# Patient Record
Sex: MALE | Race: WHITE | NOT HISPANIC OR LATINO | Employment: FULL TIME | URBAN - METROPOLITAN AREA
[De-identification: names, ages, dates, MRNs, and addresses within clinical notes are randomized per-mention and may not be internally consistent; named-entity substitution may affect disease eponyms.]

---

## 2023-08-26 ENCOUNTER — OFFICE VISIT (OUTPATIENT)
Dept: URGENT CARE | Age: 63
End: 2023-08-26
Payer: COMMERCIAL

## 2023-08-26 ENCOUNTER — HOSPITAL ENCOUNTER (EMERGENCY)
Facility: HOSPITAL | Age: 63
Discharge: HOME | End: 2023-08-26
Attending: EMERGENCY MEDICINE
Payer: COMMERCIAL

## 2023-08-26 VITALS
TEMPERATURE: 97.7 F | WEIGHT: 186.29 LBS | DIASTOLIC BLOOD PRESSURE: 90 MMHG | OXYGEN SATURATION: 96 % | RESPIRATION RATE: 18 BRPM | SYSTOLIC BLOOD PRESSURE: 200 MMHG | HEART RATE: 86 BPM

## 2023-08-26 VITALS
HEART RATE: 92 BPM | OXYGEN SATURATION: 96 % | TEMPERATURE: 97.6 F | DIASTOLIC BLOOD PRESSURE: 102 MMHG | RESPIRATION RATE: 18 BRPM | SYSTOLIC BLOOD PRESSURE: 190 MMHG

## 2023-08-26 DIAGNOSIS — I16.0 HYPERTENSIVE URGENCY: ICD-10-CM

## 2023-08-26 DIAGNOSIS — I10 HYPERTENSION, UNSPECIFIED TYPE: Primary | ICD-10-CM

## 2023-08-26 LAB
ANION GAP SERPL CALCULATED.3IONS-SCNC: 4 MMOL/L
BUN SERPL-MCNC: 8 MG/DL (ref 5–25)
CALCIUM SERPL-MCNC: 9.6 MG/DL (ref 8.4–10.2)
CHLORIDE SERPL-SCNC: 98 MMOL/L (ref 96–108)
CO2 SERPL-SCNC: 31 MMOL/L (ref 21–32)
CREAT SERPL-MCNC: 0.81 MG/DL (ref 0.6–1.3)
GFR SERPL CREATININE-BSD FRML MDRD: 94 ML/MIN/1.73SQ M
GLUCOSE SERPL-MCNC: 99 MG/DL (ref 65–140)
POTASSIUM SERPL-SCNC: 4.6 MMOL/L (ref 3.5–5.3)
SODIUM SERPL-SCNC: 133 MMOL/L (ref 135–147)

## 2023-08-26 PROCEDURE — G0382 LEV 3 HOSP TYPE B ED VISIT: HCPCS

## 2023-08-26 PROCEDURE — 80048 BASIC METABOLIC PNL TOTAL CA: CPT | Performed by: EMERGENCY MEDICINE

## 2023-08-26 PROCEDURE — 36415 COLL VENOUS BLD VENIPUNCTURE: CPT | Performed by: EMERGENCY MEDICINE

## 2023-08-26 PROCEDURE — 99285 EMERGENCY DEPT VISIT HI MDM: CPT | Performed by: EMERGENCY MEDICINE

## 2023-08-26 PROCEDURE — 99283 EMERGENCY DEPT VISIT LOW MDM: CPT

## 2023-08-26 RX ORDER — LOSARTAN POTASSIUM 50 MG/1
50 TABLET ORAL ONCE
Status: COMPLETED | OUTPATIENT
Start: 2023-08-26 | End: 2023-08-26

## 2023-08-26 RX ORDER — LOSARTAN POTASSIUM 50 MG/1
50 TABLET ORAL DAILY
Qty: 30 TABLET | Refills: 0 | Status: SHIPPED | OUTPATIENT
Start: 2023-08-26 | End: 2023-09-08 | Stop reason: SDUPTHER

## 2023-08-26 RX ADMIN — LOSARTAN POTASSIUM 50 MG: 50 TABLET, FILM COATED ORAL at 11:48

## 2023-08-26 NOTE — PROGRESS NOTES
[facility]        NAME: Melanie Austin is a 61 y.o. male  : 1960    MRN: [id number]  DATE: 2023  TIME: 10:14 AM    Assessment and Plan   Hypertension, unspecified type [I10]  1. Hypertension, unspecified type  Transfer to other facility      Report to [address] [place] [address] emergency department for further evaluation. Son is available and willing to drive. Secure follow up appointment with PCP as soon as possible for further management of HTN. Patient Instructions   Hypertension   WHAT YOU NEED TO KNOW:   Hypertension is high blood pressure. Your blood pressure is the force of your blood moving against the walls of your arteries. Hypertension causes your blood pressure to get so high that your heart has to work much harder than normal. This can damage your heart. Hypertension that does not respond to medicines and lifestyle changes is called resistant hypertension. Hypertension is considered chronic when it continues for 3 months or longer.   DISCHARGE INSTRUCTIONS:   Call your local emergency number (911 in the [address]) or have someone call if:   • You have chest pain.     • You have any of the following signs of a heart attack:       ? Squeezing, pressure, or pain in your chest     ? You may  also have any of the following:      - Discomfort or pain in your back, neck, jaw, stomach, or arm     - Shortness of breath     - Nausea or vomiting     - Lightheadedness or a sudden cold sweat     • You become confused or have trouble speaking.     • You suddenly feel lightheaded or have trouble breathing.     Return to the emergency department if:   • You have a severe headache or vision loss.     • You have weakness in an arm or leg.     Call your doctor or cardiologist if:   • You feel faint, dizzy, confused, or drowsy.     • You have been taking your blood pressure medicine but your pressure is higher than your provider says it should be.     • You have questions or concerns about your condition or care.     Medicines: You may  need any of the following:  • Antihypertensives  may be used to help lower your blood pressure. Several kinds of medicines are available. Your healthcare provider will choose medicines based on the kind of hypertension you have. You may need more than one type of medicine. Take the medicine exactly as directed.     • Diuretics  help decrease extra fluid that collects in your body. This will help lower your blood pressure. You may urinate more often while you take this medicine.     • Cholesterol medicine  helps lower your cholesterol level. A low cholesterol level helps prevent heart disease and makes it easier to control your blood pressure.     • Take your medicine as directed. Contact your healthcare provider if you think your medicine is not helping or if you have side effects. Tell your provider if you are allergic to any medicine. Keep a list of the medicines, vitamins, and herbs you take. Include the amounts, and when and why you take them. Bring the list or the pill bottles to follow-up visits. Carry your medicine list with you in case of an emergency.     Follow up with your doctor or cardiologist as directed: You will need to return to have your blood pressure checked and to have other lab tests done. Write down your questions so you remember to ask them during your visits. Stages of hypertension:  Your healthcare provider will give you a blood pressure goal based on your age, health, and risk for cardiovascular disease. The following are general guidelines on the stages of hypertension:  • Normal blood pressure is 119/79 or lower . Your healthcare provider may only check your blood pressure each year if it stays at a normal level.     • Elevated blood pressure is 120/79 to 129/79 . This is sometimes called prehypertension. Your healthcare provider may suggest lifestyle changes to help lower your blood pressure to a normal level.  He or she may then check it again in 3 to 6 months.     • Stage 1 hypertension is 130/80  to 139/89 . Your provider may recommend lifestyle changes, medication, and checks every 3 to 6 months until your blood pressure is controlled.     • Stage 2 hypertension is 140/90 or higher . Your provider will recommend lifestyle changes and have you take 2 kinds of hypertension medicines. You will also need to have your blood pressure checked monthly until it is controlled.        Manage hypertension:   • Check your blood pressure at home. Do not smoke, have caffeine, or exercise for at least 30 minutes before you check your blood pressure. Sit and rest for 5 minutes before you check your blood pressure. Extend your arm and support it on a flat surface. Your arm should be at the same level as your heart. Follow the directions that came with your blood pressure monitor. Check your blood pressure 2 times, 1 minute apart, before you take your medicine in the morning. Also check your blood pressure before your evening meal. Keep a record of your readings and bring it to your follow-up visits. Ask your healthcare provider what your blood pressure should be.          • Manage any other health conditions you have. Health conditions such as diabetes can increase your risk for hypertension. Follow your healthcare provider's instructions and take all your medicines as directed.     • Ask about all medicines. Certain medicines can increase your blood pressure. Examples include oral birth control pills, decongestants, herbal supplements, and NSAIDs, such as ibuprofen. Your healthcare provider can tell you which medicines are safe for you to take. This includes prescription and over-the-counter medicines.     Lifestyle changes you can make to manage hypertension:  Your healthcare provider may recommend you work with a team to manage hypertension.  The team may include medical experts such as a dietitian, an exercise or physical therapist, and a behavior therapist. Your family members may be included in helping you create lifestyle changes. • Limit sodium (salt) as directed. Too much sodium can affect your fluid balance. Check labels to find low-sodium or no-salt-added foods. Some low-sodium foods use potassium salts for flavor. Too much potassium can also cause health problems. Your healthcare provider will tell you how much sodium and potassium are safe for you to have in a day. He or she may recommend that you limit sodium to 2,300 mg a day.          • Follow the meal plan recommended by your healthcare provider. A dietitian or your provider can give you more information on low-sodium plans or the DASH (Dietary Approaches to Stop Hypertension) eating plan. The DASH plan is low in sodium, processed sugar, unhealthy fats, and total fat. It is high in potassium, calcium, and fiber. These can be found in vegetables, fruit, and whole-grain foods.          • Be physically active throughout the day. Physical activity, such as exercise, can help control your blood pressure and your weight. Be physically active for at least 30 minutes per day, on most days of the week. Include aerobic activity, such as walking or riding a bicycle. Also include strength training at least 2 times each week. Your healthcare providers can help you create a physical activity plan.              • Decrease stress. This may help lower your blood pressure. Learn ways to relax, such as deep breathing or listening to music.     • Limit alcohol as directed. Alcohol can increase your blood pressure. A drink of alcohol is 12 ounces of beer, 5 ounces of wine, or 1½ ounces of liquor.     • Do not smoke. Nicotine and other chemicals in cigarettes and cigars can increase your blood pressure and also cause lung damage. Ask your healthcare provider for information if you currently smoke and need help to quit. E-cigarettes or smokeless tobacco still contain nicotine.  Talk to your healthcare provider before you use these products.        © Copyright [facility] 2022 Information is for End User's use only and may not be sold, redistributed or otherwise used for commercial purposes. The above information is an  only. It is not intended as medical advice for individual conditions or treatments. Talk to your doctor, nurse or pharmacist before following any medical regimen to see if it is safe and effective for you. Follow up with PCP in 3-5 days. Proceed to  ER if symptoms worsen. Chief Complaint     Chief Complaint   Patient presents with   • Hypertension     Patient with history of high blood pressure who is not regulated on medication noted that his blood pressure has been high recently. He denies any symptoms relating to the blood pressure. History of Present Illness       61year old male with PMH significant for HTN presents with son for evaluation of high blood pressure. He reports that he has been diagnosed with HTN in the past and prescribed medications, but he has not taken any medications in 2 years because he "can't get an appointment" with his PCP. He is a current smoker. He records his BP at home and notes systolic readings in the [id number] over the last several days. He denies headache, vision changes, dizziness, chest pain, palpitations or any other symptoms. Hypertension  Pertinent negatives include no chest pain, headaches, neck pain, palpitations or shortness of breath. Review of Systems   Review of Systems   Constitutional: Negative for fatigue and fever. HENT: Negative for congestion, ear discharge, ear pain, postnasal drip, rhinorrhea, sinus pressure, sinus pain, sneezing and sore throat. Eyes: Negative. Negative for pain, discharge, redness and itching. Respiratory: Negative. Negative for apnea, cough, choking, chest tightness, shortness of breath, wheezing and stridor. Cardiovascular: Negative. Negative for chest pain and palpitations. High blood pressure   Gastrointestinal: Negative. Negative for diarrhea, nausea and vomiting. Endocrine: Negative. Negative for polydipsia, polyphagia and polyuria. Genitourinary: Negative. Negative for decreased urine volume and flank pain. Musculoskeletal: Negative. Negative for arthralgias, back pain, myalgias, neck pain and neck stiffness. Skin: Negative. Negative for color change and rash. Allergic/Immunologic: Negative. Negative for environmental allergies. Neurological: Negative. Negative for dizziness, facial asymmetry, light-headedness, numbness and headaches. Hematological: Negative. Negative for adenopathy. Psychiatric/Behavioral: Negative. Current Medications       Current Outpatient Medications:   •  HYDROcodone-acetaminophen (NORCO) 5-325 mg per tablet, Take 1 tablet by mouth every 6 (six) hours as needed for moderate pain (Not relieved by Anti-inflammatory). Max Daily Amount: 4 tablets (Patient not taking: Reported on 8/26/2023), Disp: 20 tablet, Rfl: 0    Current Allergies     Allergies as of 08/26/2023   • (No Known Allergies)            The following portions of the patient's history were reviewed and updated as appropriate: allergies, current medications, past family history, past medical history, past social history, past surgical history and problem list.     No past medical history on file. No past surgical history on file. No family history on file. Medications have been verified. Objective   BP (!) 190/102 (BP Location: Left arm, Patient Position: Sitting, Cuff Size: Standard)   Pulse 92   Temp 97.6 °F (36.4 °C)   Resp 18   SpO2 96%        Physical Exam     Physical Exam  Vitals reviewed. Constitutional:       General: He is not in acute distress. Appearance: Normal appearance. He is not ill-appearing, toxic-appearing or diaphoretic. Interventions: He is not intubated. HENT:      Head: Normocephalic and atraumatic.       Right Ear: External ear normal. There is no impacted cerumen. Left Ear: External ear normal. There is no impacted cerumen. Nose: Nose normal. No congestion or rhinorrhea. Mouth/Throat:      Mouth: Mucous membranes are moist.      Pharynx: Oropharynx is clear. Uvula midline. No pharyngeal swelling, oropharyngeal exudate, posterior oropharyngeal erythema or uvula swelling. Tonsils: No tonsillar exudate or tonsillar abscesses. 1+ on the right. 1+ on the left. Eyes:      Extraocular Movements: Extraocular movements intact. Conjunctiva/sclera: Conjunctivae normal.      Pupils: Pupils are equal, round, and reactive to light. Cardiovascular:      Rate and Rhythm: Normal rate and regular rhythm. Pulses: Normal pulses. Heart sounds: Normal heart sounds, S1 normal and S2 normal. Heart sounds not distant. No murmur heard. No friction rub. No gallop. Pulmonary:      Effort: Pulmonary effort is normal. No tachypnea, bradypnea, accessory muscle usage, prolonged expiration, respiratory distress or retractions. He is not intubated. Breath sounds: Normal breath sounds. No stridor, decreased air movement or transmitted upper airway sounds. No decreased breath sounds, wheezing, rhonchi or rales. Chest:      Chest wall: No tenderness. Musculoskeletal:         General: Normal range of motion. Cervical back: Normal range of motion and neck supple. No rigidity or tenderness. Lymphadenopathy:      Cervical: No cervical adenopathy. Skin:     General: Skin is warm and dry. Capillary Refill: Capillary refill takes less than 2 seconds. Findings: No erythema. Neurological:      General: No focal deficit present. Mental Status: He is alert and oriented to person, place, and time. Mental status is at baseline. GCS: GCS eye subscore is 4. GCS verbal subscore is 5. GCS motor subscore is 6.    Psychiatric:         Mood and Affect: Mood normal.

## 2023-08-29 NOTE — ED PROVIDER NOTES
History  Chief Complaint   Patient presents with   • High Blood Pressure     Pt was at urgent care where his BP was 190/102 and was sent here. Pt states he took medication for BP 2 years ago but currently does not take anything for this. Pt reports no symptoms. 71-year-old male presents with asymptomatic hypertension. ,  Patient went to a urgent care for a this and was sent here as his blood pressure was elevated. No headache no chest pain no dizziness no lightheadedness no shortness of breath. Patient is unsure the last time he checked his blood pressure. ,  Says he was on blood pressure medicine years ago but stopped taking. As he lost insurance and has not been to a PCP. Prior to Admission Medications   Prescriptions Last Dose Informant Patient Reported? Taking? HYDROcodone-acetaminophen (NORCO) 5-325 mg per tablet   No No   Sig: Take 1 tablet by mouth every 6 (six) hours as needed for moderate pain (Not relieved by Anti-inflammatory). Max Daily Amount: 4 tablets   Patient not taking: Reported on 8/26/2023      Facility-Administered Medications: None       History reviewed. No pertinent past medical history. Past Surgical History:   Procedure Laterality Date   • APPENDECTOMY         History reviewed. No pertinent family history. I have reviewed and agree with the history as documented. E-Cigarette/Vaping     E-Cigarette/Vaping Substances     Social History     Tobacco Use   • Smoking status: Every Day     Packs/day: 1.00     Types: Cigarettes   Substance Use Topics   • Alcohol use: Yes     Alcohol/week: 6.0 standard drinks of alcohol     Types: 6 Cans of beer per week     Comment: 6 pack daily   • Drug use: No       Review of Systems   Constitutional: Negative for activity change, chills, diaphoresis and fever. HENT: Negative for congestion, sinus pressure and sore throat. Eyes: Negative for pain and visual disturbance.    Respiratory: Negative for cough, chest tightness, shortness of breath, wheezing and stridor. Cardiovascular: Negative for chest pain and palpitations. Gastrointestinal: Negative for abdominal distention, abdominal pain, constipation, diarrhea, nausea and vomiting. Genitourinary: Negative for dysuria and frequency. Musculoskeletal: Negative for neck pain and neck stiffness. Skin: Negative for rash. Neurological: Negative for dizziness, speech difficulty, light-headedness, numbness and headaches. Physical Exam  Physical Exam  Vitals reviewed. Constitutional:       General: He is not in acute distress. Appearance: He is well-developed. He is not diaphoretic. HENT:      Head: Normocephalic and atraumatic. Right Ear: External ear normal.      Left Ear: External ear normal.      Nose: Nose normal.   Eyes:      General:         Right eye: No discharge. Left eye: No discharge. Pupils: Pupils are equal, round, and reactive to light. Neck:      Trachea: No tracheal deviation. Cardiovascular:      Rate and Rhythm: Normal rate and regular rhythm. Heart sounds: Normal heart sounds. No murmur heard. Pulmonary:      Effort: Pulmonary effort is normal. No respiratory distress. Breath sounds: Normal breath sounds. No stridor. Abdominal:      General: There is no distension. Palpations: Abdomen is soft. Tenderness: There is no abdominal tenderness. There is no guarding or rebound. Musculoskeletal:         General: Normal range of motion. Cervical back: Normal range of motion and neck supple. Skin:     General: Skin is warm and dry. Coloration: Skin is not pale. Findings: No erythema. Neurological:      General: No focal deficit present. Mental Status: He is alert and oriented to person, place, and time.          Vital Signs  ED Triage Vitals   Temperature Pulse Respirations Blood Pressure SpO2   08/26/23 1048 08/26/23 1048 08/26/23 1048 08/26/23 1049 08/26/23 1048   97.7 °F (36.5 °C) 86 18 (!) 216/107 96 %      Temp Source Heart Rate Source Patient Position - Orthostatic VS BP Location FiO2 (%)   08/26/23 1048 08/26/23 1048 -- 08/26/23 1048 --   Oral Monitor  Right arm       Pain Score       08/26/23 1048       No Pain           Vitals:    08/26/23 1048 08/26/23 1049 08/26/23 1055   BP:  (!) 216/107 (!) 200/90   Pulse: 86           Visual Acuity      ED Medications  Medications   losartan (COZAAR) tablet 50 mg (50 mg Oral Given 8/26/23 1148)       Diagnostic Studies  Results Reviewed     Procedure Component Value Units Date/Time    Basic metabolic panel [[id number]]  (Abnormal) Collected: 08/26/23 1112    Lab Status: Final result Specimen: Blood from Arm, Right Updated: 08/26/23 1142     Sodium 133 mmol/L      Potassium 4.6 mmol/L      Chloride 98 mmol/L      CO2 31 mmol/L      ANION GAP 4 mmol/L      BUN 8 mg/dL      Creatinine 0.81 mg/dL      Glucose 99 mg/dL      Calcium 9.6 mg/dL      eGFR 94 ml/min/1.73sq m     Narrative:      [place] guidelines for Chronic Kidney Disease (CKD):   •  Stage 1 with normal or high GFR (GFR > 90 mL/min/1.73 square meters)  •  Stage 2 Mild CKD (GFR = 60-89 mL/min/1.73 square meters)  •  Stage 3A Moderate CKD (GFR = 45-59 mL/min/1.73 square meters)  •  Stage 3B Moderate CKD (GFR = 30-44 mL/min/1.73 square meters)  •  Stage 4 Severe CKD (GFR = 15-29 mL/min/1.73 square meters)  •  Stage 5 End Stage CKD (GFR <15 mL/min/1.73 square meters)  Note: GFR calculation is accurate only with a steady state creatinine                 No orders to display              Procedures  Procedures         ED Course                                             Medical Decision Making        Initial ED assessment: 42-year-old male, asymptomatic hypertension, history of hypertension used to be on blood pressure meds does not recall the names    Initial DDx includes but is not limited to:   Asymptomatic hypertension, patient does not have any chest pain suggestive of myocardial ischemia does not have any headache suggestive of intracranial hemorrhage,    Initial ED plan: We will start on antihypertensives we will check CBC CMP to ensure no hepatic or renal dysfunction        Final ED summary/disposition:   After evaluation and workup in the emergency department, discharged on losartan given name of [facility] family medicine clinic for which she will follow. Amount and/or Complexity of Data Reviewed  Labs: ordered. Risk  Prescription drug management. Disposition  Final diagnoses:   Hypertension, unspecified type   Hypertensive urgency     Time reflects when diagnosis was documented in both MDM as applicable and the Disposition within this note     Time User Action Codes Description Comment    8/26/2023 11:42 AM Noralyn Dakins Add [I15.9] Secondary hypertension     8/26/2023 11:42 AM Gillie Kanner J Remove [I15.9] Secondary hypertension     8/26/2023 11:42 AM Delvin Abdalla Add [I10] Hypertension, unspecified type     8/26/2023 11:42 AM Noralyn Dakins Add [I16.0] Hypertensive urgency       ED Disposition     ED Disposition   Discharge    Condition   Stable    Date/Time   Sat Aug 26, 2023 11:42 AM    Comment   Marci Mason discharge to home/self care.                Follow-up Information     Follow up With Specialties Details Why Contact Info Additional Information    [facility] Call in 1 day To arrange an appointment with a family doctor [id number] [facility] [phone]  [address], [place], [place], [address] [facility] [address]          Discharge Medication List as of 8/26/2023 11:43 AM      START taking these medications    Details   losartan (COZAAR) 50 mg tablet Take 1 tablet (50 mg total) by mouth daily, Starting Sat 8/26/2023, Normal         CONTINUE these medications which have NOT CHANGED    Details   HYDROcodone-acetaminophen (NORCO) 5-325 mg per tablet Take 1 tablet by mouth every 6 (six) hours as needed for moderate pain (Not relieved by Anti-inflammatory). Max Daily Amount: 4 tablets, Starting 4/11/2016, Until Discontinued, Print             No discharge procedures on file.     PDMP Review     None          ED Provider  Electronically Signed by           Geeta Liu DO  08/29/23 [id number]

## 2023-09-08 ENCOUNTER — OFFICE VISIT (OUTPATIENT)
Dept: FAMILY MEDICINE CLINIC | Facility: CLINIC | Age: 63
End: 2023-09-08

## 2023-09-08 VITALS
WEIGHT: 189 LBS | RESPIRATION RATE: 16 BRPM | TEMPERATURE: 97.7 F | BODY MASS INDEX: 28.64 KG/M2 | HEIGHT: 68 IN | HEART RATE: 47 BPM | OXYGEN SATURATION: 92 % | SYSTOLIC BLOOD PRESSURE: 130 MMHG | DIASTOLIC BLOOD PRESSURE: 76 MMHG

## 2023-09-08 DIAGNOSIS — I10 HYPERTENSION, UNSPECIFIED TYPE: Primary | ICD-10-CM

## 2023-09-08 DIAGNOSIS — J44.9 CHRONIC OBSTRUCTIVE PULMONARY DISEASE, UNSPECIFIED COPD TYPE (HCC): ICD-10-CM

## 2023-09-08 DIAGNOSIS — R31.0 GROSS HEMATURIA: ICD-10-CM

## 2023-09-08 DIAGNOSIS — Z12.11 SCREEN FOR COLON CANCER: ICD-10-CM

## 2023-09-08 PROBLEM — F17.200 CURRENT EVERY DAY SMOKER: Status: ACTIVE | Noted: 2023-09-08

## 2023-09-08 PROCEDURE — 99213 OFFICE O/P EST LOW 20 MIN: CPT | Performed by: INTERNAL MEDICINE

## 2023-09-08 RX ORDER — LOSARTAN POTASSIUM 50 MG/1
50 TABLET ORAL DAILY
Qty: 30 TABLET | Refills: 0 | Status: SHIPPED | OUTPATIENT
Start: 2023-09-08

## 2023-09-08 RX ORDER — ALBUTEROL SULFATE 90 UG/1
2 AEROSOL, METERED RESPIRATORY (INHALATION) EVERY 6 HOURS PRN
Qty: 6.7 G | Refills: 5 | Status: SHIPPED | OUTPATIENT
Start: 2023-09-08

## 2023-09-08 NOTE — ASSESSMENT & PLAN NOTE
Patient has a chronic cough due to his smoking history. In the past he has used an inhaler with much success.

## 2023-09-08 NOTE — PROGRESS NOTES
Name: So Walsh      : 1960      MRN: [id number]  Encounter Provider: Kaitlin Schmitt MD  Encounter Date: 2023   Encounter department: [facility]     1. Hypertension, unspecified type  Assessment & Plan:  Patient was placed on losartan for his blood pressure and needs refills. Orders:  -     losartan (COZAAR) 50 mg tablet; Take 1 tablet (50 mg total) by mouth daily  -     Ambulatory Referral to Social Work Care Management Program; Future    2. Screen for colon cancer  -     Occult Blood, Fecal Immunochemical; Future    3. Chronic obstructive pulmonary disease, unspecified COPD type ([address] )  Assessment & Plan:  Patient has a chronic cough due to his smoking history. In the past he has used an inhaler with much success. Orders:  -     albuterol (Proventil HFA) 90 mcg/act inhaler; Inhale 2 puffs every 6 (six) hours as needed for wheezing  -     Ambulatory Referral to Social Work Care Management Program; Future    4. Gross hematuria  Assessment & Plan:  He casually mentions that he periodically has gross hematuria. Unfortunately he has no insurance             Subjective      Patient has multiple health issues however has had no insurance and needs to be followed more regularly by a regular doctor. He denies any chest pain but does have a chronic cough from his smoking. The only blood work he had was BMP done within the month we will try and get care management to help him out get insurance    Review of Systems   Constitutional: Negative for chills and fever. HENT: Negative for ear pain and sore throat. Eyes: Negative for pain and visual disturbance. Respiratory: Positive for cough and shortness of breath. Cardiovascular: Negative for chest pain and palpitations. Gastrointestinal: Negative for abdominal pain and vomiting. Genitourinary: Positive for hematuria. Negative for dysuria. Musculoskeletal: Negative for arthralgias and back pain. Skin: Negative for color change and rash. Neurological: Negative for seizures and syncope. All other systems reviewed and are negative. Current Outpatient Medications on File Prior to Visit   Medication Sig   • [DISCONTINUED] losartan (COZAAR) 50 mg tablet Take 1 tablet (50 mg total) by mouth daily   • [DISCONTINUED] HYDROcodone-acetaminophen (NORCO) 5-325 mg per tablet Take 1 tablet by mouth every 6 (six) hours as needed for moderate pain (Not relieved by Anti-inflammatory). Max Daily Amount: 4 tablets (Patient not taking: Reported on 8/26/2023)       Objective     /76 (BP Location: Left arm, Patient Position: Sitting, Cuff Size: Large)   Pulse (!) 47   Temp 97.7 °F (36.5 °C) (Temporal)   Resp 16   Ht 5' 8" (1.727 m)   Wt 85.7 kg (189 lb)   SpO2 92%   BMI 28.74 kg/m²     Physical Exam  Constitutional:       General: He is not in acute distress. Appearance: He is well-developed. HENT:      Right Ear: External ear normal.      Left Ear: External ear normal.      Nose: Nose normal.      Mouth/Throat:      Pharynx: No oropharyngeal exudate. Eyes:      Pupils: Pupils are equal, round, and reactive to light. Neck:      Thyroid: No thyromegaly. Vascular: No JVD. Cardiovascular:      Rate and Rhythm: Normal rate and regular rhythm. Heart sounds: Normal heart sounds. No murmur heard. No gallop. Pulmonary:      Effort: Pulmonary effort is normal. No respiratory distress. Breath sounds: Normal breath sounds. No wheezing or rales. Abdominal:      General: Bowel sounds are normal. There is no distension. Palpations: Abdomen is soft. There is no mass. Tenderness: There is no abdominal tenderness. Musculoskeletal:         General: No tenderness. Normal range of motion. Cervical back: Normal range of motion and neck supple. Right lower leg: No edema. Left lower leg: No edema. Lymphadenopathy:      Cervical: No cervical adenopathy.    Skin: Findings: No rash. Neurological:      General: No focal deficit present. Mental Status: He is alert and oriented to person, place, and time. Cranial Nerves: No cranial nerve deficit. Coordination: Coordination normal.   Psychiatric:         Mood and Affect: Mood normal.         Behavior: Behavior normal.         Thought Content:  Thought content normal.         Judgment: Judgment normal.       Karli Mar MD

## 2023-09-11 ENCOUNTER — PATIENT OUTREACH (OUTPATIENT)
Dept: FAMILY MEDICINE CLINIC | Facility: CLINIC | Age: 63
End: 2023-09-11

## 2023-09-11 NOTE — PROGRESS NOTES
DONTACM received referral from PCP office to assist with insurance. Chart review completed. Patient is currently without insurance.  order states to call son, Gutierrez Baires at [phone]; however, there is no communication consent signed to provide SW permission to speak with son. SW placed phone call to patient. Patient was not available and message box is full and not taking messages at this time. SW placed phone call to wife, Anuj Cheung, who is listed as emergency contact. Call was declined. SW will attempt to reach patient at another time.

## 2023-09-30 DIAGNOSIS — I10 HYPERTENSION, UNSPECIFIED TYPE: ICD-10-CM

## 2023-10-02 ENCOUNTER — PATIENT OUTREACH (OUTPATIENT)
Dept: FAMILY MEDICINE CLINIC | Facility: CLINIC | Age: 63
End: 2023-10-02

## 2023-10-02 RX ORDER — LOSARTAN POTASSIUM 50 MG/1
50 TABLET ORAL DAILY
Qty: 90 TABLET | Refills: 1 | Status: SHIPPED | OUTPATIENT
Start: 2023-10-02

## 2023-10-02 NOTE — LETTER
[address] [facility]  [phone]    Re: Insurance Assistance   10/2/2023       Dear Karlos Butcher,    I tried to reach you by telephone and unfortunately was unable to speak with you. I wanted to be certain that you had information regarding medical insurance. Please feel free to contact me at [phone] if you have questions or needs regarding health insurance. If I do not have an answer I can assist you in finding the appropriate agency or individual who can help.     Sincerely,         Dale Delarosa LCSW

## 2023-10-02 NOTE — PROGRESS NOTES
OPCM SW placed 2nd outreach phone call to patient. Note states to call son Clementina Hancock; however, patient does not have son listed as an emergency contact and no medical communication consent is provided. SW attempted to reach patient and unable to leave a message. Unable to reach letter sent by [address] [place] [address] and referral closed due to not being able to connect with patient. SW remains available.

## 2023-11-17 ENCOUNTER — OFFICE VISIT (OUTPATIENT)
Dept: FAMILY MEDICINE CLINIC | Facility: CLINIC | Age: 63
End: 2023-11-17
Payer: COMMERCIAL

## 2023-11-17 VITALS
OXYGEN SATURATION: 94 % | RESPIRATION RATE: 16 BRPM | HEART RATE: 92 BPM | HEIGHT: 68 IN | TEMPERATURE: 97.2 F | DIASTOLIC BLOOD PRESSURE: 98 MMHG | BODY MASS INDEX: 29.1 KG/M2 | WEIGHT: 192 LBS | SYSTOLIC BLOOD PRESSURE: 158 MMHG

## 2023-11-17 DIAGNOSIS — Z13.220 SCREENING, LIPID: ICD-10-CM

## 2023-11-17 DIAGNOSIS — Z12.11 SCREEN FOR COLON CANCER: ICD-10-CM

## 2023-11-17 DIAGNOSIS — I10 HYPERTENSION, UNSPECIFIED TYPE: ICD-10-CM

## 2023-11-17 DIAGNOSIS — J44.9 CHRONIC OBSTRUCTIVE PULMONARY DISEASE, UNSPECIFIED COPD TYPE (HCC): Primary | ICD-10-CM

## 2023-11-17 DIAGNOSIS — Z12.5 SCREENING FOR PROSTATE CANCER: ICD-10-CM

## 2023-11-17 PROCEDURE — 99214 OFFICE O/P EST MOD 30 MIN: CPT | Performed by: INTERNAL MEDICINE

## 2023-11-17 RX ORDER — LOSARTAN POTASSIUM 50 MG/1
100 TABLET ORAL DAILY
Qty: 90 TABLET | Refills: 1 | Status: SHIPPED | OUTPATIENT
Start: 2023-11-17

## 2023-11-17 NOTE — PROGRESS NOTES
Answers submitted by the patient for this visit:  High Blood Pressure Questionnaire (Submitted on 11/13/2023)  Chief Complaint: Hypertension  Chronicity: chronic  Onset: more than 1 month ago  Progression since onset: unchanged  Condition status: controlled  anxiety: No  blurred vision: Yes  chest pain: Yes  headaches: No  malaise/fatigue: No  neck pain: No  orthopnea: Yes  palpitations: Yes  peripheral edema: No  PND: No  shortness of breath: Yes  sweats: No  Agents associated with hypertension: NSAIDs  CAD risks: sedentary lifestyle, smoking/tobacco exposure  Compliance problems: no compliance problems

## 2023-11-17 NOTE — PROGRESS NOTES
Name: Vladislav Young      : 1960      MRN: [id number]  Encounter Provider: Gayatri Boswell MD  Encounter Date: 2023   Encounter department: [facility]     1. Chronic obstructive pulmonary disease, unspecified COPD type ([address])  -     fluticasone-umeclidinium-vilanterol 100-62.5-25 mcg/actuation inhaler; Inhale 1 puff daily Rinse mouth after use. -     CBC and differential; Future  -     XR chest pa & lateral; Future; Expected date: 2023    2. Hypertension, unspecified type  -     losartan (COZAAR) 50 mg tablet; Take 2 tablets (100 mg total) by mouth daily  -     Comprehensive metabolic panel; Future    3. Screening for prostate cancer  -     PSA, Total Screen; Future    4. Screening, lipid  -     Lipid panel; Future    5. Screen for colon cancer  -     Cologuard      BMI Counseling: Body mass index is 29.19 kg/m². The BMI is above normal. Nutrition recommendations include decreasing portion sizes, limiting drinks that contain sugar, moderation in carbohydrate intake and reducing intake of cholesterol. Exercise recommendations include exercising 3-5 times per week and strength training exercises. Rationale for BMI follow-up plan is due to patient being overweight or obese. Subjective      Pt did get insurance and is ready to go for testingg and adjust his meds    Hypertension  This is a chronic problem. The current episode started more than 1 month ago. The problem is unchanged. The problem is controlled. Associated symptoms include blurred vision, chest pain, orthopnea, palpitations and shortness of breath. Pertinent negatives include no anxiety, headaches, malaise/fatigue, neck pain, peripheral edema, PND or sweats. Agents associated with hypertension include NSAIDs. Risk factors for coronary artery disease include sedentary lifestyle, smoking/tobacco exposure and male gender. Past treatments include angiotensin blockers.  The current treatment provides mild improvement. There are no compliance problems. There is no history of angina or CVA. Review of Systems   Constitutional:  Negative for chills, fever and malaise/fatigue. HENT:  Negative for ear pain and sore throat. Eyes:  Positive for blurred vision. Negative for pain and visual disturbance. Respiratory:  Positive for shortness of breath. Negative for cough. Cardiovascular:  Positive for chest pain, palpitations and orthopnea. Negative for PND. Gastrointestinal:  Negative for abdominal pain and vomiting. Genitourinary:  Negative for dysuria and hematuria. Musculoskeletal:  Negative for arthralgias, back pain and neck pain. Skin:  Negative for color change and rash. Neurological:  Negative for seizures, syncope and headaches. All other systems reviewed and are negative. Current Outpatient Medications on File Prior to Visit   Medication Sig    albuterol (Proventil HFA) 90 mcg/act inhaler Inhale 2 puffs every 6 (six) hours as needed for wheezing    [DISCONTINUED] losartan (COZAAR) 50 mg tablet TAKE 1 TABLET BY MOUTH EVERY DAY       Objective     /98 (BP Location: Right arm, Patient Position: Sitting, Cuff Size: Large)   Pulse 92   Temp (!) 97.2 °F (36.2 °C) (Temporal)   Resp 16   Ht 5' 8" (1.727 m)   Wt 87.1 kg (192 lb)   SpO2 94%   BMI 29.19 kg/m²     Physical Exam  Constitutional:       General: He is not in acute distress. Appearance: He is well-developed. HENT:      Right Ear: External ear normal.      Left Ear: External ear normal.      Nose: Nose normal.      Mouth/Throat:      Pharynx: No oropharyngeal exudate. Eyes:      Pupils: Pupils are equal, round, and reactive to light. Neck:      Thyroid: No thyromegaly. Vascular: No JVD. Cardiovascular:      Rate and Rhythm: Normal rate and regular rhythm. Heart sounds: Normal heart sounds. No murmur heard. No gallop.    Pulmonary:      Effort: Pulmonary effort is normal. No respiratory distress. Breath sounds: Normal breath sounds. No wheezing, rhonchi or rales. Abdominal:      General: Bowel sounds are normal. There is no distension. Palpations: Abdomen is soft. There is no mass. Tenderness: There is no abdominal tenderness. Musculoskeletal:         General: No tenderness. Normal range of motion. Cervical back: Normal range of motion and neck supple. Right lower leg: No edema. Left lower leg: No edema. Lymphadenopathy:      Cervical: No cervical adenopathy. Skin:     Findings: No rash. Neurological:      General: No focal deficit present. Mental Status: He is alert and oriented to person, place, and time. Cranial Nerves: No cranial nerve deficit. Coordination: Coordination normal.   Psychiatric:         Mood and Affect: Mood normal.         Behavior: Behavior normal.         Thought Content:  Thought content normal.         Judgment: Judgment normal.       Ciara Thompson MD

## 2023-11-25 ENCOUNTER — LAB (OUTPATIENT)
Dept: LAB | Facility: MEDICAL CENTER | Age: 63
End: 2023-11-25
Payer: COMMERCIAL

## 2023-11-25 ENCOUNTER — APPOINTMENT (OUTPATIENT)
Dept: RADIOLOGY | Facility: MEDICAL CENTER | Age: 63
End: 2023-11-25
Payer: COMMERCIAL

## 2023-11-25 DIAGNOSIS — J44.9 CHRONIC OBSTRUCTIVE PULMONARY DISEASE, UNSPECIFIED COPD TYPE (HCC): ICD-10-CM

## 2023-11-25 DIAGNOSIS — I10 HYPERTENSION, UNSPECIFIED TYPE: ICD-10-CM

## 2023-11-25 DIAGNOSIS — Z12.5 SCREENING FOR PROSTATE CANCER: ICD-10-CM

## 2023-11-25 DIAGNOSIS — Z13.220 SCREENING, LIPID: ICD-10-CM

## 2023-11-25 LAB
ALBUMIN SERPL BCP-MCNC: 3.9 G/DL (ref 3.5–5)
ALP SERPL-CCNC: 79 U/L (ref 34–104)
ALT SERPL W P-5'-P-CCNC: 12 U/L (ref 7–52)
ANION GAP SERPL CALCULATED.3IONS-SCNC: 3 MMOL/L
AST SERPL W P-5'-P-CCNC: 16 U/L (ref 13–39)
BILIRUB SERPL-MCNC: 0.54 MG/DL (ref 0.2–1)
BUN SERPL-MCNC: 8 MG/DL (ref 5–25)
CALCIUM SERPL-MCNC: 9.6 MG/DL (ref 8.4–10.2)
CHLORIDE SERPL-SCNC: 99 MMOL/L (ref 96–108)
CHOLEST SERPL-MCNC: 199 MG/DL
CO2 SERPL-SCNC: 32 MMOL/L (ref 21–32)
CREAT SERPL-MCNC: 1.02 MG/DL (ref 0.6–1.3)
GFR SERPL CREATININE-BSD FRML MDRD: 77 ML/MIN/1.73SQ M
GLUCOSE P FAST SERPL-MCNC: 118 MG/DL (ref 65–99)
HDLC SERPL-MCNC: 48 MG/DL
LDLC SERPL CALC-MCNC: 123 MG/DL (ref 0–100)
NONHDLC SERPL-MCNC: 151 MG/DL
POTASSIUM SERPL-SCNC: 5.9 MMOL/L (ref 3.5–5.3)
PROT SERPL-MCNC: 7.3 G/DL (ref 6.4–8.4)
PSA SERPL-MCNC: 2.41 NG/ML (ref 0–4)
SODIUM SERPL-SCNC: 134 MMOL/L (ref 135–147)
TRIGL SERPL-MCNC: 138 MG/DL

## 2023-11-25 PROCEDURE — G0103 PSA SCREENING: HCPCS

## 2023-11-25 PROCEDURE — 85025 COMPLETE CBC W/AUTO DIFF WBC: CPT

## 2023-11-25 PROCEDURE — 71046 X-RAY EXAM CHEST 2 VIEWS: CPT

## 2023-11-25 PROCEDURE — 36415 COLL VENOUS BLD VENIPUNCTURE: CPT

## 2023-11-25 PROCEDURE — 80053 COMPREHEN METABOLIC PANEL: CPT

## 2023-11-25 PROCEDURE — 80061 LIPID PANEL: CPT

## 2023-11-25 NOTE — LETTER
[address] [place] [address]  [address] [address]      November 28, 2023    MRN: [id number]     Phone: [phone]     Dear Mr. Rosario Patel recently had a(n) Diagnostic Imaging performed on 11/25/2023 at  [address] [place] [address] that was requested by Lia Lundy MD. The study was reviewed by a radiologist, which is a physician who specializes in medical imaging. The radiologist issued a report describing his or her findings. In that report there was a finding that the radiologist felt warranted further discussion with your health care provider and that discussion would be beneficial to you. The results were sent to Lia Lundy MD on 11/25/2023  8:16 AM. We recommend that you contact Lia Lundy MD at [phone] or set up an appointment to discuss the results of the imaging test. If you have already heard from Lia Lundy MD regarding the results of your study, you can disregard this letter. This letter is not meant to alarm you, but intended to encourage you to follow-up on your results with the provider that sent you for the imaging study. In addition, we have enclosed answers to frequently asked questions by other patients who have also received a letter to review results with their health care provider (see page two). Thank you for choosing [address] [place] [address] for your medical imaging needs. FREQUENTLY ASKED QUESTIONS    Why am I receiving this letter? [facility] requires us to notify patients who have findings on imaging exams that may require more testing or follow-up with a health professional within the next 3 months.         How serious is the finding on the imaging test?  This letter is sent to all patients who may need follow-up or more testing within the next 3 months. Receiving this letter does not necessarily mean you have a life-threatening imaging finding or disease. Recommendations in the radiologist’s imaging report are general in nature and it is up to your healthcare provider to say whether those recommendations make sense for your situation. You are strongly encouraged to talk to your health care provider about the results and ask whether additional steps need to be taken. Where can I get a copy of the final report for my recent radiology exam?  To get a full copy of the report you can access your records online at [email or web address] or please contact [facility] Medical Records Department at [phone] Monday through Friday between 8 am and 6 pm.         What do I need to do now? Please contact your health care provider who requested the imaging study to discuss what further actions (if any) are needed. You may have already heard from (your ordering provider) in regard to this test in which case you can disregard this letter. NOTICE IN ACCORDANCE WITH THE [place] STATE “PATIENT TEST RESULT INFORMATION ACT OF 2018”    You are receiving this notice as a result of a determination by your diagnostic imaging service that further discussions of your test results are warranted and would be beneficial to you. The complete results of your test or tests have been or will be sent to the health care practitioner that ordered the test or tests. It is recommended that you contact your health care practitioner to discuss your results as soon as possible.

## 2023-11-27 DIAGNOSIS — R91.8 ABNORMALITY OF LUNG ON CXR: Primary | ICD-10-CM

## 2023-12-01 ENCOUNTER — TELEPHONE (OUTPATIENT)
Dept: FAMILY MEDICINE CLINIC | Facility: CLINIC | Age: 63
End: 2023-12-01

## 2023-12-01 NOTE — TELEPHONE ENCOUNTER
Tried to call patient with lab results, and chest xray results mail box full.  Unable to leave a voicemail

## 2023-12-01 NOTE — TELEPHONE ENCOUNTER
----- Message from Noah Gonzalez MD sent at 11/27/2023  9:09 AM EST -----  Sugar little high PSA and the rest of his blood work okay

## 2023-12-01 NOTE — TELEPHONE ENCOUNTER
----- Message from Jaime Freeman MD sent at 11/27/2023  9:03 AM EST -----  Chest x-ray was not normal he needs a CT scan of his chest referral to pulmonary

## 2023-12-09 LAB — COLOGUARD RESULT REPORTABLE: POSITIVE

## 2023-12-11 DIAGNOSIS — R19.5 POSITIVE COLORECTAL CANCER SCREENING USING COLOGUARD TEST: Primary | ICD-10-CM

## 2023-12-20 ENCOUNTER — OFFICE VISIT (OUTPATIENT)
Dept: GASTROENTEROLOGY | Facility: AMBULARY SURGERY CENTER | Age: 63
End: 2023-12-20
Payer: COMMERCIAL

## 2023-12-20 ENCOUNTER — TELEPHONE (OUTPATIENT)
Dept: GASTROENTEROLOGY | Facility: AMBULARY SURGERY CENTER | Age: 63
End: 2023-12-20

## 2023-12-20 VITALS
HEIGHT: 68 IN | WEIGHT: 195.2 LBS | DIASTOLIC BLOOD PRESSURE: 88 MMHG | SYSTOLIC BLOOD PRESSURE: 150 MMHG | OXYGEN SATURATION: 98 % | BODY MASS INDEX: 29.58 KG/M2 | HEART RATE: 62 BPM

## 2023-12-20 DIAGNOSIS — R19.5 POSITIVE COLORECTAL CANCER SCREENING USING COLOGUARD TEST: Primary | ICD-10-CM

## 2023-12-20 DIAGNOSIS — F17.200 CURRENT EVERY DAY SMOKER: ICD-10-CM

## 2023-12-20 DIAGNOSIS — J44.9 CHRONIC OBSTRUCTIVE PULMONARY DISEASE, UNSPECIFIED COPD TYPE (HCC): ICD-10-CM

## 2023-12-20 DIAGNOSIS — Z78.9 ALCOHOL USE: ICD-10-CM

## 2023-12-20 PROCEDURE — 99204 OFFICE O/P NEW MOD 45 MIN: CPT | Performed by: INTERNAL MEDICINE

## 2023-12-20 NOTE — PATIENT INSTRUCTIONS
Scheduled date of colonoscopy (as of today): 03/25/24  Physician performing colonoscopy: Dr. Dutton  Location of colonoscopy: [facility]   Bowel prep reviewed with patient: clenpiq  Instructions reviewed with patient by: nw  Clearances: n/a

## 2023-12-20 NOTE — PROGRESS NOTES
[facility] - Outpatient Consultation  Nahid Luis 63 y.o. male MRN: [id number]  Encounter: [id number]      PCP: Kaitlin García MD  Referring: Kaitlin García MD  [address] [facility]  [address] [place] [id number]      ASSESSMENT AND PLAN:      1. Positive colorectal cancer screening using Cologuard test  Procedural risk given his tobacco and alcohol use,  Increased risk for colon cancer given his positive Cologuard test  Recommend optimization of his pulmonary status with pulmonary referral placed today  Last colonoscopy > 10 years ago  - Ambulatory Referral to Gastroenterology  - Colonoscopy; Future  - sodium picosulfate, magnesium oxide, citric acid (Clenpiq) oral solution; Take 175 mL (1 bottle) the evening before the colonoscopy, between 5 PM and 9 PM, followed by a second 175 mL bottle 5 hours before the colonoscopy.  Dispense: 350 mL; Refill: 0    2. Alcohol use  Counseled on alcohol cessation  Fortunately recent liver enzymes are normal  - Chronic Hepatitis Panel; Future  - Comprehensive metabolic panel; Future  - US right upper quadrant; Future- screen for     3. Chronic obstructive pulmonary disease, unspecified COPD type (HCC)  4. Current every day smoker  - recommend pulmonary optimization given wheezing noted on today's exam  Counseled on tobacco cessation  - Ambulatory Referral to Pulmonology; Future  ______________________________________________________________________    CC:  Chief Complaint   Patient presents with    Advice Only       HPI:      Patient is a 63-year-old male who is referred for positive Cologuard.  He has COPD, HTN, alcohol and tobacco use.  Colonoscopy was performed more than 10 years ago.  He denies any GI complaints at today's office visit.  No family history of colon cancer.  He has bowel movements 3 times a day without rectal bleeding, without unintentional weight loss.  He is status post appendectomy.  He has rare heartburn symptoms.  He  has been smoking 1 pack a day for the last 50 years, and drinks 3-4 beers per day.  He denies any marijuana use.    Abdominal Pain  Associated symptoms include arthralgias, frequency, hematuria and myalgias. Pertinent negatives include no anorexia, constipation, diarrhea, dysuria, fever, flatus, headaches, hematochezia, melena, nausea, vomiting or weight loss. Nothing aggravates the pain.           REVIEW OF SYSTEMS:    CONSTITUTIONAL: Denies any fever, chills, rigors, and weight loss.  HEENT: No earache or tinnitus. Denies hearing loss or visual disturbances.  CARDIOVASCULAR: No chest pain or palpitations.   RESPIRATORY: Denies any cough, hemoptysis, shortness of breath or dyspnea on exertion.  GASTROINTESTINAL: As noted in the History of Present Illness.   GENITOURINARY: No problems with urination. Denies any hematuria or dysuria.  NEUROLOGIC: No dizziness or vertigo, denies headaches.   MUSCULOSKELETAL: Denies any muscle or joint pain.   SKIN: Denies skin rashes or itching.   ENDOCRINE: Denies excessive thirst. Denies intolerance to heat or cold.  PSYCHOSOCIAL: Denies depression or anxiety. Denies any recent memory loss.       Historical Information   History reviewed. No pertinent past medical history.  Past Surgical History:   Procedure Laterality Date    APPENDECTOMY       Social History   Social History     Substance and Sexual Activity   Alcohol Use Yes    Alcohol/week: 6.0 standard drinks of alcohol    Types: 6 Cans of beer per week    Comment: 6 pack daily     Social History     Substance and Sexual Activity   Drug Use No     Social History     Tobacco Use   Smoking Status Every Day    Current packs/day: 1.00    Average packs/day: 1 pack/day for 49.0 years (49.0 ttl pk-yrs)    Types: Cigarettes    Start date: 1975   Smokeless Tobacco Not on file     Family History   Problem Relation Age of Onset    Alcohol abuse Father        Meds/Allergies       Current Outpatient Medications:     albuterol (Proventil  "HFA) 90 mcg/act inhaler    fluticasone-umeclidinium-vilanterol 100-62.5-25 mcg/actuation inhaler    losartan (COZAAR) 50 mg tablet    sodium picosulfate, magnesium oxide, citric acid (Clenpiq) oral solution    No Known Allergies        Objective     Blood pressure 150/88, pulse 62, height 5' 8\" (1.727 m), weight 88.5 kg (195 lb 3.2 oz), SpO2 98%. Body mass index is 29.68 kg/m².     PHYSICAL EXAM:      General Appearance:   Alert, cooperative, no distress   HEENT:   Normocephalic, atraumatic, anicteric.     Neck:  Supple, symmetrical, trachea midline   Lungs:   +JOHN and LLL expiratory wheezing   Heart::   Regular rate and rhythm; no murmur, rub, or gallop.   Abdomen:   Soft, non-tender, non-distended; normal bowel sounds; no masses, no organomegaly    Genitalia:   Deferred    Rectal:   Deferred    Extremities:  No cyanosis, clubbing or edema    Pulses:  2+ and symmetric    Skin:  No jaundice, rashes, or lesions    Lymph nodes:  No palpable cervical lymphadenopathy        Lab Results:     Lab Results   Component Value Date    WBC  11/25/2023      Comment:      This is a corrected result. Previous result was 14.23 Thousand/uL on 11/25/2023 at 1706 EST    HGB  11/25/2023      Comment:      This is a corrected result. Previous result was 18.6 g/dL on 11/25/2023 at 1706 EST    HCT  11/25/2023      Comment:      This is a corrected result. Previous result was 57.8 % on 11/25/2023 at 1706 EST    MCV  11/25/2023      Comment:      This is a corrected result. Previous result was 100 fL on 11/25/2023 at 1706 EST    PLT  11/25/2023      Comment:      This is a corrected result. Previous result was 236 Thousands/uL on 11/25/2023 at 1706 EST       Lab Results   Component Value Date    K 5.9 (H) 11/25/2023    CL 99 11/25/2023    CO2 32 11/25/2023    BUN 8 11/25/2023    CREATININE 1.02 11/25/2023    GLUF 118 (H) 11/25/2023    CALCIUM 9.6 11/25/2023    AST 16 11/25/2023    ALT 12 11/25/2023    ALKPHOS 79 11/25/2023    EGFR 77 " "11/25/2023       No results found for: \"INR\", \"PROTIME\"      Radiology Results:         Portions of the record may have been created with voice recognition software. Occasional wrong word or \"sound a like\" substitutions may have occurred due to the inherent limitations of voice recognition software. Read the chart carefully and recognize, using context, where substitutions have occurred.        "

## 2023-12-27 ENCOUNTER — APPOINTMENT (OUTPATIENT)
Dept: LAB | Facility: MEDICAL CENTER | Age: 63
End: 2023-12-27
Payer: COMMERCIAL

## 2023-12-27 DIAGNOSIS — Z78.9 ALCOHOL USE: ICD-10-CM

## 2023-12-27 LAB
ALBUMIN SERPL BCP-MCNC: 4.2 G/DL (ref 3.5–5)
ALP SERPL-CCNC: 86 U/L (ref 34–104)
ALT SERPL W P-5'-P-CCNC: 12 U/L (ref 7–52)
ANION GAP SERPL CALCULATED.3IONS-SCNC: 5 MMOL/L
AST SERPL W P-5'-P-CCNC: 15 U/L (ref 13–39)
BILIRUB SERPL-MCNC: 0.49 MG/DL (ref 0.2–1)
BUN SERPL-MCNC: 10 MG/DL (ref 5–25)
CALCIUM SERPL-MCNC: 9.8 MG/DL (ref 8.4–10.2)
CHLORIDE SERPL-SCNC: 99 MMOL/L (ref 96–108)
CO2 SERPL-SCNC: 31 MMOL/L (ref 21–32)
CREAT SERPL-MCNC: 1.01 MG/DL (ref 0.6–1.3)
GFR SERPL CREATININE-BSD FRML MDRD: 78 ML/MIN/1.73SQ M
GLUCOSE P FAST SERPL-MCNC: 112 MG/DL (ref 65–99)
HBV CORE AB SER QL: NORMAL
HBV CORE IGM SER QL: NORMAL
HBV SURFACE AG SER QL: NORMAL
HCV AB SER QL: NORMAL
POTASSIUM SERPL-SCNC: 5.4 MMOL/L (ref 3.5–5.3)
PROT SERPL-MCNC: 7.5 G/DL (ref 6.4–8.4)
SODIUM SERPL-SCNC: 135 MMOL/L (ref 135–147)

## 2023-12-27 PROCEDURE — 86705 HEP B CORE ANTIBODY IGM: CPT

## 2023-12-27 PROCEDURE — 80053 COMPREHEN METABOLIC PANEL: CPT

## 2023-12-27 PROCEDURE — 36415 COLL VENOUS BLD VENIPUNCTURE: CPT

## 2023-12-27 PROCEDURE — 86704 HEP B CORE ANTIBODY TOTAL: CPT

## 2023-12-27 PROCEDURE — 86803 HEPATITIS C AB TEST: CPT

## 2023-12-27 PROCEDURE — 87340 HEPATITIS B SURFACE AG IA: CPT

## 2024-01-04 ENCOUNTER — TELEPHONE (OUTPATIENT)
Dept: PULMONOLOGY | Facility: CLINIC | Age: 64
End: 2024-01-04

## 2024-01-16 ENCOUNTER — HOSPITAL ENCOUNTER (OUTPATIENT)
Dept: RADIOLOGY | Facility: MEDICAL CENTER | Age: 64
Discharge: HOME | End: 2024-01-16
Payer: COMMERCIAL

## 2024-01-16 DIAGNOSIS — Z78.9 ALCOHOL USE: ICD-10-CM

## 2024-01-16 PROCEDURE — 76705 ECHO EXAM OF ABDOMEN: CPT

## 2024-03-18 ENCOUNTER — TELEPHONE (OUTPATIENT)
Dept: GASTROENTEROLOGY | Facility: AMBULARY SURGERY CENTER | Age: 64
End: 2024-03-18

## 2024-03-21 ENCOUNTER — TELEPHONE (OUTPATIENT)
Dept: GASTROENTEROLOGY | Facility: AMBULARY SURGERY CENTER | Age: 64
End: 2024-03-21

## 2024-03-25 ENCOUNTER — ANESTHESIA (OUTPATIENT)
Dept: GASTROENTEROLOGY | Facility: HOSPITAL | Age: 64
End: 2024-03-25

## 2024-03-25 ENCOUNTER — HOSPITAL ENCOUNTER (OUTPATIENT)
Dept: GASTROENTEROLOGY | Facility: HOSPITAL | Age: 64
Setting detail: OUTPATIENT SURGERY
Discharge: HOME | End: 2024-03-25
Attending: INTERNAL MEDICINE
Payer: COMMERCIAL

## 2024-03-25 ENCOUNTER — ANESTHESIA EVENT (OUTPATIENT)
Dept: GASTROENTEROLOGY | Facility: HOSPITAL | Age: 64
End: 2024-03-25

## 2024-03-25 VITALS
SYSTOLIC BLOOD PRESSURE: 159 MMHG | BODY MASS INDEX: 26.22 KG/M2 | RESPIRATION RATE: 20 BRPM | WEIGHT: 173 LBS | OXYGEN SATURATION: 95 % | TEMPERATURE: 97.9 F | HEART RATE: 90 BPM | DIASTOLIC BLOOD PRESSURE: 85 MMHG | HEIGHT: 68 IN

## 2024-03-25 DIAGNOSIS — R19.5 POSITIVE COLORECTAL CANCER SCREENING USING COLOGUARD TEST: ICD-10-CM

## 2024-03-25 PROCEDURE — 88305 TISSUE EXAM BY PATHOLOGIST: CPT | Performed by: PATHOLOGY

## 2024-03-25 RX ORDER — PROPOFOL 10 MG/ML
INJECTION, EMULSION INTRAVENOUS AS NEEDED
Status: DISCONTINUED | OUTPATIENT
Start: 2024-03-25 | End: 2024-03-25

## 2024-03-25 RX ORDER — PROPOFOL 10 MG/ML
INJECTION, EMULSION INTRAVENOUS CONTINUOUS PRN
Status: DISCONTINUED | OUTPATIENT
Start: 2024-03-25 | End: 2024-03-25

## 2024-03-25 RX ORDER — SODIUM CHLORIDE, SODIUM LACTATE, POTASSIUM CHLORIDE, CALCIUM CHLORIDE 600; 310; 30; 20 MG/100ML; MG/100ML; MG/100ML; MG/100ML
INJECTION, SOLUTION INTRAVENOUS CONTINUOUS PRN
Status: DISCONTINUED | OUTPATIENT
Start: 2024-03-25 | End: 2024-03-25

## 2024-03-25 RX ADMIN — PROPOFOL 90 MG: 10 INJECTION, EMULSION INTRAVENOUS at 11:44

## 2024-03-25 RX ADMIN — SODIUM CHLORIDE, SODIUM LACTATE, POTASSIUM CHLORIDE, AND CALCIUM CHLORIDE: .6; .31; .03; .02 INJECTION, SOLUTION INTRAVENOUS at 11:42

## 2024-03-25 RX ADMIN — SODIUM CHLORIDE, SODIUM LACTATE, POTASSIUM CHLORIDE, AND CALCIUM CHLORIDE: .6; .31; .03; .02 INJECTION, SOLUTION INTRAVENOUS at 10:57

## 2024-03-25 RX ADMIN — PROPOFOL 140 MCG/KG/MIN: 10 INJECTION, EMULSION INTRAVENOUS at 11:45

## 2024-03-25 NOTE — ANESTHESIA PREPROCEDURE EVALUATION
"Procedure:  COLONOSCOPY     - denies any chest pain, palpitations, shortness of breath, syncope, lightheadedness, seizures   - denies any recent infectious symptoms such as fevers, chills, cough   - denies taking any anticoagulation medications or any issues with bleeding, bruising, clotting    Relevant Problems   ANESTHESIA (within normal limits)      CARDIO   (+) Hypertension      ENDO (within normal limits)      GI/HEPATIC (within normal limits)      /RENAL (within normal limits)      GYN (within normal limits)      HEMATOLOGY (within normal limits)      MUSCULOSKELETAL (within normal limits)      NEURO/PSYCH (within normal limits)      PULMONARY   (+) Chronic obstructive pulmonary disease (HCC)      Behavioral Health   (+) Current every day smoker      Lab Results   Component Value Date    WBC  11/25/2023      Comment:      This is a corrected result. Previous result was 14.23 Thousand/uL on 11/25/2023 at 1706 EST    HGB  11/25/2023      Comment:      This is a corrected result. Previous result was 18.6 g/dL on 11/25/2023 at 1706 EST    HCT  11/25/2023      Comment:      This is a corrected result. Previous result was 57.8 % on 11/25/2023 at 1706 EST    MCV  11/25/2023      Comment:      This is a corrected result. Previous result was 100 fL on 11/25/2023 at 1706 EST    PLT  11/25/2023      Comment:      This is a corrected result. Previous result was 236 Thousands/uL on 11/25/2023 at 1706 EST     Lab Results   Component Value Date    SODIUM 135 12/27/2023    K 5.4 (H) 12/27/2023    CL 99 12/27/2023    CO2 31 12/27/2023    AGAP 5 12/27/2023    BUN 10 12/27/2023    CREATININE 1.01 12/27/2023    GLUC 99 08/26/2023    GLUF 112 (H) 12/27/2023    CALCIUM 9.8 12/27/2023    AST 15 12/27/2023    ALT 12 12/27/2023    ALKPHOS 86 12/27/2023    TP 7.5 12/27/2023    TBILI 0.49 12/27/2023    EGFR 78 12/27/2023     No results found for: \"PTT\"  No results found for: \"INR\", \"PROTIME\"    Physical Exam    Airway    Mallampati " score: II  TM Distance: >3 FB  Neck ROM: full     Dental    lower dentures and upper dentures    Cardiovascular  Rhythm: regular, Rate: normal    Pulmonary   Breath sounds clear to auscultation    Other Findings        Anesthesia Plan  ASA Score- 2     Anesthesia Type- IV sedation with anesthesia with ASA Monitors.         Additional Monitors:     Airway Plan:            Plan Factors-Exercise tolerance (METS): >4 METS.    Chart reviewed. EKG reviewed.  Existing labs reviewed. Patient summary reviewed.    Patient is not a current smoker.  Patient did not smoke on day of surgery.    Obstructive sleep apnea risk education given perioperatively.        Induction- intravenous.    Postoperative Plan-     Informed Consent- Anesthetic plan and risks discussed with patient.  I personally reviewed this patient with the CRNA. Discussed and agreed on the Anesthesia Plan with the CRNA..

## 2024-03-25 NOTE — ANESTHESIA POSTPROCEDURE EVALUATION
Post-Op Assessment Note    CV Status:  Stable    Pain management: adequate       Mental Status:  Awake and sleepy   Hydration Status:  Euvolemic   PONV Controlled:  Controlled   Airway Patency:  Patent     Post Op Vitals Reviewed: Yes    No anethesia notable event occurred.    Staff: CRNA               BP   140/83   Temp   97.4   Pulse 96   Resp 15   SpO2 96

## 2024-03-25 NOTE — H&P
"History and Physical -  [facility]  Nahid Luis 64 y.o. male MRN: [id number]                  HPI: Nahid Luis is a 64 y.o. year old male who presents for +cologuard.      REVIEW OF SYSTEMS: Per the HPI, and otherwise unremarkable.    Historical Information   Past Medical History:   Diagnosis Date    Colon polyp     COPD (chronic obstructive pulmonary disease) (HCC)     Hypertension      Past Surgical History:   Procedure Laterality Date    APPENDECTOMY      COLONOSCOPY       Social History   Social History     Substance and Sexual Activity   Alcohol Use Yes    Alcohol/week: 6.0 standard drinks of alcohol    Types: 6 Cans of beer per week    Comment: daily     Social History     Substance and Sexual Activity   Drug Use No     Social History     Tobacco Use   Smoking Status Every Day    Current packs/day: 1.00    Average packs/day: 1 pack/day for 49.2 years (49.2 ttl pk-yrs)    Types: Cigarettes    Start date: 1975   Smokeless Tobacco Not on file     Family History   Problem Relation Age of Onset    Alcohol abuse Father        Meds/Allergies       Current Outpatient Medications:     albuterol (Proventil HFA) 90 mcg/act inhaler    fluticasone-umeclidinium-vilanterol 100-62.5-25 mcg/actuation inhaler    losartan (COZAAR) 50 mg tablet    No Known Allergies    Objective     /76   Pulse 93   Temp (!) 96.4 °F (35.8 °C) (Temporal)   Resp 20   Ht 5' 8\" (1.727 m)   Wt 78.5 kg (173 lb)   SpO2 92%   BMI 26.30 kg/m²       PHYSICAL EXAM    Gen: NAD  Head: NCAT  CV: RRR  CHEST: Clear  ABD: soft, NT/ND  EXT: no edema      ASSESSMENT/PLAN:  This is a 64 y.o. year old male here for colonoscopy, and he is stable and optimized for his procedure.        "

## 2024-03-27 PROCEDURE — 88305 TISSUE EXAM BY PATHOLOGIST: CPT | Performed by: PATHOLOGY

## 2024-06-21 ENCOUNTER — OFFICE VISIT (OUTPATIENT)
Dept: FAMILY MEDICINE CLINIC | Facility: CLINIC | Age: 64
End: 2024-06-21
Payer: COMMERCIAL

## 2024-06-21 VITALS
HEART RATE: 97 BPM | WEIGHT: 172 LBS | DIASTOLIC BLOOD PRESSURE: 82 MMHG | HEIGHT: 68 IN | TEMPERATURE: 97.9 F | BODY MASS INDEX: 26.07 KG/M2 | SYSTOLIC BLOOD PRESSURE: 170 MMHG | OXYGEN SATURATION: 95 % | RESPIRATION RATE: 16 BRPM

## 2024-06-21 DIAGNOSIS — I10 HYPERTENSION, UNSPECIFIED TYPE: Primary | ICD-10-CM

## 2024-06-21 DIAGNOSIS — F17.200 CURRENT EVERY DAY SMOKER: ICD-10-CM

## 2024-06-21 DIAGNOSIS — J44.9 CHRONIC OBSTRUCTIVE PULMONARY DISEASE, UNSPECIFIED COPD TYPE (HCC): ICD-10-CM

## 2024-06-21 PROCEDURE — 99214 OFFICE O/P EST MOD 30 MIN: CPT | Performed by: FAMILY MEDICINE

## 2024-06-21 RX ORDER — LOSARTAN POTASSIUM 50 MG/1
100 TABLET ORAL DAILY
Qty: 180 TABLET | Refills: 1 | Status: SHIPPED | OUTPATIENT
Start: 2024-06-21

## 2024-06-21 RX ORDER — FLUTICASONE PROPIONATE AND SALMETEROL 100; 50 UG/1; UG/1
1 POWDER RESPIRATORY (INHALATION) 2 TIMES DAILY
Qty: 60 BLISTER | Refills: 2 | Status: SHIPPED | OUTPATIENT
Start: 2024-06-21 | End: 2024-06-21

## 2024-06-21 RX ORDER — FLUTICASONE PROPIONATE AND SALMETEROL 100; 50 UG/1; UG/1
1 POWDER RESPIRATORY (INHALATION) 2 TIMES DAILY
Qty: 60 BLISTER | Refills: 2 | Status: SHIPPED | OUTPATIENT
Start: 2024-06-21 | End: 2024-06-24 | Stop reason: SDUPTHER

## 2024-06-21 NOTE — PROGRESS NOTES
Ambulatory Visit  Name: Nahid Luis      : 1960      MRN: [id number]  Encounter Provider: Marky Orozco MD  Encounter Date: 2024   Encounter department: [facility]    Assessment & Plan   1. Hypertension, unspecified type  -     losartan (COZAAR) 50 mg tablet; Take 2 tablets (100 mg total) by mouth daily  2. Current every day smoker  3. Chronic obstructive pulmonary disease, unspecified COPD type ([facility])  -     Fluticasone-Salmeterol (Advair) 100-50 mcg/dose inhaler; Inhale 1 puff 2 (two) times a day Rinse mouth after use.    Restart medication losartan 50 mg, take 1 tablet a day for the first 2 weeks and then increase to 2 tablets a day afterwards  Will trial Advair rather than Trelegy as patient seems to have difficulty with affordability of Trelegy  We will discuss tobacco cessation in the near future  Follow up in 2 weeks       History of Present Illness     Hypertension  This is a chronic problem. The current episode started more than 1 month ago. The problem is unchanged. The problem is controlled. Associated symptoms include orthopnea and shortness of breath. Pertinent negatives include no anxiety, blurred vision, chest pain, headaches, malaise/fatigue, neck pain, palpitations, peripheral edema, PND or sweats. There are no associated agents to hypertension. Risk factors for coronary artery disease include smoking/tobacco exposure. There are no compliance problems.        64-year-old male patient presents for follow-up regarding his current medical conditions.  Patient was last seen in the 2023 which was seen for his COPD, hypertension and preventative care tasks.  Since then patient has ran out of his blood pressure medication and has not been taking it.  Patient also was unable to afford Trelegy, reports this will cost him about $600 for 30-day supply.  Patient did have some lightheadedness.  Blood pressure today is 170/82.    Review of Systems   Constitutional:   Negative for chills, fever and malaise/fatigue.   HENT:  Negative for congestion and rhinorrhea.    Eyes:  Negative for blurred vision.   Respiratory:  Positive for cough and shortness of breath. Negative for chest tightness.    Cardiovascular:  Positive for orthopnea. Negative for chest pain, palpitations and PND.   Musculoskeletal:  Negative for neck pain.   Allergic/Immunologic: Negative for environmental allergies.   Neurological:  Positive for light-headedness. Negative for dizziness, weakness, numbness and headaches.   Psychiatric/Behavioral:  Negative for sleep disturbance.      Past Medical History:   Diagnosis Date    Colon polyp     COPD (chronic obstructive pulmonary disease) (HCC)     Hypertension      Past Surgical History:   Procedure Laterality Date    APPENDECTOMY      COLONOSCOPY       Family History   Problem Relation Age of Onset    Alcohol abuse Father      Social History     Tobacco Use    Smoking status: Every Day     Current packs/day: 1.00     Average packs/day: 1 pack/day for 49.5 years (49.5 ttl pk-yrs)     Types: Cigarettes     Start date: 1975    Smokeless tobacco: Not on file   Vaping Use    Vaping status: Never Used   Substance and Sexual Activity    Alcohol use: Yes     Alcohol/week: 6.0 standard drinks of alcohol     Types: 6 Cans of beer per week     Comment: daily 4-5 beers daily    Drug use: No    Sexual activity: Not on file     Current Outpatient Medications on File Prior to Visit   Medication Sig    albuterol (Proventil HFA) 90 mcg/act inhaler Inhale 2 puffs every 6 (six) hours as needed for wheezing    [DISCONTINUED] fluticasone-umeclidinium-vilanterol 100-62.5-25 mcg/actuation inhaler Inhale 1 puff daily Rinse mouth after use. (Patient not taking: Reported on 6/21/2024)    [DISCONTINUED] losartan (COZAAR) 50 mg tablet Take 2 tablets (100 mg total) by mouth daily (Patient not taking: Reported on 6/21/2024)     No Known Allergies    There is no immunization history on file for  "this patient.  Objective     /82 (BP Location: Left arm, Patient Position: Sitting, Cuff Size: Large)   Pulse 97   Temp 97.9 °F (36.6 °C) (Temporal)   Resp 16   Ht 5' 8\" (1.727 m)   Wt 78 kg (172 lb)   SpO2 95%   BMI 26.15 kg/m²     Physical Exam  Vitals reviewed.   Constitutional:       General: He is not in acute distress.     Appearance: Normal appearance. He is not ill-appearing, toxic-appearing or diaphoretic.   Cardiovascular:      Rate and Rhythm: Normal rate.      Pulses: Normal pulses.   Pulmonary:      Effort: Pulmonary effort is normal.   Abdominal:      General: Abdomen is flat.   Musculoskeletal:         General: No swelling or deformity.   Skin:     General: Skin is warm and dry.      Capillary Refill: Capillary refill takes less than 2 seconds.      Coloration: Skin is not jaundiced.   Neurological:      General: No focal deficit present.      Mental Status: He is alert.   Psychiatric:         Mood and Affect: Mood normal.           "

## 2024-06-24 DIAGNOSIS — J44.9 CHRONIC OBSTRUCTIVE PULMONARY DISEASE, UNSPECIFIED COPD TYPE (HCC): ICD-10-CM

## 2024-06-24 RX ORDER — FLUTICASONE PROPIONATE AND SALMETEROL 100; 50 UG/1; UG/1
1 POWDER RESPIRATORY (INHALATION) 2 TIMES DAILY
Qty: 60 BLISTER | Refills: 2 | Status: SHIPPED | OUTPATIENT
Start: 2024-06-24 | End: 2024-09-22

## 2024-06-24 NOTE — TELEPHONE ENCOUNTER
Patient called the RX Refill Line. Message is being forwarded to the office.     Patient is requesting a new inhaler, it looks like fluticasone-sameterol 100-50 was to be called in but failed transmission. Patient was not sure if this was the correct inhaler that was to be called into the pharmacy.     Please contact patient at [phone]

## 2024-08-17 ENCOUNTER — HOSPITAL ENCOUNTER (EMERGENCY)
Dept: VASCULAR ULTRASOUND | Facility: HOSPITAL | Age: 64
Discharge: HOME | End: 2024-08-17
Payer: COMMERCIAL

## 2024-08-17 ENCOUNTER — APPOINTMENT (EMERGENCY)
Dept: VASCULAR ULTRASOUND | Facility: HOSPITAL | Age: 64
End: 2024-08-17
Payer: COMMERCIAL

## 2024-08-17 ENCOUNTER — HOSPITAL ENCOUNTER (EMERGENCY)
Facility: HOSPITAL | Age: 64
Discharge: HOME | End: 2024-08-17
Attending: EMERGENCY MEDICINE | Admitting: EMERGENCY MEDICINE
Payer: COMMERCIAL

## 2024-08-17 VITALS
HEART RATE: 82 BPM | SYSTOLIC BLOOD PRESSURE: 170 MMHG | TEMPERATURE: 97.7 F | OXYGEN SATURATION: 97 % | DIASTOLIC BLOOD PRESSURE: 80 MMHG | RESPIRATION RATE: 18 BRPM

## 2024-08-17 DIAGNOSIS — I73.9 PVD (PERIPHERAL VASCULAR DISEASE) (HCC): Primary | ICD-10-CM

## 2024-08-17 LAB
2HR DELTA HS TROPONIN: -1 NG/L
ALBUMIN SERPL BCG-MCNC: 3.8 G/DL (ref 3.5–5)
ALP SERPL-CCNC: 72 U/L (ref 34–104)
ALT SERPL W P-5'-P-CCNC: 10 U/L (ref 7–52)
ANION GAP SERPL CALCULATED.3IONS-SCNC: 5 MMOL/L (ref 4–13)
AST SERPL W P-5'-P-CCNC: 13 U/L (ref 13–39)
ATRIAL RATE: 76 BPM
BASOPHILS # BLD AUTO: 0.03 THOUSANDS/ÂΜL (ref 0–0.1)
BASOPHILS NFR BLD AUTO: 0 % (ref 0–1)
BILIRUB SERPL-MCNC: 0.49 MG/DL (ref 0.2–1)
BNP SERPL-MCNC: 45 PG/ML (ref 0–100)
BUN SERPL-MCNC: 9 MG/DL (ref 5–25)
CALCIUM SERPL-MCNC: 9.4 MG/DL (ref 8.4–10.2)
CARDIAC TROPONIN I PNL SERPL HS: 7 NG/L
CARDIAC TROPONIN I PNL SERPL HS: 8 NG/L
CHLORIDE SERPL-SCNC: 97 MMOL/L (ref 96–108)
CO2 SERPL-SCNC: 28 MMOL/L (ref 21–32)
CREAT SERPL-MCNC: 0.98 MG/DL (ref 0.6–1.3)
EOSINOPHIL # BLD AUTO: 0.1 THOUSAND/ÂΜL (ref 0–0.61)
EOSINOPHIL NFR BLD AUTO: 1 % (ref 0–6)
ERYTHROCYTE [DISTWIDTH] IN BLOOD BY AUTOMATED COUNT: 14.7 % (ref 11.6–15.1)
GFR SERPL CREATININE-BSD FRML MDRD: 81 ML/MIN/1.73SQ M
GLUCOSE SERPL-MCNC: 179 MG/DL (ref 65–140)
HCT VFR BLD AUTO: 49 % (ref 36.5–49.3)
HGB BLD-MCNC: 16.1 G/DL (ref 12–17)
IMM GRANULOCYTES # BLD AUTO: 0.03 THOUSAND/UL (ref 0–0.2)
IMM GRANULOCYTES NFR BLD AUTO: 0 % (ref 0–2)
LYMPHOCYTES # BLD AUTO: 8.75 THOUSANDS/ÂΜL (ref 0.6–4.47)
LYMPHOCYTES NFR BLD AUTO: 60 % (ref 14–44)
MAGNESIUM SERPL-MCNC: 2.3 MG/DL (ref 1.9–2.7)
MCH RBC QN AUTO: 30.6 PG (ref 26.8–34.3)
MCHC RBC AUTO-ENTMCNC: 32.9 G/DL (ref 31.4–37.4)
MCV RBC AUTO: 93 FL (ref 82–98)
MONOCYTES # BLD AUTO: 0.76 THOUSAND/ÂΜL (ref 0.17–1.22)
MONOCYTES NFR BLD AUTO: 5 % (ref 4–12)
NEUTROPHILS # BLD AUTO: 4.95 THOUSANDS/ÂΜL (ref 1.85–7.62)
NEUTS SEG NFR BLD AUTO: 34 % (ref 43–75)
NRBC BLD AUTO-RTO: 0 /100 WBCS
P AXIS: 84 DEGREES
PLATELET # BLD AUTO: 252 THOUSANDS/UL (ref 149–390)
PMV BLD AUTO: 8 FL (ref 8.9–12.7)
POTASSIUM SERPL-SCNC: 5 MMOL/L (ref 3.5–5.3)
PR INTERVAL: 160 MS
PROT SERPL-MCNC: 7.1 G/DL (ref 6.4–8.4)
QRS AXIS: 71 DEGREES
QRSD INTERVAL: 80 MS
QT INTERVAL: 388 MS
QTC INTERVAL: 436 MS
RBC # BLD AUTO: 5.27 MILLION/UL (ref 3.88–5.62)
SODIUM SERPL-SCNC: 130 MMOL/L (ref 135–147)
T WAVE AXIS: 95 DEGREES
VENTRICULAR RATE: 76 BPM
WBC # BLD AUTO: 14.62 THOUSAND/UL (ref 4.31–10.16)

## 2024-08-17 PROCEDURE — 85025 COMPLETE CBC W/AUTO DIFF WBC: CPT | Performed by: EMERGENCY MEDICINE

## 2024-08-17 PROCEDURE — 80053 COMPREHEN METABOLIC PANEL: CPT | Performed by: EMERGENCY MEDICINE

## 2024-08-17 PROCEDURE — 93970 EXTREMITY STUDY: CPT

## 2024-08-17 PROCEDURE — 84484 ASSAY OF TROPONIN QUANT: CPT | Performed by: EMERGENCY MEDICINE

## 2024-08-17 PROCEDURE — 99285 EMERGENCY DEPT VISIT HI MDM: CPT

## 2024-08-17 PROCEDURE — 93923 UPR/LXTR ART STDY 3+ LVLS: CPT

## 2024-08-17 PROCEDURE — NC001 PR NO CHARGE: Performed by: SURGERY

## 2024-08-17 PROCEDURE — 93925 LOWER EXTREMITY STUDY: CPT

## 2024-08-17 PROCEDURE — 99285 EMERGENCY DEPT VISIT HI MDM: CPT | Performed by: EMERGENCY MEDICINE

## 2024-08-17 PROCEDURE — 83880 ASSAY OF NATRIURETIC PEPTIDE: CPT | Performed by: EMERGENCY MEDICINE

## 2024-08-17 PROCEDURE — 83735 ASSAY OF MAGNESIUM: CPT | Performed by: EMERGENCY MEDICINE

## 2024-08-17 PROCEDURE — 93970 EXTREMITY STUDY: CPT | Performed by: SURGERY

## 2024-08-17 PROCEDURE — 93010 ELECTROCARDIOGRAM REPORT: CPT | Performed by: INTERNAL MEDICINE

## 2024-08-17 PROCEDURE — 36415 COLL VENOUS BLD VENIPUNCTURE: CPT | Performed by: EMERGENCY MEDICINE

## 2024-08-17 PROCEDURE — 93005 ELECTROCARDIOGRAM TRACING: CPT

## 2024-08-17 RX ORDER — ASPIRIN 81 MG/1
81 TABLET ORAL DAILY
Qty: 30 TABLET | Refills: 0 | Status: SHIPPED | OUTPATIENT
Start: 2024-08-17 | End: 2024-09-16

## 2024-08-17 RX ADMIN — ASPIRIN 81 MG: 81 TABLET, COATED ORAL at 13:12

## 2024-08-17 NOTE — ED PROVIDER NOTES
History  Chief Complaint   Patient presents with    Leg Swelling     Pt to ED with c/o left leg swelling and some right calf pain on and off. Pt states here to rule out blood clots. Pt denies CP, some SOB with exertion.     64-year-old gentleman comes in for the evaluation of leg pain and swelling.  Patient states he is concerned for blood clots.  Patient does not have a personal history or family history of blood clots but because he has increased pain and swelling in his leg he was told by several people that he could have blood clots.  Patient does have a history of COPD and says he is sometimes short of breath and has chest tightness but is actually not feeling any different than usual.  No known injury.      History provided by:  Patient   used: No    Leg Pain  Location:  Leg  Time since incident:  3 days  Injury: no    Leg location:  R lower leg  Pain details:     Quality:  Pressure and shooting    Radiates to:  Does not radiate    Severity:  Moderate    Onset quality:  Gradual    Duration:  3 days    Timing:  Intermittent    Progression:  Waxing and waning  Chronicity:  New  Dislocation: no    Foreign body present:  No foreign bodies  Tetanus status:  Unknown  Prior injury to area:  No  Ineffective treatments:  None tried  Associated symptoms: swelling    Associated symptoms: no back pain and no fever    Risk factors: no concern for non-accidental trauma and no recent illness        Prior to Admission Medications   Prescriptions Last Dose Informant Patient Reported? Taking?   Fluticasone-Salmeterol (Advair) 100-50 mcg/dose inhaler   No No   Sig: Inhale 1 puff 2 (two) times a day Rinse mouth after use.   albuterol (Proventil HFA) 90 mcg/act inhaler  Self No No   Sig: Inhale 2 puffs every 6 (six) hours as needed for wheezing   losartan (COZAAR) 50 mg tablet   No No   Sig: Take 2 tablets (100 mg total) by mouth daily      Facility-Administered Medications: None       Past Medical History:    Diagnosis Date    Colon polyp     COPD (chronic obstructive pulmonary disease) (HCC)     Hypertension        Past Surgical History:   Procedure Laterality Date    APPENDECTOMY      COLONOSCOPY         Family History   Problem Relation Age of Onset    Alcohol abuse Father      I have reviewed and agree with the history as documented.    E-Cigarette/Vaping    E-Cigarette Use Never User      E-Cigarette/Vaping Substances     Social History     Tobacco Use    Smoking status: Every Day     Current packs/day: 1.00     Average packs/day: 1 pack/day for 49.6 years (49.6 ttl pk-yrs)     Types: Cigarettes     Start date: 1975   Vaping Use    Vaping status: Never Used   Substance Use Topics    Alcohol use: Yes     Alcohol/week: 6.0 standard drinks of alcohol     Types: 6 Cans of beer per week     Comment: daily 4-5 beers daily    Drug use: No       Review of Systems   Constitutional:  Negative for chills and fever.   HENT:  Negative for ear pain and sore throat.    Eyes:  Negative for pain and visual disturbance.   Respiratory:  Negative for cough and shortness of breath.    Cardiovascular:  Negative for chest pain and palpitations.   Gastrointestinal:  Negative for abdominal pain and vomiting.   Genitourinary:  Negative for dysuria and hematuria.   Musculoskeletal:  Negative for arthralgias and back pain.   Skin:  Negative for color change and rash.   Neurological:  Negative for seizures and syncope.   All other systems reviewed and are negative.      Physical Exam  Physical Exam  Vitals and nursing note reviewed.   Constitutional:       General: He is not in acute distress.     Appearance: He is well-developed.   HENT:      Head: Normocephalic and atraumatic.   Eyes:      Conjunctiva/sclera: Conjunctivae normal.   Cardiovascular:      Rate and Rhythm: Normal rate and regular rhythm.      Heart sounds: No murmur heard.  Pulmonary:      Effort: Pulmonary effort is normal. No respiratory distress.      Breath sounds: Normal  breath sounds.   Abdominal:      Palpations: Abdomen is soft.      Tenderness: There is no abdominal tenderness.   Musculoskeletal:         General: Swelling present.      Cervical back: Neck supple.      Right lower leg: Tenderness present. 1+ Edema present.      Left lower leg: Tenderness present.   Skin:     General: Skin is warm and dry.      Capillary Refill: Capillary refill takes less than 2 seconds.   Neurological:      Mental Status: He is alert.   Psychiatric:         Mood and Affect: Mood normal.         Vital Signs  ED Triage Vitals   Temperature Pulse Respirations Blood Pressure SpO2   08/17/24 0852 08/17/24 0852 08/17/24 0852 08/17/24 0853 08/17/24 0852   97.7 °F (36.5 °C) 80 20 (!) 209/86 97 %      Temp Source Heart Rate Source Patient Position - Orthostatic VS BP Location FiO2 (%)   08/17/24 0852 08/17/24 0852 08/17/24 1230 08/17/24 0852 --   Oral Monitor Sitting Right arm       Pain Score       08/17/24 0852       6           Vitals:    08/17/24 1100 08/17/24 1200 08/17/24 1230 08/17/24 1245   BP: (!) 195/87 (!) 172/85 (!) 186/88 170/80   Pulse: 76 67 77 82   Patient Position - Orthostatic VS:   Sitting          Visual Acuity      ED Medications  Medications   aspirin (ECOTRIN LOW STRENGTH) EC tablet 81 mg (81 mg Oral Given 8/17/24 1312)       Diagnostic Studies  Results Reviewed       Procedure Component Value Units Date/Time    HS Troponin I 2hr [[id number]]  (Normal) Collected: 08/17/24 1106    Lab Status: Final result Specimen: Blood from Arm, Left Updated: 08/17/24 1138     hs TnI 2hr 7 ng/L      Delta 2hr hsTnI -1 ng/L     HS Troponin I 4hr [[id number]]     Lab Status: No result Specimen: Blood     B-Type Natriuretic Peptide(BNP) [[id number]]  (Normal) Collected: 08/17/24 0908    Lab Status: Final result Specimen: Blood from Arm, Left Updated: 08/17/24 1001     BNP 45 pg/mL     HS Troponin 0hr (reflex protocol) [[id number]]  (Normal) Collected: 08/17/24 0908    Lab Status: Final result Specimen:  Blood from Arm, Left Updated: 08/17/24 0943     hs TnI 0hr 8 ng/L     Comprehensive metabolic panel [[id number]]  (Abnormal) Collected: 08/17/24 0908    Lab Status: Final result Specimen: Blood from Arm, Left Updated: 08/17/24 0935     Sodium 130 mmol/L      Potassium 5.0 mmol/L      Chloride 97 mmol/L      CO2 28 mmol/L      ANION GAP 5 mmol/L      BUN 9 mg/dL      Creatinine 0.98 mg/dL      Glucose 179 mg/dL      Calcium 9.4 mg/dL      AST 13 U/L      ALT 10 U/L      Alkaline Phosphatase 72 U/L      Total Protein 7.1 g/dL      Albumin 3.8 g/dL      Total Bilirubin 0.49 mg/dL      eGFR 81 ml/min/1.73sq m     Narrative:      National Kid[employer] Foundation guidelines for Chronic Kidney Disease (CKD):     Stage 1 with normal or high GFR (GFR > 90 mL/min/1.73 square meters)    Stage 2 Mild CKD (GFR = 60-89 mL/min/1.73 square meters)    Stage 3A Moderate CKD (GFR = 45-59 mL/min/1.73 square meters)    Stage 3B Moderate CKD (GFR = 30-44 mL/min/1.73 square meters)    Stage 4 Severe CKD (GFR = 15-29 mL/min/1.73 square meters)    Stage 5 End Stage CKD (GFR <15 mL/min/1.73 square meters)  Note: GFR calculation is accurate only with a steady state creatinine    Magnesium [[id number]]  (Normal) Collected: 08/17/24 0908    Lab Status: Final result Specimen: Blood from Arm, Left Updated: 08/17/24 0935     Magnesium 2.3 mg/dL     CBC and differential [[id number]]  (Abnormal) Collected: 08/17/24 0908    Lab Status: Final result Specimen: Blood from Arm, Left Updated: 08/17/24 0916     WBC 14.62 Thousand/uL      RBC 5.27 Million/uL      Hemoglobin 16.1 g/dL      Hematocrit 49.0 %      MCV 93 fL      MCH 30.6 pg      MCHC 32.9 g/dL      RDW 14.7 %      MPV 8.0 fL      Platelets 252 Thousands/uL      nRBC 0 /100 WBCs      Segmented % 34 %      Immature Grans % 0 %      Lymphocytes % 60 %      Monocytes % 5 %      Eosinophils Relative 1 %      Basophils Relative 0 %      Absolute Neutrophils 4.95 Thousands/µL      Absolute Immature  Grans 0.03 Thousand/uL      Absolute Lymphocytes 8.75 Thousands/µL      Absolute Monocytes 0.76 Thousand/µL      Eosinophils Absolute 0.10 Thousand/µL      Basophils Absolute 0.03 Thousands/µL                     VAS VENOUS DUPLEX - LOWER LIMB BILATERAL    (Results Pending)   VAS ARTERIAL DUPLEX- LOWER LIMB BILATERAL    (Results Pending)              Procedures  ECG 12 Lead Documentation Only    Date/Time: 8/17/2024 9:08 AM    Performed by: Robyn Negrete DO  Authorized by: Robyn Negrete DO    Rate:     ECG rate:  76  Rhythm:     Rhythm: sinus rhythm    Ectopy:     Ectopy: none    QRS:     QRS axis:  Normal    QRS intervals:  Normal  ST segments:     ST segments:  Normal  T waves:     T waves: normal             ED Course                                               Medical Decision Making  Differential diagnosis includes but is not limited to superficial thrombophlebitis, DVT, peripheral vascular disease, musculoskeletal pain, peripheral edema volume overload CHF    Problems Addressed:  PVD (peripheral vascular disease) (HCC): chronic illness or injury with exacerbation, progression, or side effects of treatment    Amount and/or Complexity of Data Reviewed  Labs: ordered. Decision-making details documented in ED Course.  Radiology: ordered. Decision-making details documented in ED Course.  Discussion of management or test interpretation with external provider(s): Discussed arterial duplex results with vascular surgery they feel that this is a chronic arterial occlusion suggestion is to start him on 81 mg aspirin daily and he will be seen in the office this Wednesday.  Discussed this with patient and son.  Will give phone number for office    Risk  OTC drugs.  Prescription drug management.                 Disposition  Final diagnoses:   PVD (peripheral vascular disease) (HCC)     Time reflects when diagnosis was documented in both MDM as applicable and the Disposition within this note       Time User  Action Codes Description Comment    8/17/2024  1:02 PM Robyn Negrete Add [I73.9] PVD (peripheral vascular disease) ([facility])           ED Disposition       ED Disposition   Discharge    Condition   Stable    Date/Time   Sat Aug 17, 2024  1:02 PM    Comment   Nahid Luis discharge to home/self care.                   Follow-up Information       Follow up With Specialties Details Why Contact Info    Carlos Bray DO Vascular Surgery Call  Dr Bray has office hours wednesday they will call with appt time [address] [facility]  [address] [place] [id number] [phone]              Patient's Medications   Discharge Prescriptions    ASPIRIN (ECOTRIN LOW STRENGTH) 81 MG EC TABLET    Take 1 tablet (81 mg total) by mouth daily       Start Date: 8/17/2024 End Date: 9/16/2024       Order Dose: 81 mg       Quantity: 30 tablet    Refills: 0       No discharge procedures on file.    PDMP Review       None            ED Provider  Electronically Signed by             Robyn Negrete DO  08/17/24 3428

## 2024-08-17 NOTE — CONSULTS
Vascular Surgery    Presented to ER with left leg swelling and right leg pain with ambulation x 2 weeks.  LEAD done shows right INOCENCIA 0.28 and left 0.5 with right SFA pop occlusion and left SFA occlusion.  Motor and sensory intact per ER with warm BLE and brisk cap refill.  Appears to be chronic disease and claudication and possible early rest pain on the right.  Start 81mg asa and ok to DC from ER.  Will arrange office visit with me later this week.

## 2024-08-18 PROCEDURE — 93922 UPR/L XTREMITY ART 2 LEVELS: CPT | Performed by: SURGERY

## 2024-08-18 PROCEDURE — 93925 LOWER EXTREMITY STUDY: CPT | Performed by: SURGERY

## 2024-08-19 ENCOUNTER — VBI (OUTPATIENT)
Dept: FAMILY MEDICINE CLINIC | Facility: CLINIC | Age: 64
End: 2024-08-19

## 2024-08-19 ENCOUNTER — TELEPHONE (OUTPATIENT)
Age: 64
End: 2024-08-19

## 2024-08-19 NOTE — TELEPHONE ENCOUNTER
08/19/24 11:30 AM    Patient contacted post ED visit, outreach attempt made but message could not be left. Additional outreach attempt will be made.     Thank you.  Prasanna Rico MA  [facility]

## 2024-08-19 NOTE — TELEPHONE ENCOUNTER
Caller:  Nahid    Doctor: Katarina    Reason for call:  Patient called stated he was suppose to be scheduled for this week with Dr. Bray. Per notes Dr. Bray,  wrote a note that patient should be seen this week.     Call back#:  [phone]

## 2024-08-21 ENCOUNTER — PATIENT OUTREACH (OUTPATIENT)
Dept: OTHER | Facility: CLINIC | Age: 64
End: 2024-08-21

## 2024-08-21 ENCOUNTER — OFFICE VISIT (OUTPATIENT)
Dept: VASCULAR SURGERY | Facility: CLINIC | Age: 64
End: 2024-08-21
Payer: COMMERCIAL

## 2024-08-21 ENCOUNTER — TELEPHONE (OUTPATIENT)
Dept: VASCULAR SURGERY | Facility: CLINIC | Age: 64
End: 2024-08-21

## 2024-08-21 VITALS
DIASTOLIC BLOOD PRESSURE: 88 MMHG | HEIGHT: 68 IN | WEIGHT: 174 LBS | BODY MASS INDEX: 26.37 KG/M2 | HEART RATE: 74 BPM | SYSTOLIC BLOOD PRESSURE: 144 MMHG

## 2024-08-21 DIAGNOSIS — I73.9 PAD (PERIPHERAL ARTERY DISEASE) (HCC): Primary | ICD-10-CM

## 2024-08-21 PROCEDURE — 99215 OFFICE O/P EST HI 40 MIN: CPT | Performed by: SURGERY

## 2024-08-21 RX ORDER — ATORVASTATIN CALCIUM 20 MG/1
20 TABLET, FILM COATED ORAL DAILY
Qty: 30 TABLET | Refills: 0 | Status: SHIPPED | OUTPATIENT
Start: 2024-08-21

## 2024-08-21 NOTE — ASSESSMENT & PLAN NOTE
Due to his 64-year-old male presents for PAD.  He was in the emergency room on Saturday at [place] with initial complaints of left leg swelling and then also endorse chronic bilateral lower extremity pain with ambulation as well as numbness in his right foot he had an arterial duplex done in the emergency room which demonstrated chronic SFA occlusion bilaterally his INOCENCIA is in the 0.5 range on the left and 0.3 on the right.  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  He was set up for outpatient follow-up with myself today he is still smoking but working on quitting he is cut down quite a bit he is still active he works as a  he has had no prior arterial procedures and endorses longstanding bilateral claudication worse on the right he also has numbness in his right forefoot which is classic for rest pain and in fact he has been sleeping for some time at night with right leg dangling off the bed.  He denies rest pain in the left lower extremity does not have any wounds on either extremity.  He is also never had vein harvested for any prior procedures.  I discussed the results of his testing with him and told him I would recommend starting with an angiogram of his right lower extremity.  I think that likely due to the extent of the disease on the right side and his age he will likely need a bypass.  I will set him up for a left groin access aortogram with right lower extremity runoff and possible intervention within the next 3 to 4 weeks.  In anticipation of likely needing a bypass I will order vein mapping today have also referred him to cardiology as he has not established with 1.  I did advise smoking cessation and I will give him a referral to the smoking cessation program.  He is already taking an aspirin and I will start him on Lipitor 20 mg a day and he can follow-up with his PCP for refills and LFTs.    Common femoral aneurysms I did  also order an aortoiliac duplex to rule out aortoiliac aneurysms as well.

## 2024-08-21 NOTE — TELEPHONE ENCOUNTER
Operative Scheduling Information:     Hospital:  Any IR/endo suite     Physician:  Katarina     Surgery: Left groin access, Aortogram with RLE angiogram, possible intervention     Urgency:  Standard     Level:  Level 3: Outpatients to be scheduled for elective surgery than can be delayed up to 4 weeks without reasonable expectation of detriment to patient     Case Length:  Normal     Post-op Bed:  Outpatient     OR Table:  IR     Equipment Needs:  None     Medication Instructions:  Aspirin:   Continue (do not hold)     Hydration:  No     Contrast Allergy:  no

## 2024-08-21 NOTE — PROGRESS NOTES
Assessment/Plan:      Diagnoses and all orders for this visit:    PAD (peripheral artery disease) ([facility])  -     IR lower extremity angiogram; Future  -     Ambulatory Referral to Cardiology; Future  -     VAS VEIN MAPPING- LOWER EXTREMITY; Future  -     atorvastatin (LIPITOR) 20 mg tablet; Take 1 tablet (20 mg total) by mouth daily  -     Ambulatory Referral to Smoking Cessation Program; Future  -     VAS abdominal aorta/iliac duplex; Future    Other orders  -     Nursing communication Apply gown prior to procedure; Standing  -     Have Patient Void On Call to Procedure Room; Standing  -     Insert and Maintain IV; Standing        PAD (peripheral artery disease) ([facility])  Due to his 64-year-old male presents for PAD.  He was in the emergency room on Saturday at [place] with initial complaints of left leg swelling and then also endorse chronic bilateral lower extremity pain with ambulation as well as numbness in his right foot he had an arterial duplex done in the emergency room which demonstrated chronic SFA occlusion bilaterally his INOCENCIA is in the 0.5 range on the left and 0.3 on the right.  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  He was set up for outpatient follow-up with myself today he is still smoking but working on quitting he is cut down quite a bit he is still active he works as a  he has had no prior arterial procedures and endorses longstanding bilateral claudication worse on the right he also has numbness in his right forefoot which is classic for rest pain and in fact he has been sleeping for some time at night with right leg dangling off the bed.  He denies rest pain in the left lower extremity does not have any wounds on either extremity.  He is also never had vein harvested for any prior procedures.  I discussed the results of his testing with him and told him I would recommend starting with an angiogram of his right lower  extremity.  I think that likely due to the extent of the disease on the right side and his age he will likely need a bypass.  I will set him up for a left groin access aortogram with right lower extremity runoff and possible intervention within the next 3 to 4 weeks.  In anticipation of likely needing a bypass I will order vein mapping today have also referred him to cardiology as he has not established with 1.  I did advise smoking cessation and I will give him a referral to the smoking cessation program.  He is already taking an aspirin and I will start him on Lipitor 20 mg a day and he can follow-up with his PCP for refills and LFTs.    Common femoral aneurysms I did also order an aortoiliac duplex to rule out aortoiliac aneurysms as well.      Subjective:     Patient ID: Nahid Luis is a 64 y.o. male.    Patient presents today for hospital f/u visit for further evaluation of PAD and claudication. PRESTON 8/17/24. Reports RLE fatigue with walking. Also reports R foot numbness with resting and red discoloration. No wounds.     HPI    Review of Systems   Constitutional: Negative.    HENT: Negative.     Eyes: Negative.    Respiratory:  Positive for cough and shortness of breath.    Cardiovascular: Negative.    Gastrointestinal: Negative.    Endocrine: Negative.    Genitourinary: Negative.    Musculoskeletal: Negative.    Skin: Negative.    Allergic/Immunologic: Negative.    Neurological: Negative.    Hematological: Negative.    Psychiatric/Behavioral: Negative.         I have reviewed the ROS above and made changes as needed.      Objective:     Physical Exam    General  Exam: alert, awake, oriented, no distress, consistent with stated age    Integumentary  Exam: warm, dry, no gross lesions, no bruises and normal color    Head and Neck  Exam: supple, no bruits, trachea midline, no JVD, no mass or palpable nodes    Chest and Lung  Exam: chest normal without deformity, bilaterally expansive, clear to  auscultation    Cardiovascular  Exam: regular rate, regular rhythm, no murmurs, no rubs or gallops    Adbomen  Exam: soft, non-tender, non-distended, no pulsatile abdominal masses, no abdominal bruit    Peripheral Vascular  Exam: no clubbing of the digits of the upper extremity, no cyanosis, no edema, both feet are warm, radial pulses 2+ bilaterally, skin well perfused, without and no varicosities.    No widened popliteal pulse noted bilaterally    Upper Extremity:  Palpation: Radial pulse- Bilateral 2+    Lower Extremity:  Palpation: Femoral pulse- Bilateral 2+          Absent distal pulses bilaterally   No BLE wounds    Neurologic  Exam:alert, non-focal, oriented x 3, cranial nerves II-XII grossly intact      Operative Scheduling Information:    Hospital:  Any IR/endo suite    Physician:  Katarina    Surgery: Left groin access, Aortogram with RLE angiogram, possible intervention    Urgency:  Standard    Level:  Level 3: Outpatients to be scheduled for elective surgery than can be delayed up to 4 weeks without reasonable expectation of detriment to patient    Case Length:  Normal    Post-op Bed:  Outpatient    OR Table:  IR    Equipment Needs:  None    Medication Instructions:  Aspirin:   Continue (do not hold)    Hydration:  No    Contrast Allergy:  no      Tobacco use is a significant patient-modifiable risk factor for this patient’s vascular disease with multiple vascular comorbidities, and a significant risk factor for failure of and complications from any endovascular or surgical interventions.    I explained to the patient the effects of smoking including peripheral artery disease, coronary artery disease, cerebrovascular disease as well as cancer and chronic obstructive pulmonary disease. I asked the patient to stop smoking immediately. It is never too late to quit, and many studies show significant health benefits as well as economical savings after smoking cessation. I offered to the patient nicotine  replacement therapy as well as referral to the smoking cessation program and access to the quit line 7-737-YIZUNNC or ambulatory referral to our network smoking cessation program.    Based on our conversation, this patient does not appear ready to quit    And declined my offer of nicotine replacement or tobacco cessation medications    The patient did not set a quit date. I will continue to  follow up on this issue at our next scheduled visit.     I spent approximately 10 minutes on tobacco cessation counseling with this patient.

## 2024-08-21 NOTE — TELEPHONE ENCOUNTER
REMINDER: Under Reason For Call, comments MUST be formatted as:   (Surgeon's Initials) / (Procedure)      Special Instructions / FYI: level 3, no clearance, and any IR/endo suite. Per patient please call daughter Nany Huff (emergency contact) [phone] to schedule procedure.    Consent: I certify that patient has signed, printed, timed, and dated their surgery consent.  I certify that the patient's LEGAL NAME and DATE OF BIRTH are written in the upper left corner on BOTH sides of the consent.  I certify that BOTH sides of the completed surgery consent have been scanned into the patient's Epic chart by myself on 8/21/2024.  Yes, I have LABELED the consent in Epic as Consent for Vascular Procedure.     For Surgical Clearances     Levels   1-3   ROUTE this encounter to [facility] Clearance Pool (AND)   [facility] Surgery Coordinator Pool     Level   4   ROUTE this encounter to [facility] Surgery Coordinator Pool       HYDRATION CLEARANCES   ONLY ROUTE TO  [facility] Clearance Pool       Yes, I have ROUTED this encounter to [facility] Surgery Coordinator and/or [facility] Clearance Pool.

## 2024-08-23 NOTE — TELEPHONE ENCOUNTER
08/23/24 3:15 PM    Patient contacted post ED visit, [facility] phone outreaches documented. Patient called practice and scheduled a follow-up ED visit.  Seen vascular 8-21-24    Thank you.  Prasanna Rico MA  [facility]

## 2024-08-23 NOTE — PROGRESS NOTES
"Cardiology and Heart Failure Clinic Note    Nahid Luis 64 y.o. male   MRN: [id number]  Encounter: [id number]        Assessment / Plan:    # HTN  Losartan 100 mg QD  BP above goal.   Add norvasc 5 mg daily    # HLD   last year  Has PAD so recommend aggressive LDL control  On lipitor 20mg daily, recently started so would repeat lipids in 2 months    # PAD  On ASA 81  On statin   Follows with vascular, upcoming angiogram    # Abnormal EKG  Sinus rhythm with possible septal infarct and T wave abnormalities  Check echo    # TORRES  May be due to COPD  Check echo    # COPD  Recommend smoking cessation    # active smoking  Still smoking, recommend smoking cessation    # heavy ETOH use  Recommend cutting back    # Abnormal chest xray  Chest x-ray  - \"Abnormal opacity in the left upper lobe with the suggestion of cavitary components. Recommend CT scan of the chest to exclude a cavitary lesion, either inflammatory or neoplastic. \"  Looks like CT was ordered but never done.     Please follow up with primary care about this.       Today's Plan Summary:  See above assessment/plan for full details of today's plan.  Briefly,     Start norvasc  Check echo                Reason For Visit / Chief Complaint:  Referred by Dr. Bray (vascular) for a new patient visit for HTN, HLD, PAD.    HPI:   Nahid Luis is a 64 y.o.  male with history as noted in the problem list and further detailed in the above assessment and plan.    Referred by Dr. Bray (vascular) for a new patient visit for HTN, HLD, PAD.    The patient recently presented to the ER with leg pain and was diagnosed with PAD.  He saw vascular in follow-up as an outpatient and was scheduled for an angiogram.  Because of his cardiac risk factors he was referred to cardiology.    Today, the patient reports -   no chest pain.   No SOB at rest.  Does have TORRES.  No orthopnea or PND.  No leg edema.   No palpitations.  No syncope.    Works with heavy machine " .    .   3 children.    Smoking cigarettes.  Cutting back.   4 beers / day (used to drink a lot more).  No drugs.          Cardiac Imaging personally reviewed:  EKG 8-17-24  Sinus rhythm  Septal infarct  Nonspecific T wave changes       Holter or event monitor    Echo    RUTH    Cardiac MRI    Stress testing    [facility]    CP[facility]              Patient Active Problem List    Diagnosis Date Noted   • Mixed hyperlipidemia 08/26/2024   • PAD (peripheral artery disease) ([facility]) 08/21/2024   • Hypertension 09/08/2023   • Chronic obstructive pulmonary disease ([facility]) 09/08/2023   • Current every day smoker 09/08/2023   • Gross hematuria 09/08/2023       Past Medical History:   Diagnosis Date   • Colon polyp    • COPD (chronic obstructive pulmonary disease) ([facility])    • Hypertension        Review of Systems   Constitutional:  Negative for chills and fever.   HENT:  Negative for nosebleeds.    Gastrointestinal:  Negative for abdominal distention and blood in stool.   Neurological:  Negative for dizziness and syncope.   Psychiatric/Behavioral:  Negative for confusion.    14-point ROS completed and negative except as stated above and/or in the HPI.    No Known Allergies    Current Outpatient Medications   Medication Instructions   • albuterol (Proventil HFA) 90 mcg/act inhaler 2 puffs, Inhalation, Every 6 hours PRN   • amLODIPine (NORVASC) 5 mg, Oral, Daily   • aspirin (ECOTRIN LOW STRENGTH) 81 mg, Oral, Daily   • atorvastatin (LIPITOR) 20 mg, Oral, Daily   • Fluticasone-Salmeterol (Advair) 100-50 mcg/dose inhaler 1 puff, Inhalation, 2 times daily, Rinse mouth after use.   • losartan (COZAAR) 100 mg, Oral, Daily       Social History     Socioeconomic History   • Marital status: Single     Spouse name: Not on file   • Number of children: Not on file   • Years of education: Not on file   • Highest education level: Not on file   Occupational History   • Not on file   Tobacco Use   • Smoking status: Every Day  "    Current packs/day: 1.00     Average packs/day: 1 pack/day for 49.7 years (49.7 ttl pk-yrs)     Types: Cigarettes     Start date: 1975   • Smokeless tobacco: Not on file   Vaping Use   • Vaping status: Never Used   Substance and Sexual Activity   • Alcohol use: Yes     Alcohol/week: 6.0 standard drinks of alcohol     Types: 6 Cans of beer per week     Comment: daily 4-5 beers daily   • Drug use: No   • Sexual activity: Not on file   Other Topics Concern   • Not on file   Social History Narrative   • Not on file     Social Determinants of Health     Financial Resource Strain: Not on file   Food Insecurity: Not on file   Transportation Needs: Not on file   Physical Activity: Not on file   Stress: Not on file   Social Connections: Not on file   Intimate Partner Violence: Not on file   Housing Stability: Not on file       Family History   Problem Relation Age of Onset   • Alcohol abuse Father    • Heart attack Father        Physical Exam:  Blood pressure (!) 176/74, pulse 99, height 5' 8\" (1.727 m), weight 79 kg (174 lb 3.2 oz), SpO2 93%.  Body mass index is 26.49 kg/m².  Wt Readings from Last 3 Encounters:   08/26/24 79 kg (174 lb 3.2 oz)   08/21/24 78.9 kg (174 lb)   06/21/24 78 kg (172 lb)     Physical Exam  Vitals reviewed.   Constitutional:       General: He is not in acute distress.     Appearance: He is not toxic-appearing.   HENT:      Head: Normocephalic and atraumatic.   Eyes:      General: No scleral icterus.     Conjunctiva/sclera: Conjunctivae normal.   Neck:      Vascular: No carotid bruit.      Comments: No JVD  Cardiovascular:      Rate and Rhythm: Normal rate and regular rhythm.      Heart sounds: No murmur heard.     No friction rub. No gallop.      Comments: Distant heart sounds  Pulmonary:      Breath sounds: No wheezing, rhonchi or rales.      Comments: Prolonged expiratory phase  Abdominal:      General: There is no distension.      Palpations: Abdomen is soft.      Tenderness: There is no " abdominal tenderness. There is no guarding.   Musculoskeletal:      Right lower leg: No edema.      Left lower leg: No edema.   Skin:     Coloration: Skin is not jaundiced or pale.      Findings: No erythema.   Neurological:      Mental Status: He is alert. Mental status is at baseline.   Psychiatric:         Mood and Affect: Mood normal.         Behavior: Behavior normal.         Labs & Results:  Lab Results   Component Value Date    SODIUM 130 (L) 08/17/2024    K 5.0 08/17/2024    CL 97 08/17/2024    CO2 28 08/17/2024    BUN 9 08/17/2024    CREATININE 0.98 08/17/2024    GLUC 179 (H) 08/17/2024    CALCIUM 9.4 08/17/2024         Thank you for the opportunity to participate in the care of this patient.    Toni Martinez MD, [facility]  Advanced Heart Failure and Transplant Cardiologist  Director of Cardio-Oncology  [facility]   [Negative] : Respiratory

## 2024-08-26 ENCOUNTER — OFFICE VISIT (OUTPATIENT)
Dept: CARDIOLOGY CLINIC | Facility: CLINIC | Age: 64
End: 2024-08-26
Payer: COMMERCIAL

## 2024-08-26 VITALS
SYSTOLIC BLOOD PRESSURE: 176 MMHG | OXYGEN SATURATION: 93 % | HEIGHT: 68 IN | BODY MASS INDEX: 26.4 KG/M2 | HEART RATE: 99 BPM | DIASTOLIC BLOOD PRESSURE: 74 MMHG | WEIGHT: 174.2 LBS

## 2024-08-26 DIAGNOSIS — R93.89 ABNORMAL CHEST X-RAY: ICD-10-CM

## 2024-08-26 DIAGNOSIS — I73.9 PAD (PERIPHERAL ARTERY DISEASE) (HCC): ICD-10-CM

## 2024-08-26 DIAGNOSIS — R06.09 DOE (DYSPNEA ON EXERTION): ICD-10-CM

## 2024-08-26 DIAGNOSIS — E78.2 MIXED HYPERLIPIDEMIA: ICD-10-CM

## 2024-08-26 DIAGNOSIS — I10 PRIMARY HYPERTENSION: Primary | ICD-10-CM

## 2024-08-26 DIAGNOSIS — Z71.6 ENCOUNTER FOR SMOKING CESSATION COUNSELING: ICD-10-CM

## 2024-08-26 DIAGNOSIS — R94.31 ABNORMAL EKG: ICD-10-CM

## 2024-08-26 PROCEDURE — 99204 OFFICE O/P NEW MOD 45 MIN: CPT | Performed by: INTERNAL MEDICINE

## 2024-08-26 RX ORDER — AMLODIPINE BESYLATE 5 MG/1
5 TABLET ORAL DAILY
Qty: 30 TABLET | Refills: 5 | Status: SHIPPED | OUTPATIENT
Start: 2024-08-26

## 2024-08-26 NOTE — PATIENT INSTRUCTIONS
Your blood pressure is elevated so I recommend starting a new medication called amlodipine, 1 pill daily    I recommend checking an echocardiogram, this is an ultrasound of the heart

## 2024-08-26 NOTE — LETTER
"August 26, 2024     Carlos Bray DO  [address]  [address]  [facility] [address]    Patient: Nahid Luis   YOB: 1960   Date of Visit: 8/26/2024       Dear Dr. Bray:    Thank you for referring Nahid Luis to me for evaluation. Below are my notes for this consultation.    If you have questions, please do not hesitate to call me. I look forward to following your patient along with you.         Sincerely,        Toni Martinez MD        CC: MD Toni Gallegos MD  8/26/2024  2:23 PM  Sign when Signing Visit  Cardiology and Heart Failure Clinic Note    Nahid Luis 64 y.o. male   MRN: [id number]  Encounter: [id number]        Assessment / Plan:    # HTN  Losartan 100 mg QD  BP above goal.   Add norvasc 5 mg daily    # HLD   last year  Has PAD so recommend aggressive LDL control  On lipitor 20mg daily, recently started so would repeat lipids in 2 months    # PAD  On ASA 81  On statin   Follows with vascular, upcoming angiogram    # Abnormal EKG  Sinus rhythm with possible septal infarct and T wave abnormalities  Check echo    # TORRES  May be due to COPD  Check echo    # COPD  Recommend smoking cessation    # active smoking  Still smoking, recommend smoking cessation    # heavy ETOH use  Recommend cutting back    # Abnormal chest xray  Chest x-ray  - \"Abnormal opacity in the left upper lobe with the suggestion of cavitary components. Recommend CT scan of the chest to exclude a cavitary lesion, either inflammatory or neoplastic. \"  Looks like CT was ordered but never done.     Please follow up with primary care about this.       Today's Plan Summary:  See above assessment/plan for full details of today's plan.  Briefly,     Start norvasc  Check echo                Reason For Visit / Chief Complaint:  Referred by Dr. Bray (vascular) for a new patient visit for HTN, HLD, PAD.    HPI:   Nahid Luis is a 64 y.o.  male with history as " noted in the problem list and further detailed in the above assessment and plan.    Referred by Dr. Bray (vascular) for a new patient visit for HTN, HLD, PAD.    The patient recently presented to the ER with leg pain and was diagnosed with PAD.  He saw vascular in follow-up as an outpatient and was scheduled for an angiogram.  Because of his cardiac risk factors he was referred to cardiology.    Today, the patient reports -   no chest pain.   No SOB at rest.  Does have TORRES.  No orthopnea or PND.  No leg edema.   No palpitations.  No syncope.    Works with heavy .    .   3 children.    Smoking cigarettes.  Cutting back.   4 beers / day (used to drink a lot more).  No drugs.          Cardiac Imaging personally reviewed:  EKG 8-17-24  Sinus rhythm  Septal infarct  Nonspecific T wave changes       Holter or event monitor    Echo    RUTH    Cardiac MRI    Stress testing    Coronary CTA or [facility]    CPET              Patient Active Problem List    Diagnosis Date Noted   • PAD (peripheral artery disease) ([facility]) 08/21/2024   • Hypertension 09/08/2023   • Chronic obstructive pulmonary disease ([facility]) 09/08/2023   • Current every day smoker 09/08/2023   • Gross hematuria 09/08/2023       Past Medical History:   Diagnosis Date   • Colon polyp    • COPD (chronic obstructive pulmonary disease) ([facility])    • Hypertension        Review of Systems   Constitutional:  Negative for chills and fever.   HENT:  Negative for nosebleeds.    Gastrointestinal:  Negative for abdominal distention and blood in stool.   Neurological:  Negative for dizziness and syncope.   Psychiatric/Behavioral:  Negative for confusion.    14-point ROS completed and negative except as stated above and/or in the HPI.    No Known Allergies    Current Outpatient Medications   Medication Instructions   • albuterol (Proventil HFA) 90 mcg/act inhaler 2 puffs, Inhalation, Every 6 hours PRN   • amLODIPine (NORVASC) 5 mg, Oral, Daily   • aspirin  "(ECOTRIN LOW STRENGTH) 81 mg, Oral, Daily   • atorvastatin (LIPITOR) 20 mg, Oral, Daily   • Fluticasone-Salmeterol (Advair) 100-50 mcg/dose inhaler 1 puff, Inhalation, 2 times daily, Rinse mouth after use.   • losartan (COZAAR) 100 mg, Oral, Daily       Social History     Socioeconomic History   • Marital status: Single     Spouse name: Not on file   • Number of children: Not on file   • Years of education: Not on file   • Highest education level: Not on file   Occupational History   • Not on file   Tobacco Use   • Smoking status: Every Day     Current packs/day: 1.00     Average packs/day: 1 pack/day for 49.7 years (49.7 ttl pk-yrs)     Types: Cigarettes     Start date: 1975   • Smokeless tobacco: Not on file   Vaping Use   • Vaping status: Never Used   Substance and Sexual Activity   • Alcohol use: Yes     Alcohol/week: 6.0 standard drinks of alcohol     Types: 6 Cans of beer per week     Comment: daily 4-5 beers daily   • Drug use: No   • Sexual activity: Not on file   Other Topics Concern   • Not on file   Social History Narrative   • Not on file     Social Determinants of Health     Financial Resource Strain: Not on file   Food Insecurity: Not on file   Transportation Needs: Not on file   Physical Activity: Not on file   Stress: Not on file   Social Connections: Not on file   Intimate Partner Violence: Not on file   Housing Stability: Not on file       Family History   Problem Relation Age of Onset   • Alcohol abuse Father    • Heart attack Father        Physical Exam:  Blood pressure (!) 176/74, pulse 99, height 5' 8\" (1.727 m), weight 79 kg (174 lb 3.2 oz), SpO2 93%.  Body mass index is 26.49 kg/m².  Wt Readings from Last 3 Encounters:   08/26/24 79 kg (174 lb 3.2 oz)   08/21/24 78.9 kg (174 lb)   06/21/24 78 kg (172 lb)     Physical Exam  Vitals reviewed.   Constitutional:       General: He is not in acute distress.     Appearance: He is not toxic-appearing.   HENT:      Head: Normocephalic and atraumatic. "   Eyes:      General: No scleral icterus.     Conjunctiva/sclera: Conjunctivae normal.   Neck:      Vascular: No carotid bruit.      Comments: No JVD  Cardiovascular:      Rate and Rhythm: Normal rate and regular rhythm.      Heart sounds: No murmur heard.     No friction rub. No gallop.      Comments: Distant heart sounds  Pulmonary:      Breath sounds: No wheezing, rhonchi or rales.      Comments: Prolonged expiratory phase  Abdominal:      General: There is no distension.      Palpations: Abdomen is soft.      Tenderness: There is no abdominal tenderness. There is no guarding.   Musculoskeletal:      Right lower leg: No edema.      Left lower leg: No edema.   Skin:     Coloration: Skin is not jaundiced or pale.      Findings: No erythema.   Neurological:      Mental Status: He is alert. Mental status is at baseline.   Psychiatric:         Mood and Affect: Mood normal.         Behavior: Behavior normal.         Labs & Results:  Lab Results   Component Value Date    SODIUM 130 (L) 08/17/2024    K 5.0 08/17/2024    CL 97 08/17/2024    CO2 28 08/17/2024    BUN 9 08/17/2024    CREATININE 0.98 08/17/2024    GLUC 179 (H) 08/17/2024    CALCIUM 9.4 08/17/2024         Thank you for the opportunity to participate in the care of this patient.    Toni Martinez MD, [facility]  Advanced Heart Failure and Transplant Cardiologist  Director of Cardio-Oncology  [facility]

## 2024-08-26 NOTE — TELEPHONE ENCOUNTER
Verified patient's insurance   CONFIRMED - Patient's insurance is [employer]  Is patient requesting a call when authorization has been obtained? Patient did not request a call.    Surgery Date: 9/24/24  Primary Surgeon: SEBASTIEN // Carlos Bray (NPI: [id number])  Assisting Surgeon: Not Applicable (N/A)  Facility: [place] (Tax: [id number] / NPI: [id number])  Inpatient / Outpatient: Outpatient  Level: 3    Clearance Received: No clearance ordered.  Consent Received: Yes, scanned into Epic on 8/21/24.  Medication Hold / Last Dose:  No hold ASA  IR Notified:  Emailed 8/26/24  Rep. Notified: Not Applicable (N/A)  Equipment Needs: Not Applicable (N/A)  Vas Lab Requested: Not Applicable (N/A)  Patient Contacted: 8/26/24 - spoke to Surendra Ayon    Diagnosis: I73.9  Procedure/ CPT Code(s): Angiogram // CPT: 76060, 89474, and 10372 // Procedure to take place in IR [Referral Based]    For varicose vein related procedures:   Last LEVDR: Not Applicable (N/A)  CEAP Classification: Not Applicable (N/A)  VCSS: Not Applicable (N/A)    Post Operative Date/ Time: 10/16/24 , 230p [place] ([place]) with Carlos Bray (NPI: [id number])     *Please review medication hold(s), PATs, and check H&P with patient.*  PATIENT WAS MAILED SURGERY/SHOWERING/DISCHARGE/COVID INSTRUCTIONS AFTER REVIEWING WITH THEM VIA PHONE CALL.

## 2024-08-29 ENCOUNTER — HOSPITAL ENCOUNTER (OUTPATIENT)
Dept: NON INVASIVE DIAGNOSTICS | Facility: MEDICAL CENTER | Age: 64
Discharge: HOME | End: 2024-08-29
Payer: COMMERCIAL

## 2024-08-29 VITALS
WEIGHT: 174 LBS | SYSTOLIC BLOOD PRESSURE: 176 MMHG | BODY MASS INDEX: 26.37 KG/M2 | HEART RATE: 99 BPM | DIASTOLIC BLOOD PRESSURE: 74 MMHG | HEIGHT: 68 IN

## 2024-08-29 DIAGNOSIS — I10 PRIMARY HYPERTENSION: ICD-10-CM

## 2024-08-29 DIAGNOSIS — R94.31 ABNORMAL EKG: ICD-10-CM

## 2024-08-29 DIAGNOSIS — R06.09 DOE (DYSPNEA ON EXERTION): ICD-10-CM

## 2024-08-29 DIAGNOSIS — I73.9 PAD (PERIPHERAL ARTERY DISEASE) (HCC): ICD-10-CM

## 2024-08-29 LAB
AORTIC ROOT: 3.2 CM
APICAL FOUR CHAMBER EJECTION FRACTION: 55 %
ASCENDING AORTA: 2.9 CM
BSA FOR ECHO PROCEDURE: 1.93 M2
E WAVE DECELERATION TIME: 162 MS
E/A RATIO: 0.76
FRACTIONAL SHORTENING: 34 (ref 28–44)
INTERVENTRICULAR SEPTUM IN DIASTOLE (PARASTERNAL SHORT AXIS VIEW): 0.9 CM
INTERVENTRICULAR SEPTUM: 0.9 CM (ref 0.6–1.1)
LAAS-AP2: 11.2 CM2
LAAS-AP4: 12.5 CM2
LEFT ATRIUM SIZE: 3.3 CM
LEFT ATRIUM VOLUME (MOD BIPLANE): 26 ML
LEFT ATRIUM VOLUME INDEX (MOD BIPLANE): 13.5 ML/M2
LEFT INTERNAL DIMENSION IN SYSTOLE: 2.9 CM (ref 2.1–4)
LEFT VENTRICULAR INTERNAL DIMENSION IN DIASTOLE: 4.4 CM (ref 3.5–6)
LEFT VENTRICULAR POSTERIOR WALL IN END DIASTOLE: 0.9 CM
LEFT VENTRICULAR STROKE VOLUME: 53 ML
LVSV (TEICH): 53 ML
MV E'TISSUE VEL-SEP: 8 CM/S
MV PEAK A VEL: 0.84 M/S
MV PEAK E VEL: 64 CM/S
MV STENOSIS PRESSURE HALF TIME: 47 MS
MV VALVE AREA P 1/2 METHOD: 4.68
RA PRESSURE ESTIMATED: 5 MMHG
RIGHT ATRIUM AREA SYSTOLE A4C: 10.8 CM2
RIGHT VENTRICLE ID DIMENSION: 3.9 CM
SL CV LEFT ATRIUM LENGTH A2C: 4.2 CM
SL CV LV EF: 65
SL CV PED ECHO LEFT VENTRICLE DIASTOLIC VOLUME (MOD BIPLANE) 2D: 86 ML
SL CV PED ECHO LEFT VENTRICLE SYSTOLIC VOLUME (MOD BIPLANE) 2D: 33 ML
TRICUSPID ANNULAR PLANE SYSTOLIC EXCURSION: 2.2 CM

## 2024-08-29 PROCEDURE — 93306 TTE W/DOPPLER COMPLETE: CPT | Performed by: INTERNAL MEDICINE

## 2024-08-29 PROCEDURE — 93306 TTE W/DOPPLER COMPLETE: CPT

## 2024-09-18 RX ORDER — SODIUM CHLORIDE 9 MG/ML
75 INJECTION, SOLUTION INTRAVENOUS CONTINUOUS
Status: CANCELLED | OUTPATIENT
Start: 2024-09-18

## 2024-09-23 ENCOUNTER — TELEPHONE (OUTPATIENT)
Dept: RADIOLOGY | Facility: HOSPITAL | Age: 64
End: 2024-09-23

## 2024-09-23 NOTE — PRE-PROCEDURE INSTRUCTIONS
Pre-procedure Instructions for Interventional Radiology  [address] [address]  INTERVENTIONAL RADIOLOGY [phone]    You are scheduled for a/an arteriogram.    On Tuesday 9-24-24.    Your tentative arrival time is 9:15am.  [facility] [[email or web address] will notify you the day before your procedure with the exact arrival time and the location to arrive.    To prepare for your procedure:  Please arrange for someone to drive you home after the procedure and stay with you until the next morning if you are instructed to do so.  This is typically for patients receiving some type of sedative or anesthetic for the procedure.  DO NOT EAT OR DRINK ANYTHING after midnight on the evening before your procedure including candy & gum.  ONLY SIPS OF WATER with your medications are allowed on the morning of your procedure.  TAKE ALL OF YOUR REGULAR MEDICATIONS THE MORNING OF YOUR PROCEDURE with sips of water!  We may call you to stop some of your blood sugar, blood pressure and blood thinning medications depending on the procedure.  Please take all of these medications unless we instruct you to stop them.  If you have an allergy to x-ray dye, please contact Interventional Radiology for an x-ray dye preparation which usually consists of an oral steroid and Benadryl.    The day of your procedure:  Bring a list of the medications you take at home.  Bring medications you take for breathing problems (such as inhalers), medications for chest pain, or both.  Bring a case for your glasses or contacts.  Bring your insurance card and a form of photo ID.  Please leave all valuables such as credit cards and jewelry at home.  Report to the admitting office to the left of the registration desk in the main lobby at the [facility], Entrance B.  You will then be directed to the Short Stay Center.  While your procedure is being performed, your family may wait in the Radiology Waiting Room on the 1st floor in  Radiology.  if they need to leave, they may provide a number to be called following the procedure.   Be prepared to stay overnight just in case. Sometimes procedures will indicate the need for further observation or treatment.   If you are scheduled for a follow-up visit with the Interventional Radiologist after your procedure, you will be called with a date and time.    Special Instructions (Medications to stop taking before your procedure etc.):

## 2024-09-24 ENCOUNTER — HOSPITAL ENCOUNTER (OUTPATIENT)
Dept: RADIOLOGY | Facility: HOSPITAL | Age: 64
Discharge: HOME | End: 2024-09-24
Attending: SURGERY
Payer: COMMERCIAL

## 2024-09-24 VITALS
HEIGHT: 64 IN | HEART RATE: 62 BPM | TEMPERATURE: 97.1 F | RESPIRATION RATE: 18 BRPM | BODY MASS INDEX: 29.71 KG/M2 | DIASTOLIC BLOOD PRESSURE: 74 MMHG | OXYGEN SATURATION: 95 % | WEIGHT: 174 LBS | SYSTOLIC BLOOD PRESSURE: 141 MMHG

## 2024-09-24 DIAGNOSIS — I73.9 PAD (PERIPHERAL ARTERY DISEASE) (HCC): ICD-10-CM

## 2024-09-24 LAB
INR PPP: 0.92 (ref 0.85–1.19)
PROTHROMBIN TIME: 12.7 SECONDS (ref 12.3–15)

## 2024-09-24 PROCEDURE — 36246 INS CATH ABD/L-EXT ART 2ND: CPT | Performed by: SURGERY

## 2024-09-24 PROCEDURE — 85610 PROTHROMBIN TIME: CPT | Performed by: SURGERY

## 2024-09-24 PROCEDURE — 75716 ARTERY X-RAYS ARMS/LEGS: CPT | Performed by: SURGERY

## 2024-09-24 PROCEDURE — 99153 MOD SED SAME PHYS/QHP EA: CPT

## 2024-09-24 PROCEDURE — C1769 GUIDE WIRE: HCPCS

## 2024-09-24 PROCEDURE — C1894 INTRO/SHEATH, NON-LASER: HCPCS

## 2024-09-24 PROCEDURE — C1760 CLOSURE DEV, VASC: HCPCS

## 2024-09-24 PROCEDURE — 75822 VEIN X-RAY ARMS/LEGS: CPT

## 2024-09-24 PROCEDURE — 75625 CONTRAST EXAM ABDOMINL AORTA: CPT | Performed by: SURGERY

## 2024-09-24 PROCEDURE — 76937 US GUIDE VASCULAR ACCESS: CPT | Performed by: SURGERY

## 2024-09-24 PROCEDURE — C1887 CATHETER, GUIDING: HCPCS

## 2024-09-24 PROCEDURE — 99152 MOD SED SAME PHYS/QHP 5/>YRS: CPT

## 2024-09-24 PROCEDURE — 99152 MOD SED SAME PHYS/QHP 5/>YRS: CPT | Performed by: SURGERY

## 2024-09-24 PROCEDURE — 76937 US GUIDE VASCULAR ACCESS: CPT

## 2024-09-24 RX ORDER — SODIUM CHLORIDE 9 MG/ML
75 INJECTION, SOLUTION INTRAVENOUS CONTINUOUS
Status: DISCONTINUED | OUTPATIENT
Start: 2024-09-24 | End: 2024-09-25 | Stop reason: HOSPADM

## 2024-09-24 RX ORDER — IODIXANOL 320 MG/ML
200 INJECTION, SOLUTION INTRAVASCULAR
Status: COMPLETED | OUTPATIENT
Start: 2024-09-24 | End: 2024-09-24

## 2024-09-24 RX ORDER — FENTANYL CITRATE 50 UG/ML
INJECTION, SOLUTION INTRAMUSCULAR; INTRAVENOUS AS NEEDED
Status: COMPLETED | OUTPATIENT
Start: 2024-09-24 | End: 2024-09-24

## 2024-09-24 RX ORDER — MIDAZOLAM HYDROCHLORIDE 2 MG/2ML
INJECTION, SOLUTION INTRAMUSCULAR; INTRAVENOUS AS NEEDED
Status: COMPLETED | OUTPATIENT
Start: 2024-09-24 | End: 2024-09-24

## 2024-09-24 RX ORDER — LIDOCAINE WITH 8.4% SOD BICARB 0.9%(10ML)
SYRINGE (ML) INJECTION AS NEEDED
Status: COMPLETED | OUTPATIENT
Start: 2024-09-24 | End: 2024-09-24

## 2024-09-24 RX ORDER — ATORVASTATIN CALCIUM 20 MG/1
20 TABLET, FILM COATED ORAL DAILY
Qty: 30 TABLET | Refills: 5 | Status: SHIPPED | OUTPATIENT
Start: 2024-09-24

## 2024-09-24 RX ADMIN — FENTANYL CITRATE 25 MCG: 50 INJECTION INTRAMUSCULAR; INTRAVENOUS at 10:20

## 2024-09-24 RX ADMIN — FENTANYL CITRATE 25 MCG: 50 INJECTION INTRAMUSCULAR; INTRAVENOUS at 10:52

## 2024-09-24 RX ADMIN — MIDAZOLAM 0.5 MG: 1 INJECTION INTRAMUSCULAR; INTRAVENOUS at 10:31

## 2024-09-24 RX ADMIN — Medication 10 ML: at 10:31

## 2024-09-24 RX ADMIN — MIDAZOLAM 0.5 MG: 1 INJECTION INTRAMUSCULAR; INTRAVENOUS at 10:51

## 2024-09-24 RX ADMIN — FENTANYL CITRATE 25 MCG: 50 INJECTION INTRAMUSCULAR; INTRAVENOUS at 10:32

## 2024-09-24 RX ADMIN — SODIUM CHLORIDE 75 ML/HR: 0.9 INJECTION, SOLUTION INTRAVENOUS at 09:41

## 2024-09-24 RX ADMIN — FENTANYL CITRATE 25 MCG: 50 INJECTION INTRAMUSCULAR; INTRAVENOUS at 10:25

## 2024-09-24 RX ADMIN — MIDAZOLAM 0.5 MG: 1 INJECTION INTRAMUSCULAR; INTRAVENOUS at 10:25

## 2024-09-24 RX ADMIN — IODIXANOL 84 ML: 320 INJECTION, SOLUTION INTRAVASCULAR at 11:44

## 2024-09-24 RX ADMIN — MIDAZOLAM 0.5 MG: 1 INJECTION INTRAMUSCULAR; INTRAVENOUS at 10:20

## 2024-09-24 NOTE — DISCHARGE INSTR - AVS FIRST PAGE
DISCHARGE INSTRUCTIONS  ARTERIOGRAM/ANGIOPLASTY/STENT    ACTIVITY: On the evening following the procedure, you should be mostly resting.  Someone should remain with you during the evening and overnight following the procedure.     On the day after your procedure, limit your activity to walking.  Avoid heavy lifting (no more than 15 lbs) for the first three days. Walking up steps and normal activities may be resumed as you feel ready.   You should not drive a car for at least two days following discharge from the hospital. You may ride in a car.   If you have any questions regarding a particular activity, please discuss with your doctor or nurse before you are discharged.    DIET:  Resume your normal diet.  Drink more water than usual for the next 24 hours.    PROCEDURE SITE: You may have a procedure site in your groin, arm, or foot.  You may have surgical glue at your procedure site.  The glue is used to cover the procedure site, assist in closure, and prevent contamination. This adhesive will darken and peel away on its own within one to two weeks. Do not pick at it.    You should shower daily.  Wash incision daily with soap and water, but do not rub or scrub the incision; rinse thoroughly and pat dry.  Do not bathe in a tub or swim for the first 2 week following your procedure or if you have any open wounds.  It is normal to have some bruising, swelling or discoloration around the procedure site.  IF increasing redness, pain, or a bulge develops, call our office immediately.    If present, you may remove the band-aid or “steri-strips” over your procedure site after two days.   If you notice any active bleeding at the site, apply pressure to the site and call our office ([phone]) or [phone].    FOLLOW UP STUDIES:  Your doctor will discuss whether further treatments or follow-up studies are necessary at your first post procedure visit.    FOLLOW UP APPOINTMENTS:  Making and keeping follow up appointments and  ultrasound tests are important to your recovery.  If you have difficulty making it to or keeping your follow up appointments, call the office.    If you have increased pain, fever >101.5, increased drainage, redness or a bad smell at your surgery site, new coldness/numbness of your arm or leg, please call us immediately and GO directly to the ER.    PLEASE CALL THE OFFICE IF YOU HAVE ANY QUESTIONS  [phone]  [phone] [address], [address], [place] [address]  [address] [place] [address]  [address] [facility], [address] [address]  [address] [facility], 1st Floor, [place] [address]  [address] [facility], [address], [place] [address]  [address] [facility], [address], [place] [address]  [address] [place] [address]  [address] [facility], 1st Floor, [address], [place] [address]  [address] [facility] [place] [address]  [address] [facility], [address], [place] [address]

## 2024-09-24 NOTE — SEDATION DOCUMENTATION
MIMA oates completed by Katarina Toribio. Patient tolerated well. Left groin puncture site closed with a angioseal, site dressed with guaze and tegaderm. 2 hour bedrest start time 1100. Report given to SSC.

## 2024-09-24 NOTE — OP NOTE
OPERATIVE REPORT  PATIENT NAME: Nahid Luis    :  1960  MRN: [id number]  Pt Location:     [facility] intervention[facility] radiology suite    Surgeon  Carlos Bray DO    Assistants none    SURGERY DATE: 2024      Preop Diagnosis:  #1 right lower extremity ischemic rest pain  #2 short distance bilateral claudication    Postop diagnosis:  Same    Procedures  #1 ultrasound-guided percutaneous access to the left common femoral artery  #2 aortogram  #3 iliac angiogram  #4 catheter placement in the right common femoral artery  #5 right lower extremity runoff angiogram  #6 left lower extremity runoff angiogram  #7 supervision interpretation of all radiologic imaging  #8 conscious sedation administered by myself 25 minutes time      Anesthesia Type:   Conscious sedation plus local anesthetic    Operative Indications:    64-year-old male with longstanding right lower extremity ischemic rest pain and bilateral claudication.  I saw him in the office he had lower extremity arterial duplex demonstrated small common femoral artery aneurysms bilaterally in the 1-1/2 to 2 cm range as well as SFA occlusion bilaterally and popliteal occlusion on the right.  His right leg is more bothersome to him and his ABIs 0.3 on the right and 0.5 on the left.  I discussed options with him in the office including the likely need for a bypass I also discussed the need for an angiogram to evaluate his outflow prior.  He has vein mapping and cardiology workup already in progress.        Operative Findings:  Patent infrarenal aorta and renal arteries bilaterally the common iliacs are ectatic bilaterally there is no significant occlusive disease in the iliac system on the right or the left side.  On the left side there is a small common femoral aneurysm approximately 2 cm the profundus patent the SFA is occluded proximally there is reconstitution of the distal SFA the P1 popliteal segment on the left is widely patent there is a focal severe  stenosis in the P2 popliteal segment on the left side the P3 segment is widely patent and there is three-vessel runoff although the image quality below the ankle is somewhat poor.  On the right side there is also a small right common femoral aneurysm approximately 1.5 cm.  The profunda was widely patent the SFA was occluded proximally the SFA was occluded throughout its entire course and P1 P2 and P3 popliteal segments are occluded there was reconstitution of the TP trunk the AT is widely patent throughout its entire course of the peroneal is patent the proximal posterior tibial was occluded and reconstitutes in the mid posterior tibial segment with patent vessel throughout the remainder of its course there are patent medial and lateral plantar vessels as well as her dorsalis pedis it is not entirely clear if the pedal arch is completed appears incomplete to me.        Complications:   None    Procedure and Technique:  Informed consent was obtained and the appropriate part of the patient was correct identified in the holding area brought to the operating room placed in supine position appropriate lines and monitors were placed after satisfactory induction of conscious sedation the groins prepped draped in the normal sterile fashion Jako timeout was performed.  I began by identifying the left common femoral artery with an ultrasound I then anesthetized this area 1% lidocaine and then accessed this using a micropuncture needle over an 014 wire this exchanged for micropuncture sheath and then a Bentson wire was advanced into the infrarenal aorta the existing sheath was changed for a 5 Cook Islander short sheath and then a retrograde left common femoral sheath angiogram was performed with the above-stated findings I then advanced an RBI catheter to the level of the renal arteries and performed aortogram with the above-stated findings I then withdrew the catheter to the level aortic bifurcation and performed an iliac angiogram  with right and left oblique views with the above-stated findings.  I then advanced the catheter to the level of the right common femoral artery after selecting the right common iliac artery with a stiff Glidewire and RBI I performed right lower extremity runoff angiography from this position with the above-stated findings I then withdrew the RBI catheter over a Bentson wire and performed left lower extremity runoff angiogram from the left groin sheath site with the above-stated findings.  No intervention was required.  The access site was closed with a Angio-Seal.             I was present for the entire procedure.    Patient Disposition:  PACU              SIGNATURE: Carlos Bray,   DATE: September 24, 2024  TIME: 11:10 AM    Follow-up: Due to the iliac ectasia seen on angiography in the common femoral small aneurysms I will order a CTA abdomen pelvis to fully evaluate his aneurysmal disease.  He will require first treatment for his right leg which will consist of a right common femoral to posterior tibial artery bypass he has vein mapping and cardiology clearance pending he will also require repair of his right common femoral aneurysm at that time.  I will set this up in the near future and discuss bypass surgery with him in the office in a couple of weeks.      Flouro time 11.3min  DAP 4592  84cc visi

## 2024-09-24 NOTE — TELEPHONE ENCOUNTER
Reason for call:   [x] Refill   [] Prior Auth  [] Other:     Office:   [] PCP/Provider -   [x] Specialty/Provider - Vascular    Medication: atorvastatin (LIPITOR) 20 mg tablet     Dose/Frequency: Take 1 tablet (20 mg total) by mouth daily     Quantity: 30 tablet    Pharmacy: [facility]/pharmacy #[id number] - [place] - [address] [facility] [phone]    Does the patient have enough for 3 days?   [] Yes   [x] No - Send as HP to POD

## 2024-09-24 NOTE — DISCHARGE INSTRUCTIONS
ARTERIOGRAM    WHAT YOU SHOULD KNOW:   An angiogram is a procedure to look at arteries in your body. Arteries are the blood vessels that carry blood from your heart to your body.     AFTER YOU LEAVE:     Self-care:   Limit activity: Rest for the remainder of the day of your procedure.Have some one with you until the next morning. Keep your arm or leg straight as much as possible.Rest as much as possible, sitting lying or reclining. Walk only to go to the bathroom, to bed or to eat. If the angiogram catheter was put in your leg, use the stairs as little as possible. No driving for 24-48 hours. No heavy lifting, >10 lbs. Or strenuous activity for 48 hours.      Keep your wound clean and dry. Remove band aid/ dressing tomorrow. You may shower 24 hours after your procedure. Shower and wash groin area or wrist area gently with soap and water: beginning tomorrow. Rinse and pat Dry. Apply new water seal band aid. Repeat this process for 5 days.  If there is any drainage from the puncture site, you should put on a clean bandage. No Powders, creams, lotions or antibiotic ointments for 5 days.  No tub baths, hot tubs or swimming for 5 days.    Watch for bleeding and bruising: It is normal to have a bruise and soreness where the angiogram catheter went in.  Medication: If your angiogram was performed to treat blockages in your leg arteries, it is strongly recommended that you take both an antiplatelet medication (like aspirin or Plavix) to prevent clotting AND a statin drug (like Lipitor or Crestor), even if you have normal cholesterol. If these drugs are not ordered for you please contact either your Vascular Surgery office or the Interventional Radiology Dept during normal daytime working hours. See Interventional Radiology telephone numbers below.  You Should Have Follow up with the vascular surgeon   call [phone] with questions  Diet:   You may resume your regular diet, Sips of flat soda will help with mild  nausea.  Drink more liquids than usual for the next 24 hours      IMMEDIATELY Contact Interventional Radiology at [phone]  if any of the following occur:  If your bruise gets larger or if you notice any active bleeding. APPLY DIRECT PRESSURE TO THE BLEEDING SITE.   If you notice increased swelling or have increased pain at the puncture site   If you have any numbness or pain in the extremity of the puncture site   If that extremity seems cold or pale.    You have fever greater than 101  Persistent nausea or vomitting    Follow up with your primary healthcare provider  as directed: Write down your questions so you remember to ask them during your visits.

## 2024-09-25 ENCOUNTER — TELEPHONE (OUTPATIENT)
Dept: VASCULAR SURGERY | Facility: CLINIC | Age: 64
End: 2024-09-25

## 2024-09-25 DIAGNOSIS — I73.9 PAD (PERIPHERAL ARTERY DISEASE) (HCC): Primary | ICD-10-CM

## 2024-09-25 NOTE — TELEPHONE ENCOUNTER
----- Message from Carlos Bray DO sent at 9/24/2024 11:08 AM EDT -----  I did an angio on this patient today.  Start looking for OR time for a right CFA aneurysm repair & right CFA to tibial bypass.  Will need 6 hours.  He will need cardiology clearance prior, vein mapping, and I ordered a CT scan ab/pelvis prior.  Schedule OV with me in ~ 2 weeks to discuss bypass.

## 2024-09-30 ENCOUNTER — HOSPITAL ENCOUNTER (OUTPATIENT)
Dept: VASCULAR ULTRASOUND | Facility: HOSPITAL | Age: 64
Discharge: HOME | End: 2024-09-30
Attending: SURGERY
Payer: COMMERCIAL

## 2024-09-30 DIAGNOSIS — I73.9 PAD (PERIPHERAL ARTERY DISEASE) (HCC): ICD-10-CM

## 2024-09-30 PROCEDURE — 93978 VASCULAR STUDY: CPT | Performed by: SURGERY

## 2024-09-30 PROCEDURE — 93978 VASCULAR STUDY: CPT

## 2024-10-05 NOTE — PROGRESS NOTES
"Cardiology and Heart Failure Clinic Note    Nahid Luis 64 y.o. male   MRN: [id number]  Encounter: [id number]        Assessment / Plan:    # Preoperative risk assessment  Plan for vascular surgery - right common femoral to posterior tibial artery bypass  Recent echo reviewed demonstrating normal ejection fraction  He does have significant TORRES.  While this may be pulmonary in nature, we also need to consider cardiac causes such as anginal equivalent.  Given he has upcoming vascular surgery, and given that he is unable to do 4 METS activity, recommend pharmacologic nuclear stress test.  This test needs to be pharmacologic due to inability to exercise due to severe claudication symptoms in the leg.  If there are high risk findings on the stress test he will need to delay vascular surgery and have a LHC.  If there are no high risk findings, he would be at an acceptable level of risk to proceed with the planned surgery.    # HTN    Losartan 100 mg daily    norvasc 5 mg daily  (added last visit)  BP now at goal    # HLD   last year  Has PAD so recommend aggressive LDL control  On lipitor 20mg daily, recently started so would repeat lipids in 2 months    # PAD  On ASA 81  On statin   Follows with vascular, upcoming plan for surgery    # Abnormal EKG  Sinus rhythm with possible septal infarct and T wave abnormalities  Checked echo - normal EF, no WMA    # TORRES  May be due to COPD  Echo without concerns  May be anginal equivalent - check stress test as above    # COPD  Recommend smoking cessation    # active smoking  Still smoking, recommend smoking cessation    # heavy ETOH use  Recommend cutting back    # Abnormal chest xray  Chest x-ray  - \"Abnormal opacity in the left upper lobe with the suggestion of cavitary components. Recommend CT scan of the chest to exclude a cavitary lesion, either inflammatory or neoplastic. \"  Looks like CT was ordered but never done.     Please follow up with primary care " about this.       Today's Plan Summary:  See above assessment/plan for full details of today's plan.  Briefly,     Pharmacologic nuclear stress test                Reason For Visit / Chief Complaint:  F/u - HTN, HLD, PAD.    HPI:   Nahid Luis is a 64 y.o.  male with history as noted in the problem list and further detailed in the above assessment and plan.    Initial:   August 2024  Referred by Dr. Bray (vascular) for a new patient visit for HTN, HLD, PAD.  The patient recently presented to the ER with leg pain and was diagnosed with PAD.  He saw vascular in follow-up and was scheduled for an angiogram.  Because of his cardiac risk factors he was referred to cardiology.  Today, the patient reports -   no chest pain.   Does have TORRES.  Works with heavy .    .   3 children.  Smoking cigarettes.  Cutting back.   4 beers / day (used to drink a lot more).  No drugs.    Interval:    Plan at initial visit -->   Start norvasc  Check echo    Echo - 08/29/24   EF 65%.  No WMA.    Today  - still smoking but down to 5 cig per day.   Cut back on beer.   Reports will be having vascular surgery.  Here for preoperative risk assessment.   No chest pain.   Does have significant TORRES.   Cannot walk very far due to leg pain.              Cardiac Imaging personally reviewed:  EKG 8-17-24  Sinus rhythm  Septal infarct  Nonspecific T wave changes       Holter or event monitor    Echo Echo - 08/29/24   EF 65%.  No WMA.   RUTH    Cardiac MRI    Stress testing    Coronary CTA or [facility]    CPET              Patient Active Problem List    Diagnosis Date Noted    Mixed hyperlipidemia 08/26/2024    PAD (peripheral artery disease) (HCC) 08/21/2024    Hypertension 09/08/2023    Chronic obstructive pulmonary disease (HCC) 09/08/2023    Current every day smoker 09/08/2023    Gross hematuria 09/08/2023       Past Medical History:   Diagnosis Date    Colon polyp     COPD (chronic obstructive pulmonary disease) ([facility])      "Hypertension        No Known Allergies    Current Outpatient Medications   Medication Instructions    albuterol (Proventil HFA) 90 mcg/act inhaler 2 puffs, Inhalation, Every 6 hours PRN    amLODIPine (NORVASC) 5 mg, Oral, Daily    aspirin (ECOTRIN LOW STRENGTH) 81 mg, Oral, Daily    atorvastatin (LIPITOR) 20 mg, Oral, Daily    Fluticasone-Salmeterol (Advair) 100-50 mcg/dose inhaler 1 puff, Inhalation, 2 times daily, Rinse mouth after use.    losartan (COZAAR) 100 mg, Oral, Daily       Social History     Socioeconomic History    Marital status: Single     Spouse name: Not on file    Number of children: Not on file    Years of education: Not on file    Highest education level: Not on file   Occupational History    Not on file   Tobacco Use    Smoking status: Every Day     Current packs/day: 1.00     Average packs/day: 1 pack/day for 49.8 years (49.8 ttl pk-yrs)     Types: Cigarettes     Start date: 1975    Smokeless tobacco: Not on file   Vaping Use    Vaping status: Never Used   Substance and Sexual Activity    Alcohol use: Yes     Alcohol/week: 6.0 standard drinks of alcohol     Types: 6 Cans of beer per week     Comment: daily 4-5 beers daily    Drug use: No    Sexual activity: Not on file   Other Topics Concern    Not on file   Social History Narrative    Not on file     Social Determinants of Health     Financial Resource Strain: Not on file   Food Insecurity: Not on file   Transportation Needs: Not on file   Physical Activity: Not on file   Stress: Not on file   Social Connections: Not on file   Intimate Partner Violence: Not on file   Housing Stability: Not on file       Family History   Problem Relation Age of Onset    Alcohol abuse Father     Heart attack Father        Physical Exam:  Blood pressure 130/70, pulse 98, height 5' 8\" (1.727 m), weight 80.4 kg (177 lb 3.2 oz).  Body mass index is 26.94 kg/m².  Wt Readings from Last 3 Encounters:   10/07/24 80.4 kg (177 lb 3.2 oz)   09/24/24 78.9 kg (174 lb) "   08/29/24 78.9 kg (174 lb)     Physical Exam  Vitals reviewed.   Constitutional:       General: He is not in acute distress.     Appearance: He is not toxic-appearing.   Neck:      Comments: No JVP elevation  Cardiovascular:      Rate and Rhythm: Normal rate and regular rhythm.      Heart sounds: No murmur heard.     No friction rub. No gallop.      Comments: Distant heart sounds  Pulmonary:      Breath sounds: No wheezing, rhonchi or rales.      Comments: Prolonged expiratory phase  Musculoskeletal:      Right lower leg: No edema.      Left lower leg: No edema.   Neurological:      Mental Status: He is alert.         Labs & Results:  Lab Results   Component Value Date    SODIUM 130 (L) 08/17/2024    K 5.0 08/17/2024    CL 97 08/17/2024    CO2 28 08/17/2024    BUN 9 08/17/2024    CREATININE 0.98 08/17/2024    GLUC 179 (H) 08/17/2024    CALCIUM 9.4 08/17/2024         Thank you for the opportunity to participate in the care of this patient.    Toni Martinez MD, [facility]  Advanced Heart Failure and Transplant Cardiologist  Director of Cardio-Oncology  [facility]

## 2024-10-07 ENCOUNTER — OFFICE VISIT (OUTPATIENT)
Dept: CARDIOLOGY CLINIC | Facility: CLINIC | Age: 64
End: 2024-10-07
Payer: COMMERCIAL

## 2024-10-07 VITALS
SYSTOLIC BLOOD PRESSURE: 130 MMHG | WEIGHT: 177.2 LBS | HEART RATE: 98 BPM | HEIGHT: 68 IN | BODY MASS INDEX: 26.86 KG/M2 | DIASTOLIC BLOOD PRESSURE: 70 MMHG

## 2024-10-07 DIAGNOSIS — R06.09 DOE (DYSPNEA ON EXERTION): ICD-10-CM

## 2024-10-07 DIAGNOSIS — R94.31 ABNORMAL EKG: ICD-10-CM

## 2024-10-07 DIAGNOSIS — R06.02 SOB (SHORTNESS OF BREATH): ICD-10-CM

## 2024-10-07 DIAGNOSIS — E78.2 MIXED HYPERLIPIDEMIA: ICD-10-CM

## 2024-10-07 DIAGNOSIS — Z01.818 PREOPERATIVE CLEARANCE: Primary | ICD-10-CM

## 2024-10-07 DIAGNOSIS — I73.9 PAD (PERIPHERAL ARTERY DISEASE) (HCC): ICD-10-CM

## 2024-10-07 DIAGNOSIS — I10 PRIMARY HYPERTENSION: ICD-10-CM

## 2024-10-07 DIAGNOSIS — Z71.6 ENCOUNTER FOR SMOKING CESSATION COUNSELING: ICD-10-CM

## 2024-10-07 PROCEDURE — 99214 OFFICE O/P EST MOD 30 MIN: CPT | Performed by: INTERNAL MEDICINE

## 2024-10-09 ENCOUNTER — HOSPITAL ENCOUNTER (OUTPATIENT)
Dept: VASCULAR ULTRASOUND | Facility: HOSPITAL | Age: 64
Discharge: HOME | End: 2024-10-09
Attending: SURGERY
Payer: COMMERCIAL

## 2024-10-09 ENCOUNTER — HOSPITAL ENCOUNTER (OUTPATIENT)
Dept: CT IMAGING | Facility: HOSPITAL | Age: 64
Discharge: HOME | End: 2024-10-09
Attending: SURGERY
Payer: COMMERCIAL

## 2024-10-09 DIAGNOSIS — I73.9 PAD (PERIPHERAL ARTERY DISEASE) (HCC): ICD-10-CM

## 2024-10-09 PROCEDURE — 93971 EXTREMITY STUDY: CPT

## 2024-10-09 PROCEDURE — 93971 EXTREMITY STUDY: CPT | Performed by: SURGERY

## 2024-10-09 PROCEDURE — 74174 CTA ABD&PLVS W/CONTRAST: CPT

## 2024-10-09 RX ADMIN — IOHEXOL 50 ML: 350 INJECTION, SOLUTION INTRAVENOUS at 17:49

## 2024-10-14 ENCOUNTER — NURSE TRIAGE (OUTPATIENT)
Age: 64
End: 2024-10-14

## 2024-10-14 NOTE — TELEPHONE ENCOUNTER
"Answer Assessment - Initial Assessment Questions  1. REASON FOR CALL or QUESTION: \"What is your reason for calling today?\" or \"How can I best help you?\" or \"What question do you have that I can help answer?\"      Returned call to patient. Informed him he has an apt with 10/16 to review results of vein mapping and CTA done on 10/9. Pt voiced understanding and no further questions.    Protocols used: Information Only Call - No Triage-ADULT-[place]    "

## 2024-10-14 NOTE — TELEPHONE ENCOUNTER
----- Message from Maranda LEGER sent at 10/14/2024  3:23 PM EDT -----  Patient's daughter calling in [facility] pt saw some results online via my chart and will like a call back to him for questions he may have.

## 2024-10-15 NOTE — PROGRESS NOTES
Assessment/Plan:      Diagnoses and all orders for this visit:    Gross hematuria  -     Ambulatory Referral to Urology; Future    PAD (peripheral artery disease) ([facility])  -     Case request operating room: right CFA to tibial bypass, possible completion angiogram; Standing  -     Type and screen; Future  -     Case request operating room: right CFA to tibial bypass, possible completion angiogram    Other orders  -     Diet NPO; Sips with meds; Standing  -     Void on call to OR; Standing  -     Insert peripheral IV; Standing  -     Nursing Communication CHG bath, have staff wash entire body (neck down) per pre op bathing protocol. Routine, evening prior to, and day of surgery.; Standing  -     Nursing Communication Swab both nares with Povidone-Iodine solution, EXCLUDE if patient has shellfish/Iodine allergy, and replace with nasal alcohol swabstick. Routine, day of surgery, on call to OR.; Standing  -     chlorhexidine (PERIDEX) 0.12 % oral rinse 15 mL  -     Place sequential compression device; Standing  -     ceFAZolin (ANCEF) IVPB (premix in dextrose) 2,000 mg 50 mL        PAD (peripheral artery disease) ([facility])  64-year-old male presents for follow-up for PAD.  I saw him back in August for chronic PAD.  He has bilateral short distance claudication and right lower extremity rest pain he does not have any tissue loss nor does he have rest pain on the left side.  I schedule him for aortoiliac duplex and subsequent CAT scan to evaluate his aortoiliac aneurysmal disease.  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  Also demonstrated an incidental bladder mass and I placed a referral to urology.  I strongly advised him on smoking cessation in his previous visits and he is cut back significantly and is aware of the importance of completely quitting.  He has been compliant with his aspirin and statin.  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  His right leg will require a right common femoral to tibial bypass he has three-vessel runoff and I believe his anterior tibial is the best outflow vessel for bypass.  His vein mapping seems adequate and I recommended a right common femoral to anterior tibial bypass with ipsilateral great saphenous vein.  His left leg symptoms are manageable and we will plan for observation for the left side occlusive disease as well as left common femoral aneurysm at this time.  He was referred for cardiology and has a stress test scheduled next week pending cardiology clearance we will proceed with right lower extremity bypass and risks benefits and alternative therapies were discussed with him in detail today and he consented to proceed.      Subjective:     Patient ID: Nahid Luis is a 64 y.o. male.    Patient underwent RLE angiogram on 9/24/24 and presents today for f/u visit. BLE short distance claudication and RLE rest pain. Also review LE vein mapping and CTA abdomen/pelvis done 10/9/24. Discuss possible bypass.     HPI    Review of Systems   Constitutional: Negative.    HENT: Negative.     Eyes: Negative.    Respiratory:  Positive for cough and shortness of breath.    Cardiovascular: Negative.    Gastrointestinal: Negative.    Endocrine: Negative.    Genitourinary: Negative.    Musculoskeletal:         Calf cramping with walking   Skin: Negative.    Allergic/Immunologic: Negative.    Neurological:  Positive for numbness.   Hematological: Negative.    Psychiatric/Behavioral: Negative.         I have reviewed the ROS above and made changes as needed.      Objective:     Physical Exam      General  Exam: alert, awake, oriented, no distress, consistent with stated age    Integumentary  Exam: warm, dry, no gross lesions, no bruises and normal color    Head and Neck  Exam: supple, no bruits, trachea midline, no JVD, no mass or  palpable nodes    Chest and Lung  Exam: chest normal without deformity, bilaterally expansive, clear to auscultation    Cardiovascular  Exam: regular rate, regular rhythm, no murmurs, no rubs or gallops    Adbomen  Exam: soft, non-tender, non-distended, no pulsatile abdominal masses, no abdominal bruit    Peripheral Vascular  Exam: no clubbing of the digits of the upper extremity, no cyanosis, no edema, both feet are warm, radial pulses 2+ bilaterally, skin well perfused, without and no varicosities.      Upper Extremity:  Palpation: Radial pulse- Bilateral 2+    Lower Extremity:  Palpation: Femoral pulse- Bilateral 2+         Absent distal pulses   No BLE wounds  Neurologic  Exam:alert, non-focal, oriented x 3, cranial nerves II-XII grossly intact        Operative Scheduling Information:    Hospital:  [facility] or [facility] or [facility]    Physician:  Katarina    Surgery: Right CFA to AT bypass with GSV, possible completion angiogram    Urgency:  Standard    Level:  Level 3: Outpatients to be scheduled for elective surgery than can be delayed up to 4 weeks without reasonable expectation of detriment to patient    Case Length:  Normal    Post-op Bed:  ICU    OR Table:  Standard    Equipment Needs:  None    Medication Instructions:  Aspirin:   Continue (do not hold)    Hydration:  No    Contrast Allergy:  no

## 2024-10-15 NOTE — H&P (VIEW-ONLY)
Assessment/Plan:      Diagnoses and all orders for this visit:    Gross hematuria  -     Ambulatory Referral to Urology; Future    PAD (peripheral artery disease) ([facility])  -     Case request operating room: right CFA to tibial bypass, possible completion angiogram; Standing  -     Type and screen; Future  -     Case request operating room: right CFA to tibial bypass, possible completion angiogram    Other orders  -     Diet NPO; Sips with meds; Standing  -     Void on call to OR; Standing  -     Insert peripheral IV; Standing  -     Nursing Communication CHG bath, have staff wash entire body (neck down) per pre op bathing protocol. Routine, evening prior to, and day of surgery.; Standing  -     Nursing Communication Swab both nares with Povidone-Iodine solution, EXCLUDE if patient has shellfish/Iodine allergy, and replace with nasal alcohol swabstick. Routine, day of surgery, on call to OR.; Standing  -     chlorhexidine (PERIDEX) 0.12 % oral rinse 15 mL  -     Place sequential compression device; Standing  -     ceFAZolin (ANCEF) IVPB (premix in dextrose) 2,000 mg 50 mL        PAD (peripheral artery disease) ([facility])  64-year-old male presents for follow-up for PAD.  I saw him back in August for chronic PAD.  He has bilateral short distance claudication and right lower extremity rest pain he does not have any tissue loss nor does he have rest pain on the left side.  I schedule him for aortoiliac duplex and subsequent CAT scan to evaluate his aortoiliac aneurysmal disease.  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  Also demonstrated an incidental bladder mass and I placed a referral to urology.  I strongly advised him on smoking cessation in his previous visits and he is cut back significantly and is aware of the importance of completely quitting.  He has been compliant with his aspirin and statin.  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  His right leg will require a right common femoral to tibial bypass he has three-vessel runoff and I believe his anterior tibial is the best outflow vessel for bypass.  His vein mapping seems adequate and I recommended a right common femoral to anterior tibial bypass with ipsilateral great saphenous vein.  His left leg symptoms are manageable and we will plan for observation for the left side occlusive disease as well as left common femoral aneurysm at this time.  He was referred for cardiology and has a stress test scheduled next week pending cardiology clearance we will proceed with right lower extremity bypass and risks benefits and alternative therapies were discussed with him in detail today and he consented to proceed.      Subjective:     Patient ID: Nahid Luis is a 64 y.o. male.    Patient underwent RLE angiogram on 9/24/24 and presents today for f/u visit. BLE short distance claudication and RLE rest pain. Also review LE vein mapping and CTA abdomen/pelvis done 10/9/24. Discuss possible bypass.     HPI    Review of Systems   Constitutional: Negative.    HENT: Negative.     Eyes: Negative.    Respiratory:  Positive for cough and shortness of breath.    Cardiovascular: Negative.    Gastrointestinal: Negative.    Endocrine: Negative.    Genitourinary: Negative.    Musculoskeletal:         Calf cramping with walking   Skin: Negative.    Allergic/Immunologic: Negative.    Neurological:  Positive for numbness.   Hematological: Negative.    Psychiatric/Behavioral: Negative.         I have reviewed the ROS above and made changes as needed.      Objective:     Physical Exam      General  Exam: alert, awake, oriented, no distress, consistent with stated age    Integumentary  Exam: warm, dry, no gross lesions, no bruises and normal color    Head and Neck  Exam: supple, no bruits, trachea midline, no JVD, no mass or  palpable nodes    Chest and Lung  Exam: chest normal without deformity, bilaterally expansive, clear to auscultation    Cardiovascular  Exam: regular rate, regular rhythm, no murmurs, no rubs or gallops    Adbomen  Exam: soft, non-tender, non-distended, no pulsatile abdominal masses, no abdominal bruit    Peripheral Vascular  Exam: no clubbing of the digits of the upper extremity, no cyanosis, no edema, both feet are warm, radial pulses 2+ bilaterally, skin well perfused, without and no varicosities.      Upper Extremity:  Palpation: Radial pulse- Bilateral 2+    Lower Extremity:  Palpation: Femoral pulse- Bilateral 2+         Absent distal pulses   No BLE wounds  Neurologic  Exam:alert, non-focal, oriented x 3, cranial nerves II-XII grossly intact        Operative Scheduling Information:    Hospital:  [facility] or [facility] or [facility]    Physician:  Katarina    Surgery: Right CFA to AT bypass with GSV, possible completion angiogram    Urgency:  Standard    Level:  Level 3: Outpatients to be scheduled for elective surgery than can be delayed up to 4 weeks without reasonable expectation of detriment to patient    Case Length:  Normal    Post-op Bed:  ICU    OR Table:  Standard    Equipment Needs:  None    Medication Instructions:  Aspirin:   Continue (do not hold)    Hydration:  No    Contrast Allergy:  no

## 2024-10-16 ENCOUNTER — OFFICE VISIT (OUTPATIENT)
Dept: VASCULAR SURGERY | Facility: CLINIC | Age: 64
End: 2024-10-16
Payer: COMMERCIAL

## 2024-10-16 ENCOUNTER — TELEPHONE (OUTPATIENT)
Dept: VASCULAR SURGERY | Facility: CLINIC | Age: 64
End: 2024-10-16

## 2024-10-16 VITALS
BODY MASS INDEX: 27.28 KG/M2 | HEART RATE: 82 BPM | DIASTOLIC BLOOD PRESSURE: 82 MMHG | WEIGHT: 180 LBS | HEIGHT: 68 IN | SYSTOLIC BLOOD PRESSURE: 148 MMHG

## 2024-10-16 DIAGNOSIS — R31.0 GROSS HEMATURIA: Primary | ICD-10-CM

## 2024-10-16 DIAGNOSIS — I73.9 PAD (PERIPHERAL ARTERY DISEASE) (HCC): ICD-10-CM

## 2024-10-16 PROCEDURE — 99215 OFFICE O/P EST HI 40 MIN: CPT | Performed by: SURGERY

## 2024-10-16 RX ORDER — CEFAZOLIN SODIUM 2 G/50ML
2000 SOLUTION INTRAVENOUS ONCE
OUTPATIENT
Start: 2024-10-16 | End: 2024-10-16

## 2024-10-16 RX ORDER — CHLORHEXIDINE GLUCONATE ORAL RINSE 1.2 MG/ML
15 SOLUTION DENTAL ONCE
OUTPATIENT
Start: 2024-10-16 | End: 2024-10-16

## 2024-10-16 NOTE — ASSESSMENT & PLAN NOTE
64-year-old male presents for follow-up for PAD.  I saw him back in August for chronic PAD.  He has bilateral short distance claudication and right lower extremity rest pain he does not have any tissue loss nor does he have rest pain on the left side.  I schedule him for aortoiliac duplex and subsequent CAT scan to evaluate his aortoiliac aneurysmal disease.  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  Also demonstrated an incidental bladder mass and I placed a referral to urology.  I strongly advised him on smoking cessation in his previous visits and he is cut back significantly and is aware of the importance of completely quitting.  He has been compliant with his aspirin and statin.  I did a bilateral lower extremity angiogram on him a couple weeks ago which demonstrated a small left common femoral aneurysm left SFA occlusion and right SFA popliteal occlusion.  His right leg will require a right common femoral to tibial bypass he has three-vessel runoff and I believe his anterior tibial is the best outflow vessel for bypass.  His vein mapping seems adequate and I recommended a right common femoral to anterior tibial bypass with ipsilateral great saphenous vein.  His left leg symptoms are manageable and we will plan for observation for the left side occlusive disease as well as left common femoral aneurysm at this time.  He was referred for cardiology and has a stress test scheduled next week pending cardiology clearance we will proceed with right lower extremity bypass and risks benefits and alternative therapies were discussed with him in detail today and he consented to proceed.

## 2024-10-17 ENCOUNTER — TELEPHONE (OUTPATIENT)
Age: 64
End: 2024-10-17

## 2024-10-17 NOTE — TELEPHONE ENCOUNTER
Complaint/Diagnosis:Gross Hematuria     Insurance:[employer] P[employer] Blue     History of cancer:no     Previous urologist:no     Outside testing/where:[facility]     If yes what kind:[facility]     Records requested:epic     Preferred location:?

## 2024-10-17 NOTE — TELEPHONE ENCOUNTER
Daughter states father received a text and wasn't sure what it was about. I confirmed next weeks testing and notice that Urology was trying to reach him. I was able to warm transfer Nany to [facility] in urology to assist her.

## 2024-10-17 NOTE — TELEPHONE ENCOUNTER
ASAP Npt Referral: Please review records/results if time sensitive/scheduling protocol : 6 cm mass approximating the posterior bladder, highly concerning for a primary bladder or prostate cancer. No evidence of metastatic disease in the abdomen or pelvis. Consultation with urology is recommended.

## 2024-10-17 NOTE — TELEPHONE ENCOUNTER
Attempted to call patient to triage and get scheduled. No answer and voicemail box is full so unable to leave a message. Will attempt again later.    [facility] says schedule within in 1 week, no appointment win this timeframe in [place] or [place], these are the locations closest to patients home

## 2024-10-17 NOTE — TELEPHONE ENCOUNTER
Spoke with daughter at length about having her father be seen asap. Daughter and pt are okay with establishing care at [place] office with Dr. Wellington. Advised Dr might do a cystoscopy as well at tomorrows visit and family and pt are agreeable.

## 2024-10-17 NOTE — TELEPHONE ENCOUNTER
Daughter called back to see if we had an appointment for patient. She would like a call back.    CB: [phone]

## 2024-10-18 ENCOUNTER — PROCEDURE VISIT (OUTPATIENT)
Dept: UROLOGY | Facility: MEDICAL CENTER | Age: 64
End: 2024-10-18
Payer: COMMERCIAL

## 2024-10-18 VITALS
OXYGEN SATURATION: 95 % | DIASTOLIC BLOOD PRESSURE: 76 MMHG | SYSTOLIC BLOOD PRESSURE: 126 MMHG | HEART RATE: 89 BPM | WEIGHT: 180 LBS | HEIGHT: 68 IN | BODY MASS INDEX: 27.28 KG/M2

## 2024-10-18 DIAGNOSIS — N32.89 BLADDER MASS: Primary | ICD-10-CM

## 2024-10-18 DIAGNOSIS — R31.0 GROSS HEMATURIA: ICD-10-CM

## 2024-10-18 LAB
SL AMB  POCT GLUCOSE, UA: ABNORMAL
SL AMB LEUKOCYTE ESTERASE,UA: ABNORMAL
SL AMB POCT BILIRUBIN,UA: ABNORMAL
SL AMB POCT BLOOD,UA: ABNORMAL
SL AMB POCT CLARITY,UA: ABNORMAL
SL AMB POCT COLOR,UA: ABNORMAL
SL AMB POCT KETONES,UA: ABNORMAL
SL AMB POCT NITRITE,UA: ABNORMAL
SL AMB POCT PH,UA: 6.5
SL AMB POCT SPECIFIC GRAVITY,UA: 1.02
SL AMB POCT URINE PROTEIN: 100
SL AMB POCT UROBILINOGEN: 4

## 2024-10-18 PROCEDURE — 88112 CYTOPATH CELL ENHANCE TECH: CPT | Performed by: PATHOLOGY

## 2024-10-18 PROCEDURE — 99204 OFFICE O/P NEW MOD 45 MIN: CPT | Performed by: UROLOGY

## 2024-10-18 PROCEDURE — 52000 CYSTOURETHROSCOPY: CPT | Performed by: UROLOGY

## 2024-10-18 PROCEDURE — 81003 URINALYSIS AUTO W/O SCOPE: CPT | Performed by: UROLOGY

## 2024-10-18 NOTE — ASSESSMENT & PLAN NOTE
Incidental finding of 6.2 cm bladder mass on CT scan evaluating for claudication  Cystoscopy:  large bladder tumor consistent with bladder cancer in appearance along right trigone/bladder neck  - UA  - Ucx  - urine cytology  - schedule for CT urogram to evaluate upper tracts  - schedule for TURBT, possible right stent placement if ureteral orifice is resected and identified  - discussed may not be able to visualize right ureteral orifice (not visible on cystoscopy today) and may need PCN if unable to identify ureteral orifice and develops right hydronephrosis  - discussed need for Malone postoperatively  - will need cardiology clearance, he is at increased risk for procedure given peripheral vascular disease with claudication.  - will have to hold blood thinners for procedure

## 2024-10-18 NOTE — PROGRESS NOTES
"   Cystoscopy     Date/Time  10/18/2024 2:30 PM     Performed by  Toni Real MD   Authorized by  Toni Real MD     Universal Protocol:  procedure performed by consultantConsent: Verbal consent obtained. Written consent obtained.  Risks and benefits: risks, benefits and alternatives were discussed  Consent given by: patient  Time out: Immediately prior to procedure a \"time out\" was called to verify the correct patient, procedure, equipment, support staff and site/side marked as required.  Timeout called at: 10/18/2024 3:40 PM.  Patient understanding: patient states understanding of the procedure being performed  Patient consent: the patient's understanding of the procedure matches consent given  Procedure consent: procedure consent matches procedure scheduled  Relevant documents: relevant documents present and verified  Test results: test results available and properly labeled  Site marked: the operative site was not marked  Radiology Images displayed and confirmed. If images not available, report reviewed: imaging studies available  Required items: required blood products, implants, devices, and special equipment available  Patient identity confirmed: verbally with patient      Procedure Details:  Procedure type: cystoscopy    Patient tolerance: Patient tolerated the procedure well with no immediate complications    Additional Procedure Details: Office Cystoscopy Procedure Note    Indication:    Bladder mass    Informed consent   The risks, benefits, complications, treatment options, and expected outcomes were discussed with the patient. The patient concurred with the proposed plan and provided informed consent.    Anesthesia  Lidocaine jelly 2%    Antibiotic prophylaxis   None    Procedure  The patient was placed in the supineposition, was prepped and draped in the usual manner using sterile technique, and 2% lidocaine jelly instilled into the urethra.  A 17 F flexible cystoscope was then inserted " into the urethra and the urethra and bladder carefully examined.  The following findings were noted:    Findings:  Urethra:  Normal  Prostate:  mild lateral lobe hypertrophy  Bladder:  Large fungating papillary bladder tumor over 6 cm in size along bladder neck/right trigone/right lateral wall of bladder  Ureteral orifices:  Unable to visualize right UO  Other findings:  None, retroflexed view confirms    Specimens: None                 Complications:    None; patient tolerated the procedure well           Disposition: To home            Condition: Stable    Plan:   Schedule for TURBT

## 2024-10-18 NOTE — PROGRESS NOTES
10/18/2024    Nahid Luis  1960  [id number]    1. Bladder mass  Assessment & Plan:  Incidental finding of 6.2 cm bladder mass on CT scan evaluating for claudication  Cystoscopy:  large bladder tumor consistent with bladder cancer in appearance along right trigone/bladder neck  - UA  - Ucx  - urine cytology  - schedule for CT urogram to evaluate upper tracts  - schedule for TURBT, possible right stent placement  - discussed may not be able to visualize right ureteral orifice, may need PCN if unable to identify ureteral orifice and develops right hydronephrosis  - discussed need for Malone postoperatively  - will need cardiology clearance, he is at increased risk for procedure given peripheral vascular disease with claudication.  - will have to hold blood thinners for procedure  Orders:  -     POCT urine dip auto non-scope  -     Cytology, urine  -     CT renal protocol; Future; Expected date: 10/18/2024  -     Urine culture  -     Case request operating room: TRANSURETHRAL RESECTION OF BLADDER TUMOR (TURBT); Standing  -     Case request operating room: TRANSURETHRAL RESECTION OF BLADDER TUMOR (TURBT)  2. Gross hematuria  -     Ambulatory Referral to Urology  -     Cytology, urine  -     CT renal protocol; Future; Expected date: 10/18/2024       History of Present Illness  64 y.o. male with a history of kidney stones and PAD with claudication, HLD, COPD, HTN, and current smoker with 50+ pack-year smoking history.      No recent hematuria or dysuria   Only history of hematuria was with passing of a kidney stone  Had CTA done to evaluate PAD with claudication and was incidentally found to have large 6 cm bladder mass as seen below on CT scan image, no hydronephrosis    CT scan:  6 cm bladder mass with arterial enhancement        Review of Systems   All other systems reviewed and are negative.      Past Medical History  Past Medical History:   Diagnosis Date    Colon polyp     COPD (chronic obstructive  pulmonary disease) (HCC)     Hypertension        Past Social History  Past Surgical History:   Procedure Laterality Date    APPENDECTOMY      COLONOSCOPY      IR LOWER EXTREMITY ANGIOGRAM  9/24/2024       Past Family History  Family History   Problem Relation Age of Onset    Alcohol abuse Father     Heart attack Father        Past Social history  Social History     Socioeconomic History    Marital status: Single     Spouse name: Not on file    Number of children: Not on file    Years of education: Not on file    Highest education level: Not on file   Occupational History    Not on file   Tobacco Use    Smoking status: Every Day     Current packs/day: 0.25     Types: Cigarettes    Smokeless tobacco: Not on file   Vaping Use    Vaping status: Never Used   Substance and Sexual Activity    Alcohol use: Yes     Alcohol/week: 6.0 standard drinks of alcohol     Types: 6 Cans of beer per week     Comment: daily 4-5 beers daily    Drug use: No    Sexual activity: Not on file   Other Topics Concern    Not on file   Social History Narrative    Not on file     Social Determinants of Health     Financial Resource Strain: Not on file   Food Insecurity: Not on file   Transportation Needs: Not on file   Physical Activity: Not on file   Stress: Not on file   Social Connections: Not on file   Intimate Partner Violence: Not on file   Housing Stability: Not on file       Current Medications  Current Outpatient Medications   Medication Sig Dispense Refill    albuterol (Proventil HFA) 90 mcg/act inhaler Inhale 2 puffs every 6 (six) hours as needed for wheezing 6.7 g 5    amLODIPine (NORVASC) 5 mg tablet Take 1 tablet (5 mg total) by mouth daily 30 tablet 5    aspirin (ECOTRIN LOW STRENGTH) 81 mg EC tablet Take 1 tablet (81 mg total) by mouth daily 30 tablet 0    atorvastatin (LIPITOR) 20 mg tablet Take 1 tablet (20 mg total) by mouth daily 30 tablet 5    Fluticasone-Salmeterol (Advair) 100-50 mcg/dose inhaler Inhale 1 puff 2 (two) times  "a day Rinse mouth after use. 60 blister 2    losartan (COZAAR) 50 mg tablet Take 2 tablets (100 mg total) by mouth daily 180 tablet 1     No current facility-administered medications for this visit.       Allergies  No Known Allergies    Past Medical History, Social History, Family History, medications and allergies were reviewed.    Vitals  Vitals:    10/18/24 1439   BP: 126/76   BP Location: Left arm   Patient Position: Sitting   Cuff Size: Adult   Pulse: 89   SpO2: 95%   Weight: 81.6 kg (180 lb)   Height: 5' 8\" (1.727 m)       Physical Exam  Constitutional:       Appearance: Normal appearance. He is normal weight.   HENT:      Head: Normocephalic.      Nose: Nose normal. No congestion.   Eyes:      Conjunctiva/sclera: Conjunctivae normal.   Cardiovascular:      Rate and Rhythm: Normal rate.   Pulmonary:      Effort: Pulmonary effort is normal. No respiratory distress.   Abdominal:      General: Abdomen is flat. There is no distension.      Palpations: Abdomen is soft.      Tenderness: There is no right CVA tenderness or left CVA tenderness.   Musculoskeletal:      Comments: Normal gait   Skin:     General: Skin is warm and dry.   Neurological:      General: No focal deficit present.      Mental Status: He is alert and oriented to person, place, and time.   Psychiatric:         Mood and Affect: Mood normal.         Results  Lab Results   Component Value Date    PSA 2.41 11/25/2023     Lab Results   Component Value Date    CALCIUM 9.4 08/17/2024    K 5.0 08/17/2024    CO2 28 08/17/2024    CL 97 08/17/2024    BUN 9 08/17/2024    CREATININE 0.98 08/17/2024     Lab Results   Component Value Date    WBC 14.62 (H) 08/17/2024    HGB 16.1 08/17/2024    HCT 49.0 08/17/2024    MCV 93 08/17/2024     08/17/2024       Office Urine Dip  Recent Results (from the past 1 hour(s))   POCT urine dip auto non-scope    Collection Time: 10/18/24  2:49 PM   Result Value Ref Range     COLOR,UA orange     CLARITY,UA cloudy     " SPECIFIC GRAVITY,UA 1.025      PH,UA 6.5     LEUKOCYTE ESTERASE,UA trace     NITRITE,UA neg     GLUCOSE, UA neg     KETONES,UA trace     BILIRUBIN,UA small     BLOOD,UA large     POCT URINE PROTEIN 100     SL AMB POCT UROBILINOGEN 4.0    ]

## 2024-10-19 ENCOUNTER — APPOINTMENT (OUTPATIENT)
Dept: LAB | Facility: MEDICAL CENTER | Age: 64
End: 2024-10-19
Payer: COMMERCIAL

## 2024-10-19 DIAGNOSIS — I73.9 PAD (PERIPHERAL ARTERY DISEASE) (HCC): ICD-10-CM

## 2024-10-19 LAB
ANION GAP SERPL CALCULATED.3IONS-SCNC: 5 MMOL/L (ref 4–13)
BUN SERPL-MCNC: 9 MG/DL (ref 5–25)
CALCIUM SERPL-MCNC: 9.3 MG/DL (ref 8.4–10.2)
CHLORIDE SERPL-SCNC: 94 MMOL/L (ref 96–108)
CO2 SERPL-SCNC: 32 MMOL/L (ref 21–32)
CREAT SERPL-MCNC: 0.92 MG/DL (ref 0.6–1.3)
GFR SERPL CREATININE-BSD FRML MDRD: 87 ML/MIN/1.73SQ M
GLUCOSE SERPL-MCNC: 98 MG/DL (ref 65–140)
POTASSIUM SERPL-SCNC: 4.7 MMOL/L (ref 3.5–5.3)
SODIUM SERPL-SCNC: 131 MMOL/L (ref 135–147)

## 2024-10-19 PROCEDURE — 80048 BASIC METABOLIC PNL TOTAL CA: CPT

## 2024-10-19 PROCEDURE — 36415 COLL VENOUS BLD VENIPUNCTURE: CPT

## 2024-10-19 PROCEDURE — 87086 URINE CULTURE/COLONY COUNT: CPT | Performed by: UROLOGY

## 2024-10-20 LAB — BACTERIA UR CULT: NORMAL

## 2024-10-21 LAB — BACTERIA UR CULT: NORMAL

## 2024-10-22 ENCOUNTER — TELEPHONE (OUTPATIENT)
Dept: UROLOGY | Facility: MEDICAL CENTER | Age: 64
End: 2024-10-22

## 2024-10-22 NOTE — TELEPHONE ENCOUNTER
Case placed by Dr. Real for a TURBT (1.5 hours per MM) and needs cardiac clearance and vascular clearance. Is on Aspirin.     Patient has Vascular surgery scheduled for 10/31.   -stress test 10/23    Cardiologist: Toni Martinez MD last seen 10/7/2024, has 10/31 follow-up.    Called out and patient answered, however it sounded like he was driving and the call dropped. Tried to call back but it was busy. Will try again later.  -HOLDING 12/11 @ [facility] with Dr. Real.

## 2024-10-23 ENCOUNTER — HOSPITAL ENCOUNTER (OUTPATIENT)
Dept: NON INVASIVE DIAGNOSTICS | Facility: CLINIC | Age: 64
Discharge: HOME | End: 2024-10-23
Payer: COMMERCIAL

## 2024-10-23 ENCOUNTER — TELEPHONE (OUTPATIENT)
Dept: VASCULAR SURGERY | Facility: CLINIC | Age: 64
End: 2024-10-23

## 2024-10-23 ENCOUNTER — PREP FOR PROCEDURE (OUTPATIENT)
Dept: UROLOGY | Facility: MEDICAL CENTER | Age: 64
End: 2024-10-23

## 2024-10-23 DIAGNOSIS — Z01.818 OTHER SPECIFIED PRE-OPERATIVE EXAMINATION: ICD-10-CM

## 2024-10-23 DIAGNOSIS — R06.09 DOE (DYSPNEA ON EXERTION): ICD-10-CM

## 2024-10-23 DIAGNOSIS — N32.89 BLADDER MASS: Primary | ICD-10-CM

## 2024-10-23 DIAGNOSIS — Z01.812 PRE-OPERATIVE LABORATORY EXAMINATION: ICD-10-CM

## 2024-10-23 DIAGNOSIS — R39.89 SUSPECTED UTI: ICD-10-CM

## 2024-10-23 DIAGNOSIS — Z01.810 PRE-OPERATIVE CARDIOVASCULAR EXAMINATION: ICD-10-CM

## 2024-10-23 DIAGNOSIS — R06.02 SOB (SHORTNESS OF BREATH): ICD-10-CM

## 2024-10-23 LAB
CHEST PAIN STATEMENT: NORMAL
MAX DIASTOLIC BP: 86 MMHG
MAX HR PERCENT: 63 %
MAX HR: 99 BPM
MAX PREDICTED HEART RATE: 156 BPM
NUC STRESS EJECTION FRACTION: 56 %
PROTOCOL NAME: NORMAL
RATE PRESSURE PRODUCT: NORMAL
REASON FOR TERMINATION: NORMAL
SL CV REST NUCLEAR ISOTOPE DOSE: 8.5 MCI
SL CV STRESS NUCLEAR ISOTOPE DOSE: 26.1 MCI
SL CV STRESS RECOVERY BP: NORMAL MMHG
SL CV STRESS RECOVERY HR: 82 BPM
SL CV STRESS RECOVERY O2 SAT: 95 %
STRESS ANGINA INDEX: 0
STRESS BASELINE BP: NORMAL MMHG
STRESS BASELINE HR: 69 BPM
STRESS O2 SAT REST: 94 %
STRESS PEAK HR: 95 BPM
STRESS POST ESTIMATED WORKLOAD: 1 METS
STRESS POST EXERCISE DUR MIN: 3 MIN
STRESS POST EXERCISE DUR SEC: 0 SEC
STRESS POST O2 SAT PEAK: 97 %
STRESS POST PEAK BP: 180 MMHG
STRESS POST PEAK HR: 99 BPM
STRESS POST PEAK SYSTOLIC BP: 180 MMHG
STRESS/REST PERFUSION RATIO: 0.89
TARGET HR FORMULA: NORMAL
TEST INDICATION: NORMAL

## 2024-10-23 PROCEDURE — 93016 CV STRESS TEST SUPVJ ONLY: CPT | Performed by: INTERNAL MEDICINE

## 2024-10-23 PROCEDURE — 88112 CYTOPATH CELL ENHANCE TECH: CPT | Performed by: PATHOLOGY

## 2024-10-23 PROCEDURE — 93018 CV STRESS TEST I&R ONLY: CPT | Performed by: INTERNAL MEDICINE

## 2024-10-23 PROCEDURE — 78452 HT MUSCLE IMAGE SPECT MULT: CPT

## 2024-10-23 PROCEDURE — A9502 TC99M TETROFOSMIN: HCPCS

## 2024-10-23 PROCEDURE — 93017 CV STRESS TEST TRACING ONLY: CPT

## 2024-10-23 PROCEDURE — 78452 HT MUSCLE IMAGE SPECT MULT: CPT | Performed by: INTERNAL MEDICINE

## 2024-10-23 RX ORDER — REGADENOSON 0.08 MG/ML
0.4 INJECTION, SOLUTION INTRAVENOUS ONCE
Status: COMPLETED | OUTPATIENT
Start: 2024-10-23 | End: 2024-10-23

## 2024-10-23 RX ADMIN — REGADENOSON 0.4 MG: 0.08 INJECTION, SOLUTION INTRAVENOUS at 13:37

## 2024-10-23 NOTE — PRE-PROCEDURE INSTRUCTIONS
Pre-Surgery Instructions:   Medication Instructions    albuterol (Proventil HFA) 90 mcg/act inhaler Uses PRN- OK to take day of surgery    amLODIPine (NORVASC) 5 mg tablet Take day of surgery.    aspirin (ECOTRIN LOW STRENGTH) 81 mg EC tablet Take day of surgery.    atorvastatin (LIPITOR) 20 mg tablet Take day of surgery.    Fluticasone-Salmeterol (Advair) 100-50 mcg/dose inhaler Take day of surgery.    losartan (COZAAR) 50 mg tablet Hold day of surgery.      Medication instructions for day surgery reviewed. Please use only a sip of water to take your instructed medications. Avoid all over the counter vitamins, supplements and NSAIDS for one week prior to surgery per anesthesia guidelines. Tylenol is ok to take as needed.     You will receive a call one business day prior to surgery with an arrival time and hospital directions. If your surgery is scheduled on a Monday, the hospital will be calling you on the Friday prior to your surgery. If you have not heard from anyone by 8pm, please call the hospital supervisor through the hospital  at [phone]. ([place] [phone] or [place] [phone]).    Do not eat or drink anything after midnight the night before your surgery, including candy, mints, lifesavers, or chewing gum. Do not drink alcohol 24hrs before your surgery. Try not to smoke at least 24hrs before your surgery.       Follow the pre surgery showering instructions as listed in the “My Surgical Experience Booklet” or otherwise provided by your surgeon's office. Do not use a blade to shave the surgical area 1 week before surgery. It is okay to use a clean electric clippers up to 24 hours before surgery. Do not apply any lotions, creams, including makeup, cologne, deodorant, or perfumes after showering on the day of your surgery. Do not use dry shampoo, hair spray, hair gel, or any type of hair products.     No contact lenses, eye make-up, or artificial eyelashes. Remove nail polish, including  gel polish, and any artificial, gel, or acrylic nails if possible. Remove all jewelry including rings and body piercing jewelry.     Wear causal clothing that is easy to take on and off. Consider your type of surgery.    Keep any valuables, jewelry, piercings at home. Please bring any specially ordered equipment (sling, braces) if indicated.    Arrange for a responsible person to drive you to and from the hospital on the day of your surgery. Please confirm the visitor policy for the day of your procedure when you receive your phone call with an arrival time.     Call the surgeon's office with any new illnesses, exposures, or additional questions prior to surgery.    Please reference your “My Surgical Experience Booklet” for additional information to prepare for your upcoming surgery.

## 2024-10-23 NOTE — TELEPHONE ENCOUNTER
Spoke with patient's daughter and confirmed surgery date of 12/11  Type of surgery: TURBT   Operating physician: Dr. Real  Location of surgery: [place] OR    Verbally went over prep with patient on 10/23  NPO  Bowel prep? No  Hospital calls afternoon prior with arrival time (calls Friday afternoon for Monday surgery)  Patient needs ride to and from surgery   outpatient  Pre-op testing to be done 2 weeks prior to surgery. All testing can be done as a walk-in. EKG can only be done as a walk-in at any [facility].  Labs needed: UC, CBC, BMP  Blood thinners:   Aspirin  Clearances needed: Cardiac    Mailedto patient on 10/23  Copy of packet scanned into Media  Labs in packet and in electronic record   Soap prep in packet  post-op in packet     Consent: on admit     Cardiac Clearance:  Dr. Toni Martinez   Date clearance form faxed: 10/23  Best fax number:[phone]    Vascular Clearance:  Dr. Carlos Bray  Date clearance form faxed: 10/23  Best fax number:[phone]    Medication Suspension of: Aspirin  Ordering and Authorizing Provider:  Robyn Negrete DO  [address] [facility] [address]  Phone: [phone]   Fax: [phone]

## 2024-10-23 NOTE — TELEPHONE ENCOUNTER
Clearance will need to be deferred until after patients planned bypass surgery on 10/31/24 as we cannot predict how long he will require uninterrupted DAPT until after his procedure

## 2024-10-23 NOTE — TELEPHONE ENCOUNTER
Recjudi'd pre surgical clearence for urology  for this pt last saw Dr Bray 10/16/24, form  in media

## 2024-10-23 NOTE — RESULT ENCOUNTER NOTE
Nuclear stress test - 10/23/24   No ischemia    Plan: This test was part of preoperative risk evaluation.  No concerning findings on stress test.  The patient is at an acceptable level of risk to proceed with the planned surgery without the need for further cardiac testing.

## 2024-10-23 NOTE — TELEPHONE ENCOUNTER
Daughter called and said that 12/11 surgery date will work for them.I told them you would f/u with them.

## 2024-10-24 ENCOUNTER — PREP FOR PROCEDURE (OUTPATIENT)
Dept: VASCULAR SURGERY | Facility: CLINIC | Age: 64
End: 2024-10-24

## 2024-10-25 NOTE — TELEPHONE ENCOUNTER
Verified patient's insurance   CONFIRMED - Patient's insurance is [employer]  Is patient requesting a call when authorization has been obtained? Patient did not request a call.    Surgery Date: 10/31/24  Primary Surgeon: SEBASTIEN // Carlos Bray (NPI: [id number])  Assisting Surgeon: Not Applicable (N/A)  Facility: [facility] (Tax: [id number] / NPI: [id number])  Inpatient / Outpatient: Inpatient  Level: 2    Clearance Received: Yes, Cardiology . 10/24 Dr. Morataya  Consent Received: Yes, scanned into Epic on 10/16/24.  Medication Hold / Last Dose:  No hold on ASA  IR Notified:  Emailed  10/17/24  Rep. Notified: Not Applicable (N/A)  Equipment Needs: Not Applicable (N/A)  Vas Lab Requested: Not Applicable (N/A)  Patient Contacted: 10/25/24 spoke to patients daughter    Diagnosis: I73.9  Procedure/ CPT Code(s): Angiogram // CPT: 28661, 56343, and 67242 // Procedure to take place in OR [Auth/ Cert Based] and BYPASS - Femoral-Tibial // CPT: 64990    For varicose vein related procedures:   Last LEVDR: Not Applicable (N/A)  CEAP Classification: Not Applicable (N/A)  VCSS: Not Applicable (N/A)    Post Operative Date/ Time: 11/12/24 , [place] with Dede Regalado (NPI: [id number])     *Please review medication hold(s), PATs, and check H&P with patient.*  PATIENT WAS MAILED SURGERY/SHOWERING/DISCHARGE/COVID INSTRUCTIONS AFTER REVIEWING WITH THEM VIA PHONE CALL.

## 2024-10-26 ENCOUNTER — APPOINTMENT (OUTPATIENT)
Dept: LAB | Facility: MEDICAL CENTER | Age: 64
End: 2024-10-26
Payer: COMMERCIAL

## 2024-10-26 DIAGNOSIS — I73.9 PAD (PERIPHERAL ARTERY DISEASE) (HCC): ICD-10-CM

## 2024-10-26 PROCEDURE — 86900 BLOOD TYPING SEROLOGIC ABO: CPT | Performed by: SURGERY

## 2024-10-26 PROCEDURE — 86901 BLOOD TYPING SEROLOGIC RH(D): CPT | Performed by: SURGERY

## 2024-10-26 PROCEDURE — 86850 RBC ANTIBODY SCREEN: CPT | Performed by: SURGERY

## 2024-10-26 PROCEDURE — 36415 COLL VENOUS BLD VENIPUNCTURE: CPT

## 2024-10-27 ENCOUNTER — LAB REQUISITION (OUTPATIENT)
Dept: LAB | Facility: HOSPITAL | Age: 64
End: 2024-10-27
Payer: COMMERCIAL

## 2024-10-27 DIAGNOSIS — I73.9 PERIPHERAL VASCULAR DISEASE, UNSPECIFIED (HCC): ICD-10-CM

## 2024-10-27 LAB
ABO GROUP BLD: NORMAL
BLD GP AB SCN SERPL QL: NEGATIVE
RH BLD: POSITIVE
SPECIMEN EXPIRATION DATE: NORMAL

## 2024-10-30 ENCOUNTER — ANESTHESIA EVENT (OUTPATIENT)
Dept: PERIOP | Facility: HOSPITAL | Age: 64
DRG: 253 | End: 2024-10-30
Payer: COMMERCIAL

## 2024-10-31 ENCOUNTER — TELEPHONE (OUTPATIENT)
Dept: CARDIOLOGY CLINIC | Facility: CLINIC | Age: 64
End: 2024-10-31

## 2024-10-31 ENCOUNTER — ANESTHESIA (OUTPATIENT)
Dept: PERIOP | Facility: HOSPITAL | Age: 64
DRG: 253 | End: 2024-10-31
Payer: COMMERCIAL

## 2024-10-31 ENCOUNTER — HOSPITAL ENCOUNTER (INPATIENT)
Facility: HOSPITAL | Age: 64
LOS: 6 days | Discharge: HOME | DRG: 253 | End: 2024-11-06
Attending: SURGERY | Admitting: SURGERY
Payer: COMMERCIAL

## 2024-10-31 DIAGNOSIS — E78.2 MIXED HYPERLIPIDEMIA: ICD-10-CM

## 2024-10-31 DIAGNOSIS — J44.9 CHRONIC OBSTRUCTIVE PULMONARY DISEASE, UNSPECIFIED COPD TYPE (HCC): ICD-10-CM

## 2024-10-31 DIAGNOSIS — N32.89 BLADDER MASS: Primary | ICD-10-CM

## 2024-10-31 DIAGNOSIS — I73.9 PAD (PERIPHERAL ARTERY DISEASE) (HCC): ICD-10-CM

## 2024-10-31 DIAGNOSIS — I10 HYPERTENSION, UNSPECIFIED TYPE: ICD-10-CM

## 2024-10-31 LAB
ABO GROUP BLD: NORMAL
PLATELET # BLD AUTO: 253 THOUSANDS/UL (ref 149–390)
PMV BLD AUTO: 7.9 FL (ref 8.9–12.7)
RH BLD: POSITIVE

## 2024-10-31 PROCEDURE — 35566 ART BYP FEM-ANT-POST TIB/PRL: CPT | Performed by: SURGERY

## 2024-10-31 PROCEDURE — 99223 1ST HOSP IP/OBS HIGH 75: CPT | Performed by: INTERNAL MEDICINE

## 2024-10-31 PROCEDURE — 06BP0ZZ EXCISION OF RIGHT SAPHENOUS VEIN, OPEN APPROACH: ICD-10-PCS | Performed by: SURGERY

## 2024-10-31 PROCEDURE — 041K09Q BYPASS RIGHT FEMORAL ARTERY TO LOWER EXTREMITY ARTERY WITH AUTOLOGOUS VENOUS TISSUE, OPEN APPROACH: ICD-10-PCS | Performed by: SURGERY

## 2024-10-31 PROCEDURE — 4A133J1 MONITORING OF ARTERIAL PULSE, PERIPHERAL, PERCUTANEOUS APPROACH: ICD-10-PCS | Performed by: REGISTERED NURSE

## 2024-10-31 PROCEDURE — 86923 COMPATIBILITY TEST ELECTRIC: CPT

## 2024-10-31 PROCEDURE — NC001 PR NO CHARGE: Performed by: UROLOGY

## 2024-10-31 PROCEDURE — C1894 INTRO/SHEATH, NON-LASER: HCPCS | Performed by: SURGERY

## 2024-10-31 PROCEDURE — 4A133B1 MONITORING OF ARTERIAL PRESSURE, PERIPHERAL, PERCUTANEOUS APPROACH: ICD-10-PCS | Performed by: REGISTERED NURSE

## 2024-10-31 PROCEDURE — 03HY32Z INSERTION OF MONITORING DEVICE INTO UPPER ARTERY, PERCUTANEOUS APPROACH: ICD-10-PCS | Performed by: REGISTERED NURSE

## 2024-10-31 PROCEDURE — 85049 AUTOMATED PLATELET COUNT: CPT | Performed by: STUDENT IN AN ORGANIZED HEALTH CARE EDUCATION/TRAINING PROGRAM

## 2024-10-31 PROCEDURE — 99222 1ST HOSP IP/OBS MODERATE 55: CPT | Performed by: UROLOGY

## 2024-10-31 RX ORDER — ONDANSETRON 2 MG/ML
4 INJECTION INTRAMUSCULAR; INTRAVENOUS EVERY 6 HOURS PRN
Status: DISCONTINUED | OUTPATIENT
Start: 2024-10-31 | End: 2024-11-05 | Stop reason: SDUPTHER

## 2024-10-31 RX ORDER — FLUTICASONE FUROATE AND VILANTEROL 100; 25 UG/1; UG/1
1 POWDER RESPIRATORY (INHALATION) 2 TIMES DAILY
Status: DISCONTINUED | OUTPATIENT
Start: 2024-10-31 | End: 2024-11-06 | Stop reason: HOSPADM

## 2024-10-31 RX ORDER — DEXAMETHASONE SODIUM PHOSPHATE 10 MG/ML
INJECTION, SOLUTION INTRAMUSCULAR; INTRAVENOUS AS NEEDED
Status: DISCONTINUED | OUTPATIENT
Start: 2024-10-31 | End: 2024-10-31

## 2024-10-31 RX ORDER — SODIUM CHLORIDE, SODIUM LACTATE, POTASSIUM CHLORIDE, CALCIUM CHLORIDE 600; 310; 30; 20 MG/100ML; MG/100ML; MG/100ML; MG/100ML
75 INJECTION, SOLUTION INTRAVENOUS CONTINUOUS
Status: DISPENSED | OUTPATIENT
Start: 2024-10-31 | End: 2024-10-31

## 2024-10-31 RX ORDER — SENNOSIDES 8.6 MG
1 TABLET ORAL DAILY
Status: DISCONTINUED | OUTPATIENT
Start: 2024-10-31 | End: 2024-11-05

## 2024-10-31 RX ORDER — PROTAMINE SULFATE 10 MG/ML
INJECTION, SOLUTION INTRAVENOUS AS NEEDED
Status: DISCONTINUED | OUTPATIENT
Start: 2024-10-31 | End: 2024-10-31

## 2024-10-31 RX ORDER — CHLORHEXIDINE GLUCONATE ORAL RINSE 1.2 MG/ML
15 SOLUTION DENTAL ONCE
Status: COMPLETED | OUTPATIENT
Start: 2024-10-31 | End: 2024-10-31

## 2024-10-31 RX ORDER — PHENYLEPHRINE HCL IN 0.9% NACL 1 MG/10 ML
SYRINGE (ML) INTRAVENOUS AS NEEDED
Status: DISCONTINUED | OUTPATIENT
Start: 2024-10-31 | End: 2024-10-31

## 2024-10-31 RX ORDER — CEFAZOLIN SODIUM 2 G/50ML
2000 SOLUTION INTRAVENOUS ONCE
Status: COMPLETED | OUTPATIENT
Start: 2024-10-31 | End: 2024-10-31

## 2024-10-31 RX ORDER — HEPARIN SODIUM 5000 [USP'U]/ML
5000 INJECTION, SOLUTION INTRAVENOUS; SUBCUTANEOUS EVERY 8 HOURS SCHEDULED
Status: DISCONTINUED | OUTPATIENT
Start: 2024-10-31 | End: 2024-11-03

## 2024-10-31 RX ORDER — OXYCODONE HYDROCHLORIDE 10 MG/1
10 TABLET ORAL EVERY 4 HOURS PRN
Status: DISCONTINUED | OUTPATIENT
Start: 2024-10-31 | End: 2024-11-06 | Stop reason: HOSPADM

## 2024-10-31 RX ORDER — ONDANSETRON 2 MG/ML
4 INJECTION INTRAMUSCULAR; INTRAVENOUS ONCE AS NEEDED
Status: DISCONTINUED | OUTPATIENT
Start: 2024-10-31 | End: 2024-10-31 | Stop reason: HOSPADM

## 2024-10-31 RX ORDER — LABETALOL HYDROCHLORIDE 5 MG/ML
10 INJECTION, SOLUTION INTRAVENOUS EVERY 6 HOURS PRN
Status: DISCONTINUED | OUTPATIENT
Start: 2024-10-31 | End: 2024-11-06 | Stop reason: HOSPADM

## 2024-10-31 RX ORDER — HYDROMORPHONE HCL/PF 1 MG/ML
0.5 SYRINGE (ML) INJECTION EVERY 2 HOUR PRN
Status: DISCONTINUED | OUTPATIENT
Start: 2024-10-31 | End: 2024-11-03

## 2024-10-31 RX ORDER — PROPOFOL 10 MG/ML
INJECTION, EMULSION INTRAVENOUS AS NEEDED
Status: DISCONTINUED | OUTPATIENT
Start: 2024-10-31 | End: 2024-10-31

## 2024-10-31 RX ORDER — HYDROMORPHONE HCL/PF 1 MG/ML
0.5 SYRINGE (ML) INJECTION EVERY OTHER DAY
Status: DISCONTINUED | OUTPATIENT
Start: 2024-10-31 | End: 2024-10-31

## 2024-10-31 RX ORDER — HEPARIN SODIUM 1000 [USP'U]/ML
INJECTION, SOLUTION INTRAVENOUS; SUBCUTANEOUS AS NEEDED
Status: DISCONTINUED | OUTPATIENT
Start: 2024-10-31 | End: 2024-10-31

## 2024-10-31 RX ORDER — ALBUTEROL SULFATE 90 UG/1
2 INHALANT RESPIRATORY (INHALATION) EVERY 6 HOURS PRN
Status: DISCONTINUED | OUTPATIENT
Start: 2024-10-31 | End: 2024-11-06 | Stop reason: HOSPADM

## 2024-10-31 RX ORDER — HYDROMORPHONE HCL/PF 1 MG/ML
0.5 SYRINGE (ML) INJECTION
Status: CANCELLED | OUTPATIENT
Start: 2024-10-31

## 2024-10-31 RX ORDER — ROCURONIUM BROMIDE 10 MG/ML
INJECTION, SOLUTION INTRAVENOUS AS NEEDED
Status: DISCONTINUED | OUTPATIENT
Start: 2024-10-31 | End: 2024-10-31

## 2024-10-31 RX ORDER — MIDAZOLAM HYDROCHLORIDE 2 MG/2ML
INJECTION, SOLUTION INTRAMUSCULAR; INTRAVENOUS AS NEEDED
Status: DISCONTINUED | OUTPATIENT
Start: 2024-10-31 | End: 2024-10-31

## 2024-10-31 RX ORDER — LABETALOL HYDROCHLORIDE 5 MG/ML
10 INJECTION, SOLUTION INTRAVENOUS ONCE
Status: COMPLETED | OUTPATIENT
Start: 2024-10-31 | End: 2024-10-31

## 2024-10-31 RX ORDER — AMLODIPINE BESYLATE 5 MG/1
5 TABLET ORAL DAILY
Status: DISCONTINUED | OUTPATIENT
Start: 2024-11-01 | End: 2024-11-01

## 2024-10-31 RX ORDER — ACETAMINOPHEN 325 MG/1
975 TABLET ORAL EVERY 8 HOURS SCHEDULED
Status: DISCONTINUED | OUTPATIENT
Start: 2024-10-31 | End: 2024-11-05 | Stop reason: SDUPTHER

## 2024-10-31 RX ORDER — ONDANSETRON 2 MG/ML
INJECTION INTRAMUSCULAR; INTRAVENOUS AS NEEDED
Status: DISCONTINUED | OUTPATIENT
Start: 2024-10-31 | End: 2024-10-31

## 2024-10-31 RX ORDER — FENTANYL CITRATE/PF 50 MCG/ML
25 SYRINGE (ML) INJECTION
Status: DISCONTINUED | OUTPATIENT
Start: 2024-10-31 | End: 2024-10-31 | Stop reason: HOSPADM

## 2024-10-31 RX ORDER — ASPIRIN 81 MG/1
81 TABLET ORAL DAILY
Status: DISCONTINUED | OUTPATIENT
Start: 2024-10-31 | End: 2024-11-06 | Stop reason: HOSPADM

## 2024-10-31 RX ORDER — LIDOCAINE HYDROCHLORIDE 20 MG/ML
INJECTION, SOLUTION EPIDURAL; INFILTRATION; INTRACAUDAL; PERINEURAL AS NEEDED
Status: DISCONTINUED | OUTPATIENT
Start: 2024-10-31 | End: 2024-10-31

## 2024-10-31 RX ORDER — HEPARIN SODIUM 200 [USP'U]/100ML
INJECTION, SOLUTION INTRAVENOUS
Status: COMPLETED | OUTPATIENT
Start: 2024-10-31 | End: 2024-10-31

## 2024-10-31 RX ORDER — SODIUM CHLORIDE 9 MG/ML
125 INJECTION, SOLUTION INTRAVENOUS CONTINUOUS
Status: DISCONTINUED | OUTPATIENT
Start: 2024-10-31 | End: 2024-11-01

## 2024-10-31 RX ORDER — HYDROMORPHONE HCL/PF 1 MG/ML
SYRINGE (ML) INJECTION AS NEEDED
Status: DISCONTINUED | OUTPATIENT
Start: 2024-10-31 | End: 2024-10-31

## 2024-10-31 RX ORDER — MAGNESIUM HYDROXIDE 1200 MG/15ML
LIQUID ORAL AS NEEDED
Status: DISCONTINUED | OUTPATIENT
Start: 2024-10-31 | End: 2024-10-31 | Stop reason: HOSPADM

## 2024-10-31 RX ORDER — ATORVASTATIN CALCIUM 20 MG/1
20 TABLET, FILM COATED ORAL
Status: DISCONTINUED | OUTPATIENT
Start: 2024-11-01 | End: 2024-11-06 | Stop reason: HOSPADM

## 2024-10-31 RX ORDER — FENTANYL CITRATE 50 UG/ML
INJECTION, SOLUTION INTRAMUSCULAR; INTRAVENOUS AS NEEDED
Status: DISCONTINUED | OUTPATIENT
Start: 2024-10-31 | End: 2024-10-31

## 2024-10-31 RX ORDER — OXYCODONE HYDROCHLORIDE 5 MG/1
5 TABLET ORAL EVERY 4 HOURS PRN
Status: DISCONTINUED | OUTPATIENT
Start: 2024-10-31 | End: 2024-11-06 | Stop reason: HOSPADM

## 2024-10-31 RX ADMIN — SODIUM CHLORIDE: 0.9 INJECTION, SOLUTION INTRAVENOUS at 11:29

## 2024-10-31 RX ADMIN — CEFAZOLIN SODIUM 2000 MG: 2 SOLUTION INTRAVENOUS at 07:54

## 2024-10-31 RX ADMIN — Medication 50 MCG: at 08:20

## 2024-10-31 RX ADMIN — Medication 100 MCG: at 13:48

## 2024-10-31 RX ADMIN — ROCURONIUM BROMIDE 15 MG: 10 INJECTION, SOLUTION INTRAVENOUS at 10:32

## 2024-10-31 RX ADMIN — SUGAMMADEX 200 MG: 100 INJECTION, SOLUTION INTRAVENOUS at 13:01

## 2024-10-31 RX ADMIN — Medication 100 MCG: at 08:01

## 2024-10-31 RX ADMIN — HYDROMORPHONE HYDROCHLORIDE 0.5 MG: 1 INJECTION, SOLUTION INTRAMUSCULAR; INTRAVENOUS; SUBCUTANEOUS at 11:40

## 2024-10-31 RX ADMIN — SODIUM CHLORIDE 125 ML/HR: 0.9 INJECTION, SOLUTION INTRAVENOUS at 06:17

## 2024-10-31 RX ADMIN — LABETALOL HYDROCHLORIDE 10 MG: 5 INJECTION, SOLUTION INTRAVENOUS at 18:04

## 2024-10-31 RX ADMIN — ROCURONIUM BROMIDE 20 MG: 10 INJECTION, SOLUTION INTRAVENOUS at 09:13

## 2024-10-31 RX ADMIN — CEFAZOLIN SODIUM 2000 MG: 2 SOLUTION INTRAVENOUS at 11:51

## 2024-10-31 RX ADMIN — Medication 100 MCG: at 11:44

## 2024-10-31 RX ADMIN — ROCURONIUM BROMIDE 15 MG: 10 INJECTION, SOLUTION INTRAVENOUS at 11:26

## 2024-10-31 RX ADMIN — OXYCODONE HYDROCHLORIDE 10 MG: 10 TABLET ORAL at 21:03

## 2024-10-31 RX ADMIN — HEPARIN SODIUM 5000 UNITS: 5000 INJECTION INTRAVENOUS; SUBCUTANEOUS at 21:03

## 2024-10-31 RX ADMIN — ROCURONIUM BROMIDE 50 MG: 10 INJECTION, SOLUTION INTRAVENOUS at 07:42

## 2024-10-31 RX ADMIN — SENNOSIDES 8.6 MG: 8.6 TABLET, FILM COATED ORAL at 18:04

## 2024-10-31 RX ADMIN — LIDOCAINE HYDROCHLORIDE 100 MG: 20 INJECTION, SOLUTION EPIDURAL; INFILTRATION; INTRACAUDAL at 13:38

## 2024-10-31 RX ADMIN — Medication 100 MCG: at 08:25

## 2024-10-31 RX ADMIN — FENTANYL CITRATE 50 MCG: 50 INJECTION INTRAMUSCULAR; INTRAVENOUS at 08:58

## 2024-10-31 RX ADMIN — Medication 100 MCG: at 13:44

## 2024-10-31 RX ADMIN — HEPARIN SODIUM 6000 UNITS: 1000 INJECTION INTRAVENOUS; SUBCUTANEOUS at 11:38

## 2024-10-31 RX ADMIN — FENTANYL CITRATE 50 MCG: 50 INJECTION INTRAMUSCULAR; INTRAVENOUS at 07:41

## 2024-10-31 RX ADMIN — ONDANSETRON 4 MG: 2 INJECTION INTRAMUSCULAR; INTRAVENOUS at 12:51

## 2024-10-31 RX ADMIN — SODIUM CHLORIDE 125 ML/HR: 0.9 INJECTION, SOLUTION INTRAVENOUS at 20:37

## 2024-10-31 RX ADMIN — DEXAMETHASONE SODIUM PHOSPHATE 10 MG: 10 INJECTION INTRAMUSCULAR; INTRAVENOUS at 07:42

## 2024-10-31 RX ADMIN — Medication 50 MCG: at 12:19

## 2024-10-31 RX ADMIN — LIDOCAINE HYDROCHLORIDE 100 MG: 20 INJECTION, SOLUTION EPIDURAL; INFILTRATION; INTRACAUDAL at 07:41

## 2024-10-31 RX ADMIN — SODIUM CHLORIDE: 0.9 INJECTION, SOLUTION INTRAVENOUS at 08:15

## 2024-10-31 RX ADMIN — SODIUM CHLORIDE 200 MCG: 0.9 INJECTION, SOLUTION INTRAVENOUS at 09:50

## 2024-10-31 RX ADMIN — HYDROMORPHONE HYDROCHLORIDE 0.5 MG: 1 INJECTION, SOLUTION INTRAMUSCULAR; INTRAVENOUS; SUBCUTANEOUS at 09:49

## 2024-10-31 RX ADMIN — PROTAMINE SULFATE 15 MG: 10 INJECTION, SOLUTION INTRAVENOUS at 12:47

## 2024-10-31 RX ADMIN — SODIUM CHLORIDE 100 MCG: 0.9 INJECTION, SOLUTION INTRAVENOUS at 09:48

## 2024-10-31 RX ADMIN — ASPIRIN 81 MG: 81 TABLET, COATED ORAL at 18:04

## 2024-10-31 RX ADMIN — ROCURONIUM BROMIDE 20 MG: 10 INJECTION, SOLUTION INTRAVENOUS at 11:51

## 2024-10-31 RX ADMIN — PHENYLEPHRINE HYDROCHLORIDE 20 MCG/MIN: 50 INJECTION INTRAVENOUS at 08:01

## 2024-10-31 RX ADMIN — ACETAMINOPHEN 975 MG: 325 TABLET, FILM COATED ORAL at 18:04

## 2024-10-31 RX ADMIN — ROCURONIUM BROMIDE 30 MG: 10 INJECTION, SOLUTION INTRAVENOUS at 08:30

## 2024-10-31 RX ADMIN — HYDROMORPHONE HYDROCHLORIDE 0.5 MG: 1 INJECTION, SOLUTION INTRAMUSCULAR; INTRAVENOUS; SUBCUTANEOUS at 18:49

## 2024-10-31 RX ADMIN — MIDAZOLAM HYDROCHLORIDE 2 MG: 1 INJECTION, SOLUTION INTRAMUSCULAR; INTRAVENOUS at 07:38

## 2024-10-31 RX ADMIN — FENTANYL CITRATE 50 MCG: 50 INJECTION INTRAMUSCULAR; INTRAVENOUS at 08:15

## 2024-10-31 RX ADMIN — CHLORHEXIDINE GLUCONATE 15 ML: 1.2 RINSE ORAL at 06:17

## 2024-10-31 RX ADMIN — ROCURONIUM BROMIDE 20 MG: 10 INJECTION, SOLUTION INTRAVENOUS at 09:52

## 2024-10-31 RX ADMIN — ROCURONIUM BROMIDE 15 MG: 10 INJECTION, SOLUTION INTRAVENOUS at 12:36

## 2024-10-31 RX ADMIN — SODIUM CHLORIDE 100 MCG: 0.9 INJECTION, SOLUTION INTRAVENOUS at 11:36

## 2024-10-31 RX ADMIN — SODIUM CHLORIDE 100 MCG: 0.9 INJECTION, SOLUTION INTRAVENOUS at 09:18

## 2024-10-31 RX ADMIN — Medication 50 MCG: at 08:18

## 2024-10-31 RX ADMIN — SODIUM CHLORIDE 100 MCG: 0.9 INJECTION, SOLUTION INTRAVENOUS at 11:29

## 2024-10-31 RX ADMIN — PROPOFOL 150 MG: 10 INJECTION, EMULSION INTRAVENOUS at 07:41

## 2024-10-31 NOTE — ANESTHESIA PREPROCEDURE EVALUATION
Procedure:  right CFA to tibial bypass (Right: Leg Upper)  psb completion angiogram (Right: Abdomen)    Relevant Problems   CARDIO   (+) Hypertension   (+) Mixed hyperlipidemia   (+) PAD (peripheral artery disease) (HCC)      PULMONARY   (+) Chronic obstructive pulmonary disease (HCC)   (+) Current every day smoker      Urinary   (+) Bladder mass        Physical Exam    Airway    Mallampati score: II  TM Distance: >3 FB  Neck ROM: full     Dental   Comment: edentulous     Cardiovascular  Rhythm: regular, Rate: normal, Cardiovascular exam normal    Pulmonary  Pulmonary exam normal Breath sounds clear to auscultation    Other Findings        Anesthesia Plan  ASA Score- 3     Anesthesia Type- general with ASA Monitors.         Additional Monitors: arterial line.    Airway Plan: ETT.    Comment: Left Ventricle Left ventricular cavity size is normal. Wall thickness is normal. The left ventricular ejection fraction is 65%. Systolic function is normal. Wall motion is normal. Diastolic function is normal for age.  Right Ventricle Right ventricular cavity size is normal. Systolic function is normal. Wall thickness is normal.  Left Atrium The atrium is normal in size.  Right Atrium The atrium is normal in size.  Aortic Valve The aortic valve is trileaflet. The leaflets are not thickened. The leaflets are not calcified. The leaflets exhibit normal mobility. There is no evidence of regurgitation. The aortic valve has no significant stenosis.  Mitral Valve Mitral valve structure is normal.  There is trace regurgitation. There is no evidence of stenosis.  Tricuspid Valve Tricuspid valve structure is normal. There is no evidence of regurgitation. There is no evidence of stenosis.  Pulmonic Valve Pulmonic valve structure is normal. There is no evidence of regurgitation. There is no evidence of stenosis.  Ascending Aorta The aortic root is normal in size.  IVC/SVC The right atrial pressure is estimated at 5.0 mmHg. The inferior vena  cava is normal in size. Respirophasic changes were normal.  Pericardium There is no pericardial effusion. The pericardium is normal in appearance.    Left Ventricle Measurements    .       Plan Factors-            Patient is a current smoker.  Patient instructed to abstain from smoking on day of procedure. Patient smoked on day of surgery.    Obstructive sleep apnea risk education given perioperatively.        Induction- intravenous.    Postoperative Plan- Plan for postoperative opioid use. Planned trial extubation        Informed Consent- Anesthetic plan and risks discussed with patient and daughter.

## 2024-10-31 NOTE — CONSULTS
UROLOGY CONSULTATION NOTE     Patient Identifiers: Nahid Luis (MRN [id number])  Service Requesting Consultation: Vascular surgery  Service Providing Consultation:  Urology, Mushtaq Garduno MD    Date of Service: 10/31/2024    Reason for Consultation: Intraoperative consult for hematuria      ASSESSMENT:     Gross hematuria  Recently diagnosed bladder tumor pending resection    Gross hematuria encountered following Malone catheter placement consistent with chronic bleeding.  Hematuria may worsen once anticoagulation is initiated following his vascular procedure.    A three-way Malone catheter was replaced to facilitate continuous bladder irrigation if needed    PLAN:     Three-way Malone catheter placed and irrigated prior to incision for the vascular surgery procedure.    Urology will follow along and monitor the patient's hematuria.    Continue surgical planning for upcoming TURBT    Thank you for allowing me to participate in this patients’ care.  Please do not hesitate to call with any additional questions.  Mushtaq Garduno MD    History of Present Illness:     Nahid Luis is a 64 y.o. old with a history of 6 cm bladder tumor identified, recently evaluated by my partner Dr. Real.  Patient is brought to the operating room today for a planned femoral-popliteal bypass for peripheral vascular disease with claudication.  I was consulted into the operating room by the vascular surgery team regarding hematuria encountered following preoperative Malone catheter placement.    Past Medical, Past Surgical History:     Past Medical History:   Diagnosis Date    Bladder cancer (HCC)     Colon polyp     COPD (chronic obstructive pulmonary disease) (HCC)     Hyperlipidemia     Hypertension    :    Past Surgical History:   Procedure Laterality Date    APPENDECTOMY      COLONOSCOPY      CYSTOSCOPY      IR LOWER EXTREMITY ANGIOGRAM  09/24/2024   :    Medications, Allergies:     Current Facility-Administered  Medications:     sodium chloride 0.9 % infusion, 125 mL/hr, Intravenous, Continuous, Hebert Barrera DO, Last Rate: 125 mL/hr at 10/31/24 0738, New Bag at 10/31/24 0815    Facility-Administered Medications Ordered in Other Encounters:     dexamethasone (PF) (DECADRON) injection, , Intravenous, PRN, Carlton Campoverde CRNA, 10 mg at 10/31/24 0742    fentaNYL injection, , Intravenous, PRN, Carlton Campoverde CRNA, 50 mcg at 10/31/24 0858    lidocaine (PF) (XYLOCAINE-MPF) 2 % injection, , Intravenous, PRN, Carlton Campoverde CRNA, 100 mg at 10/31/24 0741    midazolam (VERSED) injection, , Intravenous, PRN, Carlton Campoverde CRNA, 2 mg at 10/31/24 0738    phenylephrine (MELBA-SYNEPHRINE) 50 mg (STANDARD CONCENTRATION) in sodium chloride 0.9% 250 mL, , Intravenous, Continuous PRN, Carlton Campoverde CRNA, Last Rate: 15 mL/hr at 10/31/24 0911, 50 mcg/min at 10/31/24 0911    phenylephrine 1,000 mcg/10 mL prefilled syringe, , Intravenous, PRN, Carlton Campoverde CRNA, 100 mcg at 10/31/24 0825    propofol (DIPRIVAN) 200 MG/20ML bolus injection, , Intravenous, PRN, Carlton Campoverde CRNA, 150 mg at 10/31/24 0741    ROCuronium (ZEMURON) injection, , Intravenous, PRN, Carlton Campoverde CRNA, 20 mg at 10/31/24 0913    Allergies:  No Known Allergies:    Social and Family History:   Social History:   Social History     Tobacco Use    Smoking status: Every Day     Current packs/day: 0.25     Types: Cigarettes   Vaping Use    Vaping status: Never Used   Substance Use Topics    Alcohol use: Yes     Alcohol/week: 6.0 standard drinks of alcohol     Types: 6 Cans of beer per week     Comment: daily 4-5 beers daily, last drank 10/30    Drug use: No   .    Social History     Tobacco Use   Smoking Status Every Day    Current packs/day: 0.25    Types: Cigarettes   Smokeless Tobacco Not on file       Family History:  Family History   Problem Relation Age of Onset    Alcohol abuse Father     Heart attack Father    :     Review of Systems:     Unable to obtain as  the patient is intubated.  Extensive chart review as above.  Physical Exam:   General: Patient is intubated and encountered stable in the operating room.  He is positioned supine.  A Malone catheter was placed by the nursing team which revealed hematuria    I irrigated the Malone catheter with  300 cc of sterile water.  [place] urine is encountered but no clots are present.  Since the patient will be receiving a heparin load following his vascular procedure I recommended placing a three-way Malone catheter prior to leaving the operating room to facilitate continuous bladder irrigation if needed.    Labs:     Lab Results   Component Value Date    HGB 16.1 08/17/2024    HCT 49.0 08/17/2024    WBC 14.62 (H) 08/17/2024     08/17/2024   ]    Lab Results   Component Value Date    K 4.7 10/19/2024    CL 94 (L) 10/19/2024    CO2 32 10/19/2024    BUN 9 10/19/2024    CREATININE 0.92 10/19/2024    CALCIUM 9.3 10/19/2024   ]    Imaging:   I personally reviewed the images and report of the following studies, and reviewed them with the patient:

## 2024-10-31 NOTE — ASSESSMENT & PLAN NOTE
S/p right CFA to AT bypass, POD #0  Frequent neurovascular checks  Avoid hypotension to preserve Bypass patency  Further management per the vascular teams recommendations

## 2024-10-31 NOTE — QUICK NOTE
Urology Afternoon Update:    Patient evaluated in PACU. Resting comfortable, O2 in place. Malone catheter remains inserted draining clear cherry red urine.  CBI running at a moderate-high pace. Heparin has been initiated postoperatively.  Patient denies pain. Continue CBI overnight.  Orders placed for manual irrigations every 4 hours.  Continue surgical planning for upcoming TURBT in the outpatient setting. Urology will continue to follow.     Glo Shipley PA-C

## 2024-10-31 NOTE — ASSESSMENT & PLAN NOTE
Followed by urology  Plan for TURBT in the future  Currently with hematuria  Plan as previously described

## 2024-10-31 NOTE — ASSESSMENT & PLAN NOTE
We will continue his outpatient amlodipine for now  Losartan on hold for now given recent surgery  Goal will be to maintain normotension

## 2024-10-31 NOTE — ANESTHESIA POSTPROCEDURE EVALUATION
Post-Op Assessment Note    CV Status:  Stable    Pain management: adequate       Mental Status:  Alert     Post Op Vitals Reviewed: Yes    No anethesia notable event occurred.    Staff: CRNA           Last Filed PACU Vitals:  Vitals Value Taken Time   Temp     Pulse     BP     Resp     SpO2         Modified Aracelis:  No data recorded

## 2024-10-31 NOTE — ANESTHESIA POSTPROCEDURE EVALUATION
Post-Op Assessment Note    CV Status:  Stable    Pain management: adequate       Mental Status:  Alert and awake   Hydration Status:  Euvolemic   PONV Controlled:  Controlled   Airway Patency:  Patent  Two or more mitigation strategies used for obstructive sleep apnea   Post Op Vitals Reviewed: Yes    No anethesia notable event occurred.    Staff: Anesthesiologist           Last Filed PACU Vitals:  Vitals Value Taken Time   Temp 97.5 °F (36.4 °C) 10/31/24 1412   Pulse 92 10/31/24 1601   /58 10/31/24 1552   Resp 16 10/31/24 1601   SpO2 92 % 10/31/24 1601   Vitals shown include unfiled device data.    Modified Aracelis:  Activity: 2 (10/31/2024  3:42 PM)  Respiration: 2 (10/31/2024  3:42 PM)  Circulation: 2 (10/31/2024  3:42 PM)  Consciousness: 2 (10/31/2024  3:42 PM)  Oxygen Saturation: 1 (10/31/2024  3:42 PM)  Modified Aracelis Score: 9 (10/31/2024  3:42 PM)

## 2024-10-31 NOTE — CONSULTS
Consultation - Critical Care/ICU   Name: Nahid Luis 64 y.o. male I MRN: [id number]  Unit/Bed#: ICU 14 I Date of Admission: 10/31/2024   Date of Service: 10/31/2024 I Hospital Day: 0   Inpatient consult to Medical Critical Care  Consult performed by: LISA Johnson  Consult ordered by: Saravanan Barbosa DO        Physician Requesting Evaluation: Carlos Bray DO   Reason for Evaluation / Principal Problem: Post operative medical management s/p right CFA to AT bypass    I have discussed the above management plan in detail with the primary service.     Assessment & Plan  PAD (peripheral artery disease) (HCC)  S/p right CFA to AT bypass, POD #0  Frequent neurovascular checks  Avoid hypotension to preserve Bypass patency  Further management per the vascular teams recommendations    Gross hematuria  The patient has a history of a bladder mass. He is on heparin for his bypass and developed hematuria. He is now on CBI per urology recommendations  We will continue the CBI for now  Maintain 3 way catheter- removal per urology's recommendations  Monitor hgb  Chronic obstructive pulmonary disease (HCC)  Will continue his outpatient Advair  Encourage cough, deep breathing and IS use  May require prn bronchodilators if he develops SOB  Hypertension  We will continue his outpatient amlodipine for now  Losartan on hold for now given recent surgery  Goal will be to maintain normotension  Mixed hyperlipidemia  Will continue his outpatient statin  Bladder mass  Followed by urology  Plan for TURBT in the future  Currently with hematuria  Plan as previously described  Disposition: Critical care    History of Present Illness   Nahid Luis is a 64 y.o. who presents for elective right CFA to AT bypass. The procedure was complicated by the development of hematuria in a patient with a known bladder mass in the setting of anticoagulation with heparin. He denies SOB, CP or leg pain at present    History obtained from chart  review and the patient.  Review of Systems: Review of Systems   All other systems reviewed and are negative.      Historical Information   Past Medical History:  No date: Bladder cancer (HCC)  No date: Colon polyp  No date: COPD (chronic obstructive pulmonary disease) (HCC)  No date: Hyperlipidemia  No date: Hypertension Past Surgical History:  No date: APPENDECTOMY  No date: COLONOSCOPY  No date: CYSTOSCOPY  09/24/2024: IR LOWER EXTREMITY ANGIOGRAM   Current Outpatient Medications   Medication Instructions    albuterol (Proventil HFA) 90 mcg/act inhaler 2 puffs, Inhalation, Every 6 hours PRN    amLODIPine (NORVASC) 5 mg, Oral, Daily    aspirin (ECOTRIN LOW STRENGTH) 81 mg, Oral, Daily    atorvastatin (LIPITOR) 20 mg, Oral, Daily    Fluticasone-Salmeterol (Advair) 100-50 mcg/dose inhaler 1 puff, Inhalation, 2 times daily, Rinse mouth after use.    losartan (COZAAR) 100 mg, Oral, Daily    No Known Allergies   Social History     Tobacco Use    Smoking status: Every Day     Current packs/day: 0.25     Types: Cigarettes   Vaping Use    Vaping status: Never Used   Substance Use Topics    Alcohol use: Yes     Alcohol/week: 6.0 standard drinks of alcohol     Types: 6 Cans of beer per week     Comment: daily 4-5 beers daily, last drank 10/30    Drug use: No    Family History   Problem Relation Age of Onset    Alcohol abuse Father     Heart attack Father           Objective :                   Vitals I/O      Most Recent Min/Max in 24hrs   Temp 97.5 °F (36.4 °C) Temp  Min: 97.1 °F (36.2 °C)  Max: 97.5 °F (36.4 °C)   Pulse 86 Pulse  Min: 75  Max: 94   Resp (!) 11 Resp  Min: 11  Max: 18   /58 BP  Min: 108/58  Max: 163/71   O2 Sat 91 % SpO2  Min: 91 %  Max: 98 %      Intake/Output Summary (Last 24 hours) at 10/31/2024 1634  Last data filed at 10/31/2024 1304  Gross per 24 hour   Intake 2675 ml   Output 420 ml   Net 2255 ml       Diet Regular; Regular House    Invasive Monitoring           Physical Exam   Physical  Exam  Eyes:      Pupils: Pupils are equal, round, and reactive to light.   Skin:     General: Skin is warm and dry.   HENT:      Head: Normocephalic.      Mouth/Throat:      Mouth: Mucous membranes are dry.   Cardiovascular:      Rate and Rhythm: Normal rate.   Musculoskeletal:      Comments: Right groin with bovena in place. Foot well perfused, palpable DP pulses   Abdominal:      Palpations: Abdomen is soft.      Tenderness: There is no abdominal tenderness.   Constitutional:       Appearance: He is well-nourished.   Pulmonary:      Effort: Pulmonary effort is normal.      Breath sounds: Normal breath sounds.   Neurological:      General: No focal deficit present.      Motor: gross motor function is at baseline for patient.          Diagnostic Studies      Lab Results: I have reviewed the following results:     Medications:  Scheduled PRN   acetaminophen, 975 mg, Q8H MICHAEL  [START ON 11/1/2024] amLODIPine, 5 mg, Daily  aspirin, 81 mg, Daily  [START ON 11/1/2024] atorvastatin, 20 mg, Daily With Dinner  Fluticasone Furoate-Vilanterol, 1 puff, BID  heparin (porcine), 5,000 Units, Q8H MICHAEL  senna, 1 tablet, Daily      albuterol, 2 puff, Q6H PRN  lactated ringers, 500 mL, Once PRN   And  lactated ringers, 500 mL, Once PRN  ondansetron, 4 mg, Q6H PRN  oxyCODONE, 10 mg, Q4H PRN  oxyCODONE, 5 mg, Q4H PRN  sodium chloride, 500 mL, Once PRN   And  sodium chloride, 500 mL, Once PRN       Continuous    lactated ringers, 75 mL/hr  sodium chloride, 125 mL/hr, Last Rate: 125 mL/hr (10/31/24 0738)         Labs:   CBC    Recent Labs     10/31/24  1458        BMP    No recent results    Coags    No recent results     Additional Electrolytes  No recent results       Blood Gas    No recent results  No recent results LFTs  No recent results    Infectious  No recent results  Glucose  No recent results

## 2024-10-31 NOTE — TELEPHONE ENCOUNTER
Spoke to pts rickie Ayon and r/s appt as pt is currently undergoing lower extremity bypass surgery. Pt will be coming to see Dr HOOK on Mon 12/2.

## 2024-10-31 NOTE — ASSESSMENT & PLAN NOTE
Will continue his outpatient Advair  Encourage cough, deep breathing and IS use  May require prn bronchodilators if he develops SOB

## 2024-10-31 NOTE — OR NURSING
"Approx. 5-10 minutes after 16 fr 5cc balloon brambila insertion at 0750, sweetie red blood and clot noted in drainage bag tubing. Call made to operating urologist - Dr. Garduno to come into OR. Dr. Garduno arrived and was informed of pts very recent diagnosis of bladder cancer on 10-18-24- of bladder cancer 6.2 cm lesion in trigone area of bladder. Brambila irrigated with approx 200cc sterile water by Dr. Garduno until urine cleared from dark red to \"Haw[place]ian punch\" and no further clot noted. Dr. Garduno recommended replacing 2 way brambila with three way brambila and having a post op urology consult for possible CBI irrigation need.16 fr Brambila replaced with 22 fr 3 way hematuria brambila prior to surgery starting.  "

## 2024-10-31 NOTE — CONSULTS
Please refer to official urologic consult note written by Dr. Garduno 10/31 for urologic assessment and plan. Out team remains following.     Glo Shipley PA-C

## 2024-10-31 NOTE — INTERVAL H&P NOTE
H&P reviewed. After examining the patient I find no changes in the patients condition since the H&P had been written.    Vitals:    10/31/24 0610   BP: 162/76   Pulse: 75   Resp: 18   Temp: (!) 97.1 °F (36.2 °C)   SpO2: 93%

## 2024-10-31 NOTE — OP NOTE
OPERATIVE REPORT  PATIENT NAME: Nahid Luis    :  1960  MRN: [id number]  Pt Location: [place] [facility] 09    SURGERY DATE: 10/31/2024    Surgeons and Role:     * Carlos Bray, DO - Primary     * Saravanan Barbosa, DO - Fellow     * Mushtaq Garduno MD     * Delano Bertrand MD     Preop Diagnosis:  Chronic limb threatening ischemia with rest pain     Post-Op Diagnosis Codes:  Chronic limb threatening ischemia with rest pain     Procedure(s):  Right common femoral and anterior tibial artery exposure  Right greater saphenous vein harvest via skip incisions  Right common femoral artery to anterior tibial artery bypass with ipsilateral reversed greater saphenous vein     Specimen(s):  None    Estimated Blood Loss:   100    Drains:  None    Anesthesia Type:   General    Operative Indications:  This is a 64-year-old male was initially evaluated for bilateral short distance claudication with progression of right lower extremity peripheral arterial disease to chronic limb threatening ischemia and ischemic rest pain.  Based off of symptoms patient underwent biopsy and a arteriogram which showed long segment SFA and popliteal artery occlusive disease with reconstitution of anterior tibial and posterior tibial arteries.  Based off these findings recommendation was made for right lower extremity common femoral to anterior tibial artery bypass with ipsilateral greater saphenous vein based off adequate vein from preoperative vein mapping.  Patient received appropriate preoperative cardiology evaluation and restratification.  Of note there was also a bladder mass that was identified on patient's preop risk CT scan and urology was contacted to evaluate patient at time of procedure.  Risk, benefits, alternatives, and description of procedure were discussed with patient in detail and patient was agreeable to proceeding.    Operative Findings:  Prior to initiation of procedure Malone catheter was placed and there was  evidence of hematuria along with clots within the Malone catheter.  Based off of this finding intraoperative urology evaluation was performed with recommendation for placement of a three-way Malone irrigation catheter.  Suspect bleeding secondary to patient's bladder mass.  Malone was exchanged for an irrigation catheter with plan for continued urology care post procedure.    Right common femoral artery appeared slightly ectatic however patent and amenable to clamping.  Right GSV was harvested from saphenofemoral junction to mid tibia and appeared to be approximately 3 mm or greater throughout its course.  Right anterior tibial artery was approximately 3 to 4 mm in diameter with mild atherosclerotic disease and otherwise soft throughout.  GSV was harvested via skip incisions in the autologous right common femoral to anterior tibial artery bypass was created via a subcutaneous tunnel.  There was a small hematoma within the subcutaneous tunnel after completion of bypass requiring a small cutdown superior lateral to the knee with placement of a 6-0 Prolene hemostatic suture within vein bypass graft.    There was a palpable 2+ DP artery pulse on completion of procedure.      Complications:   None    Procedure and Technique:  Consent was obtained preoperatively.  The patient was brought to the operating room and placed on the table in supine position, all bony prominences were appropriately padded.  Preoperative antibiotics were administered.  The patient received General endotracheal anesthesia.   The patient was prepped and draped in usual sterile surgical fashion.  A timeout was performed identifying patient by name, date of birth, and procedure with all in agreement.  Prior to initiation of procedure ultrasound was used to map out and alfreda right greater saphenous vein.  Greater saphenous vein appears to be appropriate caliber of at least 3 mm throughout its length from saphenofemoral junction to mid tibia.  Vein was  appropriately marked for anticipated skip incisions.    A longitudinal incision was made with a #15 blade over the right groin overlying the common femoral arter.  Dissection was carried down through the skin and subcutaneous tissue. The inguinal ligament was identified and subcutaneous tissue was dissected from the inferior edge medially and laterally.  The common femoral artery was identified after further dissection and the anterior aspect was cleared of subcutaneous tissue moving from proximal to distal.  Common femoral artery did appear to be slightly ectatic however patent.  Circumferential dissection using both blunt and sharp technique was then performed.  The right common femoral artery was then encircled with Silastic Vesseloops.  A longitudinal incision was then made over the lateral aspect of the operative extremity posterior to the tibia within the anterior compartment.  Dissection was carried through skin and subcutaneous tissue and the avascular plane between the tibialis anterior and extensor digitorum longus muscles was entered.  The anterior tibial artery was identified.  Bridging veins overlying the artery were divided between surgical clips.  The anterior tibial artery appeared to be an adequate recipient for bypass graft, there is mild atherosclerotic disease however artery was soft otherwise throughout.  The artery was encircled with silastic vessel loops proximally and distally.  Then an incision was made on the right lower extremity over the area where the saphenous vein was previously identified under ultrasound guidance.  The saphenous vein was dissected and skeletonized via multiple skip incisions with adequate skin bridging between saphenectomy site incisions.  All saphenous vein branching vessels were divided between surgical clips.  After complete harvest of the saphenous vein from mid tibial region it was transected at the saphenofemoral junction after proximal clamping.  Residual vein  cuff was then oversewn with 5-0 Prolene suture in a multilayered fashion.  The greater saphenous vein was flushed multiple times with heparinized saline and any points of leak were closed with surgical clips or interrupted 6-0 Prolene sutures.  The vein was then again pressurized and distended and the anterior surface marked with a marking pen as to confirm appropriate orientation.   The patient was then systemically heparinized with initial bolus of 6000 units of IV heparin and intermittently bolused to maintain therapeutic levels.  Atraumatic vascular clamps were then applied to the right common femoral artery to obtain proximal and distal control.  A #11 blade was then used to make  a longitudinal arteriotomy.  This was extended proximally distally with the use of Rucker scissors.  Next the vein was brought onto the field, reversed with confirmation of appropriate orientation, cut, spatulated, and beveled appropriately.  An end to side anastomosis was then created on the common femoral artery with the use of 5-0 Prolene suture tied at the heel and run circumferentially.  Inflow was then restored and the vein was pressurized.  This appeared to be appropriately hemostatic with an excellent pulse within the vein.  Vein was then flushed with heparinized saline.  Next a Elif tunneler was used to create a subcutaneous tunnel from the anterior tibial artery proximally towards the common femoral artery.  The graft was then brought through the previously created tunnel ensuring that no kinks or twists were introduced during the process. Appropriate 1:1 movement of the graft was appreciated after the tunneler was removed.  Again the vein was pressurized and noted to be without any kinks or twists.  A soft noncalcified region of the right proximal anterior tibial artery was identified. Proximal and distal Vesseloops were pulled up and a #11 blade was used to create a longitudinal arteriotomy. This was extended proximally  distally with Rucker scissors. The distal aspect of the graft was was cut to size, spatulated, and beveled. The anastomosis was then created with running 6-0 Prolene suture tied both at the heel and at the toe and run circumferentially.  Prior to completion of anastomosis, flushing maneuvers were performed and the vein was appropriately de-aired.  After completion of the anastomoses that there was an excellent pulse distal to the anastomotic site along with a palpable pulse within the right dorsalis pedis.  Given successful completion of bypass the patient was given protamine. Good hemostasis was achieved in the wound beds.  Just prior to completion of procedure there was evidence of small hematoma noted just superior lateral to the knee at the site of the subcutaneous tunnel.  There was concern that this was secondary to a defect in the vein and as such a small cutdown was performed over this area.  Bypass graft was then evaluated and there was a small area that appeared to be bleeding.  This was closed with the use of 6-0 Prolene suture in an interrupted figure-of-eight fashion and appropriately hemostatic.  No other bleeding points were identified.    All wound beds were then appropriately irrigated with sterile saline solution.  The wounds were closed in multiple layers of Vicryl suture, and the skin was closed with 4-0 Monocryl to both the right groin, right superolateral tunnel exploration incision, and right anterior tibial artery incision sites.  All saphenectomy incision sites were closed with 2-0 Vicryl suture and surgical staples to the skin.    At the conclusion of the procedure all sponge and instrument counts were correct.  Patient had a palpable right 2+ dorsalis pedis pulse on completion.  Patient tolerated procedure well.  Patient was transferred to PACU in stable condition.    Dr. Bray was present for the entire procedure.    Patient Disposition:  PACU        Vascular Quality Initiative -  Infra-Inguinal Bypass      Urgency: Elective     Side: right  Skin Prep:Chlorhexidene    Chlorhexidine Shower: VQI Chlorhexidine Shower: Yes    Functional Status: VQI Functional Status: Fully active; able to carry on all predisease activities without restriction.   Ambulation: Amb = independently ambulatory    Exercise Program: NoNo=Patient did not follow a supervised or unsupervised exercise program.    Glass Classification: VQI GLASS Classification: 1= Femoropopliteal: Any lesion spanning from the superficial femoral through (and including) the popliteal artery D. Fem-Pop Grade 33= Total length SFA disease > 2/3 (>20 cm ) length; may include any flush occlusion < 20 cm or non-flush  10-20 cm long; short popliteal stenosis 2-5 cm, not involving   trifurcation    Pathology: Occlusive disease = any % of stenosis or total occlusion    Leg to be Bypassed:Right Side     Indication:    Right Lower Extremity  Indication Occlusive: Ischemic Rest Pain       Left Lower Extremity  Indication Occlusive: Asymptomatic:  documented peripheral arterial disease without symptoms of claudication or ischemic pain      Treatment of Native Artery to Maintain Bypass Patency?:  No     Anesthesia: General    Skin Prep: VQI SKIN PREP: Chlorhexidine    Graft Origin: Common Femoral    Graft Recipient: AT    Graft Vein Type: Reversed GSV    Number of Vein Segments: 1    Prosthetic: None      Groin Incision: Incision Orientation is: Vertical.   Groin Closure Type: Absorbable Subcuticular. Closure Dressing is: Cyanoacrylate Adhesive..     Total Procedure Time:   Event Time In   Procedure Start 0857   Procedure Closing 1300   Procedure Finish        EBL: Minimal    If Graft Vein: Vein [place] Incision: Skip    Vein Graft Location: Sub-cutaneous    Adjuncts:  Vein Cuff:  no Sequential Graft:no    Concomitant Proximal Ipsilateral:   PVI: No      Endarterectomy:No      Supra-inguinal Bypass: {no    Completion Study:   Doppler: yes   Duplex:  no    Arteriogram: no    Intervention Performed: VQI Intervention Performed: No     SIGNATURE: Saravanan Barbosa DO  DATE: October 31, 2024  TIME: 1:43 PM

## 2024-10-31 NOTE — ASSESSMENT & PLAN NOTE
The patient has a history of a bladder mass. He is on heparin for his bypass and developed hematuria. He is now on CBI per urology recommendations  We will continue the CBI for now  Maintain 3 way catheter- removal per urology's recommendations  Monitor hgb

## 2024-11-01 ENCOUNTER — DOCUMENTATION (OUTPATIENT)
Dept: VASCULAR SURGERY | Facility: CLINIC | Age: 64
End: 2024-11-01

## 2024-11-01 PROBLEM — D62 ABLA (ACUTE BLOOD LOSS ANEMIA): Status: ACTIVE | Noted: 2024-11-01

## 2024-11-01 LAB
ANION GAP SERPL CALCULATED.3IONS-SCNC: 4 MMOL/L (ref 4–13)
ANION GAP SERPL CALCULATED.3IONS-SCNC: 5 MMOL/L (ref 4–13)
BUN SERPL-MCNC: 13 MG/DL (ref 5–25)
BUN SERPL-MCNC: 15 MG/DL (ref 5–25)
CA-I BLD-SCNC: 1.1 MMOL/L (ref 1.12–1.32)
CA-I BLD-SCNC: 1.13 MMOL/L (ref 1.12–1.32)
CALCIUM SERPL-MCNC: 8.3 MG/DL (ref 8.4–10.2)
CALCIUM SERPL-MCNC: 8.4 MG/DL (ref 8.4–10.2)
CHLORIDE SERPL-SCNC: 103 MMOL/L (ref 96–108)
CHLORIDE SERPL-SCNC: 103 MMOL/L (ref 96–108)
CO2 SERPL-SCNC: 24 MMOL/L (ref 21–32)
CO2 SERPL-SCNC: 25 MMOL/L (ref 21–32)
CREAT SERPL-MCNC: 0.66 MG/DL (ref 0.6–1.3)
CREAT SERPL-MCNC: 0.82 MG/DL (ref 0.6–1.3)
DME PARACHUTE DELIVERY DATE ACTUAL: NORMAL
DME PARACHUTE DELIVERY DATE REQUESTED: NORMAL
DME PARACHUTE ITEM DESCRIPTION: NORMAL
DME PARACHUTE ORDER STATUS: NORMAL
DME PARACHUTE SUPPLIER NAME: NORMAL
DME PARACHUTE SUPPLIER PHONE: NORMAL
ERYTHROCYTE [DISTWIDTH] IN BLOOD BY AUTOMATED COUNT: 15.5 % (ref 11.6–15.1)
GFR SERPL CREATININE-BSD FRML MDRD: 102 ML/MIN/1.73SQ M
GFR SERPL CREATININE-BSD FRML MDRD: 93 ML/MIN/1.73SQ M
GLUCOSE SERPL-MCNC: 141 MG/DL (ref 65–140)
GLUCOSE SERPL-MCNC: 165 MG/DL (ref 65–140)
HCT VFR BLD AUTO: 31.7 % (ref 36.5–49.3)
HGB BLD-MCNC: 10.4 G/DL (ref 12–17)
MAGNESIUM SERPL-MCNC: 1.9 MG/DL (ref 1.9–2.7)
MAGNESIUM SERPL-MCNC: 2.4 MG/DL (ref 1.9–2.7)
MCH RBC QN AUTO: 30.5 PG (ref 26.8–34.3)
MCHC RBC AUTO-ENTMCNC: 32.8 G/DL (ref 31.4–37.4)
MCV RBC AUTO: 93 FL (ref 82–98)
PHOSPHATE SERPL-MCNC: 3.4 MG/DL (ref 2.3–4.1)
PLATELET # BLD AUTO: 223 THOUSANDS/UL (ref 149–390)
PMV BLD AUTO: 8.1 FL (ref 8.9–12.7)
POTASSIUM SERPL-SCNC: 4.2 MMOL/L (ref 3.5–5.3)
POTASSIUM SERPL-SCNC: 4.3 MMOL/L (ref 3.5–5.3)
RBC # BLD AUTO: 3.41 MILLION/UL (ref 3.88–5.62)
SODIUM SERPL-SCNC: 132 MMOL/L (ref 135–147)
SODIUM SERPL-SCNC: 132 MMOL/L (ref 135–147)
WBC # BLD AUTO: 17.87 THOUSAND/UL (ref 4.31–10.16)

## 2024-11-01 PROCEDURE — 80048 BASIC METABOLIC PNL TOTAL CA: CPT

## 2024-11-01 PROCEDURE — NC001 PR NO CHARGE: Performed by: SURGERY

## 2024-11-01 PROCEDURE — 82330 ASSAY OF CALCIUM: CPT

## 2024-11-01 PROCEDURE — 83735 ASSAY OF MAGNESIUM: CPT

## 2024-11-01 PROCEDURE — 99232 SBSQ HOSP IP/OBS MODERATE 35: CPT | Performed by: INTERNAL MEDICINE

## 2024-11-01 PROCEDURE — 85027 COMPLETE CBC AUTOMATED: CPT | Performed by: STUDENT IN AN ORGANIZED HEALTH CARE EDUCATION/TRAINING PROGRAM

## 2024-11-01 PROCEDURE — 97167 OT EVAL HIGH COMPLEX 60 MIN: CPT

## 2024-11-01 PROCEDURE — 97162 PT EVAL MOD COMPLEX 30 MIN: CPT

## 2024-11-01 PROCEDURE — 99232 SBSQ HOSP IP/OBS MODERATE 35: CPT

## 2024-11-01 PROCEDURE — 84100 ASSAY OF PHOSPHORUS: CPT

## 2024-11-01 PROCEDURE — 80048 BASIC METABOLIC PNL TOTAL CA: CPT | Performed by: STUDENT IN AN ORGANIZED HEALTH CARE EDUCATION/TRAINING PROGRAM

## 2024-11-01 RX ORDER — PHENAZOPYRIDINE HYDROCHLORIDE 100 MG/1
100 TABLET, FILM COATED ORAL 3 TIMES DAILY
Status: DISCONTINUED | OUTPATIENT
Start: 2024-11-01 | End: 2024-11-03

## 2024-11-01 RX ORDER — OXYBUTYNIN CHLORIDE 5 MG/1
5 TABLET ORAL 3 TIMES DAILY
Status: DISCONTINUED | OUTPATIENT
Start: 2024-11-01 | End: 2024-11-06 | Stop reason: HOSPADM

## 2024-11-01 RX ORDER — PHENAZOPYRIDINE HYDROCHLORIDE 100 MG/1
100 TABLET, FILM COATED ORAL
Status: DISCONTINUED | OUTPATIENT
Start: 2024-11-01 | End: 2024-11-01

## 2024-11-01 RX ORDER — OXYBUTYNIN CHLORIDE 5 MG/1
5 TABLET ORAL 3 TIMES DAILY
Status: DISCONTINUED | OUTPATIENT
Start: 2024-11-01 | End: 2024-11-01

## 2024-11-01 RX ORDER — CALCIUM GLUCONATE 20 MG/ML
2 INJECTION, SOLUTION INTRAVENOUS ONCE
Status: COMPLETED | OUTPATIENT
Start: 2024-11-01 | End: 2024-11-01

## 2024-11-01 RX ORDER — MAGNESIUM SULFATE HEPTAHYDRATE 40 MG/ML
2 INJECTION, SOLUTION INTRAVENOUS ONCE
Status: COMPLETED | OUTPATIENT
Start: 2024-11-01 | End: 2024-11-01

## 2024-11-01 RX ADMIN — HEPARIN SODIUM 5000 UNITS: 5000 INJECTION INTRAVENOUS; SUBCUTANEOUS at 21:57

## 2024-11-01 RX ADMIN — PHENAZOPYRIDINE 100 MG: 100 TABLET ORAL at 21:57

## 2024-11-01 RX ADMIN — OXYBUTYNIN CHLORIDE 5 MG: 5 TABLET ORAL at 15:57

## 2024-11-01 RX ADMIN — ACETAMINOPHEN 975 MG: 325 TABLET, FILM COATED ORAL at 01:13

## 2024-11-01 RX ADMIN — PHENAZOPYRIDINE 100 MG: 100 TABLET ORAL at 08:23

## 2024-11-01 RX ADMIN — OXYBUTYNIN CHLORIDE 5 MG: 5 TABLET ORAL at 21:57

## 2024-11-01 RX ADMIN — FLUTICASONE FUROATE AND VILANTEROL TRIFENATATE 1 PUFF: 100; 25 POWDER RESPIRATORY (INHALATION) at 08:23

## 2024-11-01 RX ADMIN — OXYCODONE HYDROCHLORIDE 5 MG: 5 TABLET ORAL at 17:40

## 2024-11-01 RX ADMIN — PHENAZOPYRIDINE 100 MG: 100 TABLET ORAL at 04:22

## 2024-11-01 RX ADMIN — SENNOSIDES 8.6 MG: 8.6 TABLET, FILM COATED ORAL at 08:23

## 2024-11-01 RX ADMIN — ACETAMINOPHEN 975 MG: 325 TABLET, FILM COATED ORAL at 17:40

## 2024-11-01 RX ADMIN — OXYCODONE HYDROCHLORIDE 5 MG: 5 TABLET ORAL at 22:05

## 2024-11-01 RX ADMIN — OXYBUTYNIN CHLORIDE 5 MG: 5 TABLET ORAL at 04:22

## 2024-11-01 RX ADMIN — HYDROMORPHONE HYDROCHLORIDE 0.5 MG: 1 INJECTION, SOLUTION INTRAMUSCULAR; INTRAVENOUS; SUBCUTANEOUS at 03:26

## 2024-11-01 RX ADMIN — ASPIRIN 81 MG: 81 TABLET, COATED ORAL at 08:23

## 2024-11-01 RX ADMIN — OXYCODONE HYDROCHLORIDE 5 MG: 5 TABLET ORAL at 12:37

## 2024-11-01 RX ADMIN — SODIUM CHLORIDE, SODIUM LACTATE, POTASSIUM CHLORIDE, AND CALCIUM CHLORIDE 500 ML: .6; .31; .03; .02 INJECTION, SOLUTION INTRAVENOUS at 02:48

## 2024-11-01 RX ADMIN — ATORVASTATIN CALCIUM 20 MG: 20 TABLET, FILM COATED ORAL at 15:57

## 2024-11-01 RX ADMIN — PHENAZOPYRIDINE 100 MG: 100 TABLET ORAL at 15:57

## 2024-11-01 RX ADMIN — ACETAMINOPHEN 975 MG: 325 TABLET, FILM COATED ORAL at 10:22

## 2024-11-01 RX ADMIN — CALCIUM GLUCONATE 2 G: 20 INJECTION, SOLUTION INTRAVENOUS at 01:26

## 2024-11-01 RX ADMIN — MAGNESIUM SULFATE HEPTAHYDRATE 2 G: 40 INJECTION, SOLUTION INTRAVENOUS at 01:21

## 2024-11-01 RX ADMIN — HEPARIN SODIUM 5000 UNITS: 5000 INJECTION INTRAVENOUS; SUBCUTANEOUS at 05:36

## 2024-11-01 RX ADMIN — OXYBUTYNIN CHLORIDE 5 MG: 5 TABLET ORAL at 08:27

## 2024-11-01 RX ADMIN — HEPARIN SODIUM 5000 UNITS: 5000 INJECTION INTRAVENOUS; SUBCUTANEOUS at 13:39

## 2024-11-01 NOTE — QUICK NOTE
Notified by nursing about bleeding from right lateral leg incision earlier this evening. Dressed with gauze and kerlix. Dressing removed and old blood noted on the gauze. No active bleeding identified. No palpable hematoma. Bypass and AT remain palpable. Expected post-operative pain which is well controlled. Afebrile and normotensive during exam.    Ivan James MD  General Surgery   10/31/24

## 2024-11-01 NOTE — PLAN OF CARE
Problem: Prexisting or High Potential for Compromised Skin Integrity  Goal: Skin integrity is maintained or improved  Description: INTERVENTIONS:  - Identify patients at risk for skin breakdown  - Assess and monitor skin integrity  - Assess and monitor nutrition and hydration status  - Monitor labs   - Assess for incontinence   - Turn and reposition patient  - Assist with mobility/ambulation  - Relieve pressure over bony prominences  - Avoid friction and shearing  - Provide appropriate hygiene as needed including keeping skin clean and dry  - Evaluate need for skin moisturizer/barrier cream  - Collaborate with interdisciplinary team   - Patient/family teaching  - Consider wound care consult   Outcome: Progressing     Problem: DISCHARGE PLANNING  Goal: Discharge to home or other facility with appropriate resources  Description: INTERVENTIONS:  - Identify barriers to discharge w/patient and caregiver  - Arrange for needed discharge resources and transportation as appropriate  - Identify discharge learning needs (meds, wound care, etc.)  - Arrange for interpretive services to assist at discharge as needed  - Refer to Case Management Department for coordinating discharge planning if the patient needs post-hospital services based on physician/advanced practitioner order or complex needs related to functional status, cognitive ability, or social support system  Outcome: Progressing     Problem: Knowledge Deficit  Goal: Patient/family/caregiver demonstrates understanding of disease process, treatment plan, medications, and discharge instructions  Description: Complete learning assessment and assess knowledge base.  Interventions:  - Provide teaching at level of understanding  - Provide teaching via preferred learning methods  Outcome: Progressing     Problem: RESPIRATORY - ADULT  Goal: Achieves optimal ventilation and oxygenation  Description: INTERVENTIONS:  - Assess for changes in respiratory status  - Assess for changes  in mentation and behavior  - Position to facilitate oxygenation and minimize respiratory effort  - Oxygen administered by appropriate delivery if ordered  - Initiate smoking cessation education as indicated  - Encourage broncho-pulmonary hygiene including cough, deep breathe, Incentive Spirometry  - Assess the need for suctioning and aspirate as needed  - Assess and instruct to report SOB or any respiratory difficulty  - Respiratory Therapy support as indicated  Outcome: Progressing     Problem: MUSCULOSKELETAL - ADULT  Goal: Maintain or return mobility to safest level of function  Description: INTERVENTIONS:  - Assess patient's ability to carry out ADLs; assess patient's baseline for ADL function and identify physical deficits which impact ability to perform ADLs (bathing, care of mouth/teeth, toileting, grooming, dressing, etc.)  - Assess/evaluate cause of self-care deficits   - Assess range of motion  - Assess patient's mobility  - Assess patient's need for assistive devices and provide as appropriate  - Encourage maximum independence but intervene and supervise when necessary  - Involve family in performance of ADLs  - Assess for home care needs following discharge   - Consider OT consult to assist with ADL evaluation and planning for discharge  - Provide patient education as appropriate  Outcome: Progressing  Goal: Maintain proper alignment of affected body part  Description: INTERVENTIONS:  - Support, maintain and protect limb and body alignment  - Provide patient/ family with appropriate education  Outcome: Progressing

## 2024-11-01 NOTE — ASSESSMENT & PLAN NOTE
-S/p Right CFA to AT bypass lateral SQ tunnel with ipsilateral vGSV on 10/31.  - Regular diet as tolerated  - Continue ASA/Statin  - Goal BP normotensive  - PT/OT  - Continue prevena dressing  - DVT ppx with SQH

## 2024-11-01 NOTE — PROGRESS NOTES
Vascular Nurse Navigator Post Op Education    Met with patient at bedside to introduce myself as Vascular Nurse Navigator and explained my role.  Patient is appropriate and accepting to education. Patient was educated with Review of written materials provided, Teachback, Explanation, Demonstration, and Question & Answer on expectations of post op care and recovery on Right CFA to AT bypass lateral SQ tunnel with ipsilateral vGSV with Prevena vac placed to right groin. Patient is a smoker  (1/4 ppd x 49 yrs), as such Smoking effects on the lungs, tobacco triggers, and Smoking cessation was reviewed. Education provided to patient on infection prevention, activity limitations, when to call the office, importance of follow up, and incisional care.  Discharge instruction handout provided to patient to review.  Reviewed Prevena vac care and removal with patient and provided him with information pamphlet.  Provided patient with a pack of disposable washcloths, a pack of guaze, and a pack of chlorhexidine wipes for incision care upon discharge.        Tobacco use is a significant patient-modifiable risk factor for this patient’s vascular disease with multiple vascular comorbidities, and a significant risk factor for failure of and complications from any endovascular or surgical interventions.    I explained to the patient the effects of smoking including peripheral artery disease, coronary artery disease, cerebrovascular disease as well as cancer and chronic obstructive pulmonary disease. I asked the patient to stop smoking immediately. It is never too late to quit, and many studies show significant health benefits as well as economical savings after smoking cessation. I offered to the patient nicotine replacement therapy as well as referral to the smoking cessation program and access to the quit line 2-233-YTCYSJZ or ambulatory referral to our network smoking cessation program.    Based on our conversation, this patient  appears motivated to quit  And declined my offer of nicotine replacement or tobacco cessation medications    The patient set a quit date of 10/30/24 .     I spent approximately 5 minutes on tobacco cessation counseling with this patient.

## 2024-11-01 NOTE — CASE MANAGEMENT
Case Management Assessment & Discharge Planning Note    Patient name Nahid Luis  Location ICU 14/ICU 14 MRN [id number]  : 1960 Date 2024       Current Admission Date: 10/31/2024  Current Admission Diagnosis:PAD (peripheral artery disease) (HCC)   Patient Active Problem List    Diagnosis Date Noted Date Diagnosed    ABLA (acute blood loss anemia) 2024     Bladder mass 10/18/2024     Mixed hyperlipidemia 2024     PAD (peripheral artery disease) (HCC) 2024     Hypertension 2023     Chronic obstructive pulmonary disease (HCC) 2023     Current every day smoker 2023     Gross hematuria 2023       LOS (days): 1  Geometric Mean LOS (GMLOS) (days): 3.8  Days to GMLOS:2.8     OBJECTIVE:    Risk of Unplanned Readmission Score: 11.54         Current admission status: Inpatient       Preferred Pharmacy:   [facility]/pharmacy #[id number] - [address] [address]  Phone: [phone] Fax: [phone]    Primary Care Provider: Kaitlin García MD    Primary Insurance: [place]  Secondary Insurance:     ASSESSMENT:  Active Health Care Proxies    There are no active Health Care Proxies on file.                      Patient Information  Admitted from:: Home  Mental Status: Alert  During Assessment patient was accompanied by: Not accompanied during assessment  Assessment information provided by:: Patient  Primary Caregiver: Self  Support Systems: Family members  County of Residence: [place]  What city do you live in?: [place]  Home entry access options. Select all that apply.: Stairs  Number of steps to enter home.: 4  Do the steps have railings?: Yes  Type of Current Residence: [facility]  Living Arrangements: Lives w/ Son, Lives w/ Daughter, Other (Comment) (grandchild)    Activities of Daily Living Prior to Admission  Functional Status: Independent  Completes ADLs independently?: Yes  Ambulates independently?: Yes  Does patient  use assisted devices?: No  Does patient currently own DME?: No         Patient Information Continued  Income Source: Employed  Does patient have a history of substance abuse?: No  Does patient have a history of Mental Health Diagnosis?: No                    DISCHARGE DETAILS:    Discharge planning discussed with:: Patient        CM contacted family/caregiver?: No- see comments (pt called daughter at bedside)                  Requested Home Health Care         Is the patient interested in HHC at discharge?: No    DME Referral Provided  Referral made for DME?: Yes  DME referral completed for the following items:: Walker  DME Supplier Name:: [employer]    Other Referral/Resources/Interventions Provided:  Interventions: DME  Referral Comments: order for walker placed         Treatment Team Recommendation: Home  Discharge Destination Plan:: Home  Transport at Discharge : Family                                      Additional Comments: CM met with pt at bedside. Pt was IPTA. No PT/OT needs but therapy did recommend walker. Pt called daughter and confirmed they no longer have walker at the home. Pt interested in CM ordering walker through insurance. CM placed order, awaiting signature.

## 2024-11-01 NOTE — DISCHARGE INSTR - AVS FIRST PAGE
DISCHARGE INSTRUCTIONS  LEG/BYPASS SURGERY    ACTIVITY:   Limit your physical activity to walking for the first week and then increase your activity as tolerated.  If you become short of breath or tired, stop and rest.  You may require help with walking or feel more secure with something to lean on.  Walking up steps and normal activities may be resumed as you feel ready.  Most people tire easily for the first few weeks following leg surgery.  This improves as conditioning returns.  Avoid strenuous activity such as vigorous exercise.  Avoid heavy lifting (do not lift more than 15 pounds) for the first four weeks after surgery.  You should not drive a car for at least two weeks following discharge from the hospital and you are off all narcotic pain medication.  You may ride in a car.    DIET:  Resume your normal diet.  Good nutrition is important for healing of your incision.  If you are discharged on narcotics for pain control, continue taking your stool softeners until you are having regular bowel movements.    INCISION:  Once the Prevena VAC is removed, you should shower daily.  Wash incision daily with soap and water, but do not rub or scrub the incision; rinse thoroughly and pat dry.  You have staples to close your incision and it is okay for these to get wet.  Do not bathe in a tub or swim for the first 4 week following surgery or if you have any open wounds.  It is normal to have swelling or discoloration around the incision.   If increasing redness or pain develops, call our office immediately.  Numbness in the region of the incision may occur following the surgery.  This normally improves over six to twelve months.    You have surgical glue over your incisions. There are stitches present under the skin which will absorb on their own.   The glue is used to cover the access, assist in closure, and prevent contamination. This adhesive will darken and peel away on its own within one to two weeks. Do not pick at  it.        You have a groin wound/incision. Once Prevena VAC is removed, place a clean dry piece of gauze to cover your groin incision to keep incision clean and dry and prevent your skin from sticking together.  Change gauze daily.  You have staples at your incisions.  These will be removed at your follow-up appointment or when they are ready to come out.  If you have foot or leg wounds, please follow your podiatrist/wound care doctor's instructions for care.  If any of your incisions are open and require dressing changes, you will be given instructions for your daily incision care. If you are not able to change the dressings, a visiting nurse will be arranged.    DO NOT put any powders, creams, ointments, or lotions on your incision.    LEG SWELLING: Most patients have noticeable leg swelling after leg surgery. This usually improves within a few weeks. If swelling is present, elevate the leg whenever possible. Avoid sitting with the leg hanging down for prolonged periods of time.  Walking is beneficial.  An ACE bandage or support stocking may be helpful, but this should be discussed with your physician prior to use if you have a bypass.    FOLLOW UP STUDIES:  Doppler ultrasound studies are very important for long-term management.  Your surgeon will arrange this at your first postoperative visit.  Repeat studies are then scheduled every three months for the first year and periodically after this.    FOLLOW UP APPOINTMENTS:  Making and keeping follow up appointments and ultrasound tests are important to your recovery.  If you have difficulty making it to or keeping your follow up appointments, call the office.    If you have increased pain, fever >101.5, increased drainage, redness or a bad smell at your surgery site, new coldness/numbness of your arm or leg, please call us immediately and GO directly to the ER.    PLEASE CALL THE OFFICE IF YOU HAVE ANY QUESTIONS  [phone]  [phone] [address],  [address], [place] [address]  [address] [place] [address]  [address] [facility] [address] [facility], [place] [address]  [address] [facility] [place] [address]  [address] [facility], [place] [address]  [address] [facility] [address], [address], [place] [address]  [address], [address]nd Floor, [place] [address]  [address] [facility], 1st Floor, [address], [place] [address]  [address], [address], [place] [address]  [address] [facility], [address], [place] [address]         Dear Nahid Luis,     It was our pleasure to care for you here at [facility].  It is our hope that we were always able to exceed the expected standards for your care during your stay.  You were hospitalized due to peripheral vascular disease and hematuria .  You were cared for on the second floor under the service of Dorian Leija DO with the [facility] who covers for your primary care physician (PCP), Kaitlin García MD, while you were hospitalized.  If you have any questions or concerns related to this hospitalization, you may contact us at .  For follow up as well as medication refills, we recommend that you follow up with your primary care physician.  A registered nurse will reach out to you by phone within a few days after your discharge to answer any additional questions that you may have after going home.  However, at this time we provide for you here, the most important instructions / recommendations at discharge:     Notable Medication Adjustments -   Oxybutynin as needed bladder spasms  Testing Required after Discharge -   Ask your PCP about getting repeat blood work - CBC, BMP  ** Please contact your PCP to request testing orders for any of the testing recommended here **  Important Follow Up Information -   Please follow-up with urology next Monday  Please follow-up with vascular surgery on the  12th  Please review this entire after visit summary as additional general instructions including medication list, appointments, activity, diet, any pertinent wound care, and other additional recommendations from your care team that may be provided for you.      Sincerely,     Dorian Leija, DO

## 2024-11-01 NOTE — PROGRESS NOTES
Progress Note - Vascular Surgery   Name: Nahid Luis 64 y.o. male I MRN: [id number]  Unit/Bed#: ICU 14 I Date of Admission: 10/31/2024   Date of Service: 11/1/2024 I Hospital Day: 1    Assessment & Plan  PAD (peripheral artery disease) (HCC)  -S/p Right CFA to AT bypass lateral SQ tunnel with ipsilateral vGSV on 10/31.  - Regular diet as tolerated  - Continue ASA/Statin  - Goal BP normotensive  - PT/OT  - Continue prevena dressing  - DVT ppx with SQH  Gross hematuria  - Gross hematuria after brambila placement in OR. Currently with continuous bladder irrigation with drainage clearing up.  - Urology consulted, appreciate recs.  - Continue brambila  Mixed hyperlipidemia  - Continue statin  ABLA (acute blood loss anemia)  - In setting of recent surgery and bleeding from bladder.  - Trend hemoglobin. Transfuse for Hgb<7 or if symptomatic.    Please contact the SecureChat role for the Vascular Surgery service with any questions/concerns.    Subjective   No acute events overnight. Afebrile, hemodynamically stable. Tolerating diet. No nausea, or vomiting, fevers or chills. Pain well controlled.       Objective :  Temp:  [97.5 °F (36.4 °C)-98.2 °F (36.8 °C)] 97.7 °F (36.5 °C)  HR:  [] 70  BP: ()/(40-71) 90/53  Resp:  [8-36] 22  SpO2:  [91 %-98 %] 95 %  O2 Device: Nasal cannula  Nasal Cannula O2 Flow Rate (L/min):  [1 L/min-4 L/min] 1 L/min    I/O         10/30 0701  10/31 0700 10/31 0701 11/01 0700 11/01 0701 11/02 0700    I.V. (mL/kg)  2625 (30.7)     IV Piggyback  50     Total Intake(mL/kg)  2675 (31.3)     Urine (mL/kg/hr)  4370 (2.1) 200 (2.2)    Total Output  4370 200    Net  -1695 -200                 Lines/Drains/Airways       Active Status       Name Placement date Placement time Site Days    Arterial Line 10/31/24 Radial 10/31/24  0754  Radial  1    Closed/Suction Drain --  --  --  --    Urethral Catheter Three way 22 Fr. 10/31/24  0820  Three way  less than 1    Continuous Bladder Irrigation  Three-way 10/31/24  --  Three-way  1                    Physical Exam:  General: No acute distress, alert and oriented  CV: Well perfused, regular rate  Lungs: Normal work of breathing, no increased respiratory effort  Abdomen: Soft, non-tender, non-distended.  Extremities: RLE incisions c/d/I. No palpable hematoma. Bypass palpable pulse. Palpable DP. Motor intact in RLE.  Skin: Warm, dry      Lab Results: I have reviewed the following results:  Recent Labs     11/01/24  0000 11/01/24  0520   WBC  --  17.87*   HGB  --  10.4*   HCT  --  31.7*   PLT  --  223   SODIUM 132* 132*   K 4.3 4.2    103   CO2 24 25   BUN 15 13   CREATININE 0.82 0.66   GLUC 165* 141*   CAIONIZED 1.10* 1.13   MG 1.9 2.4   PHOS 3.4  --        Imaging Results Review: No pertinent imaging studies reviewed.  Other Study Results Review: No additional pertinent studies reviewed.    VTE Pharmacologic Prophylaxis: VTE covered by:  heparin (porcine), Subcutaneous, 5,000 Units at 11/01/24 0536     VTE Mechanical Prophylaxis: sequential compression device

## 2024-11-01 NOTE — PHYSICAL THERAPY NOTE
PHYSICAL THERAPY EVALUATION          Patient Name: Nahid Lius  Today's Date: 11/1/2024 11/01/24 0956   PT Last Visit   PT Visit Date 11/01/24   Note Type   Note type Evaluation   Pain Assessment   Pain Assessment Tool 0-10   Pain Score No Pain   Restrictions/Precautions   Other Precautions Multiple lines;Pain;Telemetry  (CBI, catheter, telemetry, wound vac)   Home Living   Type of Home House   Home Layout One level;Stairs to enter with rails   Bathroom Shower/Tub Walk-in shower   Bathroom Toilet Standard   Bathroom Equipment Grab bars in shower;Built-in shower seat   Home Equipment   (unsure if he has cane or RW at home)   Additional Comments 4 TRAY   Prior Function   Level of [place] Independent with ADLs;Independent with functional mobility;Independent with IADLS   Lives With Family  (son, dtr and grandkid)   Receives Help From Family   IADLs Independent with driving;Independent with medication management;Family/Friend/Other provides meals   Falls in the last 6 months 0   Vocational Full time employment   Comments indep without DME   General   Additional Pertinent History pt admitted 10/31/24 for PAD s/p CFA to AT bypass. ambulate patient orders. PMHx significant for COPD, PAD, +smoker, CA   Cognition   Overall Cognitive Status WFL   Arousal/Participation Cooperative   Memory Within functional limits   Following Commands Follows all commands and directions without difficulty   RLE Assessment   RLE Assessment WFL  (pain limitations)   LLE Assessment   LLE Assessment WFL   Coordination   Sensation X  (reports LE acutely numb)   Bed Mobility   Supine to Sit 6  Modified independent   Additional items Increased time required   Transfers   Sit to Stand 5  Supervision   Additional items Increased time required;Other  (AD)   Stand to Sit 5  Supervision   Additional items Increased time required;Other  (AD)   Ambulation/Elevation   Gait pattern Improper Weight shift;Antalgic;Excessively  "slow   Gait Assistance 5  Supervision   Additional items Assist x 1;Verbal cues  (cues for safe/proper use of AD)   Assistive Device Rolling walker   Distance 30'   Balance   Static Standing Fair +   Dynamic Standing Fair   Ambulatory Fair   Endurance Deficit   Endurance Deficit No  (Vitals 132/65 supine and 142/71 seated pre amb)   Activity Tolerance   Activity Tolerance Patient tolerated treatment well   Medical Staff Made Aware Ofelia OT   Nurse Made Aware Estrella RN   Assessment   Prognosis Good   Problem List Decreased mobility;Pain;Decreased skin integrity   Assessment Nahid Luis is a 64 y.o. male admitted to [facility] on 10/[facility]/2024 for PAD (peripheral artery disease) (HCC). POD#1 DFA to AT bypass. PT was consulted and pt was seen on 11/1/2024 for mobility assessment and d/c planning. Pt presents w high fall risk, multiple lines. At baseline pt is indep without an AD. Pt is currently functioning at a mod I bed mobility, S for transfers and ambulation. Pt demonstrated ability to ambulate household distances. Decline further ambulation dt not wanting to \"overdo it\". Did use RW dt discomfort in RLE. He seemed to have little reliance on DME and required frequent cuing to reinforce safe technique. He is unsure if he still has DME at home so may benefit from RW if he does not given decreased WB tolerance on RLE. No significant acute deficits impacting function and safe dc home. Will maintain on restorative services.   Barriers to Discharge None   Plan   PT Frequency   (d/c PT: maintain on restorative)   Discharge Recommendation   Rehab Resource Intensity Level, PT No post-acute rehabilitation needs   Equipment Recommended (S)  Walker  (pt unsure if he still has RW at home. may benefit from DME if he does not already own)   End of Consult   Patient Position at End of Consult Bedside chair;All needs within reach     History: co - morbidities including age, current experience including fall risk, multiple " lines  Exam: impairments in systems including multiple body structures involved; musculoskeletal (strength), neuromuscular (balance, gait, transfers, sensation), cardiopulmonary (vitals), cognition; activity limitations (difficulties executing an action); participation restrictions (problems associated w involvement in life situations), am-pac  Clinical: stable/unpredictable  Complexity: moderate    Julia Cabral, PT

## 2024-11-01 NOTE — PROGRESS NOTES
Progress Note - Urology   Name: Nahid Luis 64 y.o. male I MRN: [id number]  Unit/Bed#: ICU 14 I Date of Admission: 10/31/2024   Date of Service: 11/1/2024 I Hospital Day: 1     Assessment & Plan  PAD (peripheral artery disease) (HCC)  S/p right CFA to AT bypass, POD #1  Frequent neurovascular checks  Alert and oriented in consultation   Continue monitoring per primary team   Gross hematuria  Known bladder mass + on Heparin for recent bypass  Developed hematuria intraoperatively   3-way brambila catheter and irrigation performed by Dr. Garduno   CBI remained ongoing overnight + manual irrigation q4h  Brambila remains inserted draining clear light pink/yellow urine, no clots  Patient reports some clot passage this morning but no obstruction   Hgb stable at 10.4     Plan:  Continue CBI - wean as able   Continue manual irrigation q4h to remove clots and avoid obstruction   Monitor Hgb closely, transfuse as needed per primary team   Continue pyridium and oxybutynin for bladder spasms   Bladder mass  Known 6 cm bladder mass - follows with our team in the outpatient setting   Mild hematuria ongoing     Plan:  Continue with CBI and manual irrigations   TURBT scheduled 12/11/24 with Dr. Real     Subjective:   Patient seen and examined, he is sitting in his bedside chair resting comfortably.  CBI remains at a moderate pace, Brambila catheter draining clear light pink/yellow urine.  Irrigation performed at bedside--urine clears, no clots appreciated.  Patient continues to deny pain within the bladder or bilateral flanks.  He reported bladder spasms overnight but oxybutynin and Pyridium have been assisting.  He does have some irritation at the glans penis.  He overall feels well.  Hgb stable at 10.4 today.    Review of Systems   Constitutional:  Negative for activity change, chills, fatigue and fever.   Respiratory:  Negative for apnea, cough and shortness of breath.    Cardiovascular:  Negative for chest pain and leg  "swelling.   Gastrointestinal:  Negative for abdominal distention, abdominal pain, constipation, diarrhea, nausea and vomiting.   Genitourinary:  Positive for hematuria (light pink, no clots) and penile pain (glans). Negative for decreased urine volume, difficulty urinating, dysuria, flank pain, frequency, testicular pain and urgency.   Neurological:  Negative for dizziness and headaches.   Psychiatric/Behavioral: Negative.         Objective:  Vitals: Blood pressure 136/68, pulse 66, temperature 97.6 °F (36.4 °C), temperature source Oral, resp. rate 20, height 5' 8\" (1.727 m), weight 85.6 kg (188 lb 11.4 oz), SpO2 95%.,Body mass index is 28.69 kg/m².    Intake/Output Summary (Last 24 hours) at 11/1/2024 1259  Last data filed at 11/1/2024 1230  Gross per 24 hour   Intake 890 ml   Output 4500 ml   Net -3610 ml     Invasive Devices       Peripheral Intravenous Line  Duration             Peripheral IV 10/31/24 Left Forearm 1 day    Peripheral IV 10/31/24 Right Forearm 1 day              Drain  Duration             Closed/Suction Drain -- days    Continuous Bladder Irrigation Three-way 1 day    Urethral Catheter Three way 22 Fr. 1 day                    Physical Exam  Vitals and nursing note reviewed.   Constitutional:       General: He is not in acute distress.     Appearance: Normal appearance. He is not ill-appearing.   HENT:      Head: Normocephalic and atraumatic.      Mouth/Throat:      Pharynx: Oropharynx is clear.   Eyes:      Extraocular Movements: Extraocular movements intact.      Conjunctiva/sclera: Conjunctivae normal.   Cardiovascular:      Rate and Rhythm: Normal rate.   Pulmonary:      Effort: Pulmonary effort is normal.   Abdominal:      General: Abdomen is flat. There is no distension.      Palpations: Abdomen is soft.      Tenderness: There is no abdominal tenderness.   Genitourinary:     Penis: Normal.       Comments: CBI at a moderate pace.  Malone catheter remains inserted draining clear light " pink/yellow urine.  No clots appreciated in Malone tubing/bag.  Musculoskeletal:         General: Normal range of motion.      Cervical back: Normal range of motion.   Skin:     General: Skin is warm and dry.   Neurological:      General: No focal deficit present.      Mental Status: He is alert and oriented to person, place, and time.   Psychiatric:         Mood and Affect: Mood normal.         Behavior: Behavior normal.         Labs:  Recent Labs     11/01/24  0520   WBC 17.87*     Recent Labs     11/01/24  0520   HGB 10.4*       Recent Labs     11/01/24  0000 11/01/24 0520   CREATININE 0.82 0.66       History:    Past Medical History:   Diagnosis Date    Bladder cancer (HCC)     Colon polyp     COPD (chronic obstructive pulmonary disease) (HCC)     Hyperlipidemia     Hypertension      Past Surgical History:   Procedure Laterality Date    APPENDECTOMY      COLONOSCOPY      CYSTOSCOPY      IR LOWER EXTREMITY ANGIOGRAM  09/24/2024    [facility] BYP FEM-ANT TIBL PST TIBL PRONEAL ART/OTH DSTL Right 10/31/2024    Procedure: right Common femoral artery to anterior tibial bypass with autologous vein;  Surgeon: Carlos Bray DO;  Location: [facility];  Service: Vascular     Family History   Problem Relation Age of Onset    Alcohol abuse Father     Heart attack Father      Social History     Socioeconomic History    Marital status: Single     Spouse name: None    Number of children: None    Years of education: None    Highest education level: None   Occupational History    None   Tobacco Use    Smoking status: Every Day     Current packs/day: 0.25     Types: Cigarettes     Passive exposure: Current    Smokeless tobacco: None   Vaping Use    Vaping status: Never Used   Substance and Sexual Activity    Alcohol use: Yes     Alcohol/week: 3.0 standard drinks of alcohol     Types: 3 Cans of beer per week     Comment: daily 3-5 beers daily, last drank 10/30    Drug use: No    Sexual activity: None   Other Topics Concern    None    Social History Narrative    None     Social Determinants of Health     Financial Resource Strain: Not on file   Food Insecurity: Not on file   Transportation Needs: Not on file   Physical Activity: Not on file   Stress: Not on file   Social Connections: Not on file   Intimate Partner Violence: Not on file   Housing Stability: Not on file         Glo Shipley PA-C  Date: 11/1/2024 Time: 12:59 PM

## 2024-11-01 NOTE — ASSESSMENT & PLAN NOTE
The patient has a history of a bladder mass. He is on heparin for his bypass and developed hematuria. He is now on CBI per urology recommendations  We will continue the CBI for now  Maintain 3 way catheter- removal per urology's recommendations  Monitor hgb  Started pyridium and oxybutynin for bladder spasms- discuss further with urology

## 2024-11-01 NOTE — ASSESSMENT & PLAN NOTE
Known bladder mass + on Heparin for recent bypass  Developed hematuria intraoperatively   3-way brambila catheter and irrigation performed by Dr. Garduno   CBI remained ongoing overnight + manual irrigation q4h  Brambila remains inserted draining clear light pink/yellow urine, no clots  Patient reports some clot passage this morning but no obstruction   Hgb stable at 10.4     Plan:  Continue CBI - wean as able   Continue manual irrigation q4h to remove clots and avoid obstruction   Monitor Hgb closely, transfuse as needed per primary team   Continue pyridium and oxybutynin for bladder spasms

## 2024-11-01 NOTE — ASSESSMENT & PLAN NOTE
Hold amlodipine given variable BP  Losartan on hold for now given recent surgery  Goal will be to maintain normotension

## 2024-11-01 NOTE — ASSESSMENT & PLAN NOTE
S/p right CFA to AT bypass, POD #1  Frequent neurovascular checks  Alert and oriented in consultation   Continue monitoring per primary team

## 2024-11-01 NOTE — UTILIZATION REVIEW
Initial Clinical Review    Elective inpatient surgical procedure  Age/Sex: 64 y.o. male  Surgery Date: 10/31/2024  Procedure:   Right common femoral and anterior tibial artery exposure  Right greater saphenous vein harvest via skip incisions  Right common femoral artery to anterior tibial artery bypass with ipsilateral reversed greater saphenous vein   Anesthesia: general   Operative Findings:   Prior to initiation of procedure Malone catheter was placed and there was evidence of hematuria along with clots within the Malone catheter.  Based off of this finding intraoperative urology evaluation was performed with recommendation for placement of a three-way Malone irrigation catheter.  Suspect bleeding secondary to patient's bladder mass.  Malone was exchanged for an irrigation catheter with plan for continued urology care post procedure.     Right common femoral artery appeared slightly ectatic however patent and amenable to clamping.  Right GSV was harvested from saphenofemoral junction to mid tibia and appeared to be approximately 3 mm or greater throughout its course.  Right anterior tibial artery was approximately 3 to 4 mm in diameter with mild atherosclerotic disease and otherwise soft throughout.  GSV was harvested via skip incisions in the autologous right common femoral to anterior tibial artery bypass was created via a subcutaneous tunnel.  There was a small hematoma within the subcutaneous tunnel after completion of bypass requiring a small cutdown superior lateral to the knee with placement of a 6-0 Prolene hemostatic suture within vein bypass graft.     There was a palpable 2+ DP artery pulse on completion of procedure.     Urology consult Intraoperative consult for Gross hematuria in setting Recently diagnosed bladder tumor pending resection  Gross hematuria encountered following Malone catheter placement consistent with chronic bleeding.  Hematuria may worsen once anticoagulation is initiated following his  vascular procedure.  A three-way Malone catheter was replaced to facilitate continuous bladder irrigation if needed    POD#1 Progress Note:   Vascular surgery  No acute events overnight. Afebrile, hemodynamically stable. Tolerating diet. No nausea, or vomiting, fevers or chills. Pain well controlled.   PAD  S/p Right CFA to AT bypass lateral SQ tunnel with ipsilateral vGSV on 10/31.  - Regular diet as tolerated  - Continue ASA/Statin  - Goal BP normotensive  - PT/OT  - Continue prevena dressing  - DVT ppx with SQH  ABLA  Trend hemoglobin. Transfuse for Hgb<7 or if symptomatic.   Gross hematuria  Recs per Urology cont Malone    Critical care  Variable BP: received 500cc of LR for hypotension   Now feeling improved, OOB> chair; Increase activities as tolerated.  Bronchodilators.  [facility] to [facility]    Urology Bladder mass w Gross hematuria  CBI remained ongoing overnight + manual irrigation q4h  Malone remains inserted draining clear light pink/yellow urine, no clots  Patient reports some clot passage this morning but no obstruction   Hgb stable at 10.4   Plan:  Continue CBI - wean as able   Continue manual irrigation q4h to remove clots and avoid obstruction   Monitor Hgb closely, transfuse as needed per primary team   ontinue pyridium and oxybutynin for bladder spasms     Admission Orders: Date/Time/Statement:   Admission Orders (From admission, onward)       Ordered        10/31/24 1343  Inpatient Admission  Once                          Orders Placed This Encounter   Procedures    Inpatient Admission     Standing Status:   Standing     Number of Occurrences:   1     Order Specific Question:   Level of Care     Answer:   Critical Care [15]     Order Specific Question:   Estimated length of stay     Answer:   Inpatient Only Surgery       Continuous cardiopulmonary & pulse oximetry  Freq neuro checks  IR LE angiogram  Arterial line monitoring for BP  Maintain urinary catheter  Diet: regular  Mobility: OOB  DVT  Prophylaxis: heparin (porcine), 5,000 Units, Subcutaneous, Q8H MICHAEL, SCD    Medications/Pain Control:   Scheduled Medications:  acetaminophen, 975 mg, Oral, Q8H MICHAEL  aspirin, 81 mg, Oral, Daily  atorvastatin, 20 mg, Oral, Daily With Dinner  Fluticasone Furoate-Vilanterol, 1 puff, Inhalation, BID  heparin (porcine), 5,000 Units, Subcutaneous, Q8H MICHAEL  oxybutynin, 5 mg, Oral, TID  phenazopyridine, 100 mg, Oral, TID  senna, 1 tablet, Oral, Daily      Continuous IV Infusions:   Sodium chloride 0.9 % infusion  Rate: 125 mL/hr Dose: 125 mL/hr  Freq: Continuous Route: IV  Indications of Use: IV Hydration  Last Dose: Stopped (11/01/24 0153)  Start: 10/31/24 0600 End: 11/01/24 0056    PRN Meds:  albuterol, 2 puff, Inhalation, Q6H PRN  HYDROmorphone, 0.5 mg, Intravenous, Q2H PRN 10/31 & 11/1 x1  labetalol, 10 mg, Intravenous, Q6H PRN  ondansetron, 4 mg, Intravenous, Q6H PRN  oxyCODONE, 10 mg, Oral, Q4H PRN  oxyCODONE, 5 mg, Oral, Q4H PRN    IVF 11/1 x1 500 ML=  lactated ringers bolus 500 mL  Dose: 500 mL  Freq: Once as needed Route: IV  PRN Comment: For SBP less than 100mmHg and K level less than or equal to 4.8 as per Vascular Surgery Hypotension Protocol  Start: 10/31/24 1338 End: 11/01/24 1337    Vital Signs (last 3 days)       Date/Time Temp Pulse Resp BP MAP (mmHg) Arterial Line BP MAP SpO2 Calculated FIO2 (%) - Nasal Cannula O2 Flow Rate (L/min) Nasal Cannula O2 Flow Rate (L/min) O2 Device Patient Position - Orthostatic VS [place] Coma Scale Score Pain    11/01/24 1237 97.6 °F (36.4 °C) -- -- -- -- -- -- -- -- -- -- -- -- -- 5    11/01/24 1200 -- 66 20 136/68 92 -- -- -- -- -- -- -- -- 15 No Pain    11/01/24 1100 -- 90 20 117/55 83 -- -- -- -- -- -- -- -- 15 --    11/01/24 1030 -- 84 15 136/60 79 -- -- -- -- -- -- -- -- -- --    11/01/24 1022 -- -- -- -- -- -- -- -- -- -- -- -- -- -- 2    11/01/24 1000 -- 86 28 142/71 111 -- -- -- -- -- -- -- -- 15 --    11/01/24 0956 -- -- -- -- -- -- -- -- -- -- -- -- -- -- No Pain     11/01/24 0943 -- -- -- -- -- -- -- -- -- -- -- -- -- -- No Pain    11/01/24 0930 -- 68 19 132/65 101 130/40 66 mmHg -- -- -- -- -- -- -- --    11/01/24 0900 -- 84 26 -- -- 152/48 78 mmHg -- -- -- -- -- -- 15 --    11/01/24 0815 -- 68 16 -- -- 136/40 68 mmHg -- -- -- -- -- -- -- --    11/01/24 0800 -- 100 20 -- -- -- -- -- -- -- -- -- -- 15 No Pain    11/01/24 0742 97.7 °F (36.5 °C) 70 22 -- -- 160/48 80 mmHg 95 % -- -- -- -- -- -- --    11/01/24 0741 -- 74 36 -- -- 168/50 84 mmHg 96 % -- -- -- -- -- -- --    11/01/24 0740 -- 68 24 -- -- 176/54 90 mmHg 95 % -- -- -- -- -- -- --    11/01/24 0739 -- 70 24 -- -- 176/54 88 mmHg 95 % -- -- -- -- -- -- --    11/01/24 0738 -- 76 31 -- -- 182/52 90 mmHg 96 % -- -- -- -- -- -- --    11/01/24 0736 -- 70 21 -- -- 188/54 94 mmHg 94 % -- -- -- -- -- -- --    11/01/24 0700 -- 62 13 -- -- 140/42 70 mmHg 94 % -- -- -- -- -- 15 --    11/01/24 0600 -- 62 11 -- -- 128/38 62 mmHg 91 % -- -- -- -- -- 15 --    11/01/24 0500 -- 60 12 -- -- 126/36 66 mmHg 94 % -- -- -- -- -- 15 --    11/01/24 0444 -- 60 9 -- -- 136/42 68 mmHg 94 % -- -- -- -- -- -- --    11/01/24 0440 -- 60 11 -- -- 134/40 68 mmHg 93 % -- -- -- -- -- -- --    11/01/24 0430 -- 60 12 -- -- 150/46 76 mmHg 94 % -- -- -- -- -- -- --    11/01/24 0420 -- 82 21 -- -- 168/68 92 mmHg 95 % -- -- -- -- -- -- --    11/01/24 0405 -- 64 13 -- -- 130/42 66 mmHg 95 % -- -- -- -- -- -- --    11/01/24 0404 -- 66 13 -- -- 128/42 68 mmHg 93 % -- -- -- -- -- -- --    11/01/24 0403 -- 64 12 -- -- 126/42 66 mmHg 94 % -- -- -- -- -- -- --    11/01/24 0402 -- 64 14 -- -- 130/42 68 mmHg 95 % -- -- -- -- -- -- --    11/01/24 0401 -- 66 14 -- -- 134/42 70 mmHg 96 % -- -- -- -- -- -- --    11/01/24 0400 98.1 °F (36.7 °C) 64 13 -- -- 130/40 66 mmHg 95 % 24 -- 1 L/min Nasal cannula -- 15 --    11/01/24 0359 -- 58 9 -- -- 124/38 64 mmHg 95 % -- -- -- -- -- -- --    11/01/24 0326 -- -- -- -- -- -- -- -- -- -- -- -- -- -- 9    11/01/24 0300 -- -- -- -- -- --  -- -- -- -- -- -- -- 15 --    11/01/24 0255 -- 62 13 -- -- 122/38 64 mmHg 93 % -- -- -- -- -- -- --    11/01/24 0254 -- 62 18 -- -- 126/38 66 mmHg 94 % -- -- -- -- -- -- --    11/01/24 0253 -- 58 11 -- -- 122/38 64 mmHg 95 % -- -- -- -- -- -- --    11/01/24 0252 -- 62 11 -- -- 130/40 70 mmHg 95 % -- -- -- -- -- -- --    11/01/24 0251 -- 68 14 -- -- 134/44 72 mmHg 96 % -- -- -- -- -- -- --    11/01/24 0250 -- 74 18 -- -- 140/46 76 mmHg 97 % -- -- -- -- -- -- --    11/01/24 0249 -- 72 16 -- -- 134/44 72 mmHg 95 % -- -- -- -- -- -- --    11/01/24 0221 -- 62 10 -- -- 108/36 56 mmHg 94 % -- -- -- -- -- -- --    11/01/24 0220 -- 62 9 -- -- 106/34 56 mmHg 94 % -- -- -- -- -- -- --    11/01/24 0219 -- 60 10 -- -- 106/34 56 mmHg 92 % -- -- -- -- -- -- --    11/01/24 0218 -- 62 10 -- -- 106/34 56 mmHg 94 % -- -- -- -- -- -- --    11/01/24 0217 -- 60 10 -- -- 108/34 56 mmHg 94 % -- -- -- -- -- -- --    11/01/24 0216 -- 60 10 -- -- 106/34 56 mmHg 94 % -- -- -- -- -- -- --    11/01/24 0215 -- 62 11 -- -- 108/34 56 mmHg 94 % -- -- -- -- -- -- --    11/01/24 0200 -- 66 10 -- -- 128/40 68 mmHg 94 % -- -- -- -- -- 15 --    11/01/24 0145 -- 64 16 -- -- 130/40 66 mmHg 96 % -- -- -- -- -- -- --    11/01/24 0144 -- 60 11 -- -- 126/40 64 mmHg 96 % -- -- -- -- -- -- --    11/01/24 0143 -- 62 13 -- -- 130/40 66 mmHg 96 % -- -- -- -- -- -- --    11/01/24 0142 -- 66 15 -- -- 124/40 64 mmHg 95 % -- -- -- -- -- -- --    11/01/24 0141 -- 66 16 -- -- 132/40 68 mmHg 96 % -- -- -- -- -- -- --    11/01/24 0140 -- 70 19 -- -- 144/44 72 mmHg 96 % -- -- -- -- -- -- --    11/01/24 0139 -- 66 16 -- -- 140/44 70 mmHg 97 % -- -- -- -- -- -- --    11/01/24 0132 -- 70 17 -- -- 184/54 96 mmHg 97 % -- -- -- -- -- -- --    11/01/24 0131 -- 80 22 -- -- 204/60 106 mmHg 97 % -- -- -- -- -- -- --    11/01/24 0130 -- 100 26 -- -- 216/78 120 mmHg 98 % -- -- -- -- -- -- --    11/01/24 0129 -- 68 16 -- -- 142/44 72 mmHg 97 % -- -- -- -- -- -- --    11/01/24 0128 -- 72  20 -- -- 146/44 72 mmHg 96 % -- -- -- -- -- -- --    11/01/24 0127 -- 72 18 -- -- 150/50 80 mmHg 95 % -- -- -- -- -- -- --    11/01/24 0126 -- 70 20 -- -- 142/46 78 mmHg 96 % -- -- -- -- -- -- --    11/01/24 0113 -- -- -- -- -- -- -- -- -- -- -- -- -- -- 7 11/01/24 0100 -- -- -- -- -- -- -- -- -- -- -- -- -- 15 --    11/01/24 0005 98.2 °F (36.8 °C) -- -- -- -- -- -- -- -- -- -- -- -- 15 3    10/31/24 2352 -- 66 12 -- -- 118/40 62 mmHg 94 % 28 -- 2 L/min Nasal cannula -- -- --    10/31/24 2351 -- 68 12 -- -- 120/42 62 mmHg 94 % -- -- -- -- -- -- --    10/31/24 2350 -- 68 15 -- -- 124/42 64 mmHg 95 % -- -- -- -- -- -- --    10/31/24 2349 -- 76 20 -- -- 128/46 68 mmHg 95 % -- -- -- -- -- -- --    10/31/24 2348 -- 70 14 -- -- 122/42 62 mmHg 94 % -- -- -- -- -- -- --    10/31/24 2347 -- 70 15 -- -- 128/42 64 mmHg 94 % -- -- -- -- -- -- --    10/31/24 2346 -- 70 17 -- -- 134/44 68 mmHg 95 % -- -- -- -- -- -- --    10/31/24 2300 -- 66 8 90/53 68 104/40 56 mmHg 94 % -- -- -- -- -- 15 --    10/31/24 2206 -- 78 19 -- -- 212/58 102 mmHg -- -- -- -- -- -- -- --    10/31/24 2205 -- 78 22 -- -- 196/60 98 mmHg -- -- -- -- -- -- -- --    10/31/24 2203 -- 76 20 -- -- 118/44 66 mmHg 95 % -- -- -- -- -- -- --    10/31/24 2200 -- 66 15 -- -- 96/36 54 mmHg 93 % -- -- -- -- -- 15 --    10/31/24 2103 -- -- -- -- -- -- -- -- -- -- -- -- -- -- 8    10/31/24 2100 -- -- -- -- -- -- -- -- -- -- -- -- -- 15 --    10/31/24 2005 -- 70 12 -- -- 100/34 52 mmHg 95 % -- -- -- -- -- -- --    10/31/24 2004 -- 72 12 -- -- 104/34 54 mmHg 95 % -- -- -- -- -- -- --    10/31/24 2003 -- 72 12 -- -- 106/36 54 mmHg 95 % -- -- -- -- -- -- --    10/31/24 2002 -- 74 17 -- -- 104/34 54 mmHg 95 % -- -- -- -- -- -- --    10/31/24 2001 -- 72 15 -- -- 104/36 52 mmHg 95 % -- -- -- -- -- -- --    10/31/24 2000 -- 72 11 102/56 76 102/34 52 mmHg 95 % -- -- -- -- -- 15 --    10/31/24 1945 -- 86 25 132/61 87 134/42 66 mmHg 98 % -- -- -- -- -- -- --    10/31/24 1935 -- 84  24 -- -- 186/66 102 mmHg 97 % -- -- -- -- -- -- --    10/31/24 1934 -- 80 23 -- -- 172/56 92 mmHg 96 % -- -- -- -- -- -- --    10/31/24 1933 -- 84 21 -- -- 188/66 106 mmHg 97 % -- -- -- -- -- -- --    10/31/24 1932 -- 80 23 -- -- 108/40 58 mmHg 94 % -- -- -- -- -- -- --    10/31/24 1931 -- 70 12 -- -- 96/36 52 mmHg 93 % -- -- -- -- -- -- --    10/31/24 1930 -- 72 11 81/44 59 94/34 52 mmHg 93 % -- -- -- -- -- -- --    10/31/24 1915 -- 74 11 82/46 59 100/34 54 mmHg 94 % -- -- -- -- -- -- --    10/31/24 1900 98.1 °F (36.7 °C) 74 15 76/51 62 94/36 52 mmHg 95 % -- -- -- -- -- 15 --    10/31/24 1849 -- -- -- -- -- -- -- -- -- -- -- -- -- -- 7    10/31/24 1845 -- 86 31 -- -- 108/34 54 mmHg 97 % -- -- -- -- -- -- --    10/31/24 1830 -- 88 29 149/63 91 214/64 112 mmHg 98 % -- -- -- -- -- -- --    10/31/24 1815 -- 78 28 -- -- 174/60 94 mmHg 95 % -- -- -- -- -- -- --    10/31/24 1804 -- -- -- -- -- -- -- -- -- -- -- -- -- -- 8    10/31/24 1800 -- 102 29 119/62 83 176/54 84 mmHg 96 % -- -- -- -- -- 15 --    10/31/24 1745 -- 88 17 -- -- 158/50 78 mmHg 95 % -- -- -- -- -- -- --    10/31/24 1730 -- 96 29 113/63 92 146/52 76 mmHg 97 % -- -- -- -- -- -- --    10/31/24 1715 -- 100 22 -- -- 170/58 87 mmHg 97 % -- -- -- -- -- -- --    10/31/24 1700 -- 88 18 94/40 57 134/48 72 mmHg 97 % -- -- -- -- -- 15 --    10/31/24 1645 -- 92 24 93/40 53 164/60 88 mmHg 96 % -- -- -- -- Lying -- 5    10/31/24 1636 98.2 °F (36.8 °C) 90 -- -- -- 146/56 80 mmHg 97 % 32 -- 3 L/min Nasal cannula -- -- --    10/31/24 1635 -- -- -- -- -- -- -- -- -- -- -- -- -- 15 No Pain    10/31/24 1600 -- 86 12 -- -- 126/54 74 mmHg 91 % -- -- -- -- -- -- --    10/31/24 1557 -- 86 11 108/58 76 122/52 72 mmHg 91 % 36 -- 4 L/min Nasal cannula -- 14 No Pain    10/31/24 1542 -- 94 17 125/59 88 172/68 96 mmHg 94 % 36 -- 4 L/min Nasal cannula -- 14 No Pain    10/31/24 1527 -- 84 13 132/62 88 142/60 82 mmHg 92 % 36 -- 4 L/min Nasal cannula -- 14 No Pain    10/31/24 1512 -- 84 12  163/71 102 140/60 84 mmHg 94 % 32 -- 3 L/min Nasal cannula -- 14 No Pain    10/31/24 1457 -- 82 11 126/62 86 152/60 86 mmHg 98 % -- 5 L/min -- Simple mask -- 14 No Pain    10/31/24 1442 -- 86 14 132/61 86 134/58 82 mmHg 92 % -- 5 L/min -- Simple mask -- 14 No Pain    10/31/24 1427 -- 86 13 163/71 102 130/60 82 mmHg 93 % 36 -- 4 L/min Nasal cannula -- 14 No Pain    10/31/24 1412 97.5 °F (36.4 °C) 86 16 133/60 86 138/62 86 mmHg 97 % -- 6 L/min -- Simple mask -- 14 --    10/31/24 0707 -- -- -- -- -- -- -- -- -- -- -- -- -- -- No Pain    10/31/24 0610 97.1 °F (36.2 °C) 75 18 162/76 -- -- -- 93 % -- -- -- None (Room air) -- -- No Pain          Weight (last 2 days)       Date/Time Weight    10/31/24 1636 85.6 (188.71)    10/31/24 0610 80.9 (178.35)            Pertinent Labs/Diagnostic Test Results:   Radiology:  IR lower extremity angiogram    (Results Pending)     Cardiology:  No orders to display     GI:  No orders to display           Results from last 7 days   Lab Units 11/01/24  0520 10/31/24  1458   WBC [place]/uL 17.87*  --    HEMOGLOBIN g/dL 10.4*  --    HEMATOCRIT % 31.7*  --    PLATELETS Thousands/uL 223 253         Results from last 7 days   Lab Units 11/01/24  0520 11/01/24  0000   SODIUM mmol/L 132* 132*   POTASSIUM mmol/L 4.2 4.3   CHLORIDE mmol/L 103 103   CO2 mmol/L 25 24   ANION GAP mmol/L 4 5   BUN mg/dL 13 15   CREATININE mg/dL 0.66 0.82   EGFR ml/min/1.73sq m 102 93   CALCIUM mg/dL 8.4 8.3*   CALCIUM, IONIZED mmol/L 1.13 1.10*   MAGNESIUM mg/dL 2.4 1.9   PHOSPHORUS mg/dL  --  3.4             Results from last 7 days   Lab Units 11/01/24  0520 11/01/24  0000   GLUCOSE RANDOM mg/dL 141* 165*         Network Utilization Review Department  ATTENTION: Please call with any questions or concerns to [phone] and carefully listen to the prompts so that you are directed to the right person. All voicemails are confidential.   For Discharge needs, contact Care Management DC Support Team at [phone] opt.  2  Send all requests for admission clinical reviews, approved or denied determinations and any other requests to dedicated fax number below belonging to the campus where the patient is receiving treatment. List of dedicated fax numbers for the Facilities:  FACILITY NAME UR FAX NUMBER   ADMISSION DENIALS (Administrative/Medical Necessity) [phone]   DISCHARGE SUPPORT TEAM (NETWORK) [phone]   PARENT CHILD HEALTH (Maternity/NICU/Pediatrics) [phone]   [facility] [phone]   [facility] [phone]   [facility] [phone]   [facility] [phone]   [facility] [phone]   [facility] [phone]   [facility] [phone]   [facility] [phone]   [facility] [phone]   [facility] [phone]   [facility] [phone]   [facility] [phone]

## 2024-11-01 NOTE — CASE MANAGEMENT
Case Management Discharge Planning Note    Patient name Nahid Luis  Location ICU 14/ICU 14 MRN [id number]  : 1960 Date 2024       Current Admission Date: 10/31/2024  Current Admission Diagnosis:PAD (peripheral artery disease) ([facility])   Patient Active Problem List    Diagnosis Date Noted Date Diagnosed    ABLA (acute blood loss anemia) 2024     Bladder mass 10/18/2024     Mixed hyperlipidemia 2024     PAD (peripheral artery disease) ([facility]) 2024     Hypertension 2023     Chronic obstructive pulmonary disease (HCC) 2023     Current every day smoker 2023     Gross hematuria 2023       LOS (days): 1  Geometric Mean LOS (GMLOS) (days): 3.8  Days to GMLOS:2.8     OBJECTIVE:  Risk of Unplanned Readmission Score: 11.54         Current admission status: Inpatient   Preferred Pharmacy:   [facility]/pharmacy #[id number] - [place] [address] [address]  Phone: [phone] Fax: [phone]    Primary Care Provider: Kaitlin García MD    Primary Insurance: [facility] BLUE [facility]  Secondary Insurance:     DISCHARGE DETAILS:                                          Other Referral/Resources/Interventions Provided:  Interventions: DME  Referral Comments: [employer] approved by [email or web address] - delivered to pt at bedside - delivery ticket signed and uploaded to [place]

## 2024-11-01 NOTE — ASSESSMENT & PLAN NOTE
Known 6 cm bladder mass - follows with our team in the outpatient setting   Mild hematuria ongoing     Plan:  Continue with CBI and manual irrigations   TURBT scheduled 12/11/24 with Dr. Real

## 2024-11-01 NOTE — PLAN OF CARE
Problem: DISCHARGE PLANNING  Goal: Discharge to home or other facility with appropriate resources  Description: INTERVENTIONS:  - Identify barriers to discharge w/patient and caregiver  - Arrange for needed discharge resources and transportation as appropriate  - Identify discharge learning needs (meds, wound care, etc.)  - Arrange for interpretive services to assist at discharge as needed  - Refer to Case Management Department for coordinating discharge planning if the patient needs post-hospital services based on physician/advanced practitioner order or complex needs related to functional status, cognitive ability, or social support system  Reactivated     Problem: Knowledge Deficit  Goal: Patient/family/caregiver demonstrates understanding of disease process, treatment plan, medications, and discharge instructions  Description: Complete learning assessment and assess knowledge base.  Interventions:  - Provide teaching at level of understanding  - Provide teaching via preferred learning methods  Reactivated     Problem: RESPIRATORY - ADULT  Goal: Achieves optimal ventilation and oxygenation  Description: INTERVENTIONS:  - Assess for changes in respiratory status  - Assess for changes in mentation and behavior  - Position to facilitate oxygenation and minimize respiratory effort  - Oxygen administered by appropriate delivery if ordered  - Initiate smoking cessation education as indicated  - Encourage broncho-pulmonary hygiene including cough, deep breathe, Incentive Spirometry  - Assess the need for suctioning and aspirate as needed  - Assess and instruct to report SOB or any respiratory difficulty  - Respiratory Therapy support as indicated  Reactivated     Problem: MUSCULOSKELETAL - ADULT  Goal: Maintain or return mobility to safest level of function  Description: INTERVENTIONS:  - Assess patient's ability to carry out ADLs; assess patient's baseline for ADL function and identify physical deficits which impact  ability to perform ADLs (bathing, care of mouth/teeth, toileting, grooming, dressing, etc.)  - Assess/evaluate cause of self-care deficits   - Assess range of motion  - Assess patient's mobility  - Assess patient's need for assistive devices and provide as appropriate  - Encourage maximum independence but intervene and supervise when necessary  - Involve family in performance of ADLs  - Assess for home care needs following discharge   - Consider OT consult to assist with ADL evaluation and planning for discharge  - Provide patient education as appropriate  Reactivated

## 2024-11-01 NOTE — OCCUPATIONAL THERAPY NOTE
Occupational Therapy Evaluation     Patient Name: Nahid Luis  Today's Date: 11/1/2024  Problem List  Principal Problem:    PAD (peripheral artery disease) (HCC)  Active Problems:    Hypertension    Chronic obstructive pulmonary disease (HCC)    Gross hematuria    Mixed hyperlipidemia    Bladder mass    ABLA (acute blood loss anemia)    Past Medical History  Past Medical History:   Diagnosis Date    Bladder cancer (HCC)     Colon polyp     COPD (chronic obstructive pulmonary disease) (HCC)     Hyperlipidemia     Hypertension      Past Surgical History  Past Surgical History:   Procedure Laterality Date    APPENDECTOMY      COLONOSCOPY      CYSTOSCOPY      IR LOWER EXTREMITY ANGIOGRAM  09/24/2024    [place] [facility] FEM-ANT TIBL PST TIBL PRONEAL ART/OTH DSTL Right 10/31/2024    Procedure: right Common femoral artery to anterior tibial bypass with autologous vein;  Surgeon: Carlos Bray DO;  Location: [facility];  Service: Vascular         11/01/24 0943   OT Last Visit   OT Visit Date 11/01/24   Note Type   Note type Evaluation   Pain Assessment   Pain Assessment Tool 0-10   Pain Score No Pain  (at rest)   Restrictions/Precautions   Other Precautions Multiple lines;Pain;Telemetry   Home Living   Type of Home House   Home Layout One level;Stairs to enter with rails  (4 TRAY)   Bathroom Shower/Tub Walk-in shower   Bathroom Toilet Standard   Bathroom Equipment Grab bars in shower;Built-in shower seat   Home Equipment   (unsure if he has cane or RW at home)   Prior Function   Level of [place] Independent with ADLs;Independent with functional mobility;Independent with IADLS   Lives With Family  (son, dtr and grandkid)   Receives Help From Family   IADLs Independent with driving;Independent with medication management;Family/Friend/Other provides meals   Falls in the last 6 months 0   Vocational Full time employment   Lifestyle   [employer] PTA, was (I) with ADLs and was (I) with IADLs. Patient lives in a [facility] w/ 4  "TRAY. Lives w/ multiple family members. Walk in shower w/ GB, standard toilet. Unclear on DME at home. Denies falls. Works FT. (+) .   Reciprocal Relationships Multiple family members   Service to Others Works FT   General   Additional Pertinent History Comorbidities affecting pt’s functional performance include a significant PMH of: HTN, COPD, CA.  Patient with active OT orders and activity orders for Ambulate.   Family/Caregiver Present No   Subjective   Subjective \"I'm doing ok\" Agreable to OT/PT co-eval.   ADL   Where Assessed Edge of bed   Eating Assistance 7  Independent   Grooming Assistance 5  Supervision/Setup   UB Bathing Assistance 5  Supervision/Setup   LB Bathing Assistance 4  Minimal Assistance   UB Dressing Assistance 5  Supervision/Setup   LB Dressing Assistance 4  Minimal Assistance   Toileting Assistance  5  Supervision/Setup   Bed Mobility   Supine to Sit 6  Modified independent   Additional items Increased time required   Transfers   Sit to Stand 5  Supervision   Additional items Increased time required;Verbal cues;Other  (RW)   Stand to Sit 5  Supervision   Additional items Increased time required;Other  (RW)   Functional Mobility   Functional Mobility 5  Supervision   Additional Comments Ax1; limited distance 2* pain.   Additional items Rolling walker   Balance   Static Sitting Good   Dynamic Sitting Good   Static Standing Fair +   Dynamic Standing Fair   Ambulatory Fair   Activity Tolerance   Activity Tolerance Patient tolerated treatment well   Medical Staff Made Aware Julia PT   Nurse Made Aware Yes   RUE Assessment   RUE Assessment WFL   LUE Assessment   LUE Assessment WFL   Hand Function   Gross Motor Coordination Functional   Fine Motor Coordination Functional   Sensation   Light Touch Partial deficits in the RLE   Proprioception   Proprioception No apparent deficits   Vision - Complex Assessment   Ocular Range of Motion Intact   Perception   Inattention/Neglect Appears intact "   Cognition   Overall Cognitive Status WFL   Arousal/Participation Alert;Responsive;Cooperative   Attention Within functional limits   Orientation Level Oriented X4   Memory Within functional limits   Following Commands Follows all commands and directions without difficulty   Assessment   Assessment Patient is a 64 y.o. year old male seen for OT eval s/p admit to [facility] on 10/31/2024 with PAD s/p CFA to AT bypass.  OT consulted to assess ADLs/IADLs/functional mobility and assist w/ D/C planning. Pt A&Ox4, follows all commands. Currently pt functioning at/close to baseline. Educ pt on compensatory strategies to complete LB ADLs w pt verbalizing understanding. Given pt's CLOF, will D/C OT orders and maintain on restorative. No further acute OT needs identified at this time. Recommend continued mobilization with hospital staff while in the hospital to increase pt’s endurance and strength upon D/C. From OT standpoint, recommend D/C to home with family support when medically cleared. D/C pt from OT caseload at this time.   Plan   OT Frequency Eval only   Discharge Recommendation   Rehab Resource Intensity Level, OT No post-acute rehabilitation needs   Equipment Recommended Other (comment)  (RW if pt does not own already)   Additional Comments  Co treatment with PT secondary to complex medical condition of pt, possible A of 2 required to achieve and maintain transitional movements, requiring the need of skilled therapeutic intervention of 2 therapists to achieve delivery of services.   AM-PAC Daily Activity Inpatient   Lower Body Dressing 3   Bathing 3   Toileting 3   Upper Body Dressing 3   Grooming 4   Eating 4   Daily Activity Raw Score 20   Daily Activity Standardized Score (Calc for Raw Score >=11) 42.03   AM-PAC Applied Cognition Inpatient   Following a Speech/Presentation 4   Understanding Ordinary Conversation 4   Taking Medications 4   Remembering Where Things Are Placed or Put Away 4   Remembering List of 4-5  Errands 4   Taking Care of Complicated Tasks 4   Applied Cognition Raw Score 24   Applied Cognition Standardized Score 62.21     Vitals seated EOB: 142/71, 81 HR.    Ofelia Carrasquillo

## 2024-11-01 NOTE — ASSESSMENT & PLAN NOTE
- Gross hematuria after brambila placement in OR. Currently with continuous bladder irrigation with drainage clearing up.  - Urology consulted, appreciate recs.  - Continue brambila

## 2024-11-01 NOTE — PROGRESS NOTES
Progress Note - Critical Care/ICU   Name: Nahid Luis 64 y.o. male I MRN: [id number]  Unit/Bed#: ICU 14 I Date of Admission: 10/31/2024   Date of Service: 11/1/2024 I Hospital Day: 1      Assessment & Plan  PAD (peripheral artery disease) (HCC)  S/p right CFA to AT bypass, POD #1  Frequent neurovascular checks  Site redressed overnight given oozing- WNL, no active bleeding  Avoid hypotension to preserve Bypass patency- hypotension protocol in place with fluid bolus  Further management per the vascular teams recommendations    Gross hematuria  The patient has a history of a bladder mass. He is on heparin for his bypass and developed hematuria. He is now on CBI per urology recommendations  We will continue the CBI for now  Maintain 3 way catheter- removal per urology's recommendations  Monitor hgb  Started pyridium and oxybutynin for bladder spasms- discuss further with urology  Chronic obstructive pulmonary disease (HCC)  Will continue his outpatient Advair  Encourage cough, deep breathing and IS use  May require prn bronchodilators if he develops SOB  Hypertension  Hold amlodipine given variable BP  Losartan on hold for now given recent surgery  Goal will be to maintain normotension  Mixed hyperlipidemia  Will continue his outpatient statin  Bladder mass  Followed by urology  Plan for TURBT in the future  Currently with hematuria  Plan as previously described  Disposition: Stepdown Level 1    ICU Core Measures     A: Assess, Prevent, and Manage Pain Has pain been assessed? Yes  Need for changes to pain regimen? No   B: Both SAT/SAT  N/A   C: Choice of Sedation RASS Goal: 0 Alert and Calm  Need for changes to sedation or analgesia regimen? No   D: Delirium CAM-ICU: Negative   E: Early Mobility  Plan for early mobility? Yes   F: Family Engagement Plan for family engagement today? Yes       Review of Invasive Devices:    Malone Plan:  continue for hematuria, CBI    Nazia Plan: Keep arterial line for hemodynamic  monitoring    Prophylaxis:  VTE VTE covered by:  heparin (porcine), Subcutaneous, 5,000 Units at 10/31/24 2103       Stress Ulcer  not ordered         24 Hour Events :   - Continued on CBI  - Variable BP: received 500cc of LR for hypotension   Subjective   Review of Systems: Review of Systems   Constitutional:  Negative for chills and fever.   HENT:  Negative for ear pain and sore throat.    Eyes:  Negative for pain and visual disturbance.   Respiratory:  Negative for cough and shortness of breath.    Cardiovascular:  Negative for chest pain and palpitations.   Gastrointestinal:  Negative for abdominal pain and vomiting.   Genitourinary:  Negative for dysuria and hematuria.        + bladder spasms   Musculoskeletal:  Negative for arthralgias and back pain.   Skin:  Negative for color change and rash.   Neurological:  Negative for seizures and syncope.   All other systems reviewed and are negative.      Objective :                   Vitals I/O      Most Recent Min/Max in 24hrs   Temp 98.1 °F (36.7 °C) Temp  Min: 97.1 °F (36.2 °C)  Max: 98.2 °F (36.8 °C)   Pulse 60 Pulse  Min: 58  Max: 102   Resp (!) 9 Resp  Min: 8  Max: 31   BP 90/53 BP  Min: 76/51  Max: 163/71   O2 Sat 94 % SpO2  Min: 91 %  Max: 98 %      Intake/Output Summary (Last 24 hours) at 11/1/2024 0534  Last data filed at 11/1/2024 0405  Gross per 24 hour   Intake 2675 ml   Output 3670 ml   Net -995 ml       Diet Regular; Regular House    Invasive Monitoring           Physical Exam   Physical Exam  Eyes:      General: No dysconjugate gaze and no foreign body in eyeNo visual field deficit or scleral icterus.        Right eye: No discharge.         Left eye: No discharge.      Conjunctiva/sclera: Conjunctivae normal.      Pupils: Pupils are equal, round, and reactive to light.   Skin:     General: Skin is warm, dry and not mottled extremities.      Coloration: Skin is pale. Skin is not jaundiced.   HENT:      Head: Normocephalic and atraumatic.    Cardiovascular:      Rate and Rhythm: Normal rate and regular rhythm.      Pulses: Normal pulses.   Musculoskeletal:      Right lower leg: No edema.      Left lower leg: No edema.   Abdominal:      Palpations: Abdomen is soft.      Tenderness: There is no abdominal tenderness.   Pulmonary:      Effort: Pulmonary effort is normal.      Breath sounds: Normal breath sounds.   Neurological:      General: No focal deficit present.          Diagnostic Studies        Lab Results: I have reviewed the following results:     Medications:  Scheduled PRN   acetaminophen, 975 mg, Q8H MICHAEL  aspirin, 81 mg, Daily  atorvastatin, 20 mg, Daily With Dinner  Fluticasone Furoate-Vilanterol, 1 puff, BID  heparin (porcine), 5,000 Units, Q8H MICHAEL  oxybutynin, 5 mg, TID  phenazopyridine, 100 mg, TID  senna, 1 tablet, Daily      albuterol, 2 puff, Q6H PRN  HYDROmorphone, 0.5 mg, Q2H PRN  labetalol, 10 mg, Q6H PRN  lactated ringers, 500 mL, Once PRN   And  lactated ringers, 500 mL, Once PRN  ondansetron, 4 mg, Q6H PRN  oxyCODONE, 10 mg, Q4H PRN  oxyCODONE, 5 mg, Q4H PRN  sodium chloride, 500 mL, Once PRN   And  sodium chloride, 500 mL, Once PRN       Continuous          Labs:   CBC    Recent Labs     10/31/24  1458 11/01/24  0520   WBC  --  17.87*   HGB  --  10.4*   HCT  --  31.7*    223     BMP    Recent Labs     11/01/24  0000   SODIUM 132*   K 4.3      CO2 24   AGAP 5   BUN 15   CREATININE 0.82   CALCIUM 8.3*       Coags    No recent results     Additional Electrolytes  Recent Labs     11/01/24  0000   MG 1.9   PHOS 3.4   CAIONIZED 1.10*          Blood Gas    No recent results  No recent results LFTs  No recent results    Infectious  No recent results  Glucose  Recent Labs     11/01/24  0000   GLUC 165*

## 2024-11-01 NOTE — ASSESSMENT & PLAN NOTE
- In setting of recent surgery and bleeding from bladder.  - Trend hemoglobin. Transfuse for Hgb<7 or if symptomatic.

## 2024-11-01 NOTE — DISCHARGE INSTR - OTHER ORDERS
DISCHARGE INSTRUCTIONS   PREVENA INCISION THERAPY NEGATIVE PRESSURE VAC    ACTIVITY:   Limit your physical activity with the limb with the Prevena VAC therapy.  Walking up steps and normal activities may be resumed as you feel ready.  Avoid strenuous activity such as vigorous exercise.  Avoid heavy lifting (do not lift more than 15 pounds) while you have the Prevena VAC in place.  You should not drive a car while you have the Prevena VAC in place.  Your provider will instruct you when it is safe to drive.  You may ride in a car.    DIET:  Resume your normal diet.  Good nutrition is important for healing of your incision.  If you are discharged on narcotics for pain control, continue taking your stool softeners until you are having regular bowel movements.    Prevena Vac: Keep vac in place for a total of 7 days from your day of surgery.  You may NOT shower or bathe while wound VAC is in place.  On Thursday, 11/7/24, remove Prevena vac using the following instructions:  1. Hold power button down for approximately 5 seconds until vac turns off. You will hear an audible beep as the vac turns off. Once vac is turned off, the purple sponge will expand.  2. Disconnect tubing.  3. Gently remove clear tape and purple sponge.  4. Dispose of all parts of the vac in a regular garbage can.  INCISION:  Once Prevena Vac removed, you should shower daily.  Wash incision daily with soap and water, but do not rub or scrub the incision; rinse thoroughly and pat dry.  You may have stitches or staples to close your incision and it is okay for these to get wet.  Do not bathe in a tub or swim for the first 4 week following surgery or if you have any open wounds.  It is normal to have swelling or discoloration around the incision.   If increasing redness or pain develops, call our office immediately.  Numbness in the region of the incision may occur following the surgery.  This normally improves over six to twelve months.    You may have  surgical glue over your incisions. There are stitches present under the skin which will absorb on their own.   The glue is used to cover the access, assist in closure, and prevent contamination. This adhesive will darken and peel away on its own within one to two weeks. Do not pick at it.    If you have a groin wound/incision, place a clean dry piece of gauze to cover your groin incision to keep incision clean and dry and prevent your skin from sticking together.  Change gauze daily.  You may have staples or stitches at your incisions.  These will be removed at your follow-up appointment when they are ready to come out.  If you have foot or leg wounds, please follow your podiatrist/wound care doctor's instructions for care.  If any of your incisions are open and require dressing changes, you will be given instructions for your daily incision care. If you are not able to change the dressings, a visiting nurse will be arranged.    DO NOT put any powders, creams, ointments, or lotions on your incision.    SWELLING: Most patients have noticeable swelling after leg surgery. This usually improves within a few weeks. If swelling is present, elevate the affected limb whenever possible.     FOLLOW UP APPOINTMENTS:  Making and keeping follow up appointments and ultrasound tests are important to your recovery.  If you have difficulty making it to or keeping your follow up appointments, call the office.    If you have increased pain, fever >101.5, nausea, vomiting, increased drainage, redness, warmth, swelling, change in color of drainage/pus, or a bad smell at your Wound VAC site, new coldness/numbness of your arm or leg, or become dizzy or confused please call us immediately and GO directly to the ER.    PLEASE CALL THE OFFICE IF YOU HAVE ANY QUESTIONS  [phone]  [phone] [address], [address], [place] [address] [address] [place] [address] [address]  [facility], 1st Floor, [place] [address]  [address] [facility], [address] [place] [address]  [address] [facility], [address], [place] [address]  [address] [facility], [address], 2nd Floor, [place] [address]  [address] [facility], 1st Floor, [address], [place] [address]  [address], [address], [place] [address]  [address] [facility], [address], [place] [address]

## 2024-11-01 NOTE — ASSESSMENT & PLAN NOTE
S/p right CFA to AT bypass, POD #1  Frequent neurovascular checks  Site redressed overnight given oozing- WNL, no active bleeding  Avoid hypotension to preserve Bypass patency- hypotension protocol in place with fluid bolus  Further management per the vascular teams recommendations

## 2024-11-01 NOTE — PROGRESS NOTES
Progress Note - Vascular Surgery   Name: Nahid Luis 64 y.o. male I MRN: [id number]  Unit/Bed#: ICU 14 I Date of Admission: 10/31/2024   Date of Service: 11/1/2024 I Hospital Day: 1     Vascular surgery attending note  Patient seen and examined at bedside, bladder irrigation in progress, clearing up.  Right CFA to AT bypass is patent with excellent pulse over the bypass and distal anterior tibial artery/dorsalis pedis artery.  Continue aspirin plus statin.  No hematoma or bleeding along the incision sites.

## 2024-11-02 LAB
ERYTHROCYTE [DISTWIDTH] IN BLOOD BY AUTOMATED COUNT: 15.6 % (ref 11.6–15.1)
HCT VFR BLD AUTO: 28.2 % (ref 36.5–49.3)
HGB BLD-MCNC: 9.1 G/DL (ref 12–17)
MCH RBC QN AUTO: 31 PG (ref 26.8–34.3)
MCHC RBC AUTO-ENTMCNC: 32.3 G/DL (ref 31.4–37.4)
MCV RBC AUTO: 96 FL (ref 82–98)
PLATELET # BLD AUTO: 216 THOUSANDS/UL (ref 149–390)
PMV BLD AUTO: 8.7 FL (ref 8.9–12.7)
RBC # BLD AUTO: 2.94 MILLION/UL (ref 3.88–5.62)
WBC # BLD AUTO: 12.28 THOUSAND/UL (ref 4.31–10.16)

## 2024-11-02 PROCEDURE — NC001 PR NO CHARGE: Performed by: SURGERY

## 2024-11-02 PROCEDURE — 99221 1ST HOSP IP/OBS SF/LOW 40: CPT | Performed by: STUDENT IN AN ORGANIZED HEALTH CARE EDUCATION/TRAINING PROGRAM

## 2024-11-02 PROCEDURE — 85027 COMPLETE CBC AUTOMATED: CPT | Performed by: NURSE PRACTITIONER

## 2024-11-02 PROCEDURE — 99232 SBSQ HOSP IP/OBS MODERATE 35: CPT | Performed by: NURSE PRACTITIONER

## 2024-11-02 RX ORDER — POLYETHYLENE GLYCOL 3350 17 G/17G
17 POWDER, FOR SOLUTION ORAL DAILY PRN
Status: DISCONTINUED | OUTPATIENT
Start: 2024-11-02 | End: 2024-11-06 | Stop reason: HOSPADM

## 2024-11-02 RX ADMIN — HEPARIN SODIUM 5000 UNITS: 5000 INJECTION INTRAVENOUS; SUBCUTANEOUS at 15:00

## 2024-11-02 RX ADMIN — PHENAZOPYRIDINE 100 MG: 100 TABLET ORAL at 21:27

## 2024-11-02 RX ADMIN — SENNOSIDES 8.6 MG: 8.6 TABLET, FILM COATED ORAL at 09:22

## 2024-11-02 RX ADMIN — HEPARIN SODIUM 5000 UNITS: 5000 INJECTION INTRAVENOUS; SUBCUTANEOUS at 21:27

## 2024-11-02 RX ADMIN — OXYBUTYNIN CHLORIDE 5 MG: 5 TABLET ORAL at 15:25

## 2024-11-02 RX ADMIN — ACETAMINOPHEN 975 MG: 325 TABLET, FILM COATED ORAL at 09:21

## 2024-11-02 RX ADMIN — ACETAMINOPHEN 975 MG: 325 TABLET, FILM COATED ORAL at 02:22

## 2024-11-02 RX ADMIN — PHENAZOPYRIDINE 100 MG: 100 TABLET ORAL at 15:25

## 2024-11-02 RX ADMIN — ACETAMINOPHEN 975 MG: 325 TABLET, FILM COATED ORAL at 17:19

## 2024-11-02 RX ADMIN — PHENAZOPYRIDINE 100 MG: 100 TABLET ORAL at 09:22

## 2024-11-02 RX ADMIN — OXYBUTYNIN CHLORIDE 5 MG: 5 TABLET ORAL at 09:21

## 2024-11-02 RX ADMIN — ASPIRIN 81 MG: 81 TABLET, COATED ORAL at 09:22

## 2024-11-02 RX ADMIN — OXYBUTYNIN CHLORIDE 5 MG: 5 TABLET ORAL at 21:27

## 2024-11-02 RX ADMIN — HEPARIN SODIUM 5000 UNITS: 5000 INJECTION INTRAVENOUS; SUBCUTANEOUS at 05:33

## 2024-11-02 RX ADMIN — ATORVASTATIN CALCIUM 20 MG: 20 TABLET, FILM COATED ORAL at 15:32

## 2024-11-02 RX ADMIN — FLUTICASONE FUROATE AND VILANTEROL TRIFENATATE 1 PUFF: 100; 25 POWDER RESPIRATORY (INHALATION) at 09:39

## 2024-11-02 RX ADMIN — OXYCODONE HYDROCHLORIDE 5 MG: 5 TABLET ORAL at 15:32

## 2024-11-02 NOTE — PROGRESS NOTES
Progress Note - Urology   Name: Nahid Luis 64 y.o. male I MRN: [id number]  Unit/Bed#: E2 -01 I Date of Admission: 10/31/2024   Date of Service: 11/2/2024 I Hospital Day: 2    Assessment & Plan  PAD (peripheral artery disease) ([facility])  Management per primary vascular surgical team  Gross hematuria  Multifactorial secondary to recent urethral manipulation/complex Malone catheter insertion and known bladder mass as well as antiplatelet/anticoagulation perioperatively for vascular procedure.      Plan:  Maintain three-way Malone-CBI.  Urine is clearing nicely.  Will follow and determine time for clamping.  Continue aspirin--this is benign.  Consider sooner operative intervention only if patient develops hematuria associated with profound ABLA.  For now medical optimization is appreciated given patient's additional medical and surgical issues.  Bladder mass  Known 6 cm bladder mass - follows with our team in the outpatient setting     Plan:  TURBT scheduled 12/11/24 with Dr. Real     Urology service will follow.    Subjective   Nahid Luis  Is a 64-year-old male with history of tobacco use, recent bladder mass diagnosis on cystoscopic examination, peripheral arterial disease status post right common femoral artery to anterior tibial artery bypass per vascular surgery.  Malone catheter was placed intraoperatively by Dr. Mushtaq Garduno.  Patient developed gross hematuria postmanipulation and while on heparin infusion--since discontinued.     three-way Malone for CBI--maintained throughout the night.  Manual irrigation also performed by nursing staff without substantial clot.  Patient is seen in follow-up.  He is afebrile, hemodynamically stable without complaints of flank, abdominal or suprapubic pain.  Hemoglobin 10.4--9.1.    Objective :  Temp:  [97.4 °F (36.3 °C)-97.7 °F (36.5 °C)] 97.4 °F (36.3 °C)  HR:  [64-82] 71  BP: (114-163)/(54-72) 122/59  Resp:  [15-27] 18  SpO2:  [95 %-97 %] 97 %  O2  Device: None (Room air)    I/O         10/31 0701  11/01 0700 11/01 0701 11/02 0700 11/02 0701 11/03 0700    P.O.  360     I.V. (mL/kg) 2625 (30.7)      IV Piggyback 200      Total Intake(mL/kg) 2825 (33) 360 (4.2)     Urine (mL/kg/hr) 4370 (2.1) 3890 (1.9) 260 (0.5)    Total Output 4370 3890 260    Net -1545 -3530 -260                 Lines/Drains/Airways       Active Status       Name Placement date Placement time Site Days    Closed/Suction Drain --  --  --  --    Urethral Catheter Three way 22 Fr. 10/31/24  0820  Three way  2    Continuous Bladder Irrigation Three-way 10/31/24  --  Three-way  2                  Physical Exam  General appearance: alert and oriented, in no acute distress, appears stated age, cooperative, and no distress  Head: Normocephalic, without obvious abnormality, atraumatic  Neck: no JVD and supple, symmetrical, trachea midline  Lungs: clear to auscultation bilaterally  Heart: regular rate and rhythm, S1, S2 normal, no murmur, click, rub or gallop  Abdomen: soft, non-tender; bowel sounds normal; no masses,  no organomegaly  Extremities: extremities normal, warm and well-perfused; no cyanosis, clubbing, or edema and right groin VAC  Pulses: 2+ and symmetric  Neurologic: Grossly normal  Three-way Malone--CBI--see below          Lab Results: I have reviewed the following results:  Recent Labs     10/31/24  1458 11/01/24  0000 11/01/24  0520 11/02/24  0536   WBC   < >  --  17.87* 12.28*   HGB   < >  --  10.4* 9.1*   HCT   < >  --  31.7* 28.2*   PLT  --   --  223 216   SODIUM  --  132* 132*  --    K  --  4.3 4.2  --    CL  --  103 103  --    CO2  --  24 25  --    BUN  --  15 13  --    CREATININE  --  0.82 0.66  --    GLUC  --  165* 141*  --    CAIONIZED  --  1.10* 1.13  --    MG  --  1.9 2.4  --    PHOS  --  3.4  --   --     < > = values in this interval not displayed.       Imaging Results Review: No pertinent imaging studies reviewed.  Other Study Results Review: No additional pertinent  studies reviewed.

## 2024-11-02 NOTE — PROGRESS NOTES
Progress Note - Surgery-General   Name: Nahid Luis 64 y.o. male I MRN: [id number]  Unit/Bed#: E2 -01 I Date of Admission: 10/31/2024   Date of Service: 11/2/2024 I Hospital Day: 2    Assessment & Plan  PAD (peripheral artery disease) ([facility])  S/p 10/31 femoropopliteal for CLTI  2+ palpable pulses.  Tolerated procedure well  Gross hematuria  In setting of bladder cancer  Per primary and urology (CBI, manual irrigation, TURBT 12/11)  Hypertension    Chronic obstructive pulmonary disease (HCC)    Mixed hyperlipidemia    Bladder mass    ABLA (acute blood loss anemia)      Subjective   Feels well.  Little to no pain.  Eating.  No nausea, vomiting, or fevers.    Objective :  Temp:  [97.5 °F (36.4 °C)-97.7 °F (36.5 °C)] 97.5 °F (36.4 °C)  HR:  [] 73  BP: (110-163)/(48-72) 156/72  Resp:  [13-36] 20  SpO2:  [94 %-96 %] 95 %  O2 Device: None (Room air)    I/O         10/31 0701  11/01 0700 11/01 0701  11/02 0700    P.O.  360    I.V. (mL/kg) 2625 (30.7)     IV Piggyback 200     Total Intake(mL/kg) 2825 (33) 360 (4.2)    Urine (mL/kg/hr) 4370 (2.1) 3350 (1.6)    Total Output 4370 3350    Net -1545 -2990                Lines/Drains/Airways       Active Status       Name Placement date Placement time Site Days    Closed/Suction Drain --  --  --  --    Urethral Catheter Three way 22 Fr. 10/31/24  0820  Three way  1    Continuous Bladder Irrigation Three-way 10/31/24  --  Three-way  2                  Physical Exam  Constitutional:       Appearance: Normal appearance.   HENT:      Head: Normocephalic and atraumatic.      Mouth/Throat:      Mouth: Mucous membranes are moist.   Eyes:      Extraocular Movements: Extraocular movements intact.   Cardiovascular:      Rate and Rhythm: Normal rate and regular rhythm.   Pulmonary:      Effort: Pulmonary effort is normal.   Abdominal:      General: Abdomen is flat.      Palpations: Abdomen is soft.   Genitourinary:     Comments: Serosanguineous output in the  Faisal  Musculoskeletal:         General: Normal range of motion.      Cervical back: Normal range of motion and neck supple.      Comments: 2+ palpable pulses.  Incision dressings without strikethrough or surrounding erythema.   Skin:     General: Skin is warm and dry.   Neurological:      General: No focal deficit present.      Mental Status: He is alert and oriented to person, place, and time.

## 2024-11-02 NOTE — ASSESSMENT & PLAN NOTE
64-year-old male with PAD is currently admitted under the vascular service underwent the following procedure last 10/31/2024.    Right common femoral and anterior tibial artery exposure  Right greater saphenous vein harvest via skip incisions  Right common femoral artery to anterior tibial artery bypass with ipsilateral reversed greater saphenous vein     Continue aspirin and statin.  DVT prophylaxis, disposition, and rest of management per primary service

## 2024-11-02 NOTE — ASSESSMENT & PLAN NOTE
Downtrending, likely due to hematuria continue monitoring.    Results from last 7 days   Lab Units 11/02/24  0536 11/01/24  0520   HEMOGLOBIN g/dL 9.1* 10.4*   MCV fL 96 93   RDW % 15.6* 15.5*

## 2024-11-02 NOTE — ASSESSMENT & PLAN NOTE
Controlled, resume amlodipine. Will consider restarting tomorrow losartan if blood pressure becomes elevated.

## 2024-11-02 NOTE — PLAN OF CARE
Problem: Knowledge Deficit  Goal: Patient/family/caregiver demonstrates understanding of disease process, treatment plan, medications, and discharge instructions  Description: Complete learning assessment and assess knowledge base.  Interventions:  - Provide teaching at level of understanding  - Provide teaching via preferred learning methods  Outcome: Progressing     Problem: SAFETY ADULT  Goal: Patient will remain free of falls  Description: INTERVENTIONS:  - Educate patient/family on patient safety including physical limitations  - Instruct patient to call for assistance with activity   - Consult OT/PT to assist with strengthening/mobility   - Keep Call bell within reach  - Keep bed low and locked with side rails adjusted as appropriate  - Keep care items and personal belongings within reach  - Initiate and maintain comfort rounds  - Make Fall Risk Sign visible to staff  - Offer Toileting every 2 Hours, in advance of need  - Initiate/Maintain bed alarm  - Obtain necessary fall risk management equipment  - Apply yellow socks and bracelet for high fall risk patients  - Consider moving patient to room near nurses station  Outcome: Progressing  Goal: Maintain or return to baseline ADL function  Description: INTERVENTIONS:  -  Assess patient's ability to carry out ADLs; assess patient's baseline for ADL function and identify physical deficits which impact ability to perform ADLs (bathing, care of mouth/teeth, toileting, grooming, dressing, etc.)  - Assess/evaluate cause of self-care deficits   - Assess range of motion  - Assess patient's mobility; develop plan if impaired  - Assess patient's need for assistive devices and provide as appropriate  - Encourage maximum independence but intervene and supervise when necessary  - Involve family in performance of ADLs  - Assess for home care needs following discharge   - Consider OT consult to assist with ADL evaluation and planning for discharge  - Provide patient  education as appropriate  Outcome: Progressing  Goal: Maintains/Returns to pre admission functional level  Description: INTERVENTIONS:  - Perform AM-PAC 6 Click Basic Mobility/ Daily Activity assessment daily.  - Set and communicate daily mobility goal to care team and patient/family/caregiver.   - Collaborate with rehabilitation services on mobility goals if consulted  - Perform Range of Motion 3 times a day.  - Reposition patient every 2 hours.  - Dangle patient 3 times a day  - Stand patient 3 times a day  - Ambulate patient 3 times a day  - Out of bed to chair 3 times a day   - Out of bed for meals 3 times a day  - Out of bed for toileting  - Record patient progress and toleration of activity level   Outcome: Progressing     Problem: PAIN - ADULT  Goal: Verbalizes/displays adequate comfort level or baseline comfort level  Description: Interventions:  - Encourage patient to monitor pain and request assistance  - Assess pain using appropriate pain scale  - Administer analgesics based on type and severity of pain and evaluate response  - Implement non-pharmacological measures as appropriate and evaluate response  - Consider cultural and social influences on pain and pain management  - Notify physician/advanced practitioner if interventions unsuccessful or patient reports new pain  Outcome: Progressing

## 2024-11-02 NOTE — CONSULTS
Consultation - Hospitalist   Name: Nahid Luis 64 y.o. male I MRN: [id number]  Unit/Bed#: E2 -01 I Date of Admission: 10/31/2024   Date of Service: 11/2/2024 I Hospital Day: 2   Inpatient consult to Internal Medicine  Consult performed by: Tomasz Herrera MD  Consult ordered by: LISA Matias        Physician Requesting Evaluation: Carlos Bray DO   Reason for Evaluation / Principal Problem: medical management of comorbids    Assessment & Plan  PAD (peripheral artery disease) (HCC)  64-year-old male with PAD is currently admitted under the vascular service underwent the following procedure last 10/31/2024.    Right common femoral and anterior tibial artery exposure  Right greater saphenous vein harvest via skip incisions  Right common femoral artery to anterior tibial artery bypass with ipsilateral reversed greater saphenous vein     Continue aspirin and statin.  DVT prophylaxis, disposition, and rest of management per primary service  Gross hematuria  After brambila catheter placement in the OR  Appears to be clearing, management per urology.   Hypertension  Controlled, resume amlodipine. Will consider restarting tomorrow losartan if blood pressure becomes elevated.  Chronic obstructive pulmonary disease (HCC)  Not in acute exacerbation  Continue inpatient formulary equivalent of home inhalers. PRN albuterol  Mixed hyperlipidemia  Continue lipitor  Bladder mass  Large bladder tumor concerning for bladder cancer.   Management per urology service.   ABLA (acute blood loss anemia)  Downtrending, likely due to hematuria continue monitoring.    Results from last 7 days   Lab Units 11/02/24  0536 11/01/24  0520   HEMOGLOBIN g/dL 9.1* 10.4*   MCV fL 96 93   RDW % 15.6* 15.5*             VTE Pharmacologic Prophylaxis:   Moderate Risk (Score 3-4) - Pharmacological DVT Prophylaxis Ordered: heparin.  Code Status: Level 1 - Full Code     Anticipated Length of Stay: Patient will be admitted on an  "inpatient basis with an anticipated length of stay of greater than 2 midnights secondary to hematuria, vascular and urology evaluation.    History of Present Illness   Chief Complaint: \"surgery\"    Nahid Luis is a 64 y.o. male with a PMH of PAD, hypertension, COPD, hyperlipidemia who presents with PAD.    He came in due to his chronic limb threatening ischemia with rest pain.  He was admitted under the vascular surgery service.  He underwent right common femoral and anterior tibial artery exposure, right great saphenous vein harvest via skip incisions, and right common femoral artery to anterior tibial artery bypass with ipsilateral reverse greater saphenous vein.  Patient tolerated the procedure.    During his OR, Malone catheter was placed which unfortunately resulted in hematuria.  Currently undergoing CBI managed by the urology service.  Currently denies any chest pain, shortness of breath, abdominal pain, or fever or chills.    Review of Systems   Constitutional:  Negative for chills and fever.   HENT:  Negative for congestion.    Respiratory:  Negative for cough, shortness of breath and wheezing.    Cardiovascular:  Negative for chest pain and palpitations.   Gastrointestinal:  Negative for abdominal pain, nausea and vomiting.   Genitourinary:  Positive for hematuria.   Skin:  Negative for color change and pallor.   Neurological:  Negative for weakness.   Psychiatric/Behavioral:  Negative for agitation and confusion.        Historical Information   Past Medical History:   Diagnosis Date    Bladder cancer (HCC)     Colon polyp     COPD (chronic obstructive pulmonary disease) (HCC)     Hyperlipidemia     Hypertension      Past Surgical History:   Procedure Laterality Date    APPENDECTOMY      COLONOSCOPY      CYSTOSCOPY      IR LOWER EXTREMITY ANGIOGRAM  09/24/2024    MS BYP FEM-ANT TIBL PST TIBL PRONEAL ART/OTH DSTL Right 10/31/2024    Procedure: right Common femoral artery to anterior tibial bypass " with autologous vein;  Surgeon: Carlos Bray DO;  Location: [facility];  Service: Vascular     Social History     Tobacco Use    Smoking status: Every Day     Current packs/day: 0.25     Types: Cigarettes     Passive exposure: Current    Smokeless tobacco: Not on file   Vaping Use    Vaping status: Never Used   Substance and Sexual Activity    Alcohol use: Yes     Alcohol/week: 3.0 standard drinks of alcohol     Types: 3 Cans of beer per week     Comment: daily 3-5 beers daily, last drank 10/30    Drug use: No    Sexual activity: Not on file     E-Cigarette/Vaping    E-Cigarette Use Never User      E-Cigarette/Vaping Substances    Nicotine No     THC No     CBD No     Flavoring No     Other No     Unknown No      Family History   Problem Relation Age of Onset    Alcohol abuse Father     Heart attack Father      Social History:  Marital Status: Single     Meds/Allergies   I have reviewed home medications with patient personally.  Prior to Admission medications    Medication Sig Start Date End Date Taking? Authorizing Provider   albuterol (Proventil HFA) 90 mcg/act inhaler Inhale 2 puffs every 6 (six) hours as needed for wheezing 9/8/23  Yes Kaitlin García MD   amLODIPine (NORVASC) 5 mg tablet Take 1 tablet (5 mg total) by mouth daily 8/26/24  Yes Toni Martinez MD   aspirin (ECOTRIN LOW STRENGTH) 81 mg EC tablet Take 1 tablet (81 mg total) by mouth daily 8/17/24 10/23/24 Yes Robyn Negrete DO   atorvastatin (LIPITOR) 20 mg tablet Take 1 tablet (20 mg total) by mouth daily 9/24/24  Yes Carlos Bray DO   Fluticasone-Salmeterol (Advair) 100-50 mcg/dose inhaler Inhale 1 puff 2 (two) times a day Rinse mouth after use. 6/24/24 10/31/24 Yes Marky Orozco MD   losartan (COZAAR) 50 mg tablet Take 2 tablets (100 mg total) by mouth daily 6/21/24  Yes Marky Orozco MD     No Known Allergies    Objective :  Temp:  [97.4 °F (36.3 °C)-99.2 °F (37.3 °C)] 99.2 °F (37.3 °C)  HR:  [71-84] 84  BP:  "(116-156)/(56-72) 136/63  Resp:  [18-27] 18  SpO2:  [91 %-97 %] 91 %  O2 Device: None (Room air)    Physical Exam  Vitals reviewed.   Constitutional:       General: He is not in acute distress.  HENT:      Head: Normocephalic.      Nose: Nose normal.      Mouth/Throat:      Mouth: Mucous membranes are moist.   Eyes:      General: No scleral icterus.  Cardiovascular:      Rate and Rhythm: Normal rate and regular rhythm.   Pulmonary:      Effort: Pulmonary effort is normal. No respiratory distress.      Breath sounds: No wheezing.   Abdominal:      General: There is no distension.      Palpations: Abdomen is soft.      Tenderness: There is no abdominal tenderness.   Genitourinary:     Comments: Malone catheter with pink urine draining  Skin:     General: Skin is warm.   Neurological:      Mental Status: He is alert and oriented to person, place, and time.   Psychiatric:         Mood and Affect: Mood normal.         Behavior: Behavior normal.       Lines/Drains:  Lines/Drains/Airways       Active Status       Name Placement date Placement time Site Days    Closed/Suction Drain --  --  --  --    Urethral Catheter Three way 22 Fr. 10/31/24  0820  Three way  2    Continuous Bladder Irrigation Three-way 10/31/24  --  Three-way  2                  Urinary Catheter:  Goal for removal: gross hematuria resolves               Lab Results: I have reviewed the following results:  Results from last 7 days   Lab Units 11/02/24  0536   WBC Thousand/uL 12.28*   HEMOGLOBIN g/dL 9.1*   HEMATOCRIT % 28.2*   PLATELETS Thousands/uL 216     Results from last 7 days   Lab Units 11/01/24  0520   SODIUM mmol/L 132*   POTASSIUM mmol/L 4.2   CHLORIDE mmol/L 103   CO2 mmol/L 25   BUN mg/dL 13   CREATININE mg/dL 0.66   ANION GAP mmol/L 4   CALCIUM mg/dL 8.4   GLUCOSE RANDOM mg/dL 141*             No results found for: \"HGBA1C\"        No new imaging for this admission    ** Please Note: This note has been constructed using a voice recognition " system. **

## 2024-11-02 NOTE — ASSESSMENT & PLAN NOTE
Not in acute exacerbation  Continue inpatient formulary equivalent of home inhalers. PRN albuterol

## 2024-11-02 NOTE — ASSESSMENT & PLAN NOTE
Known 6 cm bladder mass - follows with our team in the outpatient setting     Plan:  TURBT scheduled 12/11/24 with Dr. Real

## 2024-11-02 NOTE — ASSESSMENT & PLAN NOTE
Multifactorial secondary to recent urethral manipulation/complex Malone catheter insertion and known bladder mass as well as antiplatelet/anticoagulation perioperatively for vascular procedure.      Plan:  Maintain three-way Malone-CBI.  Urine is clearing nicely.  Will follow and determine time for clamping.  Continue aspirin--this is benign.  Consider sooner operative intervention only if patient develops hematuria associated with profound ABLA.  For now medical optimization is appreciated given patient's additional medical and surgical issues.

## 2024-11-02 NOTE — PLAN OF CARE
Problem: Prexisting or High Potential for Compromised Skin Integrity  Goal: Skin integrity is maintained or improved  Description: INTERVENTIONS:  - Identify patients at risk for skin breakdown  - Assess and monitor skin integrity  - Assess and monitor nutrition and hydration status  - Monitor labs   - Assess for incontinence   - Turn and reposition patient  - Assist with mobility/ambulation  - Relieve pressure over bony prominences  - Avoid friction and shearing  - Provide appropriate hygiene as needed including keeping skin clean and dry  - Evaluate need for skin moisturizer/barrier cream  - Collaborate with interdisciplinary team   - Patient/family teaching  - Consider wound care consult   Outcome: Progressing     Problem: DISCHARGE PLANNING  Goal: Discharge to home or other facility with appropriate resources  Description: INTERVENTIONS:  - Identify barriers to discharge w/patient and caregiver  - Arrange for needed discharge resources and transportation as appropriate  - Identify discharge learning needs (meds, wound care, etc.)  - Arrange for interpretive services to assist at discharge as needed  - Refer to Case Management Department for coordinating discharge planning if the patient needs post-hospital services based on physician/advanced practitioner order or complex needs related to functional status, cognitive ability, or social support system  Outcome: Progressing     Problem: Knowledge Deficit  Goal: Patient/family/caregiver demonstrates understanding of disease process, treatment plan, medications, and discharge instructions  Description: Complete learning assessment and assess knowledge base.  Interventions:  - Provide teaching at level of understanding  - Provide teaching via preferred learning methods  Outcome: Progressing     Problem: PAIN - ADULT  Goal: Verbalizes/displays adequate comfort level or baseline comfort level  Description: Interventions:  - Encourage patient to monitor pain and  request assistance  - Assess pain using appropriate pain scale  - Administer analgesics based on type and severity of pain and evaluate response  - Implement non-pharmacological measures as appropriate and evaluate response  - Consider cultural and social influences on pain and pain management  - Notify physician/advanced practitioner if interventions unsuccessful or patient reports new pain  Outcome: Progressing     Problem: INFECTION - ADULT  Goal: Absence or prevention of progression during hospitalization  Description: INTERVENTIONS:  - Assess and monitor for signs and symptoms of infection  - Monitor lab/diagnostic results  - Monitor all insertion sites, i.e. indwelling lines, tubes, and drains  - Monitor endotracheal if appropriate and nasal secretions for changes in amount and color  - [place] appropriate cooling/warming therapies per order  - Administer medications as ordered  - Instruct and encourage patient and family to use good hand hygiene technique  - Identify and instruct in appropriate isolation precautions for identified infection/condition  Outcome: Progressing  Goal: Absence of fever/infection during neutropenic period  Description: INTERVENTIONS:  - Monitor WBC    Outcome: Progressing     Problem: SAFETY ADULT  Goal: Patient will remain free of falls  Description: INTERVENTIONS:  - Educate patient/family on patient safety including physical limitations  - Instruct patient to call for assistance with activity   - Consult OT/PT to assist with strengthening/mobility   - Keep Call bell within reach  - Keep bed low and locked with side rails adjusted as appropriate  - Keep care items and personal belongings within reach  - Initiate and maintain comfort rounds  - Make Fall Risk Sign visible to staff  - Offer Toileting every 2 Hours, in advance of need  - Initiate/Maintain bed alarm  - Obtain necessary fall risk management equipment  - Apply yellow socks and bracelet for high fall risk patients  -  Consider moving patient to room near nurses station  Outcome: Progressing  Goal: Maintain or return to baseline ADL function  Description: INTERVENTIONS:  -  Assess patient's ability to carry out ADLs; assess patient's baseline for ADL function and identify physical deficits which impact ability to perform ADLs (bathing, care of mouth/teeth, toileting, grooming, dressing, etc.)  - Assess/evaluate cause of self-care deficits   - Assess range of motion  - Assess patient's mobility; develop plan if impaired  - Assess patient's need for assistive devices and provide as appropriate  - Encourage maximum independence but intervene and supervise when necessary  - Involve family in performance of ADLs  - Assess for home care needs following discharge   - Consider OT consult to assist with ADL evaluation and planning for discharge  - Provide patient education as appropriate  Outcome: Progressing  Goal: Maintains/Returns to pre admission functional level  Description: INTERVENTIONS:  - Perform AM-PAC 6 Click Basic Mobility/ Daily Activity assessment daily.  - Set and communicate daily mobility goal to care team and patient/family/caregiver.   - Collaborate with rehabilitation services on mobility goals if consulted  - Perform Range of Motion 3 times a day.  - Reposition patient every 2 hours.  - Dangle patient 3 times a day  - Stand patient 3 times a day  - Ambulate patient 3 times a day  - Out of bed to chair 3 times a day   - Out of bed for meals 3 times a day  - Out of bed for toileting  - Record patient progress and toleration of activity level   Outcome: Progressing     Problem: RESPIRATORY - ADULT  Goal: Achieves optimal ventilation and oxygenation  Description: INTERVENTIONS:  - Assess for changes in respiratory status  - Assess for changes in mentation and behavior  - Position to facilitate oxygenation and minimize respiratory effort  - Oxygen administered by appropriate delivery if ordered  - Initiate smoking  cessation education as indicated  - Encourage broncho-pulmonary hygiene including cough, deep breathe, Incentive Spirometry  - Assess the need for suctioning and aspirate as needed  - Assess and instruct to report SOB or any respiratory difficulty  - Respiratory Therapy support as indicated  Outcome: Progressing     Problem: MUSCULOSKELETAL - ADULT  Goal: Maintain or return mobility to safest level of function  Description: INTERVENTIONS:  - Assess patient's ability to carry out ADLs; assess patient's baseline for ADL function and identify physical deficits which impact ability to perform ADLs (bathing, care of mouth/teeth, toileting, grooming, dressing, etc.)  - Assess/evaluate cause of self-care deficits   - Assess range of motion  - Assess patient's mobility  - Assess patient's need for assistive devices and provide as appropriate  - Encourage maximum independence but intervene and supervise when necessary  - Involve family in performance of ADLs  - Assess for home care needs following discharge   - Consider OT consult to assist with ADL evaluation and planning for discharge  - Provide patient education as appropriate  Outcome: Progressing  Goal: Maintain proper alignment of affected body part  Description: INTERVENTIONS:  - Support, maintain and protect limb and body alignment  - Provide patient/ family with appropriate education  Outcome: Progressing     Problem: SKIN/TISSUE INTEGRITY - ADULT  Goal: Skin Integrity remains intact(Skin Breakdown Prevention)  Description: Assess:  -Perform [place] assessment every shift  -Clean and moisturize skin every shift  -Inspect skin when repositioning, toileting, and assisting with ADLS  -Assess under medical devices every shift  -Assess extremities for adequate circulation and sensation     Bed Management:  -Have minimal linens on bed & keep smooth, unwrinkled  -Change linens as needed when moist or perspiring  -Avoid sitting or lying in one position for more than 2 hours  while in bed  -Keep HOB at 30 degrees     Activity:  -Mobilize patient 3 times a day  -Encourage activity and walks on unit  -Encourage or provide ROM exercises   -Turn and reposition patient every 2 Hours  -Use appropriate equipment to lift or move patient in bed  -Instruct/ Assist with weight shifting every 2 when out of bed in chair  -Consider limitation of chair time 2 hour intervals    Skin Care:  -Avoid use of baby powder, tape, friction and shearing, hot water or constrictive clothing  -Relieve pressure over bony prominences using Allevyn   -Do not massage red bony areas    Next Steps:  -Teach patient strategies to minimize risks   -Consider consults to  interdisciplinary teams  Outcome: Progressing  Goal: Incision(s), wounds(s) or drain site(s) healing without S/S of infection  Description: INTERVENTIONS  - Assess and document dressing, incision, wound bed, drain sites and surrounding tissue  - Provide patient and family education  - Perform skin care/dressing changes every shift  Outcome: Progressing

## 2024-11-03 LAB
ANION GAP SERPL CALCULATED.3IONS-SCNC: 4 MMOL/L (ref 4–13)
BUN SERPL-MCNC: 10 MG/DL (ref 5–25)
CALCIUM SERPL-MCNC: 8.7 MG/DL (ref 8.4–10.2)
CHLORIDE SERPL-SCNC: 101 MMOL/L (ref 96–108)
CO2 SERPL-SCNC: 29 MMOL/L (ref 21–32)
CREAT SERPL-MCNC: 0.73 MG/DL (ref 0.6–1.3)
ERYTHROCYTE [DISTWIDTH] IN BLOOD BY AUTOMATED COUNT: 15.6 % (ref 11.6–15.1)
GFR SERPL CREATININE-BSD FRML MDRD: 98 ML/MIN/1.73SQ M
GLUCOSE SERPL-MCNC: 116 MG/DL (ref 65–140)
HCT VFR BLD AUTO: 27.5 % (ref 36.5–49.3)
HGB BLD-MCNC: 9.2 G/DL (ref 12–17)
MCH RBC QN AUTO: 31.6 PG (ref 26.8–34.3)
MCHC RBC AUTO-ENTMCNC: 33.5 G/DL (ref 31.4–37.4)
MCV RBC AUTO: 95 FL (ref 82–98)
PLATELET # BLD AUTO: 217 THOUSANDS/UL (ref 149–390)
PMV BLD AUTO: 8.4 FL (ref 8.9–12.7)
POTASSIUM SERPL-SCNC: 3.7 MMOL/L (ref 3.5–5.3)
RBC # BLD AUTO: 2.91 MILLION/UL (ref 3.88–5.62)
SODIUM SERPL-SCNC: 134 MMOL/L (ref 135–147)
WBC # BLD AUTO: 12.64 THOUSAND/UL (ref 4.31–10.16)

## 2024-11-03 PROCEDURE — 85027 COMPLETE CBC AUTOMATED: CPT | Performed by: SURGERY

## 2024-11-03 PROCEDURE — 80048 BASIC METABOLIC PNL TOTAL CA: CPT | Performed by: SURGERY

## 2024-11-03 PROCEDURE — 99024 POSTOP FOLLOW-UP VISIT: CPT | Performed by: SURGERY

## 2024-11-03 PROCEDURE — 99232 SBSQ HOSP IP/OBS MODERATE 35: CPT | Performed by: NURSE PRACTITIONER

## 2024-11-03 PROCEDURE — 99232 SBSQ HOSP IP/OBS MODERATE 35: CPT | Performed by: STUDENT IN AN ORGANIZED HEALTH CARE EDUCATION/TRAINING PROGRAM

## 2024-11-03 RX ORDER — HYDROMORPHONE HCL/PF 1 MG/ML
0.5 SYRINGE (ML) INJECTION EVERY 2 HOUR PRN
Status: DISCONTINUED | OUTPATIENT
Start: 2024-11-03 | End: 2024-11-06 | Stop reason: HOSPADM

## 2024-11-03 RX ORDER — CEPHALEXIN 500 MG/1
500 CAPSULE ORAL EVERY 6 HOURS SCHEDULED
Status: DISCONTINUED | OUTPATIENT
Start: 2024-11-03 | End: 2024-11-04

## 2024-11-03 RX ORDER — LIDOCAINE HYDROCHLORIDE 20 MG/ML
1 JELLY TOPICAL 3 TIMES DAILY
Status: DISCONTINUED | OUTPATIENT
Start: 2024-11-03 | End: 2024-11-06 | Stop reason: HOSPADM

## 2024-11-03 RX ORDER — PHENAZOPYRIDINE HYDROCHLORIDE 100 MG/1
200 TABLET, FILM COATED ORAL 3 TIMES DAILY
Status: DISCONTINUED | OUTPATIENT
Start: 2024-11-03 | End: 2024-11-06 | Stop reason: HOSPADM

## 2024-11-03 RX ORDER — LANOLIN ALCOHOL/MO/W.PET/CERES
6 CREAM (GRAM) TOPICAL
Status: DISCONTINUED | OUTPATIENT
Start: 2024-11-03 | End: 2024-11-06 | Stop reason: HOSPADM

## 2024-11-03 RX ADMIN — ATORVASTATIN CALCIUM 20 MG: 20 TABLET, FILM COATED ORAL at 16:09

## 2024-11-03 RX ADMIN — PHENAZOPYRIDINE 200 MG: 100 TABLET ORAL at 21:08

## 2024-11-03 RX ADMIN — MELATONIN 6 MG: 3 TAB ORAL at 21:08

## 2024-11-03 RX ADMIN — OXYBUTYNIN CHLORIDE 5 MG: 5 TABLET ORAL at 16:09

## 2024-11-03 RX ADMIN — OXYBUTYNIN CHLORIDE 5 MG: 5 TABLET ORAL at 08:12

## 2024-11-03 RX ADMIN — ACETAMINOPHEN 975 MG: 325 TABLET, FILM COATED ORAL at 01:26

## 2024-11-03 RX ADMIN — ACETAMINOPHEN 975 MG: 325 TABLET, FILM COATED ORAL at 18:07

## 2024-11-03 RX ADMIN — ASPIRIN 81 MG: 81 TABLET, COATED ORAL at 08:12

## 2024-11-03 RX ADMIN — HEPARIN SODIUM 5000 UNITS: 5000 INJECTION INTRAVENOUS; SUBCUTANEOUS at 13:21

## 2024-11-03 RX ADMIN — PHENAZOPYRIDINE 100 MG: 100 TABLET ORAL at 16:09

## 2024-11-03 RX ADMIN — CEPHALEXIN 500 MG: 500 CAPSULE ORAL at 18:37

## 2024-11-03 RX ADMIN — PHENAZOPYRIDINE 100 MG: 100 TABLET ORAL at 08:12

## 2024-11-03 RX ADMIN — FLUTICASONE FUROATE AND VILANTEROL TRIFENATATE 1 PUFF: 100; 25 POWDER RESPIRATORY (INHALATION) at 10:15

## 2024-11-03 RX ADMIN — SENNOSIDES 8.6 MG: 8.6 TABLET, FILM COATED ORAL at 08:13

## 2024-11-03 RX ADMIN — ACETAMINOPHEN 975 MG: 325 TABLET, FILM COATED ORAL at 10:15

## 2024-11-03 RX ADMIN — OXYCODONE HYDROCHLORIDE 10 MG: 10 TABLET ORAL at 11:51

## 2024-11-03 RX ADMIN — OXYBUTYNIN CHLORIDE 5 MG: 5 TABLET ORAL at 21:08

## 2024-11-03 RX ADMIN — OXYCODONE HYDROCHLORIDE 10 MG: 10 TABLET ORAL at 17:15

## 2024-11-03 RX ADMIN — MELATONIN 6 MG: 3 TAB ORAL at 01:26

## 2024-11-03 RX ADMIN — HEPARIN SODIUM 5000 UNITS: 5000 INJECTION INTRAVENOUS; SUBCUTANEOUS at 06:19

## 2024-11-03 NOTE — PROGRESS NOTES
Progress Note - Hospitalist   Name: Nahid Luis 64 y.o. male I MRN: [id number]  Unit/Bed#: E2 -01 I Date of Admission: 10/31/2024   Date of Service: 11/3/2024 I Hospital Day: 3    Assessment & Plan  PAD (peripheral artery disease) (HCC)  64-year-old male with PAD is currently admitted under the vascular service underwent the following procedure last 10/31/2024.    Right common femoral and anterior tibial artery exposure  Right greater saphenous vein harvest via skip incisions  Right common femoral artery to anterior tibial artery bypass with ipsilateral reversed greater saphenous vein     Continue aspirin and statin.  DVT prophylaxis, disposition, and rest of management per primary service  Gross hematuria  After brambila catheter placement in the OR  Currently on CBI, management per Urology  Consider holding heparin if Vascular surgery amenable  Hypertension  Controlled, resume amlodipine  BP controlled off losartan, continue to hold  Chronic obstructive pulmonary disease (HCC)  Not in acute exacerbation  Continue inpatient formulary equivalent of home inhalers. PRN albuterol  Mixed hyperlipidemia  Continue lipitor  Bladder mass  Large bladder tumor concerning for bladder cancer.   Management per urology service.   ABLA (acute blood loss anemia)  Downtrending, likely due to hematuria continue monitoring.  Discussing with Vascular surgery if heparin for dvt ppx can be held for now    Results from last 7 days   Lab Units 11/03/24  0632 11/02/24  0536 11/01/24  0520   HEMOGLOBIN g/dL 9.2* 9.1* 10.4*   MCV fL 95 96 93   RDW % 15.6* 15.6* 15.5*         VTE Pharmacologic Prophylaxis:   Moderate Risk (Score 3-4) - Pharmacological DVT Prophylaxis Ordered: heparin.    Mobility:   Basic Mobility Inpatient Raw Score: 18  JH-HLM Goal: 6: Walk 10 steps or more  JH-HLM Achieved: 5: Stand (1 or more minutes)  JH-HLM Goal NOT achieved. Continue with multidisciplinary rounding and encourage appropriate mobility to  improve upon [facility] goals.    Patient Centered Rounds: I performed bedside rounds with nursing staff today.   Discussions with Specialists or Other Care Team Provider: Vascular, awaiting Urology recs    Education and Discussions with Family / Patient:  patient.     Current Length of Stay: 3 day(s)  Current Patient Status: Inpatient   Certification Statement: The patient will continue to require additional inpatient hospital stay due to ongoing gross hematuria  Discharge Plan:  TBD    Code Status: Level 1 - Full Code    Subjective   Patient seen and examined at bedside. Very frustrated given persistent hematuria and persistent pain from brambila. Denies any other new symptoms. States he has chronic cough from his COPD. It has been stable. Denies any abdominal pain. Endorses some minimal pain over insertion site.     Objective :  Temp:  [98.5 °F (36.9 °C)-100.7 °F (38.2 °C)] 100.7 °F (38.2 °C)  HR:  [] 92  BP: (114-136)/(58-65) 118/58  Resp:  [18-20] 20  SpO2:  [94 %-98 %] 98 %  O2 Device: None (Room air)    Body mass index is 28.69 kg/m².     Input and Output Summary (last 24 hours):     Intake/Output Summary (Last 24 hours) at 11/3/2024 1611  Last data filed at 11/3/2024 1608  Gross per 24 hour   Intake 720 ml   Output 2790 ml   Net -2070 ml       Physical Exam  Vitals and nursing note reviewed.   Constitutional:       General: He is not in acute distress.     Appearance: He is well-developed.   HENT:      Head: Normocephalic and atraumatic.   Eyes:      Conjunctiva/sclera: Conjunctivae normal.   Cardiovascular:      Rate and Rhythm: Normal rate and regular rhythm.      Heart sounds: No murmur heard.  Pulmonary:      Effort: Pulmonary effort is normal. No respiratory distress.      Breath sounds: Normal breath sounds.   Abdominal:      Palpations: Abdomen is soft.      Tenderness: There is no abdominal tenderness.   Genitourinary:     Comments: Brambila in place with bloody urine in bag  Musculoskeletal:          General: No swelling.      Cervical back: Neck supple.   Skin:     General: Skin is warm and dry.      Capillary Refill: Capillary refill takes less than 2 seconds.      Comments: Incision sites appear clean, dry, intact   Neurological:      Mental Status: He is alert.   Psychiatric:         Mood and Affect: Mood normal.           Lines/Drains:  Lines/Drains/Airways       Active Status       Name Placement date Placement time Site Days    Closed/Suction Drain --  --  --  --    Urethral Catheter Three way 22 Fr. 10/31/24  0820  Three way  3    Continuous Bladder Irrigation Three-way 10/31/24  --  Three-way  3                  Urinary Catheter:  Goal for removal:  management per Urology                 Lab Results: I have reviewed the following results:   Results from last 7 days   Lab Units 11/03/24  0632   WBC Thousand/uL 12.64*   HEMOGLOBIN g/dL 9.2*   HEMATOCRIT % 27.5*   PLATELETS Thousands/uL 217     Results from last 7 days   Lab Units 11/03/24  0632   SODIUM mmol/L 134*   POTASSIUM mmol/L 3.7   CHLORIDE mmol/L 101   CO2 mmol/L 29   BUN mg/dL 10   CREATININE mg/dL 0.73   ANION GAP mmol/L 4   CALCIUM mg/dL 8.7   GLUCOSE RANDOM mg/dL 116                       Recent Cultures (last 7 days):         Imaging Results Review: I reviewed radiology reports from this admission including: CT abdomen/pelvis.  Other Study Results Review: No additional pertinent studies reviewed.    Last 24 Hours Medication List:     Current Facility-Administered Medications:     acetaminophen (TYLENOL) tablet 975 mg, Q8H MICHAEL    albuterol (PROVENTIL HFA,VENTOLIN HFA) inhaler 2 puff, Q6H PRN    aspirin (ECOTRIN LOW STRENGTH) EC tablet 81 mg, Daily    atorvastatin (LIPITOR) tablet 20 mg, Daily With Dinner    Fluticasone Furoate-Vilanterol 100-25 mcg/actuation 1 puff, BID    heparin (porcine) subcutaneous injection 5,000 Units, Q8H MICHAEL **AND** [COMPLETED] Platelet count, Once    HYDROmorphone (DILAUDID) injection 0.5 mg, Q2H PRN    labetalol  (NORMODYNE) injection 10 mg, Q6H PRN    melatonin tablet 6 mg, HS    ondansetron (ZOFRAN) injection 4 mg, Q6H PRN    oxybutynin (DITROPAN) tablet 5 mg, TID    oxyCODONE (ROXICODONE) immediate release tablet 10 mg, Q4H PRN    oxyCODONE (ROXICODONE) IR tablet 5 mg, Q4H PRN    phenazopyridine (PYRIDIUM) tablet 100 mg, TID    polyethylene glycol (MIRALAX) packet 17 g, Daily PRN    senna (SENOKOT) tablet 8.6 mg, Daily    Administrative Statements   Today, Patient Was Seen By: Lisa Gao MD    **Please Note: This note may have been constructed using a voice recognition system.**

## 2024-11-03 NOTE — ASSESSMENT & PLAN NOTE
Multifactorial secondary to recent urethral manipulation/complex Malone catheter insertion and known bladder mass as well as antiplatelet/anticoagulation perioperatively for vascular procedure.  Urine clearing with CBI.  But unable to clamp and wean at this time.  Despite this, Hemoglobin is essentially the same yesterday and today 9.1--9.2.  Patient with urethral pain.    Plan:  Maintain three-way Malone-CBI.  Urine is clearing nicely.  Nurses can continue manual irrigation in addition to CBI around-the-clock to aspirate clots and keep lines patent.  Pyridium, Ditropan and viscous lidocaine for catheter related discomfort.

## 2024-11-03 NOTE — PROGRESS NOTES
Progress Note - Urology   Name: Nahid Luis 64 y.o. male I MRN: [id number]  Unit/Bed#: E2 -01 I Date of Admission: 10/31/2024   Date of Service: 11/3/2024 I Hospital Day: 3    Assessment & Plan  PAD (peripheral artery disease) ([facility])  Status post right common femoral and anterior tibial artery exposure, right greater saphenous vein harvest via skip incisions, right common femoral artery to anterior tibial artery bypass with ipsilateral reverse greater saphenous vein.    Plan:  Management per primary vascular surgical team  Can resume heparin for DVT prophylaxis in AM.  Hold aspirin in preparation for potential TURBT  Gross hematuria  Multifactorial secondary to recent urethral manipulation/complex Malone catheter insertion and known bladder mass as well as antiplatelet/anticoagulation perioperatively for vascular procedure.  Urine clearing with CBI.  But unable to clamp and wean at this time.  Despite this, Hemoglobin is essentially the same yesterday and today 9.1--9.2.  Patient with urethral pain.    Plan:  Maintain three-way Malone-CBI.  Urine is clearing nicely.  Nurses can continue manual irrigation in addition to CBI around-the-clock to aspirate clots and keep lines patent.  Pyridium, Ditropan and viscous lidocaine for catheter related discomfort.  Bladder mass  Known 62 x 36 mm posterior bladder mass.    Plan:  TURBT scheduled 12/11/24 with Dr. Real   However, given present bleeding and inability to wean CBI as well as need to resume at minimum aspirin for management of PAD, we will need to consider TURBT sooner.   Patient with Tmax of 100.7--down to 98.9 most recent assessment.  Will begin Ancef prophylactically in preparation for a lower tract manipulation.  Discussed with daughter at bedside.  Will be difficult to facilitate discharge if unable to wean CBI.    Therefore as long as deemed medically safe and appropriate by the medical and vascular teams and logistics fairly easily negotiated,  will plan for TURBT during the week.  Will continue to follow clinical progress in the interim and update healthcare team as well as patient/daughter with exact date and time.  Will aim for Tuesday or Wednesday as OR availability permits.    Urology service will follow.    Subjective   Nahid Luis  Is a 64-year-old male with history of significant tobacco use, COPD, PAD and bladder mass measuring approximately 6 cm seen in recent imaging as well as on cystoscopic examination.  Patient was awaiting TURBT scheduled with Dr. Toni Aguilar 12/11.  However was admitted for vascular procedure now status post femoral to tibial artery bypass.  Malone catheter inserted intraoperatively with urologist assistance at vascular team request for gross hematuria seen during anticoagulation loading.  Patient has had gross hematuria since.  Three-way Malone--CBI with intermittent but round-the-clock manual irrigation ongoing.    Patient developed low-grade temperature.  Offers complaints of urethral/Malone related discomfort.  But denies flank, abdominal or suprapubic pain.  He is seen in follow-up for CBI management.    Objective :  Temp:  [98.5 °F (36.9 °C)-100.7 °F (38.2 °C)] 98.9 °F (37.2 °C)  HR:  [] 92  BP: (114-136)/(58-65) 118/58  Resp:  [18-20] 20  SpO2:  [94 %-98 %] 98 %  O2 Device: None (Room air)    I/O         11/01 0701  11/02 0700 11/02 0701  11/03 0700 11/03 0701  11/04 0700    P.O. 360 240 480    I.V. (mL/kg)       IV Piggyback       Total Intake(mL/kg) 360 (4.2) 240 (2.8) 480 (5.6)    Urine (mL/kg/hr) 3890 (1.9) 1320 (0.6) 1510 (1.6)    Total Output 3890 1320 1510    Net -3530 -3737 -1030                 Lines/Drains/Airways       Active Status       Name Placement date Placement time Site Days    Closed/Suction Drain --  --  --  --    Urethral Catheter Three way 22 Fr. 10/31/24  0820  Three way  3    Continuous Bladder Irrigation Three-way 10/31/24  --  Three-way  3                  Physical  Exam  General appearance: alert and oriented, in no acute distress, appears stated age, cooperative, and no distress  Head: Normocephalic, without obvious abnormality, atraumatic  Neck: no JVD and supple, symmetrical, trachea midline  Lungs: diminished breath sounds  Heart: regular rate and rhythm, S1, S2 normal, no murmur, click, rub or gallop  Abdomen: soft, non-tender; bowel sounds normal; no masses,  no organomegaly  Extremities: extremities normal, warm and well-perfused; no cyanosis, clubbing, or edema  Pulses: 2+ and symmetric  Neurologic: Grossly normal  Three-way Malone-/CBI--clear blush urine.  No clots.        Lab Results: I have reviewed the following results:  Recent Labs     11/01/24  0000 11/01/24  0520 11/02/24  0536 11/03/24  0632   WBC  --  17.87*   < > 12.64*   HGB  --  10.4*   < > 9.2*   HCT  --  31.7*   < > 27.5*   PLT  --  223   < > 217   SODIUM 132* 132*  --  134*   K 4.3 4.2  --  3.7    103  --  101   CO2 24 25  --  29   BUN 15 13  --  10   CREATININE 0.82 0.66  --  0.73   GLUC 165* 141*  --  116   CAIONIZED 1.10* 1.13  --   --    MG 1.9 2.4  --   --    PHOS 3.4  --   --   --     < > = values in this interval not displayed.       Imaging Results Review: No pertinent imaging studies reviewed.--None

## 2024-11-03 NOTE — ASSESSMENT & PLAN NOTE
Patient is a 64-year-old male, former smoker, PMH HTN, HLD, COPD, newly diagnosed bladder mass, and PAD. Patient admitted 10/31/2024 for elective right CFA to AT bypass SQ tunneled laterally with ipsilateral vGSV by Dr. Bray secondary to RLE short distance claudication. Urology consulted intraoperatively due to hematuria.    Recommendations:  - POD #3 right CFA to AT bypass with ipsilateral GSV  - Mild post-operative pain, however otherwise feels well. Palpable BPG pulse and palpable DP  - Dressings changed yesterday, incisions well approximated and intact. Daily dressing changes per nursing  - Continue prevena VAC to right groin for an additional 4 days, removed 11/7  - Continue medical optimization with statin therapy and aspirin. Ok to continue aspirin in the setting of hematuria per urology  - OOB as tolerated.  - Patient is stable for discharge from a vascular surgery standpoint, however remains on CBI due to continued hematuria  - Appreciate urology input  - Management of comorbidities per SLIM.  - Patient seen and evaluated with Dr. Cervantes.

## 2024-11-03 NOTE — ASSESSMENT & PLAN NOTE
Status post right common femoral and anterior tibial artery exposure, right greater saphenous vein harvest via skip incisions, right common femoral artery to anterior tibial artery bypass with ipsilateral reverse greater saphenous vein.    Plan:  Management per primary vascular surgical team  Can resume heparin for DVT prophylaxis in AM.  Hold aspirin in preparation for potential TURBT

## 2024-11-03 NOTE — PROGRESS NOTES
Progress Note - Vascular Surgery   Name: Nahid Luis 64 y.o. male I MRN: [id number]  Unit/Bed#: E2 -01 I Date of Admission: 10/31/2024   Date of Service: 11/3/2024 I Hospital Day: 3    Assessment & Plan  PAD (peripheral artery disease) (HCC)  Patient is a 64-year-old male, former smoker, PMH HTN, HLD, COPD, newly diagnosed bladder mass, and PAD. Patient admitted 10/31/2024 for elective right CFA to AT bypass SQ tunneled laterally with ipsilateral vGSV by Dr. Bray secondary to RLE short distance claudication. Urology consulted intraoperatively due to hematuria.    Recommendations:  - POD #3 right CFA to AT bypass with ipsilateral GSV  - Mild post-operative pain, however otherwise feels well. Palpable BPG pulse and palpable DP  - Dressings changed yesterday, incisions well approximated and intact. Daily dressing changes per nursing  - Continue prevena VAC to right groin for an additional 4 days, removed 11/7  - Continue medical optimization with statin therapy and aspirin. Ok to continue aspirin in the setting of hematuria per urology  - OOB as tolerated.  - Patient is stable for discharge from a vascular surgery standpoint, however remains on CBI due to continued hematuria  - Appreciate urology input  - Management of comorbidities per SLIM.  - Patient seen and evaluated with Dr. Cervantes.    24 Hour Events : no acute events overnight  Subjective :  Patient resting comfortably in bed. Reports continued post operative tenderness to the RLE, however feels well otherwise. Continues on CBI for gross hematuria.    Objective :  Temp:  [98.5 °F (36.9 °C)-99.2 °F (37.3 °C)] 98.6 °F (37 °C)  HR:  [] 100  BP: (114-136)/(58-65) 114/58  Resp:  [18-20] 20  SpO2:  [91 %-95 %] 94 %  O2 Device: None (Room air)     I/O         11/01 0701 11/02 0700 11/02 0701 11/03 0700 11/03 0701 11/04 0700    P.O. 360 240     I.V. (mL/kg)       IV Piggyback       Total Intake(mL/kg) 360 (4.2) 240 (2.8)     Urine (mL/kg/hr)  3890 (1.9) 1320 (0.6) 350 (1.5)    Total Output 3890 1320 350    Net -3530 -1080 -350                 Lines/Drains/Airways       Active Status       Name Placement date Placement time Site Days    Closed/Suction Drain --  --  --  --    Urethral Catheter Three way 22 Fr. 10/31/24  0820  Three way  3    Continuous Bladder Irrigation Three-way 10/31/24  --  Three-way  3                  Physical Exam  Vitals and nursing note reviewed.   Constitutional:       General: He is not in acute distress.     Appearance: Normal appearance. He is well-developed.   HENT:      Head: Normocephalic and atraumatic.   Eyes:      Conjunctiva/sclera: Conjunctivae normal.   Cardiovascular:      Rate and Rhythm: Normal rate and regular rhythm.      Pulses:           Dorsalis pedis pulses are 2+ on the right side.        Posterior tibial pulses are detected w/ Doppler on the right side.      Comments: Palpable RLE BPG pulse  Pulmonary:      Effort: Pulmonary effort is normal. No respiratory distress.   Musculoskeletal:         General: Swelling (RLE post operative swelling) and tenderness (RLE) present.      Cervical back: Normal range of motion and neck supple.      Left lower leg: No edema.   Skin:     General: Skin is warm and dry.      Capillary Refill: Capillary refill takes less than 2 seconds.      Findings: Wound present. No lesion or rash.      Comments: Well approximated surgical incisions without significant drainage   Neurological:      General: No focal deficit present.      Mental Status: He is alert and oriented to person, place, and time.   Psychiatric:         Mood and Affect: Mood normal.         Behavior: Behavior normal.         Lab Results: I have reviewed the following results:  CBC with diff:   Lab Results   Component Value Date    WBC 12.64 (H) 11/03/2024    HGB 9.2 (L) 11/03/2024    HCT 27.5 (L) 11/03/2024    MCV 95 11/03/2024     11/03/2024    RBC 2.91 (L) 11/03/2024    MCH 31.6 11/03/2024    MCHC 33.5  "11/03/2024    RDW 15.6 (H) 11/03/2024    MPV 8.4 (L) 11/03/2024    NRBC 0 08/17/2024   ,   BMP/CMP:  Lab Results   Component Value Date    SODIUM 134 (L) 11/03/2024    K 3.7 11/03/2024     11/03/2024    CO2 29 11/03/2024    BUN 10 11/03/2024    CREATININE 0.73 11/03/2024    CALCIUM 8.7 11/03/2024    AST 13 08/17/2024    ALT 10 08/17/2024    ALKPHOS 72 08/17/2024    EGFR 98 11/03/2024   ,   Lipid Panel: No results found for: \"CHOL\",   Coags:   Lab Results   Component Value Date    INR 0.92 09/24/2024   ,   Blood Culture: No results found for: \"BLOODCX\",   Urinalysis:   Lab Results   Component Value Date    COLORU orange 10/18/2024    CLARITYU cloudy 10/18/2024    LEUKOCYTESUR trace 10/18/2024    NITRITE neg 10/18/2024    GLUCOSEU neg 10/18/2024    KETONESU trace 10/18/2024    BILIRUBINUR small 10/18/2024    BLOODU large 10/18/2024   ,   Urine Culture:   Lab Results   Component Value Date    URINECX No Growth <1000 cfu/mL 10/19/2024   ,   Wound Culure: No results found for: \"WOUNDCULT\"    "

## 2024-11-03 NOTE — ASSESSMENT & PLAN NOTE
Downtrending, likely due to hematuria continue monitoring.  Discussing with Vascular surgery if heparin for dvt ppx can be held for now    Results from last 7 days   Lab Units 11/03/24  0632 11/02/24  0536 11/01/24  0520   HEMOGLOBIN g/dL 9.2* 9.1* 10.4*   MCV fL 95 96 93   RDW % 15.6* 15.6* 15.5*

## 2024-11-03 NOTE — ASSESSMENT & PLAN NOTE
Known 62 x 36 mm posterior bladder mass.    Plan:  TURBT scheduled 12/11/24 with Dr. Real   However, given present bleeding and inability to wean CBI as well as need to resume at minimum aspirin for management of PAD, we will need to consider TURBT sooner.   Patient with Tmax of 100.7--down to 98.9 most recent assessment.  Will begin Ancef prophylactically in preparation for a lower tract manipulation.  Discussed with daughter at bedside.  Will be difficult to facilitate discharge if unable to wean CBI.    Therefore as long as deemed medically safe and appropriate by the medical and vascular teams and logistics fairly easily negotiated, will plan for TURBT during the week.  Will continue to follow clinical progress in the interim and update healthcare team as well as patient/daughter with exact date and time.  Will aim for Tuesday or Wednesday as OR availability permits.

## 2024-11-03 NOTE — PLAN OF CARE
Problem: Prexisting or High Potential for Compromised Skin Integrity  Goal: Skin integrity is maintained or improved  Description: INTERVENTIONS:  - Identify patients at risk for skin breakdown  - Assess and monitor skin integrity  - Assess and monitor nutrition and hydration status  - Monitor labs   - Assess for incontinence   - Turn and reposition patient  - Assist with mobility/ambulation  - Relieve pressure over bony prominences  - Avoid friction and shearing  - Provide appropriate hygiene as needed including keeping skin clean and dry  - Evaluate need for skin moisturizer/barrier cream  - Collaborate with interdisciplinary team   - Patient/family teaching  - Consider wound care consult   Outcome: Progressing     Problem: DISCHARGE PLANNING  Goal: Discharge to home or other facility with appropriate resources  Description: INTERVENTIONS:  - Identify barriers to discharge w/patient and caregiver  - Arrange for needed discharge resources and transportation as appropriate  - Identify discharge learning needs (meds, wound care, etc.)  - Arrange for interpretive services to assist at discharge as needed  - Refer to Case Management Department for coordinating discharge planning if the patient needs post-hospital services based on physician/advanced practitioner order or complex needs related to functional status, cognitive ability, or social support system  Outcome: Progressing     Problem: Knowledge Deficit  Goal: Patient/family/caregiver demonstrates understanding of disease process, treatment plan, medications, and discharge instructions  Description: Complete learning assessment and assess knowledge base.  Interventions:  - Provide teaching at level of understanding  - Provide teaching via preferred learning methods  Outcome: Progressing     Problem: RESPIRATORY - ADULT  Goal: Achieves optimal ventilation and oxygenation  Description: INTERVENTIONS:  - Assess for changes in respiratory status  - Assess for  changes in mentation and behavior  - Position to facilitate oxygenation and minimize respiratory effort  - Oxygen administered by appropriate delivery if ordered  - Initiate smoking cessation education as indicated  - Encourage broncho-pulmonary hygiene including cough, deep breathe, Incentive Spirometry  - Assess the need for suctioning and aspirate as needed  - Assess and instruct to report SOB or any respiratory difficulty  - Respiratory Therapy support as indicated  Outcome: Progressing     Problem: MUSCULOSKELETAL - ADULT  Goal: Maintain or return mobility to safest level of function  Description: INTERVENTIONS:  - Assess patient's ability to carry out ADLs; assess patient's baseline for ADL function and identify physical deficits which impact ability to perform ADLs (bathing, care of mouth/teeth, toileting, grooming, dressing, etc.)  - Assess/evaluate cause of self-care deficits   - Assess range of motion  - Assess patient's mobility  - Assess patient's need for assistive devices and provide as appropriate  - Encourage maximum independence but intervene and supervise when necessary  - Involve family in performance of ADLs  - Assess for home care needs following discharge   - Consider OT consult to assist with ADL evaluation and planning for discharge  - Provide patient education as appropriate  Outcome: Progressing  Goal: Maintain proper alignment of affected body part  Description: INTERVENTIONS:  - Support, maintain and protect limb and body alignment  - Provide patient/ family with appropriate education  Outcome: Progressing     Problem: PAIN - ADULT  Goal: Verbalizes/displays adequate comfort level or baseline comfort level  Description: Interventions:  - Encourage patient to monitor pain and request assistance  - Assess pain using appropriate pain scale  - Administer analgesics based on type and severity of pain and evaluate response  - Implement non-pharmacological measures as appropriate and evaluate  response  - Consider cultural and social influences on pain and pain management  - Notify physician/advanced practitioner if interventions unsuccessful or patient reports new pain  Outcome: Progressing     Problem: INFECTION - ADULT  Goal: Absence or prevention of progression during hospitalization  Description: INTERVENTIONS:  - Assess and monitor for signs and symptoms of infection  - Monitor lab/diagnostic results  - Monitor all insertion sites, i.e. indwelling lines, tubes, and drains  - Monitor endotracheal if appropriate and nasal secretions for changes in amount and color  - [place] appropriate cooling/warming therapies per order  - Administer medications as ordered  - Instruct and encourage patient and family to use good hand hygiene technique  - Identify and instruct in appropriate isolation precautions for identified infection/condition  Outcome: Progressing  Goal: Absence of fever/infection during neutropenic period  Description: INTERVENTIONS:  - Monitor WBC    Outcome: Progressing     Problem: SAFETY ADULT  Goal: Patient will remain free of falls  Description: INTERVENTIONS:  - Educate patient/family on patient safety including physical limitations  - Instruct patient to call for assistance with activity   - Consult OT/PT to assist with strengthening/mobility   - Keep Call bell within reach  - Keep bed low and locked with side rails adjusted as appropriate  - Keep care items and personal belongings within reach  - Initiate and maintain comfort rounds  - Make Fall Risk Sign visible to staff  - Offer Toileting every 2 Hours, in advance of need  - Initiate/Maintain bed alarm  - Obtain necessary fall risk management equipment  - Apply yellow socks and bracelet for high fall risk patients  - Consider moving patient to room near nurses station  Outcome: Progressing  Goal: Maintain or return to baseline ADL function  Description: INTERVENTIONS:  -  Assess patient's ability to carry out ADLs; assess patient's  baseline for ADL function and identify physical deficits which impact ability to perform ADLs (bathing, care of mouth/teeth, toileting, grooming, dressing, etc.)  - Assess/evaluate cause of self-care deficits   - Assess range of motion  - Assess patient's mobility; develop plan if impaired  - Assess patient's need for assistive devices and provide as appropriate  - Encourage maximum independence but intervene and supervise when necessary  - Involve family in performance of ADLs  - Assess for home care needs following discharge   - Consider OT consult to assist with ADL evaluation and planning for discharge  - Provide patient education as appropriate  Outcome: Progressing  Goal: Maintains/Returns to pre admission functional level  Description: INTERVENTIONS:  - Perform AM-PAC 6 Click Basic Mobility/ Daily Activity assessment daily.  - Set and communicate daily mobility goal to care team and patient/family/caregiver.   - Collaborate with rehabilitation services on mobility goals if consulted  - Perform Range of Motion 3 times a day.  - Reposition patient every 2 hours.  - Dangle patient 3 times a day  - Stand patient 3 times a day  - Ambulate patient 3 times a day  - Out of bed to chair 3 times a day   - Out of bed for meals 3 times a day  - Out of bed for toileting  - Record patient progress and toleration of activity level   Outcome: Progressing     Problem: SKIN/TISSUE INTEGRITY - ADULT  Goal: Skin Integrity remains intact(Skin Breakdown Prevention)  Description: Assess:  -Perform Kulwinder assessment every shift  -Clean and moisturize skin every shift  -Inspect skin when repositioning, toileting, and assisting with ADLS  -Assess under medical devices every shift  -Assess extremities for adequate circulation and sensation     Bed Management:  -Have minimal linens on bed & keep smooth, unwrinkled  -Change linens as needed when moist or perspiring  -Avoid sitting or lying in one position for more than 2 hours while in  bed  -Keep HOB at 30 degrees     Activity:  -Mobilize patient 3 times a day  -Encourage activity and walks on unit  -Encourage or provide ROM exercises   -Turn and reposition patient every 2 Hours  -Use appropriate equipment to lift or move patient in bed  -Instruct/ Assist with weight shifting every 2 when out of bed in chair  -Consider limitation of chair time 2 hour intervals    Skin Care:  -Avoid use of baby powder, tape, friction and shearing, hot water or constrictive clothing  -Relieve pressure over bony prominences using Allevyn   -Do not massage red bony areas    Next Steps:  -Teach patient strategies to minimize risks   -Consider consults to  interdisciplinary teams  Outcome: Progressing  Goal: Incision(s), wounds(s) or drain site(s) healing without S/S of infection  Description: INTERVENTIONS  - Assess and document dressing, incision, wound bed, drain sites and surrounding tissue  - Provide patient and family education  - Perform skin care/dressing changes every shift  Outcome: Progressing

## 2024-11-03 NOTE — PHYSICAL THERAPY NOTE
PHYSICAL THERAPY SCREEN          Patient Name: Nahid Luis  Today's Date: 11/3/2024       11/03/24 1413   PT Last Visit   PT Visit Date 11/03/24   Note Type   Note type Screen      Repeat consult. Please refer to PT IE completed 11/1/24 for mobility assessment and dc recommendations. At time of evaluation on Friday pt did not demonstrate need for further PT services and was dc from caseload. Pt to mobilize w nsg/restorative staff during hospitalization.      Spoke to Kamla MUNIZ via secure chat who reports pt has been OOB daily to chair however prefers to stay in bed dt discomfort at incision site w upright positioning. No acute functional changes reported. Pt to continue to mobilize w nsg/restorative as tolerated.         Julia Cabral, PT

## 2024-11-03 NOTE — PLAN OF CARE
Problem: Prexisting or High Potential for Compromised Skin Integrity  Goal: Skin integrity is maintained or improved  Description: INTERVENTIONS:  - Identify patients at risk for skin breakdown  - Assess and monitor skin integrity  - Assess and monitor nutrition and hydration status  - Monitor labs   - Assess for incontinence   - Turn and reposition patient  - Assist with mobility/ambulation  - Relieve pressure over bony prominences  - Avoid friction and shearing  - Provide appropriate hygiene as needed including keeping skin clean and dry  - Evaluate need for skin moisturizer/barrier cream  - Collaborate with interdisciplinary team   - Patient/family teaching  - Consider wound care consult   Outcome: Progressing     Problem: DISCHARGE PLANNING  Goal: Discharge to home or other facility with appropriate resources  Description: INTERVENTIONS:  - Identify barriers to discharge w/patient and caregiver  - Arrange for needed discharge resources and transportation as appropriate  - Identify discharge learning needs (meds, wound care, etc.)  - Arrange for interpretive services to assist at discharge as needed  - Refer to Case Management Department for coordinating discharge planning if the patient needs post-hospital services based on physician/advanced practitioner order or complex needs related to functional status, cognitive ability, or social support system  Outcome: Progressing     Problem: Knowledge Deficit  Goal: Patient/family/caregiver demonstrates understanding of disease process, treatment plan, medications, and discharge instructions  Description: Complete learning assessment and assess knowledge base.  Interventions:  - Provide teaching at level of understanding  - Provide teaching via preferred learning methods  Outcome: Progressing     Problem: PAIN - ADULT  Goal: Verbalizes/displays adequate comfort level or baseline comfort level  Description: Interventions:  - Encourage patient to monitor pain and  request assistance  - Assess pain using appropriate pain scale  - Administer analgesics based on type and severity of pain and evaluate response  - Implement non-pharmacological measures as appropriate and evaluate response  - Consider cultural and social influences on pain and pain management  - Notify physician/advanced practitioner if interventions unsuccessful or patient reports new pain  Outcome: Progressing     Problem: INFECTION - ADULT  Goal: Absence or prevention of progression during hospitalization  Description: INTERVENTIONS:  - Assess and monitor for signs and symptoms of infection  - Monitor lab/diagnostic results  - Monitor all insertion sites, i.e. indwelling lines, tubes, and drains  - Monitor endotracheal if appropriate and nasal secretions for changes in amount and color  - [place] appropriate cooling/warming therapies per order  - Administer medications as ordered  - Instruct and encourage patient and family to use good hand hygiene technique  - Identify and instruct in appropriate isolation precautions for identified infection/condition  Outcome: Progressing  Goal: Absence of fever/infection during neutropenic period  Description: INTERVENTIONS:  - Monitor WBC    Outcome: Progressing     Problem: SAFETY ADULT  Goal: Patient will remain free of falls  Description: INTERVENTIONS:  - Educate patient/family on patient safety including physical limitations  - Instruct patient to call for assistance with activity   - Consult OT/PT to assist with strengthening/mobility   - Keep Call bell within reach  - Keep bed low and locked with side rails adjusted as appropriate  - Keep care items and personal belongings within reach  - Initiate and maintain comfort rounds  - Make Fall Risk Sign visible to staff  - Offer Toileting every 2 Hours, in advance of need  - Initiate/Maintain bed alarm  - Obtain necessary fall risk management equipment  - Apply yellow socks and bracelet for high fall risk patients  -  Consider moving patient to room near nurses station  Outcome: Progressing  Goal: Maintain or return to baseline ADL function  Description: INTERVENTIONS:  -  Assess patient's ability to carry out ADLs; assess patient's baseline for ADL function and identify physical deficits which impact ability to perform ADLs (bathing, care of mouth/teeth, toileting, grooming, dressing, etc.)  - Assess/evaluate cause of self-care deficits   - Assess range of motion  - Assess patient's mobility; develop plan if impaired  - Assess patient's need for assistive devices and provide as appropriate  - Encourage maximum independence but intervene and supervise when necessary  - Involve family in performance of ADLs  - Assess for home care needs following discharge   - Consider OT consult to assist with ADL evaluation and planning for discharge  - Provide patient education as appropriate  Outcome: Progressing  Goal: Maintains/Returns to pre admission functional level  Description: INTERVENTIONS:  - Perform AM-PAC 6 Click Basic Mobility/ Daily Activity assessment daily.  - Set and communicate daily mobility goal to care team and patient/family/caregiver.   - Collaborate with rehabilitation services on mobility goals if consulted  - Perform Range of Motion 3 times a day.  - Reposition patient every 2 hours.  - Dangle patient 3 times a day  - Stand patient 3 times a day  - Ambulate patient 3 times a day  - Out of bed to chair 3 times a day   - Out of bed for meals 3 times a day  - Out of bed for toileting  - Record patient progress and toleration of activity level   Outcome: Progressing     Problem: RESPIRATORY - ADULT  Goal: Achieves optimal ventilation and oxygenation  Description: INTERVENTIONS:  - Assess for changes in respiratory status  - Assess for changes in mentation and behavior  - Position to facilitate oxygenation and minimize respiratory effort  - Oxygen administered by appropriate delivery if ordered  - Initiate smoking  cessation education as indicated  - Encourage broncho-pulmonary hygiene including cough, deep breathe, Incentive Spirometry  - Assess the need for suctioning and aspirate as needed  - Assess and instruct to report SOB or any respiratory difficulty  - Respiratory Therapy support as indicated  Outcome: Progressing     Problem: MUSCULOSKELETAL - ADULT  Goal: Maintain or return mobility to safest level of function  Description: INTERVENTIONS:  - Assess patient's ability to carry out ADLs; assess patient's baseline for ADL function and identify physical deficits which impact ability to perform ADLs (bathing, care of mouth/teeth, toileting, grooming, dressing, etc.)  - Assess/evaluate cause of self-care deficits   - Assess range of motion  - Assess patient's mobility  - Assess patient's need for assistive devices and provide as appropriate  - Encourage maximum independence but intervene and supervise when necessary  - Involve family in performance of ADLs  - Assess for home care needs following discharge   - Consider OT consult to assist with ADL evaluation and planning for discharge  - Provide patient education as appropriate  Outcome: Progressing  Goal: Maintain proper alignment of affected body part  Description: INTERVENTIONS:  - Support, maintain and protect limb and body alignment  - Provide patient/ family with appropriate education  Outcome: Progressing     Problem: SKIN/TISSUE INTEGRITY - ADULT  Goal: Skin Integrity remains intact(Skin Breakdown Prevention)  Description: Assess:  -Perform Kulwinder assessment every shift  -Clean and moisturize skin every shift  -Inspect skin when repositioning, toileting, and assisting with ADLS  -Assess under medical devices every shift  -Assess extremities for adequate circulation and sensation     Bed Management:  -Have minimal linens on bed & keep smooth, unwrinkled  -Change linens as needed when moist or perspiring  -Avoid sitting or lying in one position for more than 2 hours  while in bed  -Keep HOB at 30 degrees     Activity:  -Mobilize patient 3 times a day  -Encourage activity and walks on unit  -Encourage or provide ROM exercises   -Turn and reposition patient every 2 Hours  -Use appropriate equipment to lift or move patient in bed  -Instruct/ Assist with weight shifting every 2 when out of bed in chair  -Consider limitation of chair time 2 hour intervals    Skin Care:  -Avoid use of baby powder, tape, friction and shearing, hot water or constrictive clothing  -Relieve pressure over bony prominences using Allevyn   -Do not massage red bony areas    Next Steps:  -Teach patient strategies to minimize risks   -Consider consults to  interdisciplinary teams  Outcome: Progressing  Goal: Incision(s), wounds(s) or drain site(s) healing without S/S of infection  Description: INTERVENTIONS  - Assess and document dressing, incision, wound bed, drain sites and surrounding tissue  - Provide patient and family education  - Perform skin care/dressing changes every shift  Outcome: Progressing

## 2024-11-03 NOTE — ASSESSMENT & PLAN NOTE
After brambila catheter placement in the OR  Currently on CBI, management per Urology  Consider holding heparin if Vascular surgery amenable

## 2024-11-04 ENCOUNTER — ANESTHESIA EVENT (INPATIENT)
Dept: PERIOP | Facility: HOSPITAL | Age: 64
DRG: 253 | End: 2024-11-04
Payer: COMMERCIAL

## 2024-11-04 LAB
ALBUMIN SERPL BCG-MCNC: 3.1 G/DL (ref 3.5–5)
ALP SERPL-CCNC: 50 U/L (ref 34–104)
ALT SERPL W P-5'-P-CCNC: 12 U/L (ref 7–52)
ANION GAP SERPL CALCULATED.3IONS-SCNC: 5 MMOL/L (ref 4–13)
AST SERPL W P-5'-P-CCNC: 18 U/L (ref 13–39)
BASOPHILS # BLD AUTO: 0.03 THOUSANDS/ΜL (ref 0–0.1)
BASOPHILS NFR BLD AUTO: 0 % (ref 0–1)
BILIRUB SERPL-MCNC: 0.57 MG/DL (ref 0.2–1)
BUN SERPL-MCNC: 9 MG/DL (ref 5–25)
CALCIUM ALBUM COR SERPL-MCNC: 9.3 MG/DL (ref 8.3–10.1)
CALCIUM SERPL-MCNC: 8.6 MG/DL (ref 8.4–10.2)
CHLORIDE SERPL-SCNC: 100 MMOL/L (ref 96–108)
CO2 SERPL-SCNC: 28 MMOL/L (ref 21–32)
CREAT SERPL-MCNC: 0.88 MG/DL (ref 0.6–1.3)
EOSINOPHIL # BLD AUTO: 0.05 THOUSAND/ΜL (ref 0–0.61)
EOSINOPHIL NFR BLD AUTO: 0 % (ref 0–6)
ERYTHROCYTE [DISTWIDTH] IN BLOOD BY AUTOMATED COUNT: 15.4 % (ref 11.6–15.1)
GFR SERPL CREATININE-BSD FRML MDRD: 90 ML/MIN/1.73SQ M
GLUCOSE SERPL-MCNC: 117 MG/DL (ref 65–140)
HCT VFR BLD AUTO: 26.6 % (ref 36.5–49.3)
HCT VFR BLD AUTO: 32.6 % (ref 36.5–49.3)
HGB BLD-MCNC: 10.9 G/DL (ref 12–17)
HGB BLD-MCNC: 8.7 G/DL (ref 12–17)
IMM GRANULOCYTES # BLD AUTO: 0.06 THOUSAND/UL (ref 0–0.2)
IMM GRANULOCYTES NFR BLD AUTO: 0 % (ref 0–2)
LYMPHOCYTES # BLD AUTO: 6.31 THOUSANDS/ΜL (ref 0.6–4.47)
LYMPHOCYTES NFR BLD AUTO: 42 % (ref 14–44)
MAGNESIUM SERPL-MCNC: 1.8 MG/DL (ref 1.9–2.7)
MCH RBC QN AUTO: 30.2 PG (ref 26.8–34.3)
MCHC RBC AUTO-ENTMCNC: 32.7 G/DL (ref 31.4–37.4)
MCV RBC AUTO: 92 FL (ref 82–98)
MONOCYTES # BLD AUTO: 0.81 THOUSAND/ΜL (ref 0.17–1.22)
MONOCYTES NFR BLD AUTO: 5 % (ref 4–12)
NEUTROPHILS # BLD AUTO: 7.92 THOUSANDS/ΜL (ref 1.85–7.62)
NEUTS SEG NFR BLD AUTO: 53 % (ref 43–75)
NRBC BLD AUTO-RTO: 0 /100 WBCS
PLATELET # BLD AUTO: 258 THOUSANDS/UL (ref 149–390)
PMV BLD AUTO: 8.8 FL (ref 8.9–12.7)
POTASSIUM SERPL-SCNC: 3.5 MMOL/L (ref 3.5–5.3)
PROT SERPL-MCNC: 5.8 G/DL (ref 6.4–8.4)
RBC # BLD AUTO: 2.88 MILLION/UL (ref 3.88–5.62)
SODIUM SERPL-SCNC: 133 MMOL/L (ref 135–147)
WBC # BLD AUTO: 15.18 THOUSAND/UL (ref 4.31–10.16)

## 2024-11-04 PROCEDURE — 80053 COMPREHEN METABOLIC PANEL: CPT | Performed by: STUDENT IN AN ORGANIZED HEALTH CARE EDUCATION/TRAINING PROGRAM

## 2024-11-04 PROCEDURE — 99233 SBSQ HOSP IP/OBS HIGH 50: CPT | Performed by: INTERNAL MEDICINE

## 2024-11-04 PROCEDURE — P9016 RBC LEUKOCYTES REDUCED: HCPCS

## 2024-11-04 PROCEDURE — 83735 ASSAY OF MAGNESIUM: CPT | Performed by: STUDENT IN AN ORGANIZED HEALTH CARE EDUCATION/TRAINING PROGRAM

## 2024-11-04 PROCEDURE — 99024 POSTOP FOLLOW-UP VISIT: CPT | Performed by: NURSE PRACTITIONER

## 2024-11-04 PROCEDURE — 85014 HEMATOCRIT: CPT | Performed by: INTERNAL MEDICINE

## 2024-11-04 PROCEDURE — 85025 COMPLETE CBC W/AUTO DIFF WBC: CPT | Performed by: STUDENT IN AN ORGANIZED HEALTH CARE EDUCATION/TRAINING PROGRAM

## 2024-11-04 PROCEDURE — 30233N1 TRANSFUSION OF NONAUTOLOGOUS RED BLOOD CELLS INTO PERIPHERAL VEIN, PERCUTANEOUS APPROACH: ICD-10-PCS | Performed by: INTERNAL MEDICINE

## 2024-11-04 PROCEDURE — 99232 SBSQ HOSP IP/OBS MODERATE 35: CPT

## 2024-11-04 PROCEDURE — 85018 HEMOGLOBIN: CPT | Performed by: INTERNAL MEDICINE

## 2024-11-04 RX ORDER — CEFAZOLIN SODIUM 2 G/50ML
2000 SOLUTION INTRAVENOUS EVERY 8 HOURS
Status: DISCONTINUED | OUTPATIENT
Start: 2024-11-04 | End: 2024-11-06 | Stop reason: HOSPADM

## 2024-11-04 RX ORDER — SODIUM CHLORIDE 9 MG/ML
125 INJECTION, SOLUTION INTRAVENOUS CONTINUOUS
Status: CANCELLED | OUTPATIENT
Start: 2024-11-05

## 2024-11-04 RX ORDER — POTASSIUM CHLORIDE 1500 MG/1
40 TABLET, EXTENDED RELEASE ORAL ONCE
Status: COMPLETED | OUTPATIENT
Start: 2024-11-04 | End: 2024-11-04

## 2024-11-04 RX ORDER — MAGNESIUM SULFATE HEPTAHYDRATE 40 MG/ML
2 INJECTION, SOLUTION INTRAVENOUS ONCE
Status: COMPLETED | OUTPATIENT
Start: 2024-11-04 | End: 2024-11-04

## 2024-11-04 RX ADMIN — MAGNESIUM SULFATE HEPTAHYDRATE 2 G: 40 INJECTION, SOLUTION INTRAVENOUS at 10:40

## 2024-11-04 RX ADMIN — ACETAMINOPHEN 975 MG: 325 TABLET, FILM COATED ORAL at 17:22

## 2024-11-04 RX ADMIN — OXYCODONE HYDROCHLORIDE 10 MG: 10 TABLET ORAL at 20:11

## 2024-11-04 RX ADMIN — OXYBUTYNIN CHLORIDE 5 MG: 5 TABLET ORAL at 09:00

## 2024-11-04 RX ADMIN — OXYCODONE HYDROCHLORIDE 5 MG: 5 TABLET ORAL at 14:56

## 2024-11-04 RX ADMIN — ASPIRIN 81 MG: 81 TABLET, COATED ORAL at 09:00

## 2024-11-04 RX ADMIN — CEPHALEXIN 500 MG: 500 CAPSULE ORAL at 01:09

## 2024-11-04 RX ADMIN — PHENAZOPYRIDINE 200 MG: 100 TABLET ORAL at 21:27

## 2024-11-04 RX ADMIN — ACETAMINOPHEN 975 MG: 325 TABLET, FILM COATED ORAL at 01:09

## 2024-11-04 RX ADMIN — OXYCODONE HYDROCHLORIDE 10 MG: 10 TABLET ORAL at 01:10

## 2024-11-04 RX ADMIN — PHENAZOPYRIDINE 200 MG: 100 TABLET ORAL at 15:10

## 2024-11-04 RX ADMIN — FLUTICASONE FUROATE AND VILANTEROL TRIFENATATE 1 PUFF: 100; 25 POWDER RESPIRATORY (INHALATION) at 21:27

## 2024-11-04 RX ADMIN — OXYBUTYNIN CHLORIDE 5 MG: 5 TABLET ORAL at 15:10

## 2024-11-04 RX ADMIN — POTASSIUM CHLORIDE 40 MEQ: 1500 TABLET, EXTENDED RELEASE ORAL at 10:37

## 2024-11-04 RX ADMIN — MELATONIN 6 MG: 3 TAB ORAL at 21:27

## 2024-11-04 RX ADMIN — CEFAZOLIN SODIUM 2000 MG: 2 SOLUTION INTRAVENOUS at 21:27

## 2024-11-04 RX ADMIN — LIDOCAINE HYDROCHLORIDE 1 APPLICATION: 20 JELLY TOPICAL at 09:04

## 2024-11-04 RX ADMIN — OXYBUTYNIN CHLORIDE 5 MG: 5 TABLET ORAL at 21:27

## 2024-11-04 RX ADMIN — ACETAMINOPHEN 975 MG: 325 TABLET, FILM COATED ORAL at 09:05

## 2024-11-04 RX ADMIN — PHENAZOPYRIDINE 200 MG: 100 TABLET ORAL at 09:00

## 2024-11-04 RX ADMIN — ATORVASTATIN CALCIUM 20 MG: 20 TABLET, FILM COATED ORAL at 17:22

## 2024-11-04 RX ADMIN — FLUTICASONE FUROATE AND VILANTEROL TRIFENATATE 1 PUFF: 100; 25 POWDER RESPIRATORY (INHALATION) at 09:02

## 2024-11-04 RX ADMIN — CEFAZOLIN SODIUM 2000 MG: 2 SOLUTION INTRAVENOUS at 13:38

## 2024-11-04 RX ADMIN — CEPHALEXIN 500 MG: 500 CAPSULE ORAL at 06:07

## 2024-11-04 RX ADMIN — SENNOSIDES 8.6 MG: 8.6 TABLET, FILM COATED ORAL at 09:00

## 2024-11-04 RX ADMIN — LIDOCAINE HYDROCHLORIDE 1 APPLICATION: 20 JELLY TOPICAL at 21:27

## 2024-11-04 NOTE — PROGRESS NOTES
Progress Note - Urology   Name: Nahid Luis 64 y.o. male I MRN: [id number]  Unit/Bed#: E2 -01 I Date of Admission: 10/31/2024   Date of Service: 11/4/2024 I Hospital Day: 4     Assessment & Plan  PAD (peripheral artery disease) (HCC)  Status post right common femoral and anterior tibial artery exposure, right greater saphenous vein harvest via skip incisions, right common femoral artery to anterior tibial artery bypass with ipsilateral reverse greater saphenous vein.    Plan:  Management per primary vascular surgical team  Can resume heparin for DVT prophylaxis in AM.  Hold aspirin in preparation for potential TURBT  Gross hematuria  Multifactorial secondary to recent urethral manipulation/complex Malone catheter insertion and known bladder mass as well as antiplatelet/anticoagulation perioperatively for vascular procedure.  Urine remains light punch colored with CBI at low rate. Unable to clamp and wean at this time.  Hbg remains stable at 8.7 this admission however baseline Hgb around 16.    Plan:  Maintain three-way Malone-CBI.  Nurses can continue manual irrigation in addition to CBI around-the-clock to aspirate clots and keep lines patent.  Pyridium, Ditropan and viscous lidocaine for catheter related discomfort.  See additional plan under Bladder Mass.     Bladder mass  Known 62 x 36 mm posterior bladder mass.    Plan:  Case request placed for add-on TURBT with Dr. Duke tomorrow 11/05/2024. Due to gradual drop in baseline Hgb, although currently stable at 8.7. Requesting that patient receive 2 units of PRBCs today in anticipation for OR tomorrow.   Patient with Tmax of 100.7--down to 98.5 most recent assessment.  Was empirically started on PO Keflex yesterday, will switch to Ancef prophylactically in preparation for TURBT tomorrow.   NPO MN for TURBT tomorrow.   Technique, benefits, and risk of the procedure listed above were discussed with them today and informed written consent was obtained.   "All questions and concerns regarding surgery and perioperative expectations have been addressed and answered.  No overall changes in their health since last visit.  Denies any prior complications with anesthesia.   Surgical consent placed in OR holding          Subjective: Patient is lying in bed resting comfortably in no acute distress. CBI is running at slow rate with light punch colored urine noted in brambila catheter drainage tubing. He denies any SOB, CP, abdominal pain, or flank pain.      Review of Systems   Constitutional:  Negative for chills and fever.   HENT:  Negative for congestion and sore throat.    Respiratory:  Negative for cough and shortness of breath.    Cardiovascular:  Negative for chest pain and leg swelling.   Gastrointestinal:  Negative for abdominal pain, constipation and diarrhea.   Genitourinary:  Positive for hematuria. Negative for difficulty urinating, dysuria, frequency and urgency.   Musculoskeletal:  Negative for back pain and gait problem.   Skin:  Negative for wound.   Allergic/Immunologic: Negative for immunocompromised state.   Hematological:  Does not bruise/bleed easily.       Objective:  Nursing Rounds:   Vitals: Blood pressure 109/53, pulse 90, temperature 98.5 °F (36.9 °C), temperature source Oral, resp. rate 18, height 5' 8\" (1.727 m), weight 85.6 kg (188 lb 11.4 oz), SpO2 92%.,Body mass index is 28.69 kg/m².    Physical Exam  Vitals reviewed.   Constitutional:       General: He is not in acute distress.     Appearance: Normal appearance. He is not ill-appearing or toxic-appearing.   HENT:      Head: Normocephalic and atraumatic.      Mouth/Throat:      Mouth: Mucous membranes are moist.      Pharynx: Oropharynx is clear.   Eyes:      General: No scleral icterus.     Conjunctiva/sclera: Conjunctivae normal.   Cardiovascular:      Rate and Rhythm: Normal rate and regular rhythm.      Pulses: Normal pulses.      Heart sounds: Normal heart sounds.   Pulmonary:      Effort: " Pulmonary effort is normal. No respiratory distress.      Breath sounds: Normal breath sounds.   Abdominal:      Tenderness: There is no right CVA tenderness or left CVA tenderness.      Hernia: No hernia is present.   Genitourinary:     Comments: CBI running at low rate, urethral brambila catheter draining light punch colored urine to gravity, no clots. There is scan bloody drainage noted from urethra. Testicles and scrotum are within normal limits.   Musculoskeletal:      Cervical back: Normal range of motion.      Right lower leg: No edema.      Left lower leg: No edema.   Skin:     General: Skin is warm and dry.      Coloration: Skin is not jaundiced or pale.   Neurological:      General: No focal deficit present.      Mental Status: He is alert and oriented to person, place, and time. Mental status is at baseline.      Gait: Gait normal.   Psychiatric:         Mood and Affect: Mood normal.         Behavior: Behavior normal.         Thought Content: Thought content normal.         Judgment: Judgment normal.              Labs:  Recent Labs     11/02/24  0536 11/03/24  0632 11/04/24  0435   WBC 12.28* 12.64* 15.18*       Recent Labs     11/02/24  0536 11/03/24  0632 11/04/24  0435   HGB 9.1* 9.2* 8.7*     Recent Labs     11/02/24  0536 11/03/24  0632 11/04/24  0435   HCT 28.2* 27.5* 26.6*     Recent Labs     11/03/24  0632 11/04/24  0435   CREATININE 0.73 0.88         History:    Past Medical History:   Diagnosis Date    Bladder cancer (HCC)     Colon polyp     COPD (chronic obstructive pulmonary disease) (HCC)     Hyperlipidemia     Hypertension      Social History     Socioeconomic History    Marital status: Single     Spouse name: None    Number of children: None    Years of education: None    Highest education level: None   Occupational History    None   Tobacco Use    Smoking status: Every Day     Current packs/day: 0.25     Types: Cigarettes     Passive exposure: Current    Smokeless tobacco: None   Vaping Use     Vaping status: Never Used   Substance and Sexual Activity    Alcohol use: Yes     Alcohol/week: 3.0 standard drinks of alcohol     Types: 3 Cans of beer per week     Comment: daily 3-5 beers daily, last drank 10/30    Drug use: No    Sexual activity: None   Other Topics Concern    None   Social History Narrative    None     Social Determinants of Health     Financial Resource Strain: Not on file   Food Insecurity: Not on file   Transportation Needs: Not on file   Physical Activity: Not on file   Stress: Not on file   Social Connections: Not on file   Intimate Partner Violence: Not on file   Housing Stability: Not on file     Past Surgical History:   Procedure Laterality Date    APPENDECTOMY      COLONOSCOPY      CYSTOSCOPY      IR LOWER EXTREMITY ANGIOGRAM  09/24/2024    MA BYP FEM-ANT TIBL PST TIBL PRONEAL ART/OTH DSTL Right 10/31/2024    Procedure: right Common femoral artery to anterior tibial bypass with autologous vein;  Surgeon: Carlos Bray DO;  Location: [facility] OR;  Service: Vascular     Family History   Problem Relation Age of Onset    Alcohol abuse Father     Heart attack Father        LISA Stock  Date: 11/4/2024 Time: 10:59 AM

## 2024-11-04 NOTE — ASSESSMENT & PLAN NOTE
Large bladder tumor concerning for bladder cancer.   Plan for TURBT 11/5/2024  Discussed with urology attending Dr. Duke.  Requesting 2 units of packed red blood cells to be given today preoperatively to optimize patient for procedure with high risk of significant blood loss  Consented patient for blood today at the bedside

## 2024-11-04 NOTE — ASSESSMENT & PLAN NOTE
In setting of hematuria  Per recommendations from urology, transfused 2 units preoperatively to optimize for procedure  Will check posttransfusion hemoglobin  Trend CBC

## 2024-11-04 NOTE — ANESTHESIA PREPROCEDURE EVALUATION
Procedure:  (TURBT) (Bladder)    Relevant Problems   ANESTHESIA (within normal limits)      CARDIO   (+) Hypertension   (+) Mixed hyperlipidemia   (+) PAD (peripheral artery disease) (HCC)      HEMATOLOGY   (+) ABLA (acute blood loss anemia)      PULMONARY   (+) Chronic obstructive pulmonary disease (HCC)   (+) Current every day smoker      Urinary   (+) Bladder mass   (+) Gross hematuria             Anesthesia Plan  ASA Score- 3     Anesthesia Type- general with ASA Monitors.         Additional Monitors:     Airway Plan: ETT.    Comment: ·  Stress ECG: No ST deviation is noted. The ECG was not diagnostic due to pharmacological (vasodilator) stress.  ·  Stress Function: Left ventricular function post-stress is normal. Stress ejection fraction is 56%.  ·  Stress Combined Conclusion: The ECG and SPECT imaging portions of the stress study are concordant with no evidence of stress induced myocardial ischemia.  ·  Perfusion: There is a left ventricular perfusion defect that is small in size with mild reduction in uptake present in the mid inferior location(s) that is paradoxical. The defect appears to be an artifact caused by diaphragmatic activity, as it is less noticeable on stress images, and resolves on prone images.     .       Plan Factors-    Chart reviewed. EKG reviewed.  Existing labs reviewed. Patient summary reviewed.    Patient is a current smoker. Patient not instructed to abstain from smoking on day of procedure. Patient did not smoke on day of surgery.    Obstructive sleep apnea risk education given perioperatively.        Induction-     Postoperative Plan-     Perioperative Resuscitation Plan - Level 1 - Full Code.       Informed Consent- Anesthetic plan and risks discussed with patient.

## 2024-11-04 NOTE — PROGRESS NOTES
Progress Note - Hospitalist   Name: Nahid Luis 64 y.o. male I MRN: [id number]  Unit/Bed#: E2 -01 I Date of Admission: 10/31/2024   Date of Service: 11/4/2024 I Hospital Day: 4    Assessment & Plan  PAD (peripheral artery disease) (HCC)  64-year-old male with PAD is currently admitted under the vascular service underwent the following procedure last 10/31/2024.    Right common femoral and anterior tibial artery exposure  Right greater saphenous vein harvest via skip incisions  Right common femoral artery to anterior tibial artery bypass with ipsilateral reversed greater saphenous vein     Continue aspirin and statin.  Cleared for discharge by vascular surgery team  Hospitalist team has agreed to take over primary service  Gross hematuria  After brambila catheter placement in the OR  Currently on CBI, management per Urology  Plan for TURBT 11/5/2024  Hypertension  Continue amlodipine  Chronic obstructive pulmonary disease (HCC)  Not in acute exacerbation  Continue inpatient formulary equivalent of home inhalers. PRN albuterol  Mixed hyperlipidemia  Continue lipitor  Bladder mass  Large bladder tumor concerning for bladder cancer.   Plan for TURBT 11/5/2024  Discussed with urology attending Dr. Duke.  Requesting 2 units of packed red blood cells to be given today preoperatively to optimize patient for procedure with high risk of significant blood loss  Consented patient for blood today at the bedside  ABLA (acute blood loss anemia)  In setting of hematuria  Per recommendations from urology, transfused 2 units preoperatively to optimize for procedure  Will check posttransfusion hemoglobin  Trend CBC    VTE Pharmacologic Prophylaxis: on hold due to hematuria     Patient Centered Rounds:  Patient care rounds were performed with nursing    Discussions with Specialists or Other Care Team Provider: Independently reviewed case with urology team, vascular surgery team    Education and Discussions with Family /  Patient: Updated patient at the bedside    Time Spent for Care: I have spent a total time of 56 minutes on 24 in caring for this patient including Diagnostic results, Impressions, Counseling / Coordination of care, Documenting in the medical record, Reviewing / ordering tests, medicine, procedures  , Obtaining or reviewing history  , and Communicating with other healthcare professionals .      Current Length of Stay: 4 day(s)    Current Patient Status: Inpatient   Certification Statement: The patient will continue to require additional inpatient hospital stay due to need for management of hematuria, inpatient procedure    Discharge Plan: Pending urologic procedure, 48 to 72 hours    Code Status: Level 1 - Full Code      Subjective:   Patient seen and evaluated at bedside.  He feels well.  Has been a few days since he has had a bowel movement.    Objective:     Vitals:   Temp (24hrs), Av.3 °F (37.4 °C), Min:98.5 °F (36.9 °C), Max:100.7 °F (38.2 °C)    Temp:  [98.5 °F (36.9 °C)-100.7 °F (38.2 °C)] 98.5 °F (36.9 °C)  HR:  [88-92] 90  Resp:  [18-20] 18  BP: (109-140)/(53-64) 109/53  SpO2:  [92 %-98 %] 92 %  Body mass index is 28.69 kg/m².     Input and Output Summary (last 24 hours):       Intake/Output Summary (Last 24 hours) at 2024 1057  Last data filed at 2024 1001  Gross per 24 hour   Intake 480 ml   Output 2810 ml   Net -2330 ml       Physical Exam:     Physical Exam  Vitals reviewed.   Constitutional:       General: He is not in acute distress.     Appearance: He is well-developed. He is not ill-appearing, toxic-appearing or diaphoretic.   HENT:      Head: Normocephalic and atraumatic.      Mouth/Throat:      Mouth: Mucous membranes are moist.   Eyes:      General: No scleral icterus.     Extraocular Movements: Extraocular movements intact.   Cardiovascular:      Rate and Rhythm: Normal rate and regular rhythm.      Heart sounds: Normal heart sounds.   Pulmonary:      Effort: Pulmonary effort  is normal. No respiratory distress.      Breath sounds: Normal breath sounds. No wheezing or rales.   Abdominal:      General: There is no distension.      Palpations: Abdomen is soft.      Tenderness: There is no abdominal tenderness. There is no guarding or rebound.   Musculoskeletal:         General: No swelling, tenderness or deformity.      Right lower leg: Edema present.   Skin:     General: Skin is warm and dry.   Neurological:      General: No focal deficit present.      Mental Status: He is alert.   Psychiatric:         Mood and Affect: Mood normal.         Behavior: Behavior normal.         Thought Content: Thought content normal.         Judgment: Judgment normal.         Additional Data:     Labs: I have reviewed pertinent results     Results from last 7 days   Lab Units 11/04/24  0435   WBC Thousand/uL 15.18*   HEMOGLOBIN g/dL 8.7*   HEMATOCRIT % 26.6*   PLATELETS Thousands/uL 258   SEGS PCT % 53   LYMPHO PCT % 42   MONO PCT % 5   EOS PCT % 0     Results from last 7 days   Lab Units 11/04/24  0435   SODIUM mmol/L 133*   POTASSIUM mmol/L 3.5   CHLORIDE mmol/L 100   CO2 mmol/L 28   BUN mg/dL 9   CREATININE mg/dL 0.88   ANION GAP mmol/L 5   CALCIUM mg/dL 8.6   ALBUMIN g/dL 3.1*   TOTAL BILIRUBIN mg/dL 0.57   ALK PHOS U/L 50   ALT U/L 12   AST U/L 18   GLUCOSE RANDOM mg/dL 117                         Imaging: I have reviewed pertinent imaging       Recent Cultures (last 7 days):           Last 24 Hours Medication List:   Current Facility-Administered Medications   Medication Dose Route Frequency Provider Last Rate    acetaminophen  975 mg Oral Q8H MICHAEL Saravanan Barbosa, DO      albuterol  2 puff Inhalation Q6H PRN Saravanan Barbosa, DO      aspirin  81 mg Oral Daily Saravanan Barbosa, DO      atorvastatin  20 mg Oral Daily With Dinner Saravanan Barbosa, DO      cephalexin  500 mg Oral Q6H [facility] LISA Sommer      Fluticasone Furoate-Vilanterol  1 puff Inhalation BID Saravanan Barbosa, DO      HYDROmorphone  0.5 mg Intravenous  Q2H PRN LISA Duenas      labetalol  10 mg Intravenous Q6H PRN LISA Duenas      lidocaine  1 Application Urethral TID LISA Sommer      magnesium sulfate  2 g Intravenous Once Dorian Leija DO 2 g (11/04/24 1040)    melatonin  6 mg Oral HS Rosmery Knott PA-C      ondansetron  4 mg Intravenous Q6H PRN Saravanan Barbosa,       oxybutynin  5 mg Oral TID LISA Duenas      oxyCODONE  10 mg Oral Q4H PRN Saravanan Barbosa,       oxyCODONE  5 mg Oral Q4H PRN Saravanan Barbosa,       phenazopyridine  200 mg Oral TID LISA Sommer      polyethylene glycol  17 g Oral Daily PRN LISA Matias      senna  1 tablet Oral Daily Saravanan Barbosa DO          Today, Patient Was Seen By: Dorian Leija DO    ** Please Note: Dictation voice to text software may have been used in the creation of this document. **

## 2024-11-04 NOTE — PLAN OF CARE
Problem: Prexisting or High Potential for Compromised Skin Integrity  Goal: Skin integrity is maintained or improved  Description: INTERVENTIONS:  - Identify patients at risk for skin breakdown  - Assess and monitor skin integrity  - Assess and monitor nutrition and hydration status  - Monitor labs   - Assess for incontinence   - Turn and reposition patient  - Assist with mobility/ambulation  - Relieve pressure over bony prominences  - Avoid friction and shearing  - Provide appropriate hygiene as needed including keeping skin clean and dry  - Evaluate need for skin moisturizer/barrier cream  - Collaborate with interdisciplinary team   - Patient/family teaching  - Consider wound care consult   Outcome: Progressing     Problem: DISCHARGE PLANNING  Goal: Discharge to home or other facility with appropriate resources  Description: INTERVENTIONS:  - Identify barriers to discharge w/patient and caregiver  - Arrange for needed discharge resources and transportation as appropriate  - Identify discharge learning needs (meds, wound care, etc.)  - Arrange for interpretive services to assist at discharge as needed  - Refer to Case Management Department for coordinating discharge planning if the patient needs post-hospital services based on physician/advanced practitioner order or complex needs related to functional status, cognitive ability, or social support system  Outcome: Progressing     Problem: Knowledge Deficit  Goal: Patient/family/caregiver demonstrates understanding of disease process, treatment plan, medications, and discharge instructions  Description: Complete learning assessment and assess knowledge base.  Interventions:  - Provide teaching at level of understanding  - Provide teaching via preferred learning methods  Outcome: Progressing     Problem: PAIN - ADULT  Goal: Verbalizes/displays adequate comfort level or baseline comfort level  Description: Interventions:  - Encourage patient to monitor pain and  request assistance  - Assess pain using appropriate pain scale  - Administer analgesics based on type and severity of pain and evaluate response  - Implement non-pharmacological measures as appropriate and evaluate response  - Consider cultural and social influences on pain and pain management  - Notify physician/advanced practitioner if interventions unsuccessful or patient reports new pain  Outcome: Progressing     Problem: INFECTION - ADULT  Goal: Absence or prevention of progression during hospitalization  Description: INTERVENTIONS:  - Assess and monitor for signs and symptoms of infection  - Monitor lab/diagnostic results  - Monitor all insertion sites, i.e. indwelling lines, tubes, and drains  - Monitor endotracheal if appropriate and nasal secretions for changes in amount and color  - [place] appropriate cooling/warming therapies per order  - Administer medications as ordered  - Instruct and encourage patient and family to use good hand hygiene technique  - Identify and instruct in appropriate isolation precautions for identified infection/condition  Outcome: Progressing  Goal: Absence of fever/infection during neutropenic period  Description: INTERVENTIONS:  - Monitor WBC    Outcome: Progressing     Problem: SAFETY ADULT  Goal: Patient will remain free of falls  Description: INTERVENTIONS:  - Educate patient/family on patient safety including physical limitations  - Instruct patient to call for assistance with activity   - Consult OT/PT to assist with strengthening/mobility   - Keep Call bell within reach  - Keep bed low and locked with side rails adjusted as appropriate  - Keep care items and personal belongings within reach  - Initiate and maintain comfort rounds  - Make Fall Risk Sign visible to staff  - Offer Toileting every 2 Hours, in advance of need  - Initiate/Maintain bed alarm  - Obtain necessary fall risk management equipment  - Apply yellow socks and bracelet for high fall risk patients  -  Consider moving patient to room near nurses station  Outcome: Progressing  Goal: Maintain or return to baseline ADL function  Description: INTERVENTIONS:  -  Assess patient's ability to carry out ADLs; assess patient's baseline for ADL function and identify physical deficits which impact ability to perform ADLs (bathing, care of mouth/teeth, toileting, grooming, dressing, etc.)  - Assess/evaluate cause of self-care deficits   - Assess range of motion  - Assess patient's mobility; develop plan if impaired  - Assess patient's need for assistive devices and provide as appropriate  - Encourage maximum independence but intervene and supervise when necessary  - Involve family in performance of ADLs  - Assess for home care needs following discharge   - Consider OT consult to assist with ADL evaluation and planning for discharge  - Provide patient education as appropriate  Outcome: Progressing  Goal: Maintains/Returns to pre admission functional level  Description: INTERVENTIONS:  - Perform AM-PAC 6 Click Basic Mobility/ Daily Activity assessment daily.  - Set and communicate daily mobility goal to care team and patient/family/caregiver.   - Collaborate with rehabilitation services on mobility goals if consulted  - Perform Range of Motion 3 times a day.  - Reposition patient every 2 hours.  - Dangle patient 3 times a day  - Stand patient 3 times a day  - Ambulate patient 3 times a day  - Out of bed to chair 3 times a day   - Out of bed for meals 3 times a day  - Out of bed for toileting  - Record patient progress and toleration of activity level   Outcome: Progressing     Problem: RESPIRATORY - ADULT  Goal: Achieves optimal ventilation and oxygenation  Description: INTERVENTIONS:  - Assess for changes in respiratory status  - Assess for changes in mentation and behavior  - Position to facilitate oxygenation and minimize respiratory effort  - Oxygen administered by appropriate delivery if ordered  - Initiate smoking  cessation education as indicated  - Encourage broncho-pulmonary hygiene including cough, deep breathe, Incentive Spirometry  - Assess the need for suctioning and aspirate as needed  - Assess and instruct to report SOB or any respiratory difficulty  - Respiratory Therapy support as indicated  Outcome: Progressing     Problem: MUSCULOSKELETAL - ADULT  Goal: Maintain or return mobility to safest level of function  Description: INTERVENTIONS:  - Assess patient's ability to carry out ADLs; assess patient's baseline for ADL function and identify physical deficits which impact ability to perform ADLs (bathing, care of mouth/teeth, toileting, grooming, dressing, etc.)  - Assess/evaluate cause of self-care deficits   - Assess range of motion  - Assess patient's mobility  - Assess patient's need for assistive devices and provide as appropriate  - Encourage maximum independence but intervene and supervise when necessary  - Involve family in performance of ADLs  - Assess for home care needs following discharge   - Consider OT consult to assist with ADL evaluation and planning for discharge  - Provide patient education as appropriate  Outcome: Progressing  Goal: Maintain proper alignment of affected body part  Description: INTERVENTIONS:  - Support, maintain and protect limb and body alignment  - Provide patient/ family with appropriate education  Outcome: Progressing     Problem: SKIN/TISSUE INTEGRITY - ADULT  Goal: Skin Integrity remains intact(Skin Breakdown Prevention)  Description: Assess:  -Perform Kul[place] assessment every shift  -Clean and moisturize skin every shift  -Inspect skin when repositioning, toileting, and assisting with ADLS  -Assess under medical devices every shift  -Assess extremities for adequate circulation and sensation     Bed Management:  -Have minimal linens on bed & keep smooth, unwrinkled  -Change linens as needed when moist or perspiring  -Avoid sitting or lying in one position for more than 2 hours  while in bed  -Keep HOB at 30 degrees     Activity:  -Mobilize patient 3 times a day  -Encourage activity and walks on unit  -Encourage or provide ROM exercises   -Turn and reposition patient every 2 Hours  -Use appropriate equipment to lift or move patient in bed  -Instruct/ Assist with weight shifting every 2 when out of bed in chair  -Consider limitation of chair time 2 hour intervals    Skin Care:  -Avoid use of baby powder, tape, friction and shearing, hot water or constrictive clothing  -Relieve pressure over bony prominences using Allevyn   -Do not massage red bony areas    Next Steps:  -Teach patient strategies to minimize risks   -Consider consults to  interdisciplinary teams  Outcome: Progressing  Goal: Incision(s), wounds(s) or drain site(s) healing without S/S of infection  Description: INTERVENTIONS   - Assess and document dressing, incision, wound bed, drain sites and surrounding tissue  - Provide patient and family education  - Perform skin care/dressing changes every shift  Outcome: Progressing

## 2024-11-04 NOTE — ASSESSMENT & PLAN NOTE
After brambila catheter placement in the OR  Currently on CBI, management per Urology  Plan for TURBT 11/5/2024

## 2024-11-04 NOTE — PROGRESS NOTES
Progress Note - Vascular Surgery   Name: Nahid Luis 64 y.o. male I MRN: [id number]  Unit/Bed#: E2 -01 I Date of Admission: 10/31/2024   Date of Service: 11/4/2024 I Hospital Day: 4    Assessment & Plan  PAD (peripheral artery disease) (HCC)  65 yo male ex-smoker w/ a hx of HTN, HLD, COPD, newly diagnosed bladder mass, and PAD presented to [facility] on 10/31/24 for a scheduled RLE bypass. Pt has a hx of PAD w/ RLE short-distance claudication and rest pain for which he underwent a R CFA to AT bypass tunneled laterally w/ ipsilateral rGSV by Dr. Bray on 10/31. Urology consulted intraoperatively due to hematuria and continues to follow w/ plans for a TURBT.    Plan:  -PAD w/ RLE rest pain  -S/P R CFA to AT bypass w/ ipsilateral rGSV on 10/31 (POD #4)  -Palpable bypass and R DP pulse  -Continue daily dressing changes per nursing  -Continue R groin prevena VAC x7 days; remove 11/7  -Continue ASA and lipitor for optimal medical management of PAD  -Will hold off of DAPT as plan is for excision of bladder tumor w/ TURBT during this admission  -Continue OOB as tolerated  -Pt is stable for discharge from a vascular surgery standpoint; however remains on CBI due to continued hematuria  -Urology following for ongoing hematuria; input appreciated  -Follow up in the vascular surgery office; appt scheduled for 11/12/24  -Will continue to follow intermittently for incision checks while pt remains hospitalized  -Continue medical management per primary team; assistance appreciated  -Will discuss w/ Dr. Pham    Subjective : Pt seen for exam while lying in bed; NAD. Pt reports tenderness at RLE incision sits and otherwise, denies any further complaints at this time.    Objective :  Temp:  [98.5 °F (36.9 °C)-100.7 °F (38.2 °C)] 98.5 °F (36.9 °C)  HR:  [88-92] 90  BP: (109-140)/(53-64) 109/53  Resp:  [18-20] 18  SpO2:  [92 %-98 %] 92 %  O2 Device: None (Room air)     I/O         11/02 0701 11/03 0700 11/03 0701 11/04 0700 11/04  0701  11 0700    P.O. 240 480     Total Intake(mL/kg) 240 (2.8) 480 (5.6)     Urine (mL/kg/hr) 1320 (0.6) 2760 (1.3) 400 (1.1)    Total Output 1320 2760 400    Net -1080 -2280 -400                 Lines/Drains/Airways       Active Status       Name Placement date Placement time Site Days    Closed/Suction Drain --  --  --  --    Urethral Catheter Three way 22 Fr. 10/31/24  0820  Three way  4    Continuous Bladder Irrigation Three-way 10/31/24  --  Three-way  4                  Physical Exam  Vitals and nursing note reviewed.   Constitutional:       General: He is awake. He is not in acute distress.     Appearance: Normal appearance. He is well-developed.   HENT:      Head: Normocephalic and atraumatic.   Cardiovascular:      Rate and Rhythm: Normal rate and regular rhythm.      Pulses:           Dorsalis pedis pulses are 2+ on the right side and detected w/ Doppler on the left side.        Posterior tibial pulses are 2+ on the right side and detected w/ Doppler on the left side.      Heart sounds: Normal heart sounds.      Comments: Palpable RLE BPG pulse  Pulmonary:      Effort: Pulmonary effort is normal. No respiratory distress.      Breath sounds: Normal breath sounds.   Abdominal:      General: Bowel sounds are normal. There is no distension.      Palpations: Abdomen is soft.   Musculoskeletal:         General: Swelling (RLE post operative swelling) and tenderness (RLE) present.      Cervical back: Neck supple.      Right lower le+ Edema present.      Left lower leg: No edema.   Skin:     General: Skin is warm and dry.      Capillary Refill: Capillary refill takes less than 2 seconds.      Findings: Wound present. No lesion or rash.      Comments: RLE incisions   Neurological:      General: No focal deficit present.      Mental Status: He is alert and oriented to person, place, and time.   Psychiatric:         Mood and Affect: Mood normal.         Behavior: Behavior normal. Behavior is cooperative.        Wound/Incision:  RLE w/ mild expected post-op swelling. Incisions well-approx w/ scant amount of bloody drainage on old R lateral dressing, no active oozing. Lateral incisions w/ no focal swelling, ecchymosis or erythema; surgical glue present. RLE medial incisions well-approx, no focal swelling, ecchymosis, erythema or drainage; staples intact. Incisions cleansed w/ sterile saline, painted w/ betadine. R foot warm and justin; (+) motor/sensation.      R leg (lateral)    R leg (medial)      Lab Results: I have reviewed the following results:  CBC with diff:   Lab Results   Component Value Date    WBC 15.18 (H) 11/04/2024    HGB 8.7 (L) 11/04/2024    HCT 26.6 (L) 11/04/2024    MCV 92 11/04/2024     11/04/2024    RBC 2.88 (L) 11/04/2024    MCH 30.2 11/04/2024    MCHC 32.7 11/04/2024    RDW 15.4 (H) 11/04/2024    MPV 8.8 (L) 11/04/2024    NRBC 0 11/04/2024     BMP/CMP:  Lab Results   Component Value Date    SODIUM 133 (L) 11/04/2024    K 3.5 11/04/2024     11/04/2024    CO2 28 11/04/2024    BUN 9 11/04/2024    CREATININE 0.88 11/04/2024    CALCIUM 8.6 11/04/2024    AST 18 11/04/2024    ALT 12 11/04/2024    ALKPHOS 50 11/04/2024    EGFR 90 11/04/2024

## 2024-11-04 NOTE — ASSESSMENT & PLAN NOTE
64-year-old male with PAD is currently admitted under the vascular service underwent the following procedure last 10/31/2024.    Right common femoral and anterior tibial artery exposure  Right greater saphenous vein harvest via skip incisions  Right common femoral artery to anterior tibial artery bypass with ipsilateral reversed greater saphenous vein     Continue aspirin and statin.  Cleared for discharge by vascular surgery team  Hospitalist team has agreed to take over primary service

## 2024-11-04 NOTE — ASSESSMENT & PLAN NOTE
Multifactorial secondary to recent urethral manipulation/complex Malone catheter insertion and known bladder mass as well as antiplatelet/anticoagulation perioperatively for vascular procedure.  Urine remains light punch colored with CBI at low rate. Unable to clamp and wean at this time.  Hbg remains stable at 8.7 this admission however baseline Hgb around 16.    Plan:  Maintain three-way Malone-CBI.  Nurses can continue manual irrigation in addition to CBI around-the-clock to aspirate clots and keep lines patent.  Pyridium, Ditropan and viscous lidocaine for catheter related discomfort.  See additional plan under Bladder Mass.

## 2024-11-04 NOTE — ASSESSMENT & PLAN NOTE
65 yo male ex-smoker w/ a hx of HTN, HLD, COPD, newly diagnosed bladder mass, and PAD presented to [facility] on 10/31/24 for a scheduled RLE bypass. Pt has a hx of PAD w/ RLE short-distance claudication and rest pain for which he underwent a R CFA to AT bypass tunneled laterally w/ ipsilateral rGSV by Dr. Bray on 10/31. Urology consulted intraoperatively due to hematuria and continues to follow w/ plans for a TURBT.    Plan:  -PAD w/ RLE rest pain  -S/P R CFA to AT bypass w/ ipsilateral rGSV on 10/31 (POD #4)  -Palpable bypass and R DP pulse  -Continue daily dressing changes per nursing  -Continue R groin prevena VAC x7 days; remove 11/7  -Continue ASA and lipitor for optimal medical management of PAD  -Will hold off of DAPT as plan is for excision of bladder tumor w/ TURBT during this admission  -Continue OOB as tolerated  -Pt is stable for discharge from a vascular surgery standpoint; however remains on CBI due to continued hematuria  -Urology following for ongoing hematuria; input appreciated  -Follow up in the vascular surgery office; appt scheduled for 11/12/24  -Will continue to follow intermittently for incision checks while pt remains hospitalized  -Continue medical management per primary team; assistance appreciated  -Will discuss w/ Dr. Pham

## 2024-11-04 NOTE — ASSESSMENT & PLAN NOTE
Known 62 x 36 mm posterior bladder mass.    Plan:  Case request placed for add-on TURBT with Dr. Duke tomorrow 11/05/2024. Due to gradual drop in baseline Hgb, although currently stable at 8.7. Requesting that patient receive 2 units of PRBCs today in anticipation for OR tomorrow.   Patient with Tmax of 100.7--down to 98.5 most recent assessment.  Was empirically started on PO Keflex yesterday, will switch to Ancef prophylactically in preparation for TURBT tomorrow.   NPO MN for TURBT tomorrow.   Technique, benefits, and risk of the procedure listed above were discussed with them today and informed written consent was obtained.  All questions and concerns regarding surgery and perioperative expectations have been addressed and answered.  No overall changes in their health since last visit.  Denies any prior complications with anesthesia.   Surgical consent placed in OR holding

## 2024-11-04 NOTE — CASE MANAGEMENT
Case Management Discharge Planning Note    Patient name Nahid Luis  Location [facility] /E2 -* MRN [id number]  : 1960 Date 2024       Current Admission Date: 10/31/2024  Current Admission Diagnosis:PAD (peripheral artery disease) (HCC)   Patient Active Problem List    Diagnosis Date Noted Date Diagnosed    ABLA (acute blood loss anemia) 2024     Bladder mass 10/18/2024     Mixed hyperlipidemia 2024     PAD (peripheral artery disease) (HCC) 2024     Hypertension 2023     Chronic obstructive pulmonary disease (HCC) 2023     Current every day smoker 2023     Gross hematuria 2023       LOS (days): 4  Geometric Mean LOS (GMLOS) (days): 3.8  Days to GMLOS:-0.3     OBJECTIVE:  Risk of Unplanned Readmission Score: 9.81         Current admission status: Inpatient   Preferred Pharmacy:   [facility]/pharmacy #[id number] - [place] [address] [address]  Phone: [phone] Fax: [phone]    Primary Care Provider: Kaitlin García MD    Primary Insurance: [facility] [employer]  Secondary Insurance:     DISCHARGE DETAILS:     Additional Comments: Patient denies having any discharge needs at the moment.  CM will follow up for walker that was previously ordered to ensure the order is completed.  CM department will continue to follow patient through hospital discharge.

## 2024-11-05 ENCOUNTER — ANESTHESIA (INPATIENT)
Dept: PERIOP | Facility: HOSPITAL | Age: 64
DRG: 253 | End: 2024-11-05
Payer: COMMERCIAL

## 2024-11-05 PROBLEM — D72.829 LEUKOCYTOSIS: Status: ACTIVE | Noted: 2024-11-05

## 2024-11-05 LAB
ABO GROUP BLD BPU: NORMAL
ABO GROUP BLD BPU: NORMAL
ANION GAP SERPL CALCULATED.3IONS-SCNC: 8 MMOL/L (ref 4–13)
BPU ID: NORMAL
BPU ID: NORMAL
BUN SERPL-MCNC: 9 MG/DL (ref 5–25)
CALCIUM SERPL-MCNC: 8.6 MG/DL (ref 8.4–10.2)
CHLORIDE SERPL-SCNC: 101 MMOL/L (ref 96–108)
CO2 SERPL-SCNC: 25 MMOL/L (ref 21–32)
CREAT SERPL-MCNC: 0.86 MG/DL (ref 0.6–1.3)
CROSSMATCH: NORMAL
CROSSMATCH: NORMAL
ERYTHROCYTE [DISTWIDTH] IN BLOOD BY AUTOMATED COUNT: 16.4 % (ref 11.6–15.1)
ERYTHROCYTE [DISTWIDTH] IN BLOOD BY AUTOMATED COUNT: 16.7 % (ref 11.6–15.1)
GFR SERPL CREATININE-BSD FRML MDRD: 91 ML/MIN/1.73SQ M
GLUCOSE SERPL-MCNC: 119 MG/DL (ref 65–140)
HCT VFR BLD AUTO: 31.8 % (ref 36.5–49.3)
HCT VFR BLD AUTO: 32.9 % (ref 36.5–49.3)
HGB BLD-MCNC: 10.8 G/DL (ref 12–17)
HGB BLD-MCNC: 11 G/DL (ref 12–17)
MCH RBC QN AUTO: 31.1 PG (ref 26.8–34.3)
MCH RBC QN AUTO: 31.6 PG (ref 26.8–34.3)
MCHC RBC AUTO-ENTMCNC: 33.4 G/DL (ref 31.4–37.4)
MCHC RBC AUTO-ENTMCNC: 34 G/DL (ref 31.4–37.4)
MCV RBC AUTO: 92 FL (ref 82–98)
MCV RBC AUTO: 95 FL (ref 82–98)
PLATELET # BLD AUTO: 248 THOUSANDS/UL (ref 149–390)
PLATELET # BLD AUTO: 266 THOUSANDS/UL (ref 149–390)
PMV BLD AUTO: 8.1 FL (ref 8.9–12.7)
PMV BLD AUTO: 8.2 FL (ref 8.9–12.7)
POTASSIUM SERPL-SCNC: 3.7 MMOL/L (ref 3.5–5.3)
RBC # BLD AUTO: 3.47 MILLION/UL (ref 3.88–5.62)
RBC # BLD AUTO: 3.48 MILLION/UL (ref 3.88–5.62)
SODIUM SERPL-SCNC: 134 MMOL/L (ref 135–147)
UNIT DISPENSE STATUS: NORMAL
UNIT DISPENSE STATUS: NORMAL
UNIT PRODUCT CODE: NORMAL
UNIT PRODUCT CODE: NORMAL
UNIT PRODUCT VOLUME: 300 ML
UNIT PRODUCT VOLUME: 350 ML
UNIT RH: NORMAL
UNIT RH: NORMAL
WBC # BLD AUTO: 17.18 THOUSAND/UL (ref 4.31–10.16)
WBC # BLD AUTO: 20.77 THOUSAND/UL (ref 4.31–10.16)

## 2024-11-05 PROCEDURE — 88341 IMHCHEM/IMCYTCHM EA ADD ANTB: CPT | Performed by: PATHOLOGY

## 2024-11-05 PROCEDURE — 80048 BASIC METABOLIC PNL TOTAL CA: CPT | Performed by: INTERNAL MEDICINE

## 2024-11-05 PROCEDURE — 99024 POSTOP FOLLOW-UP VISIT: CPT | Performed by: NURSE PRACTITIONER

## 2024-11-05 PROCEDURE — 99232 SBSQ HOSP IP/OBS MODERATE 35: CPT | Performed by: INTERNAL MEDICINE

## 2024-11-05 PROCEDURE — 52240 CYSTOSCOPY AND TREATMENT: CPT | Performed by: UROLOGY

## 2024-11-05 PROCEDURE — 88342 IMHCHEM/IMCYTCHM 1ST ANTB: CPT | Performed by: PATHOLOGY

## 2024-11-05 PROCEDURE — 85027 COMPLETE CBC AUTOMATED: CPT | Performed by: UROLOGY

## 2024-11-05 PROCEDURE — 0TBB8ZX EXCISION OF BLADDER, VIA NATURAL OR ARTIFICIAL OPENING ENDOSCOPIC, DIAGNOSTIC: ICD-10-PCS | Performed by: UROLOGY

## 2024-11-05 PROCEDURE — 85027 COMPLETE CBC AUTOMATED: CPT | Performed by: INTERNAL MEDICINE

## 2024-11-05 PROCEDURE — 88307 TISSUE EXAM BY PATHOLOGIST: CPT | Performed by: PATHOLOGY

## 2024-11-05 RX ORDER — FENTANYL CITRATE 50 UG/ML
INJECTION, SOLUTION INTRAMUSCULAR; INTRAVENOUS AS NEEDED
Status: DISCONTINUED | OUTPATIENT
Start: 2024-11-05 | End: 2024-11-05

## 2024-11-05 RX ORDER — SODIUM CHLORIDE, SODIUM LACTATE, POTASSIUM CHLORIDE, CALCIUM CHLORIDE 600; 310; 30; 20 MG/100ML; MG/100ML; MG/100ML; MG/100ML
INJECTION, SOLUTION INTRAVENOUS CONTINUOUS PRN
Status: DISCONTINUED | OUTPATIENT
Start: 2024-11-05 | End: 2024-11-05

## 2024-11-05 RX ORDER — HYDROMORPHONE HCL/PF 1 MG/ML
0.5 SYRINGE (ML) INJECTION
Status: DISCONTINUED | OUTPATIENT
Start: 2024-11-05 | End: 2024-11-05 | Stop reason: HOSPADM

## 2024-11-05 RX ORDER — CALCIUM CARBONATE 500 MG/1
500 TABLET, CHEWABLE ORAL DAILY PRN
Status: DISCONTINUED | OUTPATIENT
Start: 2024-11-05 | End: 2024-11-06 | Stop reason: HOSPADM

## 2024-11-05 RX ORDER — OXYBUTYNIN CHLORIDE 5 MG/1
5 TABLET ORAL EVERY 6 HOURS PRN
Status: DISCONTINUED | OUTPATIENT
Start: 2024-11-05 | End: 2024-11-05

## 2024-11-05 RX ORDER — ONDANSETRON 2 MG/ML
4 INJECTION INTRAMUSCULAR; INTRAVENOUS ONCE AS NEEDED
Status: DISCONTINUED | OUTPATIENT
Start: 2024-11-05 | End: 2024-11-05 | Stop reason: HOSPADM

## 2024-11-05 RX ORDER — ACETAMINOPHEN 10 MG/ML
1000 INJECTION, SOLUTION INTRAVENOUS ONCE
Status: COMPLETED | OUTPATIENT
Start: 2024-11-05 | End: 2024-11-05

## 2024-11-05 RX ORDER — SODIUM CHLORIDE, SODIUM GLUCONATE, SODIUM ACETATE, POTASSIUM CHLORIDE, MAGNESIUM CHLORIDE, SODIUM PHOSPHATE, DIBASIC, AND POTASSIUM PHOSPHATE .53; .5; .37; .037; .03; .012; .00082 G/100ML; G/100ML; G/100ML; G/100ML; G/100ML; G/100ML; G/100ML
75 INJECTION, SOLUTION INTRAVENOUS CONTINUOUS
Status: DISCONTINUED | OUTPATIENT
Start: 2024-11-05 | End: 2024-11-05

## 2024-11-05 RX ORDER — SENNOSIDES 8.6 MG
1 TABLET ORAL 2 TIMES DAILY
Status: DISCONTINUED | OUTPATIENT
Start: 2024-11-05 | End: 2024-11-06 | Stop reason: HOSPADM

## 2024-11-05 RX ORDER — ACETAMINOPHEN 325 MG/1
650 TABLET ORAL EVERY 6 HOURS SCHEDULED
Status: DISCONTINUED | OUTPATIENT
Start: 2024-11-05 | End: 2024-11-06 | Stop reason: HOSPADM

## 2024-11-05 RX ORDER — FENTANYL CITRATE/PF 50 MCG/ML
25 SYRINGE (ML) INJECTION
Status: DISCONTINUED | OUTPATIENT
Start: 2024-11-05 | End: 2024-11-05 | Stop reason: HOSPADM

## 2024-11-05 RX ORDER — ROCURONIUM BROMIDE 10 MG/ML
INJECTION, SOLUTION INTRAVENOUS AS NEEDED
Status: DISCONTINUED | OUTPATIENT
Start: 2024-11-05 | End: 2024-11-05

## 2024-11-05 RX ORDER — SODIUM CHLORIDE, SODIUM LACTATE, POTASSIUM CHLORIDE, CALCIUM CHLORIDE 600; 310; 30; 20 MG/100ML; MG/100ML; MG/100ML; MG/100ML
125 INJECTION, SOLUTION INTRAVENOUS CONTINUOUS
Status: DISCONTINUED | OUTPATIENT
Start: 2024-11-05 | End: 2024-11-05

## 2024-11-05 RX ORDER — ONDANSETRON 2 MG/ML
4 INJECTION INTRAMUSCULAR; INTRAVENOUS EVERY 6 HOURS PRN
Status: DISCONTINUED | OUTPATIENT
Start: 2024-11-05 | End: 2024-11-06 | Stop reason: HOSPADM

## 2024-11-05 RX ORDER — PROPOFOL 10 MG/ML
INJECTION, EMULSION INTRAVENOUS AS NEEDED
Status: DISCONTINUED | OUTPATIENT
Start: 2024-11-05 | End: 2024-11-05

## 2024-11-05 RX ORDER — ONDANSETRON 2 MG/ML
INJECTION INTRAMUSCULAR; INTRAVENOUS AS NEEDED
Status: DISCONTINUED | OUTPATIENT
Start: 2024-11-05 | End: 2024-11-05

## 2024-11-05 RX ORDER — SODIUM CHLORIDE, SODIUM GLUCONATE, SODIUM ACETATE, POTASSIUM CHLORIDE, MAGNESIUM CHLORIDE, SODIUM PHOSPHATE, DIBASIC, AND POTASSIUM PHOSPHATE .53; .5; .37; .037; .03; .012; .00082 G/100ML; G/100ML; G/100ML; G/100ML; G/100ML; G/100ML; G/100ML
75 INJECTION, SOLUTION INTRAVENOUS CONTINUOUS
Status: DISCONTINUED | OUTPATIENT
Start: 2024-11-05 | End: 2024-11-06

## 2024-11-05 RX ORDER — DEXAMETHASONE SODIUM PHOSPHATE 10 MG/ML
INJECTION, SOLUTION INTRAMUSCULAR; INTRAVENOUS AS NEEDED
Status: DISCONTINUED | OUTPATIENT
Start: 2024-11-05 | End: 2024-11-05

## 2024-11-05 RX ORDER — CEFAZOLIN SODIUM 1 G/50ML
1000 SOLUTION INTRAVENOUS EVERY 8 HOURS
Status: DISCONTINUED | OUTPATIENT
Start: 2024-11-05 | End: 2024-11-05 | Stop reason: SDUPTHER

## 2024-11-05 RX ORDER — LIDOCAINE HYDROCHLORIDE 20 MG/ML
INJECTION, SOLUTION EPIDURAL; INFILTRATION; INTRACAUDAL; PERINEURAL AS NEEDED
Status: DISCONTINUED | OUTPATIENT
Start: 2024-11-05 | End: 2024-11-05

## 2024-11-05 RX ORDER — MIDAZOLAM HYDROCHLORIDE 2 MG/2ML
INJECTION, SOLUTION INTRAMUSCULAR; INTRAVENOUS AS NEEDED
Status: DISCONTINUED | OUTPATIENT
Start: 2024-11-05 | End: 2024-11-05

## 2024-11-05 RX ADMIN — ACETAMINOPHEN 1000 MG: 10 INJECTION INTRAVENOUS at 13:22

## 2024-11-05 RX ADMIN — ONDANSETRON 4 MG: 2 INJECTION INTRAMUSCULAR; INTRAVENOUS at 15:15

## 2024-11-05 RX ADMIN — SENNOSIDES 8.6 MG: 8.6 TABLET, FILM COATED ORAL at 17:26

## 2024-11-05 RX ADMIN — CEFAZOLIN SODIUM 2000 MG: 2 SOLUTION INTRAVENOUS at 05:10

## 2024-11-05 RX ADMIN — FENTANYL CITRATE 50 MCG: 50 INJECTION, SOLUTION INTRAMUSCULAR; INTRAVENOUS at 15:14

## 2024-11-05 RX ADMIN — CEFAZOLIN SODIUM 2000 MG: 2 SOLUTION INTRAVENOUS at 21:12

## 2024-11-05 RX ADMIN — OXYBUTYNIN CHLORIDE 5 MG: 5 TABLET ORAL at 08:45

## 2024-11-05 RX ADMIN — ACETAMINOPHEN 650 MG: 325 TABLET, FILM COATED ORAL at 19:23

## 2024-11-05 RX ADMIN — OXYBUTYNIN CHLORIDE 5 MG: 5 TABLET ORAL at 21:12

## 2024-11-05 RX ADMIN — ACETAMINOPHEN 650 MG: 325 TABLET, FILM COATED ORAL at 23:42

## 2024-11-05 RX ADMIN — SENNOSIDES 8.6 MG: 8.6 TABLET, FILM COATED ORAL at 08:45

## 2024-11-05 RX ADMIN — PHENAZOPYRIDINE 200 MG: 100 TABLET ORAL at 08:45

## 2024-11-05 RX ADMIN — FENTANYL CITRATE 50 MCG: 50 INJECTION, SOLUTION INTRAMUSCULAR; INTRAVENOUS at 13:59

## 2024-11-05 RX ADMIN — ACETAMINOPHEN 975 MG: 325 TABLET, FILM COATED ORAL at 01:44

## 2024-11-05 RX ADMIN — OXYCODONE HYDROCHLORIDE 10 MG: 10 TABLET ORAL at 07:52

## 2024-11-05 RX ADMIN — FENTANYL CITRATE 50 MCG: 50 INJECTION, SOLUTION INTRAMUSCULAR; INTRAVENOUS at 14:31

## 2024-11-05 RX ADMIN — ROCURONIUM 50 MG: 50 INJECTION, SOLUTION INTRAVENOUS at 14:08

## 2024-11-05 RX ADMIN — FENTANYL CITRATE 100 MCG: 50 INJECTION, SOLUTION INTRAMUSCULAR; INTRAVENOUS at 14:41

## 2024-11-05 RX ADMIN — FLUTICASONE FUROATE AND VILANTEROL TRIFENATATE 1 PUFF: 100; 25 POWDER RESPIRATORY (INHALATION) at 21:18

## 2024-11-05 RX ADMIN — LIDOCAINE HYDROCHLORIDE 100 MG: 20 INJECTION, SOLUTION EPIDURAL; INFILTRATION; INTRACAUDAL at 14:07

## 2024-11-05 RX ADMIN — SODIUM CHLORIDE, SODIUM GLUCONATE, SODIUM ACETATE, POTASSIUM CHLORIDE, MAGNESIUM CHLORIDE, SODIUM PHOSPHATE, DIBASIC, AND POTASSIUM PHOSPHATE 75 ML/HR: .53; .5; .37; .037; .03; .012; .00082 INJECTION, SOLUTION INTRAVENOUS at 08:57

## 2024-11-05 RX ADMIN — MELATONIN 6 MG: 3 TAB ORAL at 21:12

## 2024-11-05 RX ADMIN — LIDOCAINE HYDROCHLORIDE 1 APPLICATION: 20 JELLY TOPICAL at 21:12

## 2024-11-05 RX ADMIN — MIDAZOLAM HYDROCHLORIDE 2 MG: 1 INJECTION, SOLUTION INTRAMUSCULAR; INTRAVENOUS at 13:59

## 2024-11-05 RX ADMIN — CEFAZOLIN SODIUM 2000 MG: 2 SOLUTION INTRAVENOUS at 14:13

## 2024-11-05 RX ADMIN — SUGAMMADEX 200 MG: 100 INJECTION, SOLUTION INTRAVENOUS at 15:44

## 2024-11-05 RX ADMIN — PROPOFOL 200 MG: 10 INJECTION, EMULSION INTRAVENOUS at 14:07

## 2024-11-05 RX ADMIN — FENTANYL CITRATE 50 MCG: 50 INJECTION, SOLUTION INTRAMUSCULAR; INTRAVENOUS at 15:28

## 2024-11-05 RX ADMIN — SODIUM CHLORIDE, SODIUM GLUCONATE, SODIUM ACETATE, POTASSIUM CHLORIDE, MAGNESIUM CHLORIDE, SODIUM PHOSPHATE, DIBASIC, AND POTASSIUM PHOSPHATE 75 ML/HR: .53; .5; .37; .037; .03; .012; .00082 INJECTION, SOLUTION INTRAVENOUS at 17:30

## 2024-11-05 RX ADMIN — SODIUM CHLORIDE, SODIUM GLUCONATE, SODIUM ACETATE, POTASSIUM CHLORIDE, MAGNESIUM CHLORIDE, SODIUM PHOSPHATE, DIBASIC, AND POTASSIUM PHOSPHATE 75 ML/HR: .53; .5; .37; .037; .03; .012; .00082 INJECTION, SOLUTION INTRAVENOUS at 13:24

## 2024-11-05 RX ADMIN — ROCURONIUM 10 MG: 50 INJECTION, SOLUTION INTRAVENOUS at 14:59

## 2024-11-05 RX ADMIN — SODIUM CHLORIDE, SODIUM LACTATE, POTASSIUM CHLORIDE, AND CALCIUM CHLORIDE: .6; .31; .03; .02 INJECTION, SOLUTION INTRAVENOUS at 15:03

## 2024-11-05 RX ADMIN — DEXAMETHASONE SODIUM PHOSPHATE 10 MG: 10 INJECTION INTRAMUSCULAR; INTRAVENOUS at 14:08

## 2024-11-05 RX ADMIN — PHENAZOPYRIDINE 200 MG: 100 TABLET ORAL at 21:12

## 2024-11-05 RX ADMIN — LIDOCAINE HYDROCHLORIDE 1 APPLICATION: 20 JELLY TOPICAL at 08:52

## 2024-11-05 NOTE — ASSESSMENT & PLAN NOTE
In setting of hematuria  Per recommendations from urology, transfused 2 units preoperatively to optimize for procedure  Trend CBC

## 2024-11-05 NOTE — ANESTHESIA POSTPROCEDURE EVALUATION
Post-Op Assessment Note    CV Status:  Stable  Pain Score: 2    Pain management: adequate       Mental Status:  Alert and awake   Hydration Status:  Euvolemic   PONV Controlled:  Controlled   Airway Patency:  Patent  Two or more mitigation strategies used for obstructive sleep apnea   Post Op Vitals Reviewed: Yes    No anethesia notable event occurred.    Staff: Anesthesiologist       Last Filed PACU Vitals:  Vitals Value Taken Time   Temp 98.9 °F (37.2 °C) 11/05/24 1651   Pulse 89 11/05/24 1651   /84 11/05/24 1651   Resp 18 11/05/24 1651   SpO2 91 % 11/05/24 1651       Modified Aracelis:  Activity: 2 (11/5/2024  4:15 PM)  Respiration: 2 (11/5/2024  4:15 PM)  Circulation: 2 (11/5/2024  4:15 PM)  Consciousness: 1 (11/5/2024  4:15 PM)  Oxygen Saturation: 1 (11/5/2024  4:15 PM)  Modified Aracelis Score: 8 (11/5/2024  4:15 PM)

## 2024-11-05 NOTE — PLAN OF CARE
Problem: Prexisting or High Potential for Compromised Skin Integrity  Goal: Skin integrity is maintained or improved  Description: INTERVENTIONS:  - Identify patients at risk for skin breakdown  - Assess and monitor skin integrity  - Assess and monitor nutrition and hydration status  - Monitor labs   - Assess for incontinence   - Turn and reposition patient  - Assist with mobility/ambulation  - Relieve pressure over bony prominences  - Avoid friction and shearing  - Provide appropriate hygiene as needed including keeping skin clean and dry  - Evaluate need for skin moisturizer/barrier cream  - Collaborate with interdisciplinary team   - Patient/family teaching  - Consider wound care consult   Outcome: Progressing     Problem: DISCHARGE PLANNING  Goal: Discharge to home or other facility with appropriate resources  Description: INTERVENTIONS:  - Identify barriers to discharge w/patient and caregiver  - Arrange for needed discharge resources and transportation as appropriate  - Identify discharge learning needs (meds, wound care, etc.)  - Arrange for interpretive services to assist at discharge as needed  - Refer to Case Management Department for coordinating discharge planning if the patient needs post-hospital services based on physician/advanced practitioner order or complex needs related to functional status, cognitive ability, or social support system  Outcome: Progressing     Problem: Knowledge Deficit  Goal: Patient/family/caregiver demonstrates understanding of disease process, treatment plan, medications, and discharge instructions  Description: Complete learning assessment and assess knowledge base.  Interventions:  - Provide teaching at level of understanding  - Provide teaching via preferred learning methods  Outcome: Progressing     Problem: RESPIRATORY - ADULT  Goal: Achieves optimal ventilation and oxygenation  Description: INTERVENTIONS:  - Assess for changes in respiratory status  - Assess for  changes in mentation and behavior  - Position to facilitate oxygenation and minimize respiratory effort  - Oxygen administered by appropriate delivery if ordered  - Initiate smoking cessation education as indicated  - Encourage broncho-pulmonary hygiene including cough, deep breathe, Incentive Spirometry  - Assess the need for suctioning and aspirate as needed  - Assess and instruct to report SOB or any respiratory difficulty  - Respiratory Therapy support as indicated  Outcome: Progressing     Problem: MUSCULOSKELETAL - ADULT  Goal: Maintain or return mobility to safest level of function  Description: INTERVENTIONS:  - Assess patient's ability to carry out ADLs; assess patient's baseline for ADL function and identify physical deficits which impact ability to perform ADLs (bathing, care of mouth/teeth, toileting, grooming, dressing, etc.)  - Assess/evaluate cause of self-care deficits   - Assess range of motion  - Assess patient's mobility  - Assess patient's need for assistive devices and provide as appropriate  - Encourage maximum independence but intervene and supervise when necessary  - Involve family in performance of ADLs  - Assess for home care needs following discharge   - Consider OT consult to assist with ADL evaluation and planning for discharge  - Provide patient education as appropriate  Outcome: Progressing  Goal: Maintain proper alignment of affected body part  Description: INTERVENTIONS:  - Support, maintain and protect limb and body alignment  - Provide patient/ family with appropriate education  Outcome: Progressing     Problem: PAIN - ADULT  Goal: Verbalizes/displays adequate comfort level or baseline comfort level  Description: Interventions:  - Encourage patient to monitor pain and request assistance  - Assess pain using appropriate pain scale  - Administer analgesics based on type and severity of pain and evaluate response  - Implement non-pharmacological measures as appropriate and evaluate  response  - Consider cultural and social influences on pain and pain management  - Notify physician/advanced practitioner if interventions unsuccessful or patient reports new pain  Outcome: Progressing     Problem: INFECTION - ADULT  Goal: Absence or prevention of progression during hospitalization  Description: INTERVENTIONS:  - Assess and monitor for signs and symptoms of infection  - Monitor lab/diagnostic results  - Monitor all insertion sites, i.e. indwelling lines, tubes, and drains  - Monitor endotracheal if appropriate and nasal secretions for changes in amount and color  - [place] appropriate cooling/warming therapies per order  - Administer medications as ordered  - Instruct and encourage patient and family to use good hand hygiene technique  - Identify and instruct in appropriate isolation precautions for identified infection/condition  Outcome: Progressing  Goal: Absence of fever/infection during neutropenic period  Description: INTERVENTIONS:  - Monitor WBC    Outcome: Progressing     Problem: SAFETY ADULT  Goal: Patient will remain free of falls  Description: INTERVENTIONS:  - Educate patient/family on patient safety including physical limitations  - Instruct patient to call for assistance with activity   - Consult OT/PT to assist with strengthening/mobility   - Keep Call bell within reach  - Keep bed low and locked with side rails adjusted as appropriate  - Keep care items and personal belongings within reach  - Initiate and maintain comfort rounds  - Make Fall Risk Sign visible to staff  - Offer Toileting every 2 Hours, in advance of need  - Initiate/Maintain bed alarm  - Obtain necessary fall risk management equipment  - Apply yellow socks and bracelet for high fall risk patients  - Consider moving patient to room near nurses station  Outcome: Progressing  Goal: Maintain or return to baseline ADL function  Description: INTERVENTIONS:  -  Assess patient's ability to carry out ADLs; assess patient's  baseline for ADL function and identify physical deficits which impact ability to perform ADLs (bathing, care of mouth/teeth, toileting, grooming, dressing, etc.)  - Assess/evaluate cause of self-care deficits   - Assess range of motion  - Assess patient's mobility; develop plan if impaired  - Assess patient's need for assistive devices and provide as appropriate  - Encourage maximum independence but intervene and supervise when necessary  - Involve family in performance of ADLs  - Assess for home care needs following discharge   - Consider OT consult to assist with ADL evaluation and planning for discharge  - Provide patient education as appropriate  Outcome: Progressing  Goal: Maintains/Returns to pre admission functional level  Description: INTERVENTIONS:  - Perform AM-PAC 6 Click Basic Mobility/ Daily Activity assessment daily.  - Set and communicate daily mobility goal to care team and patient/family/caregiver.   - Collaborate with rehabilitation services on mobility goals if consulted  - Perform Range of Motion 3 times a day.  - Reposition patient every 2 hours.  - Dangle patient 3 times a day  - Stand patient 3 times a day  - Ambulate patient 3 times a day  - Out of bed to chair 3 times a day   - Out of bed for meals 3 times a day  - Out of bed for toileting  - Record patient progress and toleration of activity level   Outcome: Progressing     Problem: SKIN/TISSUE INTEGRITY - ADULT  Goal: Skin Integrity remains intact(Skin Breakdown Prevention)  Description: Assess:  -Perform Kulwinder assessment every shift  -Clean and moisturize skin every shift  -Inspect skin when repositioning, toileting, and assisting with ADLS  -Assess under medical devices every shift  -Assess extremities for adequate circulation and sensation     Bed Management:  -Have minimal linens on bed & keep smooth, unwrinkled  -Change linens as needed when moist or perspiring  -Avoid sitting or lying in one position for more than 2 hours while in  bed  -Keep HOB at 30 degrees     Activity:  -Mobilize patient 3 times a day  -Encourage activity and walks on unit  -Encourage or provide ROM exercises   -Turn and reposition patient every 2 Hours  -Use appropriate equipment to lift or move patient in bed  -Instruct/ Assist with weight shifting every 2 when out of bed in chair  -Consider limitation of chair time 2 hour intervals    Skin Care:  -Avoid use of baby powder, tape, friction and shearing, hot water or constrictive clothing  -Relieve pressure over bony prominences using Allevyn   -Do not massage red bony areas    Next Steps:  -Teach patient strategies to minimize risks   -Consider consults to  interdisciplinary teams  Outcome: Progressing  Goal: Incision(s), wounds(s) or drain site(s) healing without S/S of infection  Description: INTERVENTIONS  - Assess and document dressing, incision, wound bed, drain sites and surrounding tissue  - Provide patient and family education  - Perform skin care/dressing changes every shift  Outcome: Progressing

## 2024-11-05 NOTE — PLAN OF CARE
Problem: Prexisting or High Potential for Compromised Skin Integrity  Goal: Skin integrity is maintained or improved  Description: INTERVENTIONS:  - Identify patients at risk for skin breakdown  - Assess and monitor skin integrity  - Assess and monitor nutrition and hydration status  - Monitor labs   - Assess for incontinence   - Turn and reposition patient  - Assist with mobility/ambulation  - Relieve pressure over bony prominences  - Avoid friction and shearing  - Provide appropriate hygiene as needed including keeping skin clean and dry  - Evaluate need for skin moisturizer/barrier cream  - Collaborate with interdisciplinary team   - Patient/family teaching  - Consider wound care consult   Outcome: Progressing     Problem: DISCHARGE PLANNING  Goal: Discharge to home or other facility with appropriate resources  Description: INTERVENTIONS:  - Identify barriers to discharge w/patient and caregiver  - Arrange for needed discharge resources and transportation as appropriate  - Identify discharge learning needs (meds, wound care, etc.)  - Arrange for interpretive services to assist at discharge as needed  - Refer to Case Management Department for coordinating discharge planning if the patient needs post-hospital services based on physician/advanced practitioner order or complex needs related to functional status, cognitive ability, or social support system  Outcome: Progressing     Problem: Knowledge Deficit  Goal: Patient/family/caregiver demonstrates understanding of disease process, treatment plan, medications, and discharge instructions  Description: Complete learning assessment and assess knowledge base.  Interventions:  - Provide teaching at level of understanding  - Provide teaching via preferred learning methods  Outcome: Progressing     Problem: RESPIRATORY - ADULT  Goal: Achieves optimal ventilation and oxygenation  Description: INTERVENTIONS:  - Assess for changes in respiratory status  - Assess for  changes in mentation and behavior  - Position to facilitate oxygenation and minimize respiratory effort  - Oxygen administered by appropriate delivery if ordered  - Initiate smoking cessation education as indicated  - Encourage broncho-pulmonary hygiene including cough, deep breathe, Incentive Spirometry  - Assess the need for suctioning and aspirate as needed  - Assess and instruct to report SOB or any respiratory difficulty  - Respiratory Therapy support as indicated  Outcome: Progressing     Problem: MUSCULOSKELETAL - ADULT  Goal: Maintain or return mobility to safest level of function  Description: INTERVENTIONS:  - Assess patient's ability to carry out ADLs; assess patient's baseline for ADL function and identify physical deficits which impact ability to perform ADLs (bathing, care of mouth/teeth, toileting, grooming, dressing, etc.)  - Assess/evaluate cause of self-care deficits   - Assess range of motion  - Assess patient's mobility  - Assess patient's need for assistive devices and provide as appropriate  - Encourage maximum independence but intervene and supervise when necessary  - Involve family in performance of ADLs  - Assess for home care needs following discharge   - Consider OT consult to assist with ADL evaluation and planning for discharge  - Provide patient education as appropriate  Outcome: Progressing  Goal: Maintain proper alignment of affected body part  Description: INTERVENTIONS:  - Support, maintain and protect limb and body alignment  - Provide patient/ family with appropriate education  Outcome: Progressing     Problem: PAIN - ADULT  Goal: Verbalizes/displays adequate comfort level or baseline comfort level  Description: Interventions:  - Encourage patient to monitor pain and request assistance  - Assess pain using appropriate pain scale  - Administer analgesics based on type and severity of pain and evaluate response  - Implement non-pharmacological measures as appropriate and evaluate  response  - Consider cultural and social influences on pain and pain management  - Notify physician/advanced practitioner if interventions unsuccessful or patient reports new pain  Outcome: Progressing     Problem: INFECTION - ADULT  Goal: Absence or prevention of progression during hospitalization  Description: INTERVENTIONS:  - Assess and monitor for signs and symptoms of infection  - Monitor lab/diagnostic results  - Monitor all insertion sites, i.e. indwelling lines, tubes, and drains  - Monitor endotracheal if appropriate and nasal secretions for changes in amount and color  - [place] appropriate cooling/warming therapies per order  - Administer medications as ordered  - Instruct and encourage patient and family to use good hand hygiene technique  - Identify and instruct in appropriate isolation precautions for identified infection/condition  Outcome: Progressing  Goal: Absence of fever/infection during neutropenic period  Description: INTERVENTIONS:  - Monitor WBC    Outcome: Progressing     Problem: SAFETY ADULT  Goal: Patient will remain free of falls  Description: INTERVENTIONS:  - Educate patient/family on patient safety including physical limitations  - Instruct patient to call for assistance with activity   - Consult OT/PT to assist with strengthening/mobility   - Keep Call bell within reach  - Keep bed low and locked with side rails adjusted as appropriate  - Keep care items and personal belongings within reach  - Initiate and maintain comfort rounds  - Make Fall Risk Sign visible to staff  - Offer Toileting every 2 Hours, in advance of need  - Initiate/Maintain bed alarm  - Obtain necessary fall risk management equipment  - Apply yellow socks and bracelet for high fall risk patients  - Consider moving patient to room near nurses station  Outcome: Progressing  Goal: Maintain or return to baseline ADL function  Description: INTERVENTIONS:  -  Assess patient's ability to carry out ADLs; assess patient's  baseline for ADL function and identify physical deficits which impact ability to perform ADLs (bathing, care of mouth/teeth, toileting, grooming, dressing, etc.)  - Assess/evaluate cause of self-care deficits   - Assess range of motion  - Assess patient's mobility; develop plan if impaired  - Assess patient's need for assistive devices and provide as appropriate  - Encourage maximum independence but intervene and supervise when necessary  - Involve family in performance of ADLs  - Assess for home care needs following discharge   - Consider OT consult to assist with ADL evaluation and planning for discharge  - Provide patient education as appropriate  Outcome: Progressing  Goal: Maintains/Returns to pre admission functional level  Description: INTERVENTIONS:  - Perform AM-PAC 6 Click Basic Mobility/ Daily Activity assessment daily.  - Set and communicate daily mobility goal to care team and patient/family/caregiver.   - Collaborate with rehabilitation services on mobility goals if consulted  - Perform Range of Motion 3 times a day.  - Reposition patient every 2 hours.  - Dangle patient 3 times a day  - Stand patient 3 times a day  - Ambulate patient 3 times a day  - Out of bed to chair 3 times a day   - Out of bed for meals 3 times a day  - Out of bed for toileting  - Record patient progress and toleration of activity level   Outcome: Progressing     Problem: SKIN/TISSUE INTEGRITY - ADULT  Goal: Skin Integrity remains intact(Skin Breakdown Prevention)  Description: Assess:  -Perform Kulwinder assessment every shift  -Clean and moisturize skin every shift  -Inspect skin when repositioning, toileting, and assisting with ADLS  -Assess under medical devices every shift  -Assess extremities for adequate circulation and sensation     Bed Management:  -Have minimal linens on bed & keep smooth, unwrinkled  -Change linens as needed when moist or perspiring  -Avoid sitting or lying in one position for more than 2 hours while in  bed  -Keep HOB at 30 degrees     Activity:  -Mobilize patient 3 times a day  -Encourage activity and walks on unit  -Encourage or provide ROM exercises   -Turn and reposition patient every 2 Hours  -Use appropriate equipment to lift or move patient in bed  -Instruct/ Assist with weight shifting every 2 when out of bed in chair  -Consider limitation of chair time 2 hour intervals    Skin Care:  -Avoid use of baby powder, tape, friction and shearing, hot water or constrictive clothing  -Relieve pressure over bony prominences using Allevyn   -Do not massage red bony areas    Next Steps:  -Teach patient strategies to minimize risks   -Consider consults to  interdisciplinary teams  Outcome: Progressing  Goal: Incision(s), wounds(s) or drain site(s) healing without S/S of infection  Description: INTERVENTIONS  - Assess and document dressing, incision, wound bed, drain sites and surrounding tissue  - Provide patient and family education  - Perform skin care/dressing changes every shift  Outcome: Progressing

## 2024-11-05 NOTE — OP NOTE
OPERATIVE REPORT  PATIENT NAME: Nahid Luis    :  1960  MRN: [id number]  Pt Location: [facility] OR ROOM 06    SURGERY DATE: 2024    Surgeons and Role:     * Gilmer Duke MD - Primary    Preop Diagnosis:  Bladder mass [N32.89]    Post-Op Diagnosis Codes:     * Bladder mass [N32.89]    Procedure(s):  (TURBT)    Specimen(s):  ID Type Source Tests Collected by Time Destination   1 : Bladder Tumor Posterior Wall Tissue Urinary Bladder TISSUE EXAM Gilmer Duke MD 2024  3:36 PM        Estimated Blood Loss:   300 mL    Drains:  Closed/Suction Drain (Active)   Dressing Status Clean;Dry;Intact 10/31/24 1413   Number of days:        Continuous Bladder Irrigation Three-way (Active)   Site Assessment Skin intact;Clean 24 0906   Securement Method Tape 24   Rate Moderate 24 1147   Irrigant Normal saline 24 0901   CBI Irrigation Intake (mL) 750 mL 24 1147   CBI Malone Output (mL) 850 mL 24 1147   CBI Net Output (mL) 100 mL 24 1147   Malone Output Appearance Clear 24 1147   Number of days: 5       Anesthesia Type:   General    Operative Indications:  Bladder mass [N32.89]      Operative Findings:  Large bladder cancer, 8 cm diameter, posterior wall into trigone.  Cannot see orifices  Slight extension into bladder neck and prostate at the 6:00 to 8 o'clock position.      Complications:   None    Procedure and Technique:  After the patient was placed in adequate general anesthesia, he was prepped and draped using chlorhexidine solution in lithotomy position.  The 26 [place] resectoscope was introduced with findings as above.    Using the bipolar resectoscope loop, tumor was resected into, but not through muscle.  There was lots of tumor and it was hard to see landmarks such as the orifices.  After 60 minutes of resection, I had removed all visible tumor I could see.  There was lots of hypervascularity of the floor the bladder, it required extensive cautery to get  the bleeding under control.    After tumor fragments were taken after specimen, repeat cystoscopy showed good control of hemostasis.    Malone cath was inserted, irrigated clear, patient taken to recovery in good condition.   I was present for the entire procedure.    Patient Disposition:  PACU              SIGNATURE: Gilmer Duke MD  DATE: November 5, 2024  TIME: 4:00 PM

## 2024-11-05 NOTE — CASE MANAGEMENT
Case Management Discharge Planning Note    Patient name Nahid Luis  Location OR POOL/OR POOL MRN [id number]  : 1960 Date 2024       Current Admission Date: 10/31/2024  Current Admission Diagnosis:PAD (peripheral artery disease) (HCC)   Patient Active Problem List    Diagnosis Date Noted Date Diagnosed    Leukocytosis 2024     ABLA (acute blood loss anemia) 2024     Bladder mass 10/18/2024     Mixed hyperlipidemia 2024     PAD (peripheral artery disease) (HCC) 2024     Hypertension 2023     Chronic obstructive pulmonary disease (HCC) 2023     Current every day smoker 2023     Gross hematuria 2023       LOS (days): 5  Geometric Mean LOS (GMLOS) (days): 3.8  Days to GMLOS:-1.2     OBJECTIVE:  Risk of Unplanned Readmission Score: 10.28         Current admission status: Inpatient   Preferred Pharmacy:   [facility]/pharmacy #[id number] - [place] [address] [place] [address]  Phone: [phone] Fax: [phone]    Primary Care Provider: Kaitlin García MD    Primary Insurance: [employer]  Secondary Insurance:     DISCHARGE DETAILS:        Additional Comments: Patient is currently in the OR for a procedure. CM will follow up with patient at a later time for dischrge planning and will continue to follow the patient through his hospital discharge.

## 2024-11-05 NOTE — PROGRESS NOTES
Progress Note - Vascular Surgery   Name: Nahid Luis 64 y.o. male I MRN: [id number]  Unit/Bed#: E2 -01 I Date of Admission: 10/31/2024   Date of Service: 11/5/2024 I Hospital Day: 5    Assessment & Plan  PAD (peripheral artery disease) (HCC)  65 yo male ex-smoker w/ a hx of HTN, HLD, COPD, newly diagnosed bladder mass, and PAD presented to [facility] on 10/31/24 for an elective RLE bypass. Pt has a hx of PAD w/ RLE short-distance claudication and rest pain for which he underwent a R CFA to AT bypass tunneled laterally w/ ipsilateral rGSV by Dr. Bray on 10/31. Urology consulted intraoperatively due to hematuria and continues to follow w/ plans for a TURBT today (11/5).    Plan:  -PAD w/ RLE rest pain  -S/P R CFA to AT bypass w/ ipsilateral rGSV on 10/31 (POD #5)  -Palpable bypass and R DP pulse  -Continue daily dressing changes per nursing  -Continue R groin prevena VAC x7 days; remove 11/7  -Continue ASA and lipitor for optimal medical management of PAD  -Will hold off of DAPT as plan is for excision of bladder tumor w/ TURBT during this admission  -Continue OOB as tolerated  -Pt is stable for discharge from a vascular surgery standpoint  -Urology following for bladder mass w/ plans for TURBPT today (11/5); input appreciated  -Follow up in the vascular surgery office; appt scheduled for 11/12/24  -Will continue to follow intermittently for incision checks while pt remains hospitalized  -Continue medical management per primary team; assistance appreciated  -Will discuss w/ Dr. Tilley    Subjective : Pt seen for exam while lying in bed; NAD. Pt reports mild tenderness at RLE incision sites and otherwise, denies any further complaints at this time.    Objective :  Temp:  [97.5 °F (36.4 °C)-99.3 °F (37.4 °C)] 99.3 °F (37.4 °C)  HR:  [81-96] 90  BP: (120-157)/(58-78) 147/67  Resp:  [16-18] 18  SpO2:  [90 %-94 %] 94 %  O2 Device: None (Room air)     I/O         11/03 0701 11/04 0700 11/04 0701 11/05 0700 11/05  0701   0700    P.O. 480 480     Blood  700     Total Intake(mL/kg) 480 (5.6) 1180 (13.8)     Urine (mL/kg/hr) 2760 (1.3) 2800 (1.4) 4550 (17.8)    Total Output 2760 2800 4550    Net -2280 -1620 -4550                 Lines/Drains/Airways       Active Status       Name Placement date Placement time Site Days    Closed/Suction Drain --  --  --  --    Urethral Catheter Three way 22 Fr. 10/31/24  0820  Three way  5    Continuous Bladder Irrigation Three-way 10/31/24  --  Three-way  5                  Physical Exam  Vitals and nursing note reviewed.   Constitutional:       General: He is awake. He is not in acute distress.     Appearance: Normal appearance. He is well-developed.   HENT:      Head: Normocephalic and atraumatic.   Cardiovascular:      Rate and Rhythm: Normal rate and regular rhythm.      Pulses:           Dorsalis pedis pulses are 2+ on the right side and detected w/ Doppler on the left side.        Posterior tibial pulses are 2+ on the right side and detected w/ Doppler on the left side.      Comments: Palpable RLE BPG pulse  Pulmonary:      Effort: Pulmonary effort is normal. No respiratory distress.   Abdominal:      General: There is no distension.      Palpations: Abdomen is soft.   Musculoskeletal:         General: Swelling (RLE post operative swelling) and tenderness (RLE) present.      Cervical back: Neck supple.      Right lower le+ Edema present.      Left lower leg: No edema.   Skin:     General: Skin is warm and dry.      Capillary Refill: Capillary refill takes less than 2 seconds.      Findings: Wound present. No lesion or rash.      Comments: RLE incisions   Neurological:      General: No focal deficit present.      Mental Status: He is alert and oriented to person, place, and time.   Psychiatric:         Mood and Affect: Mood normal.         Behavior: Behavior normal. Behavior is cooperative.       Wound/Incision:  RLE w/ mild expected post-op swelling. Lateral incisions well-approx  w/ no focal swelling, ecchymosis, erythema or drainage; surgical glue present. RLE medial incisions well-approx, no focal swelling, ecchymosis, erythema or drainage; staples intact. R foot warm and justin; (+) motor/sensation.       Lab Results: I have reviewed the following results:  CBC with diff:   Lab Results   Component Value Date    WBC 17.18 (H) 11/05/2024    HGB 10.8 (L) 11/05/2024    HCT 31.8 (L) 11/05/2024    MCV 92 11/05/2024     11/05/2024    RBC 3.47 (L) 11/05/2024    MCH 31.1 11/05/2024    MCHC 34.0 11/05/2024    RDW 16.4 (H) 11/05/2024    MPV 8.2 (L) 11/05/2024    NRBC 0 11/04/2024     BMP/CMP:  Lab Results   Component Value Date    SODIUM 134 (L) 11/05/2024    K 3.7 11/05/2024     11/05/2024    CO2 25 11/05/2024    BUN 9 11/05/2024    CREATININE 0.86 11/05/2024    CALCIUM 8.6 11/05/2024    AST 18 11/04/2024    ALT 12 11/04/2024    ALKPHOS 50 11/04/2024    EGFR 91 11/05/2024

## 2024-11-05 NOTE — ASSESSMENT & PLAN NOTE
64-year-old male with PAD is currently admitted under the vascular service underwent the following procedure last 10/31/2024.    Right common femoral and anterior tibial artery exposure  Right greater saphenous vein harvest via skip incisions  Right common femoral artery to anterior tibial artery bypass with ipsilateral reversed greater saphenous vein     Continue aspirin and statin.  Cleared for discharge by vascular surgery team  Patient with Prevena VAC x 7 days removal 11/7  Dual antiplatelet therapy on hold given ongoing hematuria  Outpatient vascular surgery office appointment scheduled for 11/12/2024

## 2024-11-05 NOTE — ASSESSMENT & PLAN NOTE
63 yo male ex-smoker w/ a hx of HTN, HLD, COPD, newly diagnosed bladder mass, and PAD presented to [facility] on 10/31/24 for an elective RLE bypass. Pt has a hx of PAD w/ RLE short-distance claudication and rest pain for which he underwent a R CFA to AT bypass tunneled laterally w/ ipsilateral rGSV by Dr. Bray on 10/31. Urology consulted intraoperatively due to hematuria and continues to follow w/ plans for a TURBT today (11/5).    Plan:  -PAD w/ RLE rest pain  -S/P R CFA to AT bypass w/ ipsilateral rGSV on 10/31 (POD #5)  -Palpable bypass and R DP pulse  -Continue daily dressing changes per nursing  -Continue R groin prevena VAC x7 days; remove 11/7  -Continue ASA and lipitor for optimal medical management of PAD  -Will hold off of DAPT as plan is for excision of bladder tumor w/ TURBT during this admission  -Continue OOB as tolerated  -Pt is stable for discharge from a vascular surgery standpoint  -Urology following for bladder mass w/ plans for TURBPT today (11/5); input appreciated  -Follow up in the vascular surgery office; appt scheduled for 11/12/24  -Will continue to follow intermittently for incision checks while pt remains hospitalized  -Continue medical management per primary team; assistance appreciated  -Will discuss w/ Dr. Tilley

## 2024-11-05 NOTE — PROGRESS NOTES
Progress Note - Hospitalist   Name: Nahid Luis 64 y.o. male I MRN: [id number]  Unit/Bed#: E2 -01 I Date of Admission: 10/31/2024   Date of Service: 11/5/2024 I Hospital Day: 5    Assessment & Plan  PAD (peripheral artery disease) (HCC)  64-year-old male with PAD is currently admitted under the vascular service underwent the following procedure last 10/31/2024.    Right common femoral and anterior tibial artery exposure  Right greater saphenous vein harvest via skip incisions  Right common femoral artery to anterior tibial artery bypass with ipsilateral reversed greater saphenous vein     Continue aspirin and statin.  Cleared for discharge by vascular surgery team  Patient with Prevena VAC x 7 days removal 11/7  Dual antiplatelet therapy on hold given ongoing hematuria  Outpatient vascular surgery office appointment scheduled for 11/12/2024  Gross hematuria  After brambila catheter placement in the OR  Currently on CBI, management per Urology  Plan for TURBT 11/5/2024  Hypertension  Continue amlodipine  Chronic obstructive pulmonary disease (HCC)  Not in acute exacerbation  Continue inpatient formulary equivalent of home inhalers. PRN albuterol  Mixed hyperlipidemia  Continue lipitor  Bladder mass  Large bladder tumor concerning for bladder cancer.   Plan for TURBT 11/5/2024  ABLA (acute blood loss anemia)  In setting of hematuria  Per recommendations from urology, transfused 2 units preoperatively to optimize for procedure  Trend CBC  Leukocytosis  Suspected reactive with ongoing bleeding, blood products  Continue to trend fever/WBC curve    VTE Pharmacologic Prophylaxis: On hold given hematuria     Patient Centered Rounds:  Patient care rounds were performed with nursing    Discussions with Specialists or Other Care Team Provider: Independently reviewed case with vascular surgery team and urology team    Education and Discussions with Family / Patient: Patient    Time Spent for Care: I have spent a  total time of 56 minutes on 24 in caring for this patient including Diagnostic results, Risks and benefits of tx options, Instructions for management, Patient and family education, Impressions, Counseling / Coordination of care, Documenting in the medical record, Reviewing / ordering tests, medicine, procedures  , and Communicating with other healthcare professionals .      Current Length of Stay: 5 day(s)    Current Patient Status: Inpatient   Certification Statement: The patient will continue to require additional inpatient hospital stay due to management of hematuria, need for inpatient procedure    Discharge Plan: Pending recovery from procedure, suspect 48 hours    Code Status: Level 1 - Full Code      Subjective:   Patient seen and evaluated at bedside.  Feels he has been in the hospital for a long time.  Looking forward to getting home.    Objective:     Vitals:   Temp (24hrs), Av.5 °F (36.9 °C), Min:98 °F (36.7 °C), Max:99.3 °F (37.4 °C)    Temp:  [98 °F (36.7 °C)-99.3 °F (37.4 °C)] 99.3 °F (37.4 °C)  HR:  [83-91] 90  Resp:  [16-18] 18  BP: (120-150)/(58-67) 147/67  SpO2:  [90 %-94 %] 94 %  Body mass index is 28.69 kg/m².     Input and Output Summary (last 24 hours):       Intake/Output Summary (Last 24 hours) at 2024 1244  Last data filed at 2024 1147  Gross per 24 hour   Intake 1180 ml   Output 7250 ml   Net -6070 ml       Physical Exam:     Physical Exam  Vitals reviewed.   Constitutional:       General: He is not in acute distress.     Appearance: He is well-developed. He is not ill-appearing, toxic-appearing or diaphoretic.   HENT:      Head: Normocephalic and atraumatic.      Mouth/Throat:      Mouth: Mucous membranes are moist.   Eyes:      General: No scleral icterus.     Extraocular Movements: Extraocular movements intact.   Cardiovascular:      Rate and Rhythm: Normal rate and regular rhythm.      Heart sounds: Normal heart sounds.   Pulmonary:      Effort: Pulmonary effort is  normal. No respiratory distress.      Breath sounds: Normal breath sounds. No wheezing or rales.   Abdominal:      General: There is no distension.      Palpations: Abdomen is soft.      Tenderness: There is no abdominal tenderness. There is no guarding or rebound.   Musculoskeletal:         General: No swelling, tenderness or deformity.      Comments: Right lower extremity dressings clean dry and intact   Skin:     General: Skin is warm and dry.   Neurological:      General: No focal deficit present.      Mental Status: He is alert. Mental status is at baseline.   Psychiatric:         Mood and Affect: Mood normal.         Behavior: Behavior normal.         Thought Content: Thought content normal.         Judgment: Judgment normal.         Additional Data:     Labs: I have reviewed pertinent results     Results from last 7 days   Lab Units 11/05/24  0542 11/04/24 1953 11/04/24  0435   WBC Thousand/uL 17.18*  --  15.18*   HEMOGLOBIN g/dL 10.8*   < > 8.7*   HEMATOCRIT % 31.8*   < > 26.6*   PLATELETS Thousands/uL 248  --  258   SEGS PCT %  --   --  53   LYMPHO PCT %  --   --  42   MONO PCT %  --   --  5   EOS PCT %  --   --  0    < > = values in this interval not displayed.     Results from last 7 days   Lab Units 11/05/24 0542 11/04/24  0435   SODIUM mmol/L 134* 133*   POTASSIUM mmol/L 3.7 3.5   CHLORIDE mmol/L 101 100   CO2 mmol/L 25 28   BUN mg/dL 9 9   CREATININE mg/dL 0.86 0.88   ANION GAP mmol/L 8 5   CALCIUM mg/dL 8.6 8.6   ALBUMIN g/dL  --  3.1*   TOTAL BILIRUBIN mg/dL  --  0.57   ALK PHOS U/L  --  50   ALT U/L  --  12   AST U/L  --  18   GLUCOSE RANDOM mg/dL 119 117                         Imaging: I have reviewed pertinent imaging       Recent Cultures (last 7 days):           Last 24 Hours Medication List:   Current Facility-Administered Medications   Medication Dose Route Frequency Provider Last Rate    acetaminophen  1,000 mg Intravenous Once Kary Almanzar MD      acetaminophen  975 mg Oral Q8H [facility]  Saravanan Barbosa, DO      albuterol  2 puff Inhalation Q6H PRN Saravanan Barbosa, DO      aspirin  81 mg Oral Daily Saravanan Barbosa, DO      atorvastatin  20 mg Oral Daily With Dinner Saravanan Barbosa, DO      calcium carbonate  500 mg Oral Daily PRN Hebert Garcia PA-C      cefazolin  2,000 mg Intravenous Q8H LISA Stock 2,000 mg (11/05/24 0510)    Fluticasone Furoate-Vilanterol  1 puff Inhalation BID Saravanan Barbosa, DO      HYDROmorphone  0.5 mg Intravenous Q2H PRN LISA Duenas      labetalol  10 mg Intravenous Q6H PRN LISA Duenas      lactated ringers  125 mL/hr Intravenous Continuous Kary Almanzar MD      lidocaine  1 Application Urethral TID LISA Sommer      melatonin  6 mg Oral HS Rosmery Knott PA-C      multi-electrolyte  75 mL/hr Intravenous Continuous Dorian Leija DO 75 mL/hr (11/05/24 0857)    ondansetron  4 mg Intravenous Q6H PRN Saravanan Barbosa, DO      oxybutynin  5 mg Oral TID LISA Duenas      oxyCODONE  10 mg Oral Q4H PRN Saravanan Barbosa, DO      oxyCODONE  5 mg Oral Q4H PRN Saravanan Barbosa, DO      phenazopyridine  200 mg Oral TID LISA Sommer      polyethylene glycol  17 g Oral Daily PRN LISA Matias      senna  1 tablet Oral BID Dorian Leija DO          Today, Patient Was Seen By: Dorian Leija DO    ** Please Note: Dictation voice to text software may have been used in the creation of this document. **

## 2024-11-05 NOTE — QUICK NOTE
See op report.  Large hypervascular bladder tumor resected.  Very likely muscle invasive.  At the completion, good  hemostasis but certainly expected to bleed some more.    Labs pending.    Keep Malone catheter for several days.  Likely go home with a catheter when stable

## 2024-11-05 NOTE — ANESTHESIA POSTPROCEDURE EVALUATION
Post-Op Assessment Note    CV Status:  Stable    Pain management: adequate       Mental Status:  Agitated   Hydration Status:  Stable   PONV Controlled:  None   Airway Patency:  Patent     Post Op Vitals Reviewed: Yes    No anethesia notable event occurred.    Staff: Anesthesiologist, CRNA   Comments: vss        Last Filed PACU Vitals:  Vitals Value Taken Time   Temp 98.6 °F (37 °C) 11/05/24 1600   Pulse 92 11/05/24 1600   /80 11/05/24 1558   Resp 22 11/05/24 1600   SpO2 91 % 11/05/24 1559   Vitals shown include unfiled device data.    Modified Aracelis:  No data recorded

## 2024-11-06 ENCOUNTER — TELEPHONE (OUTPATIENT)
Dept: OTHER | Facility: HOSPITAL | Age: 64
End: 2024-11-06

## 2024-11-06 VITALS
RESPIRATION RATE: 18 BRPM | OXYGEN SATURATION: 93 % | WEIGHT: 188.71 LBS | HEIGHT: 68 IN | TEMPERATURE: 98.6 F | SYSTOLIC BLOOD PRESSURE: 130 MMHG | DIASTOLIC BLOOD PRESSURE: 79 MMHG | HEART RATE: 94 BPM | BODY MASS INDEX: 28.6 KG/M2

## 2024-11-06 LAB
ANION GAP SERPL CALCULATED.3IONS-SCNC: 5 MMOL/L (ref 4–13)
BUN SERPL-MCNC: 10 MG/DL (ref 5–25)
CALCIUM SERPL-MCNC: 8.6 MG/DL (ref 8.4–10.2)
CHLORIDE SERPL-SCNC: 100 MMOL/L (ref 96–108)
CO2 SERPL-SCNC: 30 MMOL/L (ref 21–32)
CREAT SERPL-MCNC: 0.74 MG/DL (ref 0.6–1.3)
ERYTHROCYTE [DISTWIDTH] IN BLOOD BY AUTOMATED COUNT: 16.2 % (ref 11.6–15.1)
GFR SERPL CREATININE-BSD FRML MDRD: 97 ML/MIN/1.73SQ M
GLUCOSE SERPL-MCNC: 144 MG/DL (ref 65–140)
HCT VFR BLD AUTO: 32 % (ref 36.5–49.3)
HGB BLD-MCNC: 10.4 G/DL (ref 12–17)
MCH RBC QN AUTO: 30 PG (ref 26.8–34.3)
MCHC RBC AUTO-ENTMCNC: 32.5 G/DL (ref 31.4–37.4)
MCV RBC AUTO: 92 FL (ref 82–98)
PLATELET # BLD AUTO: 320 THOUSANDS/UL (ref 149–390)
PMV BLD AUTO: 8.1 FL (ref 8.9–12.7)
POTASSIUM SERPL-SCNC: 4.3 MMOL/L (ref 3.5–5.3)
RBC # BLD AUTO: 3.47 MILLION/UL (ref 3.88–5.62)
SODIUM SERPL-SCNC: 135 MMOL/L (ref 135–147)
WBC # BLD AUTO: 20.55 THOUSAND/UL (ref 4.31–10.16)

## 2024-11-06 PROCEDURE — NC001 PR NO CHARGE: Performed by: NURSE PRACTITIONER

## 2024-11-06 PROCEDURE — 99239 HOSP IP/OBS DSCHRG MGMT >30: CPT | Performed by: INTERNAL MEDICINE

## 2024-11-06 PROCEDURE — 99232 SBSQ HOSP IP/OBS MODERATE 35: CPT | Performed by: PHYSICIAN ASSISTANT

## 2024-11-06 PROCEDURE — 80048 BASIC METABOLIC PNL TOTAL CA: CPT | Performed by: UROLOGY

## 2024-11-06 PROCEDURE — 85027 COMPLETE CBC AUTOMATED: CPT | Performed by: UROLOGY

## 2024-11-06 PROCEDURE — 99024 POSTOP FOLLOW-UP VISIT: CPT | Performed by: NURSE PRACTITIONER

## 2024-11-06 RX ORDER — LIDOCAINE HYDROCHLORIDE 20 MG/ML
1 JELLY TOPICAL 3 TIMES DAILY
Qty: 10 ML | Refills: 0 | Status: SHIPPED | OUTPATIENT
Start: 2024-11-06 | End: 2024-11-10

## 2024-11-06 RX ORDER — OXYBUTYNIN CHLORIDE 5 MG/1
5 TABLET ORAL 3 TIMES DAILY PRN
Qty: 15 TABLET | Refills: 0 | Status: SHIPPED | OUTPATIENT
Start: 2024-11-06

## 2024-11-06 RX ADMIN — ACETAMINOPHEN 650 MG: 325 TABLET, FILM COATED ORAL at 11:42

## 2024-11-06 RX ADMIN — ASPIRIN 81 MG: 81 TABLET, COATED ORAL at 08:30

## 2024-11-06 RX ADMIN — LIDOCAINE HYDROCHLORIDE 1 APPLICATION: 20 JELLY TOPICAL at 07:36

## 2024-11-06 RX ADMIN — SENNOSIDES 8.6 MG: 8.6 TABLET, FILM COATED ORAL at 08:30

## 2024-11-06 RX ADMIN — ACETAMINOPHEN 650 MG: 325 TABLET, FILM COATED ORAL at 05:28

## 2024-11-06 RX ADMIN — OXYCODONE HYDROCHLORIDE 10 MG: 10 TABLET ORAL at 02:12

## 2024-11-06 RX ADMIN — OXYBUTYNIN CHLORIDE 5 MG: 5 TABLET ORAL at 08:30

## 2024-11-06 RX ADMIN — PHENAZOPYRIDINE 200 MG: 100 TABLET ORAL at 08:29

## 2024-11-06 RX ADMIN — CEFAZOLIN SODIUM 2000 MG: 2 SOLUTION INTRAVENOUS at 05:28

## 2024-11-06 NOTE — ASSESSMENT & PLAN NOTE
Known 8cm bladder mass.  Now s/p TURBT 11/5  Urine clear yellow today. CBI discontinued. Expect some intermittent hematuria in these 1-2 weeks post procedure.  Keep brambila catheter for 5 days- office removal  Path review in approximately 2 weeks

## 2024-11-06 NOTE — TELEPHONE ENCOUNTER
s/p TURBT by Dr Duke on call yesterday at [facility] during admission had some hematuria so expedited his resection. Will need brambila removal early next week then path review with Dr Duke or Dr Real. Has OR on 12/11 for Addie for turbt, keep for now in case he needs a six week re-look the timing will line up.

## 2024-11-06 NOTE — ASSESSMENT & PLAN NOTE
After brambila catheter placement in the OR  Status post TURBT 11/5/2024  CBI discontinued 11/6/2024  Patient with tea colored urine  Cleared for discharge by urology  Maintain Brambila catheter until office visit in 5 days

## 2024-11-06 NOTE — ASSESSMENT & PLAN NOTE
Suspected reactive with hematuria/bleeding, blood products, procedure  Continues to be afebrile without localizing symptoms  Patient interval follow-up CBC to confirm resolution  Strict return precautions provided to patient

## 2024-11-06 NOTE — ASSESSMENT & PLAN NOTE
65 yo male ex-smoker w/ a hx of HTN, HLD, COPD, newly diagnosed bladder mass, and PAD presented to [facility] on 10/31/24 for an elective RLE bypass. Pt has a hx of PAD w/ RLE short-distance claudication and rest pain for which he underwent a R CFA to AT bypass tunneled laterally w/ ipsilateral rGSV by Dr. Bray on 10/31. Urology consulted intraoperatively due to hematuria and continues to follow. Pt is S/P TURBT w/ urology on 11/5.    Plan:  -PAD w/ RLE rest pain  -S/P R CFA to AT bypass w/ ipsilateral rGSV on 10/31 (POD #6)  -Palpable bypass and R DP pulse  -Continue daily dressing changes per nursing  -Continue R groin prevena VAC x7 days; remove 11/7  -Continue ASA and lipitor for optimal medical management of PAD  -Will hold off of DAPT as plan is for excision of bladder tumor w/ TURBT during this admission  -Continue OOB as tolerated  -Pt is stable for discharge from a vascular surgery standpoint  -Urology following for bladder mass; S/P TURBPT on 11/5  -Follow up in the vascular surgery office; appt scheduled for 11/12/24  -Will continue to follow intermittently for incision checks while pt remains hospitalized  -Continue medical management per primary team; assistance appreciated  -Will discuss w/ Dr. Galeana

## 2024-11-06 NOTE — PLAN OF CARE
Problem: Prexisting or High Potential for Compromised Skin Integrity  Goal: Skin integrity is maintained or improved  Description: INTERVENTIONS:  - Identify patients at risk for skin breakdown  - Assess and monitor skin integrity  - Assess and monitor nutrition and hydration status  - Monitor labs   - Assess for incontinence   - Turn and reposition patient  - Assist with mobility/ambulation  - Relieve pressure over bony prominences  - Avoid friction and shearing  - Provide appropriate hygiene as needed including keeping skin clean and dry  - Evaluate need for skin moisturizer/barrier cream  - Collaborate with interdisciplinary team   - Patient/family teaching  - Consider wound care consult   Outcome: Progressing     Problem: DISCHARGE PLANNING  Goal: Discharge to home or other facility with appropriate resources  Description: INTERVENTIONS:  - Identify barriers to discharge w/patient and caregiver  - Arrange for needed discharge resources and transportation as appropriate  - Identify discharge learning needs (meds, wound care, etc.)  - Arrange for interpretive services to assist at discharge as needed  - Refer to Case Management Department for coordinating discharge planning if the patient needs post-hospital services based on physician/advanced practitioner order or complex needs related to functional status, cognitive ability, or social support system  Outcome: Progressing     Problem: Knowledge Deficit  Goal: Patient/family/caregiver demonstrates understanding of disease process, treatment plan, medications, and discharge instructions  Description: Complete learning assessment and assess knowledge base.  Interventions:  - Provide teaching at level of understanding  - Provide teaching via preferred learning methods  Outcome: Progressing     Problem: RESPIRATORY - ADULT  Goal: Achieves optimal ventilation and oxygenation  Description: INTERVENTIONS:  - Assess for changes in respiratory status  - Assess for  changes in mentation and behavior  - Position to facilitate oxygenation and minimize respiratory effort  - Oxygen administered by appropriate delivery if ordered  - Initiate smoking cessation education as indicated  - Encourage broncho-pulmonary hygiene including cough, deep breathe, Incentive Spirometry  - Assess the need for suctioning and aspirate as needed  - Assess and instruct to report SOB or any respiratory difficulty  - Respiratory Therapy support as indicated  Outcome: Progressing     Problem: MUSCULOSKELETAL - ADULT  Goal: Maintain or return mobility to safest level of function  Description: INTERVENTIONS:  - Assess patient's ability to carry out ADLs; assess patient's baseline for ADL function and identify physical deficits which impact ability to perform ADLs (bathing, care of mouth/teeth, toileting, grooming, dressing, etc.)  - Assess/evaluate cause of self-care deficits   - Assess range of motion  - Assess patient's mobility  - Assess patient's need for assistive devices and provide as appropriate  - Encourage maximum independence but intervene and supervise when necessary  - Involve family in performance of ADLs  - Assess for home care needs following discharge   - Consider OT consult to assist with ADL evaluation and planning for discharge  - Provide patient education as appropriate  Outcome: Progressing  Goal: Maintain proper alignment of affected body part  Description: INTERVENTIONS:  - Support, maintain and protect limb and body alignment  - Provide patient/ family with appropriate education  Outcome: Progressing     Problem: PAIN - ADULT  Goal: Verbalizes/displays adequate comfort level or baseline comfort level  Description: Interventions:  - Encourage patient to monitor pain and request assistance  - Assess pain using appropriate pain scale  - Administer analgesics based on type and severity of pain and evaluate response  - Implement non-pharmacological measures as appropriate and evaluate  response  - Consider cultural and social influences on pain and pain management  - Notify physician/advanced practitioner if interventions unsuccessful or patient reports new pain  Outcome: Progressing     Problem: INFECTION - ADULT  Goal: Absence or prevention of progression during hospitalization  Description: INTERVENTIONS:  - Assess and monitor for signs and symptoms of infection  - Monitor lab/diagnostic results  - Monitor all insertion sites, i.e. indwelling lines, tubes, and drains  - Monitor endotracheal if appropriate and nasal secretions for changes in amount and color  - [place] appropriate cooling/warming therapies per order  - Administer medications as ordered  - Instruct and encourage patient and family to use good hand hygiene technique  - Identify and instruct in appropriate isolation precautions for identified infection/condition  Outcome: Progressing  Goal: Absence of fever/infection during neutropenic period  Description: INTERVENTIONS:  - Monitor WBC    Outcome: Progressing     Problem: SAFETY ADULT  Goal: Patient will remain free of falls  Description: INTERVENTIONS:  - Educate patient/family on patient safety including physical limitations  - Instruct patient to call for assistance with activity   - Consult OT/PT to assist with strengthening/mobility   - Keep Call bell within reach  - Keep bed low and locked with side rails adjusted as appropriate  - Keep care items and personal belongings within reach  - Initiate and maintain comfort rounds  - Make Fall Risk Sign visible to staff  - Offer Toileting every 2 Hours, in advance of need  - Initiate/Maintain bed alarm  - Obtain necessary fall risk management equipment  - Apply yellow socks and bracelet for high fall risk patients  - Consider moving patient to room near nurses station  Outcome: Progressing  Goal: Maintain or return to baseline ADL function  Description: INTERVENTIONS:  -  Assess patient's ability to carry out ADLs; assess patient's  baseline for ADL function and identify physical deficits which impact ability to perform ADLs (bathing, care of mouth/teeth, toileting, grooming, dressing, etc.)  - Assess/evaluate cause of self-care deficits   - Assess range of motion  - Assess patient's mobility; develop plan if impaired  - Assess patient's need for assistive devices and provide as appropriate  - Encourage maximum independence but intervene and supervise when necessary  - Involve family in performance of ADLs  - Assess for home care needs following discharge   - Consider OT consult to assist with ADL evaluation and planning for discharge  - Provide patient education as appropriate  Outcome: Progressing  Goal: Maintains/Returns to pre admission functional level  Description: INTERVENTIONS:  - Perform AM-PAC 6 Click Basic Mobility/ Daily Activity assessment daily.  - Set and communicate daily mobility goal to care team and patient/family/caregiver.   - Collaborate with rehabilitation services on mobility goals if consulted  - Perform Range of Motion 3 times a day.  - Reposition patient every 2 hours.  - Dangle patient 3 times a day  - Stand patient 3 times a day  - Ambulate patient 3 times a day  - Out of bed to chair 3 times a day   - Out of bed for meals 3 times a day  - Out of bed for toileting  - Record patient progress and toleration of activity level   Outcome: Progressing     Problem: SKIN/TISSUE INTEGRITY - ADULT  Goal: Skin Integrity remains intact(Skin Breakdown Prevention)  Description: Assess:  -Perform Kulwinder assessment every shift  -Clean and moisturize skin every shift  -Inspect skin when repositioning, toileting, and assisting with ADLS  -Assess under medical devices every shift  -Assess extremities for adequate circulation and sensation     Bed Management:  -Have minimal linens on bed & keep smooth, unwrinkled  -Change linens as needed when moist or perspiring  -Avoid sitting or lying in one position for more than 2 hours while in  bed  -Keep HOB at 30 degrees     Activity:  -Mobilize patient 3 times a day  -Encourage activity and walks on unit  -Encourage or provide ROM exercises   -Turn and reposition patient every 2 Hours  -Use appropriate equipment to lift or move patient in bed  -Instruct/ Assist with weight shifting every 2 when out of bed in chair  -Consider limitation of chair time 2 hour intervals    Skin Care:  -Avoid use of baby powder, tape, friction and shearing, hot water or constrictive clothing  -Relieve pressure over bony prominences using Allevyn   -Do not massage red bony areas    Next Steps:  -Teach patient strategies to minimize risks   -Consider consults to  interdisciplinary teams  Outcome: Progressing  Goal: Incision(s), wounds(s) or drain site(s) healing without S/S of infection  Description: INTERVENTIONS  - Assess and document dressing, incision, wound bed, drain sites and surrounding tissue  - Provide patient and family education  - Perform skin care/dressing changes every shift  Outcome: Progressing

## 2024-11-06 NOTE — PROCEDURES
Procedure Note - Vascular Surgery   Name: Nahid Luis 64 y.o. male I MRN: [id number]  Unit/Bed#: E2 -01 I Date of Admission: 10/31/2024   Date of Service: 11/6/2024 I Hospital Day: 6    Pt seen today for removal of R groin prevena vac. Pt tolerated removal without difficulty. RLE and R groin incisions well-approx, no focal swelling, erythema or drainage; staples and surgical glue intact.      R groin    R leg (lateral)    R leg (medial)      LISA Burciaga  11/6/2024

## 2024-11-06 NOTE — PLAN OF CARE
Problem: Prexisting or High Potential for Compromised Skin Integrity  Goal: Skin integrity is maintained or improved  Description: INTERVENTIONS:  - Identify patients at risk for skin breakdown  - Assess and monitor skin integrity  - Assess and monitor nutrition and hydration status  - Monitor labs   - Assess for incontinence   - Turn and reposition patient  - Assist with mobility/ambulation  - Relieve pressure over bony prominences  - Avoid friction and shearing  - Provide appropriate hygiene as needed including keeping skin clean and dry  - Evaluate need for skin moisturizer/barrier cream  - Collaborate with interdisciplinary team   - Patient/family teaching  - Consider wound care consult   Outcome: Progressing     Problem: DISCHARGE PLANNING  Goal: Discharge to home or other facility with appropriate resources  Description: INTERVENTIONS:  - Identify barriers to discharge w/patient and caregiver  - Arrange for needed discharge resources and transportation as appropriate  - Identify discharge learning needs (meds, wound care, etc.)  - Arrange for interpretive services to assist at discharge as needed  - Refer to Case Management Department for coordinating discharge planning if the patient needs post-hospital services based on physician/advanced practitioner order or complex needs related to functional status, cognitive ability, or social support system  Outcome: Progressing     Problem: Knowledge Deficit  Goal: Patient/family/caregiver demonstrates understanding of disease process, treatment plan, medications, and discharge instructions  Description: Complete learning assessment and assess knowledge base.  Interventions:  - Provide teaching at level of understanding  - Provide teaching via preferred learning methods  Outcome: Progressing     Problem: RESPIRATORY - ADULT  Goal: Achieves optimal ventilation and oxygenation  Description: INTERVENTIONS:  - Assess for changes in respiratory status  - Assess for  changes in mentation and behavior  - Position to facilitate oxygenation and minimize respiratory effort  - Oxygen administered by appropriate delivery if ordered  - Initiate smoking cessation education as indicated  - Encourage broncho-pulmonary hygiene including cough, deep breathe, Incentive Spirometry  - Assess the need for suctioning and aspirate as needed  - Assess and instruct to report SOB or any respiratory difficulty  - Respiratory Therapy support as indicated  Outcome: Progressing     Problem: MUSCULOSKELETAL - ADULT  Goal: Maintain or return mobility to safest level of function  Description: INTERVENTIONS:  - Assess patient's ability to carry out ADLs; assess patient's baseline for ADL function and identify physical deficits which impact ability to perform ADLs (bathing, care of mouth/teeth, toileting, grooming, dressing, etc.)  - Assess/evaluate cause of self-care deficits   - Assess range of motion  - Assess patient's mobility  - Assess patient's need for assistive devices and provide as appropriate  - Encourage maximum independence but intervene and supervise when necessary  - Involve family in performance of ADLs  - Assess for home care needs following discharge   - Consider OT consult to assist with ADL evaluation and planning for discharge  - Provide patient education as appropriate  Outcome: Progressing  Goal: Maintain proper alignment of affected body part  Description: INTERVENTIONS:  - Support, maintain and protect limb and body alignment  - Provide patient/ family with appropriate education  Outcome: Progressing     Problem: PAIN - ADULT  Goal: Verbalizes/displays adequate comfort level or baseline comfort level  Description: Interventions:  - Encourage patient to monitor pain and request assistance  - Assess pain using appropriate pain scale  - Administer analgesics based on type and severity of pain and evaluate response  - Implement non-pharmacological measures as appropriate and evaluate  response  - Consider cultural and social influences on pain and pain management  - Notify physician/advanced practitioner if interventions unsuccessful or patient reports new pain  Outcome: Progressing     Problem: INFECTION - ADULT  Goal: Absence or prevention of progression during hospitalization  Description: INTERVENTIONS:  - Assess and monitor for signs and symptoms of infection  - Monitor lab/diagnostic results  - Monitor all insertion sites, i.e. indwelling lines, tubes, and drains  - Monitor endotracheal if appropriate and nasal secretions for changes in amount and color  - [place] appropriate cooling/warming therapies per order  - Administer medications as ordered  - Instruct and encourage patient and family to use good hand hygiene technique  - Identify and instruct in appropriate isolation precautions for identified infection/condition  Outcome: Progressing  Goal: Absence of fever/infection during neutropenic period  Description: INTERVENTIONS:  - Monitor WBC    Outcome: Progressing     Problem: SAFETY ADULT  Goal: Patient will remain free of falls  Description: INTERVENTIONS:  - Educate patient/family on patient safety including physical limitations  - Instruct patient to call for assistance with activity   - Consult OT/PT to assist with strengthening/mobility   - Keep Call bell within reach  - Keep bed low and locked with side rails adjusted as appropriate  - Keep care items and personal belongings within reach  - Initiate and maintain comfort rounds  - Make Fall Risk Sign visible to staff  - Offer Toileting every 2 Hours, in advance of need  - Initiate/Maintain bed alarm  - Obtain necessary fall risk management equipment  - Apply yellow socks and bracelet for high fall risk patients  - Consider moving patient to room near nurses station  Outcome: Progressing  Goal: Maintain or return to baseline ADL function  Description: INTERVENTIONS:  -  Assess patient's ability to carry out ADLs; assess patient's  baseline for ADL function and identify physical deficits which impact ability to perform ADLs (bathing, care of mouth/teeth, toileting, grooming, dressing, etc.)  - Assess/evaluate cause of self-care deficits   - Assess range of motion  - Assess patient's mobility; develop plan if impaired  - Assess patient's need for assistive devices and provide as appropriate  - Encourage maximum independence but intervene and supervise when necessary  - Involve family in performance of ADLs  - Assess for home care needs following discharge   - Consider OT consult to assist with ADL evaluation and planning for discharge  - Provide patient education as appropriate  Outcome: Progressing  Goal: Maintains/Returns to pre admission functional level  Description: INTERVENTIONS:  - Perform AM-PAC 6 Click Basic Mobility/ Daily Activity assessment daily.  - Set and communicate daily mobility goal to care team and patient/family/caregiver.   - Collaborate with rehabilitation services on mobility goals if consulted  - Perform Range of Motion 3 times a day.  - Reposition patient every 2 hours.  - Dangle patient 3 times a day  - Stand patient 3 times a day  - Ambulate patient 3 times a day  - Out of bed to chair 3 times a day   - Out of bed for meals 3 times a day  - Out of bed for toileting  - Record patient progress and toleration of activity level   Outcome: Progressing     Problem: SKIN/TISSUE INTEGRITY - ADULT  Goal: Skin Integrity remains intact(Skin Breakdown Prevention)  Description: Assess:  -Perform Kulwinder assessment every shift  -Clean and moisturize skin every shift  -Inspect skin when repositioning, toileting, and assisting with ADLS  -Assess under medical devices every shift  -Assess extremities for adequate circulation and sensation     Bed Management:  -Have minimal linens on bed & keep smooth, unwrinkled  -Change linens as needed when moist or perspiring  -Avoid sitting or lying in one position for more than 2 hours while in  bed  -Keep HOB at 30 degrees     Activity:  -Mobilize patient 3 times a day  -Encourage activity and walks on unit  -Encourage or provide ROM exercises   -Turn and reposition patient every 2 Hours  -Use appropriate equipment to lift or move patient in bed  -Instruct/ Assist with weight shifting every 2 when out of bed in chair  -Consider limitation of chair time 2 hour intervals    Skin Care:  -Avoid use of baby powder, tape, friction and shearing, hot water or constrictive clothing  -Relieve pressure over bony prominences using Allevyn   -Do not massage red bony areas    Next Steps:  -Teach patient strategies to minimize risks   -Consider consults to  interdisciplinary teams  Outcome: Progressing  Goal: Incision(s), wounds(s) or drain site(s) healing without S/S of infection  Description: INTERVENTIONS  - Assess and document dressing, incision, wound bed, drain sites and surrounding tissue  - Provide patient and family education  - Perform skin care/dressing changes every shift  Outcome: Progressing

## 2024-11-06 NOTE — CASE MANAGEMENT
Case Management Discharge Planning Note    Patient name Nahid Luis  Location [facility] /E2 -* MRN [id number]  : 1960 Date 2024       Current Admission Date: 10/31/2024  Current Admission Diagnosis:PAD (peripheral artery disease) (HCC)   Patient Active Problem List    Diagnosis Date Noted Date Diagnosed    Leukocytosis 2024     ABLA (acute blood loss anemia) 2024     Bladder mass 10/18/2024     Mixed hyperlipidemia 2024     PAD (peripheral artery disease) (HCC) 2024     Hypertension 2023     Chronic obstructive pulmonary disease (HCC) 2023     Current every day smoker 2023     Gross hematuria 2023       LOS (days): 6  Geometric Mean LOS (GMLOS) (days): 3.8  Days to GMLOS:-2.2     OBJECTIVE:  Risk of Unplanned Readmission Score: 10.56         Current admission status: Inpatient   Preferred Pharmacy:   [facility]/pharmacy #[id number] - [place] [address] [address]  Phone: [phone] Fax: [phone]    Primary Care Provider: Kaitlin García MD    Primary Insurance: [facility]  Secondary Insurance:     DISCHARGE DETAILS:    Discharge planning discussed with:: patient  Freedom of Choice: Yes  Comments - Freedom of Choice: Home with OP urology follow up  CM contacted family/caregiver?: No- see comments       Contacts  Patient Contacts: Nany Huff  (daughter) [phone]  Relationship to Patient:: Family  Contact Method: Phone  Phone Number: [phone]  Reason/Outcome: Emergency Contact    Requested Home Health Care         Is the patient interested in HHC at discharge?: No      Would you like to participate in our [facility] service program?  : No - Declined    Treatment Team Recommendation: Home  Discharge Destination Plan:: Home      Family notified:: family at the bedside  Additional Comments: Patient to be discharged home today by the [facility] provider.  Patient has no CM needs. Patent will be  taught brambila care and provided with supplies by the primary nurse since he will be discharged with the catheter in place. Patient  scheduled to follow up with Urology on Monday for brambila check and OP treatment. CM department will continue to follow patient through hospital discharge.

## 2024-11-06 NOTE — PROGRESS NOTES
Progress Note - Urology   Name: Nahid Luis 64 y.o. male I MRN: [id number]  Unit/Bed#: E2 -01 I Date of Admission: 10/31/2024   Date of Service: 11/6/2024 I Hospital Day: 6    Assessment & Plan  PAD (peripheral artery disease) (HCC)  S/p RLE areterial bypass this hospital stay. Plan per vascular team.  Gross hematuria  Multifactorial secondary to recent urethral manipulation/complex Brambila catheter insertion and known bladder mass as well as antiplatelet/anticoagulation perioperatively for vascular procedure.  Expedited TURBT for hematuria on 11/5  Urine clear yellow today. CBI discontinued.  Keep brambila catheter 5 days    Bladder mass  Known 8cm bladder mass.  Now s/p TURBT 11/5  Urine clear yellow today. CBI discontinued. Expect some intermittent hematuria in these 1-2 weeks post procedure.  Keep brambila catheter for 5 days- office removal  Path review in approximately 2 weeks      Ok for discharge from Urology service perspective.    Subjective   angry that he is still in the hospital after 6 days and having so many procedures done in a row. he denies pain just that he is attached to too many wires and poles to get out of bed. his urine is clear yellow. he knows he needs to keep the brambila for now but does not need to stay in the hospital just for that    Objective :  Temp:  [97.8 °F (36.6 °C)-98.9 °F (37.2 °C)] 98.6 °F (37 °C)  HR:  [78-94] 94  BP: (126-182)/(58-84) 130/79  Resp:  [16-22] 18  SpO2:  [90 %-94 %] 93 %  O2 Device: None (Room air)  Nasal Cannula O2 Flow Rate (L/min):  [2 L/min-4 L/min] 2 L/min    I/O         11/04 0701 11/05 0700 11/05 0701 11/06 0700 11/06 0701 11/07 0700    P.O. 480  240    I.V. (mL/kg)  2000 (23.4)     Blood 700      IV Piggyback  50     Total Intake(mL/kg) 1180 (13.8) 2050 (23.9) 240 (2.8)    Urine (mL/kg/hr) 2800 (1.4) 9400 (4.6) 700 (1.3)    Blood  300     Total Output 2800 9700 700    Net -8955 -7654 -460                 Lines/Drains/Airways       Active Status        Name Placement date Placement time Site Days    Closed/Suction Drain --  --  --  --    Continuous Bladder Irrigation Three-way 11/05/24  1609  Three-way  less than 1                  Physical Exam  Vitals and nursing note reviewed.   Constitutional:       General: He is not in acute distress.     Appearance: He is well-developed. He is not diaphoretic.   HENT:      Head: Normocephalic and atraumatic.   Pulmonary:      Effort: Pulmonary effort is normal.   Genitourinary:     Comments: brambila catheter per urethra draining clear yellow urine on slow cbi  Musculoskeletal:      Right lower leg: No edema.      Left lower leg: No edema.      Comments: RLE incisions from vein harvest sites clean dry intact. right groin wound vac   Skin:     General: Skin is warm.   Neurological:      Mental Status: He is alert and oriented to person, place, and time.           Lab Results: I have reviewed the following results:  Recent Labs     11/04/24  0435 11/04/24 1953 11/06/24  0527   WBC 15.18*   < > 20.55*   HGB 8.7*   < > 10.4*   HCT 26.6*   < > 32.0*      < > 320   SODIUM 133*   < > 135   K 3.5   < > 4.3      < > 100   CO2 28   < > 30   BUN 9   < > 10   CREATININE 0.88   < > 0.74   GLUC 117   < > 144*   MG 1.8*  --   --    AST 18  --   --    ALT 12  --   --    ALB 3.1*  --   --    TBILI 0.57  --   --    ALKPHOS 50  --   --     < > = values in this interval not displayed.       Imaging Results Review: No pertinent imaging studies reviewed.  Other Study Results Review: No additional pertinent studies reviewed.    VTE Pharmacologic Prophylaxis: VTE covered by:    None      VTE Mechanical Prophylaxis: sequential compression device

## 2024-11-06 NOTE — PROGRESS NOTES
Progress Note - Vascular Surgery   Name: Nahid Luis 64 y.o. male I MRN: [id number]  Unit/Bed#: E2 -01 I Date of Admission: 10/31/2024   Date of Service: 11/6/2024 I Hospital Day: 6    Assessment & Plan  PAD (peripheral artery disease) (HCC)  63 yo male ex-smoker w/ a hx of HTN, HLD, COPD, newly diagnosed bladder mass, and PAD presented to [facility] on 10/31/24 for an elective RLE bypass. Pt has a hx of PAD w/ RLE short-distance claudication and rest pain for which he underwent a R CFA to AT bypass tunneled laterally w/ ipsilateral rGSV by Dr. Bray on 10/31. Urology consulted intraoperatively due to hematuria and continues to follow. Pt is S/P TURBT w/ urology on 11/5.    Plan:  -PAD w/ RLE rest pain  -S/P R CFA to AT bypass w/ ipsilateral rGSV on 10/31 (POD #6)  -Palpable bypass and R DP pulse  -Continue daily dressing changes per nursing  -Continue R groin prevena VAC x7 days; remove 11/7  -Continue ASA and lipitor for optimal medical management of PAD  -Will hold off of DAPT as plan is for excision of bladder tumor w/ TURBT during this admission  -Continue OOB as tolerated  -Pt is stable for discharge from a vascular surgery standpoint  -Urology following for bladder mass; S/P TURBPT on 11/5  -Follow up in the vascular surgery office; appt scheduled for 11/12/24  -Will continue to follow intermittently for incision checks while pt remains hospitalized  -Continue medical management per primary team; assistance appreciated  -Will discuss w/ Dr. Galeana    Subjective : Pt seen for exam while lying in bed; NAD. Pt denies any complaints at this time.    Objective :  Temp:  [97.8 °F (36.6 °C)-98.9 °F (37.2 °C)] 98.6 °F (37 °C)  HR:  [78-92] 82  BP: (126-182)/(58-84) 126/76  Resp:  [16-22] 18  SpO2:  [90 %-94 %] 92 %  O2 Device: None (Room air)  Nasal Cannula O2 Flow Rate (L/min):  [2 L/min-4 L/min] 2 L/min     I/O         11/04 0701 11/05 0700 11/05 0701 11/06 0700 11/06 0701 11/07 0700    P.O. 480  240     I.V. (mL/kg)  2000 (23.4)     Blood 700      IV Piggyback  50     Total Intake(mL/kg) 1180 (13.8) 2050 (23.9) 240 (2.8)    Urine (mL/kg/hr) 2800 (1.4) 9400 (4.6) 200 (0.7)    Blood  300     Total Output 2800 9700 200    Net -1620 -7650 +40                 Lines/Drains/Airways       Active Status       Name Placement date Placement time Site Days    Closed/Suction Drain --  --  --  --    Continuous Bladder Irrigation Three-way 24  1609  Three-way  less than 1                  Physical Exam  Vitals and nursing note reviewed.   Constitutional:       General: He is awake. He is not in acute distress.     Appearance: Normal appearance. He is well-developed.   HENT:      Head: Normocephalic and atraumatic.   Cardiovascular:      Rate and Rhythm: Normal rate and regular rhythm.      Pulses:           Dorsalis pedis pulses are 2+ on the right side and detected w/ Doppler on the left side.        Posterior tibial pulses are 2+ on the right side and detected w/ Doppler on the left side.      Comments: Palpable RLE BPG pulse  Pulmonary:      Effort: Pulmonary effort is normal. No respiratory distress.   Abdominal:      General: There is no distension.      Palpations: Abdomen is soft.   Musculoskeletal:         General: Swelling (RLE post operative swelling) and tenderness (RLE) present.      Cervical back: Neck supple.      Right lower le+ Edema present.      Left lower leg: No edema.   Skin:     General: Skin is warm and dry.      Capillary Refill: Capillary refill takes less than 2 seconds.      Findings: Wound present.      Comments: RLE and R groin incisions   Neurological:      General: No focal deficit present.      Mental Status: He is alert and oriented to person, place, and time.   Psychiatric:         Mood and Affect: Mood normal.         Behavior: Behavior normal. Behavior is cooperative.       Wound/Incision:  RLE w/ mild expected post-op swelling. Lateral incisions well-approx w/ no focal swelling,  ecchymosis, erythema or drainage; surgical glue present. RLE medial incisions well-approx, no focal swelling, ecchymosis, erythema or drainage; staples intact. R groin incision w/ prevena vac intact, adequate suction, no drainage in collection canister. R foot warm w/ (+) motor/sensation.       Lab Results: I have reviewed the following results:  CBC with diff:   Lab Results   Component Value Date    WBC 20.55 (H) 11/06/2024    HGB 10.4 (L) 11/06/2024    HCT 32.0 (L) 11/06/2024    MCV 92 11/06/2024     11/06/2024    RBC 3.47 (L) 11/06/2024    MCH 30.0 11/06/2024    MCHC 32.5 11/06/2024    RDW 16.2 (H) 11/06/2024    MPV 8.1 (L) 11/06/2024    NRBC 0 11/04/2024     BMP/CMP:  Lab Results   Component Value Date    SODIUM 135 11/06/2024    K 4.3 11/06/2024     11/06/2024    CO2 30 11/06/2024    BUN 10 11/06/2024    CREATININE 0.74 11/06/2024    CALCIUM 8.6 11/06/2024    AST 18 11/04/2024    ALT 12 11/04/2024    ALKPHOS 50 11/04/2024    EGFR 97 11/06/2024

## 2024-11-06 NOTE — ASSESSMENT & PLAN NOTE
64-year-old male with PAD is currently admitted under the vascular service underwent the following procedure last 10/31/2024.    Right common femoral and anterior tibial artery exposure  Right greater saphenous vein harvest via skip incisions  Right common femoral artery to anterior tibial artery bypass with ipsilateral reversed greater saphenous vein     Continue aspirin and statin.  Cleared for discharge by vascular surgery team  Patient with Prevena VAC postoperatively that was removed 11/6/2024  Dual antiplatelet therapy on hold given ongoing hematuria  Outpatient vascular surgery office appointment scheduled for 11/12/2024

## 2024-11-06 NOTE — ASSESSMENT & PLAN NOTE
In setting of hematuria  Per recommendations from urology, transfused 2 units preoperatively to optimize for procedure  Hemoglobin has remained stable

## 2024-11-06 NOTE — ASSESSMENT & PLAN NOTE
Large bladder tumor concerning for bladder cancer.   Status post TURBT 11/5/2024  Outpatient follow-up with urology

## 2024-11-06 NOTE — ASSESSMENT & PLAN NOTE
Multifactorial secondary to recent urethral manipulation/complex Brambila catheter insertion and known bladder mass as well as antiplatelet/anticoagulation perioperatively for vascular procedure.  Expedited TURBT for hematuria on 11/5  Urine clear yellow today. CBI discontinued.  Keep brambila catheter 5 days

## 2024-11-06 NOTE — DISCHARGE SUMMARY
Discharge Summary - Hospitalist   Name: Nahid Luis 64 y.o. male I MRN: [id number]  Unit/Bed#: E2 -01 I Date of Admission: 10/31/2024   Date of Service: 11/6/2024 I Hospital Day: 6     Assessment & Plan  PAD (peripheral artery disease) (HCC)  64-year-old male with PAD is currently admitted under the vascular service underwent the following procedure last 10/31/2024.    Right common femoral and anterior tibial artery exposure  Right greater saphenous vein harvest via skip incisions  Right common femoral artery to anterior tibial artery bypass with ipsilateral reversed greater saphenous vein     Continue aspirin and statin.  Cleared for discharge by vascular surgery team  Patient with Prevena VAC postoperatively that was removed 11/6/2024  Dual antiplatelet therapy on hold given ongoing hematuria  Outpatient vascular surgery office appointment scheduled for 11/12/2024  Gross hematuria  After brambila catheter placement in the OR  Status post TURBT 11/5/2024  CBI discontinued 11/6/2024  Patient with tea colored urine  Cleared for discharge by urology  Maintain Brambila catheter until office visit in 5 days  Hypertension  Continue amlodipine  Chronic obstructive pulmonary disease (HCC)  Not in acute exacerbation  Continue inpatient formulary equivalent of home inhalers. PRN albuterol  Mixed hyperlipidemia  Continue lipitor  Bladder mass  Large bladder tumor concerning for bladder cancer.   Status post TURBT 11/5/2024  Outpatient follow-up with urology  ABLA (acute blood loss anemia)  In setting of hematuria  Per recommendations from urology, transfused 2 units preoperatively to optimize for procedure  Hemoglobin has remained stable  Leukocytosis  Suspected reactive with hematuria/bleeding, blood products, procedure  Continues to be afebrile without localizing symptoms  Patient interval follow-up CBC to confirm resolution  Strict return precautions provided to patient     Discharging Physician / Practitioner:  "Dorian Leija DO  PCP: Kaitlin García MD  Admission Date:   Admission Orders (From admission, onward)       Ordered        10/31/24 1343  Inpatient Admission  Once                          Discharge Date: 11/06/24    Medical Problems       Resolved Problems  Date Reviewed: 8/26/2024   None         Consultations During Hospital Stay:   IP CONSULT TO MEDICAL CRITICAL CARE  IP CONSULT TO UROLOGY  IP CONSULT TO INTERNAL MEDICINE    Procedures Performed:   S/P R CFA to AT bypass w/ ipsilateral rGSV on 10/31   TURBT 11/5/2024      Significant Findings / Test Results:     No results found.    Incidental Findings: None    Test Results Pending at Discharge (will require follow up): Surgical pathology      Outpatient Tests Requested: None    Reason for Admission: Elective vascular procedure    Hospital Course:     Nahid Luis is a 64 y.o. male with past medical history of PAD, recently diagnosed bladder tumor who presents to the hospital for elective right lower extremity bypass.  Patient developed gross hematuria following procedure.  Patient was seen by urology, Malone catheter was placed and patient was started on continuous bladder irrigation.  Ultimately patient underwent TURBT 11/5/2024.  Patient discharged home with Malone catheter and close outpatient follow-up.    Please see above list of diagnoses and related plan for additional information.     Condition at Discharge: stable     Discharge Day Visit / Exam:     Subjective: Patient anxious to go home.  We discussed discharge planning.    Vitals: Blood Pressure: 130/79 (11/06/24 1032)  Pulse: 94 (11/06/24 1032)  Temperature: 98.6 °F (37 °C) (11/06/24 1032)  Temp Source: Oral (11/06/24 1032)  Respirations: 18 (11/06/24 1032)  Height: 5' 8\" (172.7 cm) (10/31/24 1636)  Weight - Scale: 85.6 kg (188 lb 11.4 oz) (10/31/24 1636)  SpO2: 93 % (11/06/24 1032)  Exam:   Physical Exam  Vitals reviewed.   Constitutional:       General: He is not in acute distress.     " Appearance: He is well-developed. He is not ill-appearing, toxic-appearing or diaphoretic.   HENT:      Head: Normocephalic and atraumatic.      Mouth/Throat:      Mouth: Mucous membranes are moist.   Eyes:      General: No scleral icterus.     Extraocular Movements: Extraocular movements intact.   Cardiovascular:      Rate and Rhythm: Normal rate and regular rhythm.      Heart sounds: Normal heart sounds.   Pulmonary:      Effort: Pulmonary effort is normal. No respiratory distress.      Breath sounds: Normal breath sounds. No wheezing or rales.   Abdominal:      General: There is no distension.      Palpations: Abdomen is soft.      Tenderness: There is no abdominal tenderness. There is no guarding or rebound.   Musculoskeletal:         General: No swelling, tenderness or deformity.   Skin:     General: Skin is warm and dry.      Comments: Right lower extremity incisions clean dry and intact   Neurological:      General: No focal deficit present.      Mental Status: He is alert. Mental status is at baseline.   Psychiatric:         Mood and Affect: Mood normal.         Behavior: Behavior normal.         Thought Content: Thought content normal.         Judgment: Judgment normal.           Discharge instructions/Information to patient and family:   See after visit summary for information provided to patient and family.      Provisions for Follow-Up Care:  See after visit summary for information related to follow-up care and any pertinent home health orders.      Disposition:     Home     Discharge Statement:  I spent 60 minutes discharging the patient. This time was spent on the day of discharge. I had direct contact with the patient on the day of discharge. Greater than 50% of the total time was spent examining patient, answering all patient questions, arranging and discussing plan of care with patient as well as directly providing post-discharge instructions.  Additional time then spent on discharge  activities.    Discharge Medications:  See after visit summary for reconciled discharge medications provided to patient and family.      ** Please Note: This note has been constructed using a voice recognition system **

## 2024-11-07 ENCOUNTER — TELEPHONE (OUTPATIENT)
Dept: UROLOGY | Facility: MEDICAL CENTER | Age: 64
End: 2024-11-07

## 2024-11-07 ENCOUNTER — TRANSITIONAL CARE MANAGEMENT (OUTPATIENT)
Dept: FAMILY MEDICINE CLINIC | Facility: CLINIC | Age: 64
End: 2024-11-07

## 2024-11-07 ENCOUNTER — PATIENT OUTREACH (OUTPATIENT)
Dept: CASE MANAGEMENT | Facility: OTHER | Age: 64
End: 2024-11-07

## 2024-11-07 DIAGNOSIS — Z71.89 COMPLEX CARE COORDINATION: Primary | ICD-10-CM

## 2024-11-07 NOTE — TELEPHONE ENCOUNTER
Post Op Note    Nahid Lius is a 64 y.o. male s/p TURBT  performed 11/5/24 with Dr. Duke.  He is a patient of Dr. Real seen in [place] office    How would you rate your pain on a scale from 1 to 10, 10 being the worst pain ever? 1  Have you had a fever? No    Have your bowel movements been regular? No pt advised to start stool softener with 40-60 oz of hydration per day  Do you have any difficulty urinating? No pt has a brambila    If the patient has a brambila- are you comfortable caring for your brambila? Yes Is it draining urine? Yes  Do you have any other questions or concerns that I can address at this time?     Pt states he has no pain just discomfort from brambila.  Appt confirmed for brambila removal/VT on 11/12/24 in [place] office with the nurse.  Spoke to pt's daughter and confirmed this info with her.  Pt will either be called with path results or appt will be made to discuss.    He is currently scheduled for OR on 12/11/24 with Dr. Real for another TURBT pending path results. Pt knows to contact the office with any issues or concerns.

## 2024-11-07 NOTE — UTILIZATION REVIEW
NOTIFICATION OF ADMISSION DISCHARGE   This is a Notification of Discharge from [facility]. Please be advised that this patient has been discharge from our facility. Below you will find the admission and discharge date and time including the patient’s disposition.   UTILIZATION REVIEW CONTACT:  Nany Ledezma MA  Utilization   Network Utilization Review Department  Phone: [phone] x carefully listen to the prompts. All voicemails are confidential.  Email: [email or web address]     ADMISSION INFORMATION  PRESENTATION DATE: 10/31/2024  5:31 AM  OBERVATION ADMISSION DATE: N/A  INPATIENT ADMISSION DATE: 10/31/24  1:43 PM   DISCHARGE DATE: 11/6/2024  4:09 PM   DISPOSITION:Home/Self Care    Network Utilization Review Department  ATTENTION: Please call with any questions or concerns to [phone] and carefully listen to the prompts so that you are directed to the right person. All voicemails are confidential.   For Discharge needs, contact Care Management DC Support Team at [phone] opt. 2  Send all requests for admission clinical reviews, approved or denied determinations and any other requests to dedicated fax number below belonging to the campus where the patient is receiving treatment. List of dedicated fax numbers for the Facilities:  FACILITY NAME UR FAX NUMBER   ADMISSION DENIALS (Administrative/Medical Necessity) [phone]   DISCHARGE SUPPORT TEAM ([facility]) [phone]   PARENT CHILD HEALTH (Maternity/NICU/Pediatrics) [phone]   [facility] [phone]   [facility] [phone]   [facility] [phone]   [facility] [phone]   [facility] [phone]   [facility] [phone]   [facility] [phone]   [facility]  [phone]   [facility] [phone]   [facility] [phone]   [facility] [phone]   [facility] [phone]

## 2024-11-07 NOTE — TELEPHONE ENCOUNTER
PT daughter called about an appt for next week.  Please advise.  PT daughter can be reached at [phone]

## 2024-11-07 NOTE — PROGRESS NOTES
Outpatient Care Management Note    HRR referral. Chart review completed.    Patient was hospitalized at [facility] 10/31/24-11/6/24 for PAD (peripheral artery disease).  Per chart review, patient was recently diagnosed with a bladder tumor, who presented for elective right lower extremity bypass.  He developed gross hematuria post procedure and was seen by Urology.  He ultimately underwent TURBT on 11/5/24.  Patient discharged home with a Malone.    Per chart, patient has an appointment scheduled with Vascular Surgery on 11/12/24 and PCP on 11/13/24.    RN CM will schedule outreach call to follow up post hospitalization and to assess for any care management needs.    RN CM placed outreach call, call was picked up without anyone speaking and immediately hung up.  No VM option offered- unable to leave message.  RN CM will make another attempt later today.

## 2024-11-07 NOTE — PROGRESS NOTES
Outpatient Care Management Note    RN CM attempted another outreach call to patient. No patient answer received. Message states that voice mailbox is full- unable to leave message.    RN CM will schedule a second outreach attempt for next week if no patient response received prior.

## 2024-11-08 ENCOUNTER — TELEPHONE (OUTPATIENT)
Dept: VASCULAR SURGERY | Facility: CLINIC | Age: 64
End: 2024-11-08

## 2024-11-08 NOTE — TELEPHONE ENCOUNTER
Vascular Nurse Navigator Post Op Call    Procedure:   Right common femoral and anterior tibial artery exposure  Right greater saphenous vein harvest via skip incisions  Right common femoral artery to anterior tibial artery bypass with ipsilateral reversed greater saphenous vein     Date of Procedure: 10/31/24    Surgeon:   * Carlos Bray, DO - Primary     * Saravaann Barbosa DO - Fellow     * Mushtaq Garduno MD     * Delano Bertrand MD     Discharge Date: 11/6/24    Discharge Disposition: Home    Leg Weakness?: No    Leg Swelling?: No    Leg Numbness?: No    Chest Pain?: No    Shortness of Breath?: No    Orthopnea?: No    Anticoagulation pt was discharged on post op?: Aspirin    Statin pt was discharged on post op?:  Lipitor (atorvastatin)    Bleeding?: No    Uncontrolled Pain?: No    Incision Concerns?: No    Fever or Chills?: No      Reviewed discharge instructions and incision care with patient.      NEXT OFFICE VISIT SCHEDULED:  11/12/24 at 1:30 pm with LISA Styles at [facility] Confirmed?: Yes      Any further questions/concerns?  Patient stated that he is doing good since discharge.  He stated that he continues with numbness in his foot that is slowly improving.  Reviewed incision care with him - wash daily with soap and water.  All questions answered.  No concerns expressed at this time.

## 2024-11-11 ENCOUNTER — PATIENT OUTREACH (OUTPATIENT)
Dept: CASE MANAGEMENT | Facility: OTHER | Age: 64
End: 2024-11-11

## 2024-11-11 NOTE — PROGRESS NOTES
Outpatient Care Management Note    IB reminder: HRR referral- second outreach attempt needed.  Chart reviewed.  Per chart, patient is scheduled to see Urology and Vascular Surgery tomorrow 11/12/24.    Patient was hospitalized at [facility] 10/31/24-11/6/24 for peripheral artery disease.  He presented for elective right lower extremity bypass.  He developed hematuria post procedure.  He underwent TURBT on 11/5/24.  Discharged home with Malone.    CRISTY WATSON made first telephone outreach attempt to patient on 11/7/24 with no patient answer received. VM message left and included RN Cm contact information.    Second outreach attempt made now. Spoke with patient. RN CM introduced self, role, and reason for call.  Patient states that he is managing well and looking forward to his appointments tomorrow with Urology and Vascular Surgery.  He verbalized that he is eager to have the Malone removed.    Patient states that the Malone remains in place and draining clear yellow urine without any issues.      Patient states that he received a copy of his AVS instructions and medication list.  He states that he has reviewed and he has no questions or concerns.  He declined need to review medications with RN WALTER at this time.  He states that his is taking his medications as prescribed.  He denied having any issues with medication affordability.    Patient states that he lives with his son and daughter and is able to manage his ADL's without any concerns.  He states that his son and daughters assist him as needed. His daughter will transport him to his upcoming appointments.    RN CM explained care management to patient.  Patient states that he is able to manage his care well and declined need for further RN CM outreach.    RN CM instructed patient to notify physician for any changes in his condition or any symptoms of concern- patient verbalized understanding.    RN CM will remove self from care team and close referral at this time.

## 2024-11-12 ENCOUNTER — PROCEDURE VISIT (OUTPATIENT)
Dept: UROLOGY | Facility: MEDICAL CENTER | Age: 64
End: 2024-11-12
Payer: COMMERCIAL

## 2024-11-12 ENCOUNTER — OFFICE VISIT (OUTPATIENT)
Dept: VASCULAR SURGERY | Facility: CLINIC | Age: 64
End: 2024-11-12

## 2024-11-12 ENCOUNTER — TELEPHONE (OUTPATIENT)
Age: 64
End: 2024-11-12

## 2024-11-12 VITALS
DIASTOLIC BLOOD PRESSURE: 74 MMHG | WEIGHT: 170.4 LBS | SYSTOLIC BLOOD PRESSURE: 128 MMHG | BODY MASS INDEX: 25.82 KG/M2 | HEART RATE: 108 BPM | HEIGHT: 68 IN

## 2024-11-12 VITALS
SYSTOLIC BLOOD PRESSURE: 130 MMHG | HEIGHT: 68 IN | HEART RATE: 89 BPM | WEIGHT: 167 LBS | OXYGEN SATURATION: 92 % | BODY MASS INDEX: 25.31 KG/M2 | DIASTOLIC BLOOD PRESSURE: 76 MMHG

## 2024-11-12 DIAGNOSIS — R31.0 GROSS HEMATURIA: ICD-10-CM

## 2024-11-12 DIAGNOSIS — I73.9 PAD (PERIPHERAL ARTERY DISEASE) (HCC): Primary | ICD-10-CM

## 2024-11-12 DIAGNOSIS — Z98.890 S/P FEMORAL-TIBIAL BYPASS: ICD-10-CM

## 2024-11-12 DIAGNOSIS — N32.89 BLADDER MASS: Primary | ICD-10-CM

## 2024-11-12 LAB — POST-VOID RESIDUAL VOLUME, ML POC: 12 ML

## 2024-11-12 PROCEDURE — 88342 IMHCHEM/IMCYTCHM 1ST ANTB: CPT | Performed by: PATHOLOGY

## 2024-11-12 PROCEDURE — 51798 US URINE CAPACITY MEASURE: CPT

## 2024-11-12 PROCEDURE — 88307 TISSUE EXAM BY PATHOLOGIST: CPT | Performed by: PATHOLOGY

## 2024-11-12 PROCEDURE — 99024 POSTOP FOLLOW-UP VISIT: CPT

## 2024-11-12 PROCEDURE — 99024 POSTOP FOLLOW-UP VISIT: CPT | Performed by: NURSE PRACTITIONER

## 2024-11-12 PROCEDURE — 88341 IMHCHEM/IMCYTCHM EA ADD ANTB: CPT | Performed by: PATHOLOGY

## 2024-11-12 NOTE — ASSESSMENT & PLAN NOTE
- BLE claudication, with RLE progressing to rest pain. S/p R CFA-AT bypass with ipsi rGSV. See above  - LLE claudication    Plan:  See above  Orders:    VAS ARTERIAL DUPLEX- LOWER LIMB BILATERAL; Future

## 2024-11-12 NOTE — TELEPHONE ENCOUNTER
Caller:  Nany    Doctor:  Katarina    Reason for call:  Pt's daughter calling in to see if the post opp appt can be changed. Called office was advised that patient will be receiving a call back in regards to appt.    Call back#:  [phone]

## 2024-11-12 NOTE — ASSESSMENT & PLAN NOTE
"64-year-old male smoker with HTN, HLD, COPD, bladder mass s/p TURP 11/5, and PAD w/ BLE claudication progressed to RLE ischemic rest pain s/p R CFA-AT bypass with ipsilateral rGSV 10/31/24 (Katarina) presents for post op follow up.     -Presents without vascular complaints.  Patient doing well postoperatively. Patient coming from urology appt this am with removal of brambila and reports discomfort.   -Denies claudication or rest pain.    -Reports preop symptoms significantly improved.  -Right foot numbness is improving.  -RLE warm, M/S intact.  -No edema  -Palpable right DP and bypass pulse    Incisions:  Right groin incision CDI with surgical glue.  No dehiscence, drainage, bleeding, erythema or signs infection    Right lateral leg incision CDI with surgical glue    Right medial leg incisions CDI with staples.  No dehiscence, drainage, bleeding, erythema or signs of infection    Plan:  -Patient requests staples not be removed today. Declines removal of staples at office visit today.  Patient was seen by urology this morning for removal of Brambila and has return visit this afternoon.  He has been through too much today and \"needs a break\"  -Return to office in 1 week for staple removal (AP)  -Continue aspirin, statin  -Incision care reviewed.  Wash daily soap and water, pat dry.  No soaking or submerging  -Lower extremity arterial duplex in 3 months with return office visit with Dr. Bray at that time.  -Smoking cessation  - Educated on symptoms to call or return to office.   -Continue to monitor LLE symptoms at this time.        "

## 2024-11-12 NOTE — PROGRESS NOTES
"Ambulatory Visit  Name: Nahid Luis      : 1960      MRN: [id number]  Encounter Provider: LISA Gómez  Encounter Date: 2024   Encounter department: [facility]    Assessment & Plan  S/P femoral-tibial bypass  64-year-old male smoker with HTN, HLD, COPD, bladder mass s/p TURP , and PAD w/ BLE claudication progressed to RLE ischemic rest pain s/p R CFA-AT bypass with ipsilateral rGSV 10/31/24 (Katarina) presents for post op follow up.     -Presents without vascular complaints.  Patient doing well postoperatively. Patient coming from urology appt this am with removal of brambila and reports discomfort.   -Denies claudication or rest pain.    -Reports preop symptoms significantly improved.  -Right foot numbness is improving.  -RLE warm, M/S intact.  -No edema  -Palpable right DP and bypass pulse    Incisions:  Right groin incision CDI with surgical glue.  No dehiscence, drainage, bleeding, erythema or signs infection    Right lateral leg incision CDI with surgical glue    Right medial leg incisions CDI with staples.  No dehiscence, drainage, bleeding, erythema or signs of infection    Plan:  -Patient requests staples not be removed today. Declines removal of staples at office visit today.  Patient was seen by urology this morning for removal of Brambila and has return visit this afternoon.  He has been through too much today and \"needs a break\"  -Return to office in 1 week for staple removal (AP)  -Continue aspirin, statin  -Incision care reviewed.  Wash daily soap and water, pat dry.  No soaking or submerging  -Lower extremity arterial duplex in 3 months with return office visit with Dr. Bray at that time.  -Smoking cessation  - Educated on symptoms to call or return to office.   -Continue to monitor LLE symptoms at this time.        PAD (peripheral artery disease) (HCC)  - BLE claudication, with RLE progressing to rest pain. S/p R CFA-AT bypass with ipsi rGSV. See above  - " "LLE claudication    Plan:  See above  Orders:    VAS ARTERIAL DUPLEX- LOWER LIMB BILATERAL; Future      History of Present Illness   Cc:Pt reports numbness in the right foot, but it is improving. Pt denies coldness, fevers, chills. Pt states that prior symptoms such as coldness have improved, and incision site has been healing good.     Nahid Luis is a 64 y.o. male who presents for postop follow-up s/p R CFA-AT bypass with ipsilateral r GSV 10/31/2024.    POD #12 presents accompanied by his daughter..  Patient is doing well postoperatively from a vascular standpoint.  He reports preop symptoms significantly improved.  He still has some residual right foot numbness however improving.  He has no edema.  Incisions CDI, well-approximated.  Patient requests staples be left in place at this time.  Patient is coming from urology appointment this morning where they removed his indwelling Malone and \"needs a break\".    Patient with bladder mass found incidentally in preop workup.  Day of surgery Malone was placed with gross hematuria.  Urology consulted.  Patient underwent TURP on 11/5 while inpatient with indwelling Malone.    Malone removed this morning and patient is to return to urology this afternoon for additional scans.    Discussed postop incision care and also follow-up surveillance.    Patient reports LLE symptoms are stable at this time.    Will see patient back in 1 week for staple removal, in 3 months lead followed by office visit with vascular surgeon.        History obtained from : patient  Review of Systems   Constitutional: Negative.    HENT: Negative.     Eyes: Negative.    Respiratory:  Positive for shortness of breath (COPD).    Cardiovascular: Negative.    Gastrointestinal: Negative.    Endocrine: Negative.    Genitourinary: Negative.    Musculoskeletal: Negative.    Skin: Negative.    Allergic/Immunologic: Negative.    Neurological: Negative.    Hematological: Negative.    Psychiatric/Behavioral: " Negative.       Medical History Reviewed by provider this encounter:  Problems       Past Medical History   Past Medical History:   Diagnosis Date    Bladder cancer (HCC)     Colon polyp     COPD (chronic obstructive pulmonary disease) (HCC)     Hyperlipidemia     Hypertension      Past Surgical History:   Procedure Laterality Date    APPENDECTOMY      COLONOSCOPY      CYSTOSCOPY      IR LOWER EXTREMITY ANGIOGRAM  09/24/2024    [place] BYP FEM-ANT TIBL PST TIBL PRONEAL ART/OTH DSTL Right 10/31/2024    Procedure: right Common femoral artery to anterior tibial bypass with autologous vein;  Surgeon: Carlos Bray DO;  Location: [facility];  Service: Vascular    TRANSURETHRAL RESECTION OF BLADDER TUMOR N/A 11/5/2024    Procedure: (TURBT);  Surgeon: Gilmer Duke MD;  Location: [facility];  Service: Urology     Family History   Problem Relation Age of Onset    Alcohol abuse Father     Heart attack Father      Current Outpatient Medications on File Prior to Visit   Medication Sig Dispense Refill    albuterol (Proventil HFA) 90 mcg/act inhaler Inhale 2 puffs every 6 (six) hours as needed for wheezing 6.7 g 5    amLODIPine (NORVASC) 5 mg tablet Take 1 tablet (5 mg total) by mouth daily 30 tablet 5    aspirin (ECOTRIN LOW STRENGTH) 81 mg EC tablet Take 1 tablet (81 mg total) by mouth daily 30 tablet 0    atorvastatin (LIPITOR) 20 mg tablet Take 1 tablet (20 mg total) by mouth daily 30 tablet 5    losartan (COZAAR) 50 mg tablet Take 2 tablets (100 mg total) by mouth daily 180 tablet 1    Fluticasone-Salmeterol (Advair) 100-50 mcg/dose inhaler Inhale 1 puff 2 (two) times a day Rinse mouth after use. (Patient not taking: Reported on 11/12/2024) 60 blister 2    oxybutynin (DITROPAN) 5 mg tablet Take 1 tablet (5 mg total) by mouth 3 (three) times a day as needed (bladder spasms) (Patient not taking: Reported on 11/12/2024) 15 tablet 0     No current facility-administered medications on file prior to visit.   No Known Allergies  "  Current Outpatient Medications on File Prior to Visit   Medication Sig Dispense Refill    albuterol (Proventil HFA) 90 mcg/act inhaler Inhale 2 puffs every 6 (six) hours as needed for wheezing 6.7 g 5    amLODIPine (NORVASC) 5 mg tablet Take 1 tablet (5 mg total) by mouth daily 30 tablet 5    aspirin (ECOTRIN LOW STRENGTH) 81 mg EC tablet Take 1 tablet (81 mg total) by mouth daily 30 tablet 0    atorvastatin (LIPITOR) 20 mg tablet Take 1 tablet (20 mg total) by mouth daily 30 tablet 5    losartan (COZAAR) 50 mg tablet Take 2 tablets (100 mg total) by mouth daily 180 tablet 1    Fluticasone-Salmeterol (Advair) 100-50 mcg/dose inhaler Inhale 1 puff 2 (two) times a day Rinse mouth after use. (Patient not taking: Reported on 11/12/2024) 60 blister 2    oxybutynin (DITROPAN) 5 mg tablet Take 1 tablet (5 mg total) by mouth 3 (three) times a day as needed (bladder spasms) (Patient not taking: Reported on 11/12/2024) 15 tablet 0     No current facility-administered medications on file prior to visit.      Social History     Tobacco Use    Smoking status: Every Day     Current packs/day: 0.25     Types: Cigarettes     Passive exposure: Current    Smokeless tobacco: Not on file   Vaping Use    Vaping status: Never Used   Substance and Sexual Activity    Alcohol use: Yes     Alcohol/week: 3.0 standard drinks of alcohol     Types: 3 Cans of beer per week     Comment: daily 3-5 beers daily, last drank 10/30    Drug use: No    Sexual activity: Not on file         Objective     /74 (BP Location: Left arm, Patient Position: Sitting, Cuff Size: Standard)   Pulse (!) 108   Ht 5' 8\" (1.727 m)   Wt 77.3 kg (170 lb 6.4 oz)   BMI 25.91 kg/m²     Physical Exam  Vitals reviewed.   Constitutional:       General: He is not in acute distress.  Cardiovascular:      Rate and Rhythm: Normal rate.      Pulses:           Dorsalis pedis pulses are 2+ on the right side and 0 on the left side.      Comments: Palpable bypass  Pulmonary:    "   Effort: Pulmonary effort is normal. No respiratory distress.   Musculoskeletal:         General: Normal range of motion.      Right lower leg: No edema.      Left lower leg: No edema.   Skin:     General: Skin is warm and dry.      Capillary Refill: Capillary refill takes less than 2 seconds.      Comments: RLE incisions as above, clinical pics below   Neurological:      Mental Status: He is alert and oriented to person, place, and time.      Sensory: No sensory deficit.      Motor: No weakness.   Psychiatric:         Behavior: Behavior normal.         R groin    medial    lateral  Administrative Statements   I have spent a total time of 16 minutes in caring for this patient on the day of the visit/encounter including Instructions for management, Patient and family education, Importance of tx compliance, Impressions, Documenting in the medical record, Reviewing / ordering tests, medicine, procedures  , and Obtaining or reviewing history  .

## 2024-11-12 NOTE — PATIENT INSTRUCTIONS
Return to office in 1 week for RLE staple removal    Continue daily aspirin and statin    Lower extremity arterial duplex in 3 months with return office visit at that time with Dr. Bray after imaging.    Call with any new or worsening symptoms, questions or concerns.      -continue postoperative incisional care.  Clean incisions daily with soap and water.  No lotions, ointments or creams to incision.  No soaking or submerging incision x4 weeks.  No swimming x4 weeks.  -elevate leg for symptomatic relief of postoperative swelling.  -continue aspirin and statin therapy  -return to office in 3 month with surgeon for routine postoperative follow-up  -we will schedule routine lower extremity ultrasound per postoperative protocol in 3 months.   -please contact the office with new symptoms, concerns, new right leg pain or changes in incision.

## 2024-11-12 NOTE — TELEPHONE ENCOUNTER
Pt was in office today 11/12/24 for TOV. PVR 12 ml.  Pathology did come back. Pt and daughter have a lot of questions about results. They are scheduled to see you Thursday morning at 730. But daughter is asking if you are able to call her tomorrow to discuss?

## 2024-11-12 NOTE — PROGRESS NOTES
"11/12/2024    Nahid ZAPATA Janelle  1960  [id number]    Diagnosis  Chief Complaint    Post-op         Patient presents for brambila removal/VT s/p TURBT 11/5/2024 managed by Dr. Duke    Plan  Return for scheduled follow up visit    Procedure Brambila removal/voiding trial    Brambila catheter removed after deflation of an intact balloon. Patient tolerated well. Encouraged patient to hydrate well and return this afternoon for post void residual. He knows he may return early if uncomfortable and unable to urinate. Patient agrees to this plan.    Patient returned this afternoon. Bladder ultrasound performed and PVR measured 12 ml. Pt encouraged to drink plenty of fluids. ED precautions made aware to pt and daughter. Pt and daughter made aware if they have any concerns or issues to call our office.           Vitals:    11/12/24 0820   BP: 130/76   BP Location: Left arm   Patient Position: Sitting   Cuff Size: Adult   Pulse: 89   SpO2: 92%   Weight: 75.8 kg (167 lb)   Height: 5' 8\" (1.727 m)           Meaghan Man RN       "

## 2024-11-13 NOTE — TELEPHONE ENCOUNTER
Daughter calling asking if they will receive a call from Dr. Duke. Informed unsure, emotional support provided as they are anxious about the results    Confirmed tomorrows appointment, time and location

## 2024-11-14 ENCOUNTER — OFFICE VISIT (OUTPATIENT)
Dept: UROLOGY | Facility: MEDICAL CENTER | Age: 64
End: 2024-11-14
Payer: COMMERCIAL

## 2024-11-14 VITALS
BODY MASS INDEX: 25.76 KG/M2 | WEIGHT: 170 LBS | SYSTOLIC BLOOD PRESSURE: 90 MMHG | HEIGHT: 68 IN | DIASTOLIC BLOOD PRESSURE: 60 MMHG

## 2024-11-14 DIAGNOSIS — C67.4 MALIGNANT NEOPLASM OF POSTERIOR WALL OF URINARY BLADDER (HCC): Primary | ICD-10-CM

## 2024-11-14 LAB — POST-VOID RESIDUAL VOLUME, ML POC: 31 ML

## 2024-11-14 PROCEDURE — 99213 OFFICE O/P EST LOW 20 MIN: CPT | Performed by: UROLOGY

## 2024-11-14 PROCEDURE — 51798 US URINE CAPACITY MEASURE: CPT | Performed by: UROLOGY

## 2024-11-14 NOTE — PROGRESS NOTES
"   HISTORY:    Now 1 week out from TUR bladder tumor on November 5, 2024.  Large tumor, 8 cm diameter, posterior wall extending into the trigone, proximal prostatic urethra.  Could not see either orifice.    Pathology shows high-grade invasive \"to the level of the muscularis mucosa.\"  No comment whether muscle was seen in the specimen.    Making a good recovery from the procedure.  Voided well after Malone out, has not seen blood yet but certainly is likely to.    CT angiogram of early October 2024 says there was no hydronephrosis and no tumor outside the bladder evident on that scan         ASSESSMENT / PLAN:        Long discussion of bladder cancer, depth of invasion, possibility of hydronephrosis because tumor obscuring the ureters, and further evaluation    1.  Repeat TUR of bladder to get more sample of muscle and see if it is any deeper invasion.    2.  PET scan soon, we will cancel the CT that had been ordered.    3.  Further discussion of treatment after we get the next pathology.        Review of Systems      Objective:     Physical Exam  Constitutional:       Appearance: Normal appearance.   Abdominal:      Comments: Abdomen soft nontender   Neurological:      Mental Status: He is alert.           0   Lab Value Date/Time    PSA 2.41 11/25/2023 0827   ]  BUN   Date Value Ref Range Status   11/06/2024 10 5 - 25 mg/dL Final     Creatinine   Date Value Ref Range Status   11/06/2024 0.74 0.60 - 1.30 mg/dL Final     Comment:     Standardized to IDMS reference method     No components found for: \"CBC\"    Patient Active Problem List   Diagnosis    Hypertension    Chronic obstructive pulmonary disease (HCC)    Current every day smoker    Gross hematuria    PAD (peripheral artery disease) (HCC)    Mixed hyperlipidemia    Bladder mass    ABLA (acute blood loss anemia)    Leukocytosis    S/P femoral-tibial bypass        Patient ID: Nahid Luis is a 64 y.o. male.      Current Outpatient Medications:     " albuterol (Proventil HFA) 90 mcg/act inhaler, Inhale 2 puffs every 6 (six) hours as needed for wheezing, Disp: 6.7 g, Rfl: 5    amLODIPine (NORVASC) 5 mg tablet, Take 1 tablet (5 mg total) by mouth daily, Disp: 30 tablet, Rfl: 5    atorvastatin (LIPITOR) 20 mg tablet, Take 1 tablet (20 mg total) by mouth daily, Disp: 30 tablet, Rfl: 5    losartan (COZAAR) 50 mg tablet, Take 2 tablets (100 mg total) by mouth daily, Disp: 180 tablet, Rfl: 1    aspirin (ECOTRIN LOW STRENGTH) 81 mg EC tablet, Take 1 tablet (81 mg total) by mouth daily, Disp: 30 tablet, Rfl: 0    Fluticasone-Salmeterol (Advair) 100-50 mcg/dose inhaler, Inhale 1 puff 2 (two) times a day Rinse mouth after use. (Patient not taking: Reported on 11/12/2024), Disp: 60 blister, Rfl: 2    oxybutynin (DITROPAN) 5 mg tablet, Take 1 tablet (5 mg total) by mouth 3 (three) times a day as needed (bladder spasms), Disp: 15 tablet, Rfl: 0

## 2024-11-15 ENCOUNTER — OFFICE VISIT (OUTPATIENT)
Dept: FAMILY MEDICINE CLINIC | Facility: CLINIC | Age: 64
End: 2024-11-15
Payer: COMMERCIAL

## 2024-11-15 VITALS
SYSTOLIC BLOOD PRESSURE: 100 MMHG | TEMPERATURE: 97.4 F | OXYGEN SATURATION: 98 % | BODY MASS INDEX: 25.76 KG/M2 | HEART RATE: 72 BPM | DIASTOLIC BLOOD PRESSURE: 64 MMHG | WEIGHT: 170 LBS | RESPIRATION RATE: 20 BRPM | HEIGHT: 68 IN

## 2024-11-15 DIAGNOSIS — J43.8 OTHER EMPHYSEMA (HCC): ICD-10-CM

## 2024-11-15 DIAGNOSIS — I73.9 PAD (PERIPHERAL ARTERY DISEASE) (HCC): ICD-10-CM

## 2024-11-15 DIAGNOSIS — N32.89 BLADDER MASS: ICD-10-CM

## 2024-11-15 DIAGNOSIS — E78.2 MIXED HYPERLIPIDEMIA: ICD-10-CM

## 2024-11-15 DIAGNOSIS — I10 HYPERTENSION, UNSPECIFIED TYPE: Primary | ICD-10-CM

## 2024-11-15 DIAGNOSIS — Z98.890 S/P FEMORAL-TIBIAL BYPASS: ICD-10-CM

## 2024-11-15 PROBLEM — F17.200 CURRENT EVERY DAY SMOKER: Status: RESOLVED | Noted: 2023-09-08 | Resolved: 2024-11-15

## 2024-11-15 PROCEDURE — 99495 TRANSJ CARE MGMT MOD F2F 14D: CPT | Performed by: INTERNAL MEDICINE

## 2024-11-15 NOTE — PROGRESS NOTES
Transition of Care Visit  Name: Nahid Luis      : 1960      MRN: [id number]  Encounter Provider: Kaitlni García MD  Encounter Date: 11/15/2024   Encounter department: [facility]    Assessment & Plan  Hypertension, unspecified type  His blood pressure is dramatically better since prior to surgery.       PAD (peripheral artery disease) (HCC)  He had a fem tib bypas       Other emphysema (HCC)  He stopped smoking prior to surgery       Bladder mass  Still waiting for his biopsies for final determination of treatment       Mixed hyperlipidemia  He remains on his statin       S/P femoral-tibial bypass           Depression Screening and Follow-up Plan: Patient was screened for depression during today's encounter. They screened negative with a PHQ-2 score of 0.    Tobacco Cessation Counseling: Tobacco cessation counseling was provided. The patient is sincerely urged to quit consumption of tobacco. He is ready to quit tobacco. Patient refused medication.         History of Present Illness     Transitional Care Management Review:   Nahid Luis is a 64 y.o. male here for TCM follow up.     During the TCM phone call patient stated:  TCM Call       Date and time call was made  2024  9:56 AM    Hospital care reviewed  Records reviewed    Patient was hospitialized at  [facility]    Date of Admission  10/31/24    Date of discharge  24    Diagnosis  PAD (peripheral artery disease) (HCC)    Disposition  Home    Were the patients medications reviewed and updated  Yes          TCM Call       Should patient be enrolled in anticoag monitoring?  No    Scheduled for follow up?  Yes    Patients specialists  Urologist    Did you obtain your prescribed medications  Yes    Do you need help managing your prescriptions or medications  No    Is transportation to your appointment needed  No    I have advised the patient to call PCP with any new or worsening symptoms  Nadege Patel MA     Living Arrangements  Children    Are you recieving any outpatient services  No    Are you recieving home care services  No    Are you using any community resources  No    Current waiver services  No    Have you fallen in the last 12 months  No    Interperter language line needed  No    Counseling  Patient    Counseling topics  Importance of RX compliance          Patient is status post removal of a bladder tumor and vascular surgery to his right lower leg he actually is doing quite well.  He currently is waiting results of the tumor evaluation for depth and cell type and all that.  He has dropped smoking and is doing quite well however his inhalers were changed because of cost although he does have the emergency inhaler at home.  He will call Monday with the name of the new inhaler.  Otherwise he seen urology and vascular.  He actually feels pretty good now although he is anxious about the rest of her test results      Review of Systems   Constitutional:  Negative for chills, fatigue, fever and unexpected weight change.   HENT:  Negative for congestion, ear pain, hearing loss, postnasal drip, sinus pressure, sore throat, trouble swallowing and voice change.    Eyes:  Negative for visual disturbance.   Respiratory:  Negative for cough, chest tightness, shortness of breath and wheezing.    Cardiovascular:  Negative for chest pain.   Gastrointestinal: Negative.  Negative for abdominal distention, abdominal pain, anal bleeding, blood in stool, constipation, diarrhea and nausea.   Endocrine: Negative for cold intolerance, polydipsia, polyphagia and polyuria.   Genitourinary:  Negative for flank pain and urgency.        No catheter   Musculoskeletal:  Positive for arthralgias and gait problem. Negative for back pain, joint swelling, myalgias and neck pain.        Right leg fatigue and decreased pulses   Skin:  Positive for wound (Leg incision). Negative for rash.   Allergic/Immunologic: Negative for immunocompromised  "state.   Neurological:  Negative for dizziness, syncope, facial asymmetry, weakness, light-headedness, numbness and headaches.   Hematological:  Negative for adenopathy.   Psychiatric/Behavioral:  Negative for confusion, sleep disturbance and suicidal ideas.      Objective   /64 (BP Location: Left arm, Patient Position: Sitting, Cuff Size: Standard)   Pulse 72   Temp (!) 97.4 °F (36.3 °C) (Temporal)   Resp 20   Ht 5' 8\" (1.727 m)   Wt 77.1 kg (170 lb)   SpO2 98%   BMI 25.85 kg/m²     Physical Exam  Constitutional:       General: He is not in acute distress.     Appearance: He is normal weight.   HENT:      Head: Normocephalic.      Right Ear: Tympanic membrane normal.      Left Ear: Tympanic membrane normal.      Nose: Nose normal.      Mouth/Throat:      Mouth: Mucous membranes are moist.   Eyes:      General:         Right eye: No discharge.         Left eye: No discharge.      Extraocular Movements: Extraocular movements intact.      Pupils: Pupils are equal, round, and reactive to light.   Cardiovascular:      Rate and Rhythm: Normal rate and regular rhythm.   Pulmonary:      Effort: Pulmonary effort is normal.      Breath sounds: Wheezing (Occasional wheeze) present.   Abdominal:      General: Abdomen is flat. Bowel sounds are normal.      Palpations: Abdomen is soft.   Musculoskeletal:         General: No swelling.      Right lower leg: No edema.      Left lower leg: No edema.   Skin:     General: Skin is warm and dry.   Neurological:      General: No focal deficit present.      Mental Status: He is alert and oriented to person, place, and time.      Gait: Gait abnormal.   Psychiatric:         Mood and Affect: Mood normal.         Behavior: Behavior normal.         Thought Content: Thought content normal.         Judgment: Judgment normal.       Medications have been reviewed by provider in current encounter      "

## 2024-11-19 ENCOUNTER — TELEPHONE (OUTPATIENT)
Dept: ADMINISTRATIVE | Facility: HOSPITAL | Age: 64
End: 2024-11-19

## 2024-11-19 NOTE — PROGRESS NOTES
Name: Nahid Luis      : 1960      MRN: [id number]  Encounter Provider: Nimo Martell PA-C  Encounter Date: 2024   Encounter department: [facility]  :  Assessment & Plan  S/P femoral-tibial bypass  64-year-old male smoker with HTN, HLD, COPD, bladder mass s/p TURP , and PAD w/ BLE claudication progressed to RLE ischemic rest pain s/p R CFA-AT bypass with ipsilateral rGSV 10/31/24 (Feyko) presents for post op follow up.     S/P right CFA -anterior tibial bypass with ipsilateral rGSV (10/31/24, Feyko)    -Doing well. No complaints.  -Walking as much as he wants to walk without leg pain/ claudication  -Right foot numbness continue to improve   -No right foot pain or tissue loss  -No problems with any of the incisions  -He can feel the right lateral leg bypass pulse    Exam:    Incisions  -R groin CDI and secured with surgical glue.  Mild scabbing along the incision which is otherwise closed.  No swelling, hematoma, breakdown or evidence of infection    -R lateral leg incision CDI.  Mild scabbing along the incisions which are overall healed and closed.    -R medial leg incisions x 4 CDI with staples.  Staples were removed.  Incisions cleansed with saline and Betadine.  Steri-Strips placed.      Pulses   -Excellent R GSV bypass pulse felt throughout the lateral incision  -Good right DP pulse; multiphasic DP Doppler signals  -R foot is warm and well-perfused   -No lower extremity edema      A/P    Overall stable POD #22 after artery to anterior tibial artery bypass.  Bypass is clinically patent. No acute concerns.  Incisions are all closed without dehiscence infection.  There is no significant lower extremity edema and we were able to remove the right lower extremity staples.  He was given instructions to try to avoid leg swelling with walking, periodic elevation and provided gentle Tubigrip.     *He is scheduled for a TURP on .    -Doing well no incisional issues.  No leg  "pain or claudication.  -Was removed in the office today and replaced with Steri-Strips.  -Continue smoking cessation encouraged.  He has not had a cigarette in 1 month.  -Continue with aspirin 81 and atorvastatin 20  -Patient education regarding PAD and post-bypass instructions  -Discussed symptoms for which he should call or return to the office sooner  -Baseline PRESTON at 3 months (already scheduled) and follow up with Dr. Bray      Incisional care:  -Staples removed and Steri-Strips placed  -May shower and gently wash groin and leg incisions       PAD (peripheral artery disease) (HCC)  -BLE claudication with RLE progressing to rest pain. S/p R CFA-AT bypass with ipsilateral rGSV.   -LLE claudication is not currently bothering him  -Continue with aspirin 81 and atorvastatin 20  -Already has PRESTON scheduled 2/13/25           History of Present Illness \  CC: Patient presents today for 1 week staples removal s/p R common femoral endarterectomy to anterior tibial bypass on 10/31/24 ([place]).     HPI  Nahid Luis is a 64 y.o. male   Nahid Luis is a 64 y.o. male who presents for postop follow-up s/p R CFA-AT bypass with ipsilateral r GSV 10/31/2024.     MOISE Leos 11/12/24:    POD #12 presents accompanied by his daughter..  Patient is doing well postoperatively from a vascular standpoint.  He reports preop symptoms significantly improved.  He still has some residual right foot numbness however improving.  He has no edema.  Incisions CDI, well-approximated.  Patient requests staples be left in place at this time.  Patient is coming from urology appointment this morning where they removed his indwelling Malone and \"needs a break\".     Patient with bladder mass found incidentally in preop workup.  Day of surgery Malone was placed with gross hematuria.  Urology consulted.  Patient underwent TURP on 11/5 while inpatient with indwelling Malone.     Malone removed this morning and patient is to return to urology this " "afternoon for additional scans.     Discussed postop incision care and also follow-up surveillance.     Patient reports LLE symptoms are stable at this time.     Will see patient back in 1 week for staple removal, in 3 months lead followed by office visit with vascular surgeon.         11/22/24: Returns to have right leg staples removed.  Doing well.  No complaints. No problems with the incisions. Patient states that he is walking is much as he wants to walk and very pleased that the right leg feels good.  He continues to have numbness in right foot which is improving.  He has no claudication or foot pain.  He has no incisional problems.  He has been washing with surgical soap.    He has a bladder mass and is scheduled for a TURP on 12/2/2024.           Review of Systems   Constitutional: Negative.    HENT: Negative.     Eyes: Negative.    Respiratory:  Positive for cough and shortness of breath.    Cardiovascular: Negative.    Gastrointestinal: Negative.    Endocrine: Negative.    Genitourinary: Negative.    Musculoskeletal: Negative.    Skin: Negative.    Allergic/Immunologic: Negative.    Neurological: Negative.    Hematological: Negative.    Psychiatric/Behavioral: Negative.            Objective   /72 (BP Location: Left arm, Patient Position: Sitting)   Pulse 78   Ht 5' 8\" (1.727 m)   Wt 79.8 kg (176 lb)   BMI 26.76 kg/m²      Physical Exam  Alert and oriented x 3.  Pleasant no apparent distress.  Regular rate rhythm S1-S2  Clear to auscultation bilaterally.  Respirations nonlabored  Abdomen is soft.    Right lower extremity examination as above                            I have reviewed and made appropriate changes to the review of systems input by the medical assistant.    Vitals:    11/22/24 1500   BP: 122/72   BP Location: Left arm   Patient Position: Sitting   Pulse: 78   Weight: 79.8 kg (176 lb)   Height: 5' 8\" (1.727 m)       Patient Active Problem List   Diagnosis    Hypertension    Chronic " obstructive pulmonary disease (HCC)    Gross hematuria    PAD (peripheral artery disease) (HCC)    Mixed hyperlipidemia    Bladder mass    ABLA (acute blood loss anemia)    Leukocytosis    S/P femoral-tibial bypass       Past Surgical History:   Procedure Laterality Date    APPENDECTOMY      COLONOSCOPY      CYSTOSCOPY      IR LOWER EXTREMITY ANGIOGRAM  09/24/2024    MI BYP FEM-ANT TIBL PST TIBL PRONEAL ART/OTH DSTL Right 10/31/2024    Procedure: right Common femoral artery to anterior tibial bypass with autologous vein;  Surgeon: Carlos Bray DO;  Location: [facility];  Service: Vascular    TRANSURETHRAL RESECTION OF BLADDER TUMOR N/A 11/5/2024    Procedure: (TURBT);  Surgeon: Gilmer Duke MD;  Location: [facility];  Service: Urology       Family History   Problem Relation Age of Onset    Alcohol abuse Father     Heart attack Father        Social History     Socioeconomic History    Marital status: Single     Spouse name: Not on file    Number of children: Not on file    Years of education: Not on file    Highest education level: Not on file   Occupational History    Not on file   Tobacco Use    Smoking status: Former     Current packs/day: 0.25     Types: Cigarettes     Passive exposure: Current    Smokeless tobacco: Not on file   Vaping Use    Vaping status: Never Used   Substance and Sexual Activity    Alcohol use: Yes     Alcohol/week: 3.0 standard drinks of alcohol     Types: 3 Cans of beer per week     Comment: daily 3-5 beers daily, last drank 10/30    Drug use: No    Sexual activity: Not on file   Other Topics Concern    Not on file   Social History Narrative    Not on file     Social Drivers of Health     Financial Resource Strain: Not on file   Food Insecurity: Not on file   Transportation Needs: Not on file   Physical Activity: Not on file   Stress: Not on file   Social Connections: Not on file   Intimate Partner Violence: Not on file   Housing Stability: Not on file       No Known  Allergies      Current Outpatient Medications:     albuterol (Proventil HFA) 90 mcg/act inhaler, Inhale 2 puffs every 6 (six) hours as needed for wheezing, Disp: 6.7 g, Rfl: 5    amLODIPine (NORVASC) 5 mg tablet, Take 1 tablet (5 mg total) by mouth daily, Disp: 30 tablet, Rfl: 5    aspirin (ECOTRIN LOW STRENGTH) 81 mg EC tablet, Take 1 tablet (81 mg total) by mouth daily, Disp: 30 tablet, Rfl: 0    atorvastatin (LIPITOR) 20 mg tablet, Take 1 tablet (20 mg total) by mouth daily, Disp: 30 tablet, Rfl: 5    losartan (COZAAR) 50 mg tablet, Take 2 tablets (100 mg total) by mouth daily, Disp: 180 tablet, Rfl: 1    oxybutynin (DITROPAN) 5 mg tablet, Take 1 tablet (5 mg total) by mouth 3 (three) times a day as needed (bladder spasms), Disp: 15 tablet, Rfl: 0

## 2024-11-20 NOTE — TELEPHONE ENCOUNTER
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

## 2024-11-22 ENCOUNTER — OFFICE VISIT (OUTPATIENT)
Dept: VASCULAR SURGERY | Facility: CLINIC | Age: 64
End: 2024-11-22

## 2024-11-22 VITALS
SYSTOLIC BLOOD PRESSURE: 122 MMHG | HEART RATE: 78 BPM | DIASTOLIC BLOOD PRESSURE: 72 MMHG | HEIGHT: 68 IN | WEIGHT: 176 LBS | BODY MASS INDEX: 26.67 KG/M2

## 2024-11-22 DIAGNOSIS — Z98.890 S/P FEMORAL-TIBIAL BYPASS: Primary | ICD-10-CM

## 2024-11-22 DIAGNOSIS — I73.9 PAD (PERIPHERAL ARTERY DISEASE) (HCC): ICD-10-CM

## 2024-11-22 PROCEDURE — 99024 POSTOP FOLLOW-UP VISIT: CPT | Performed by: PHYSICIAN ASSISTANT

## 2024-11-22 NOTE — ASSESSMENT & PLAN NOTE
-BLE claudication with RLE progressing to rest pain. S/p R CFA-AT bypass with ipsilateral rGSV.   -LLE claudication is not currently bothering him  -Continue with aspirin 81 and atorvastatin 20  -Already has PRESTON scheduled 2/13/25

## 2024-11-22 NOTE — PATIENT INSTRUCTIONS
PAD    -Continue walking as tolerated  -You may wash the incisions with gentle soap and water and shower  -Do not sit in tubs or bathe  -Continue with aspirin 81 and atorvastatin 20  -Already has PRESTON scheduled 2/13/25 and office visit to follow with Dr. Bray    -Call the office if you have any problems with the incisions or develop any leg pain as we discussed.

## 2024-11-22 NOTE — ASSESSMENT & PLAN NOTE
64-year-old male smoker with HTN, HLD, COPD, bladder mass s/p TURP 11/5, and PAD w/ BLE claudication progressed to RLE ischemic rest pain s/p R CFA-AT bypass with ipsilateral rGSV 10/31/24 ([facility]ey[facility]) presents for post op follow up.     S/P right CFA -anterior tibial bypass with ipsilateral rGSV (10/31/24, Feyko)    -Doing well. No complaints.  -Walking as much as he wants to walk without leg pain/ claudication  -Right foot numbness continue to improve   -No right foot pain or tissue loss  -No problems with any of the incisions  -He can feel the right lateral leg bypass pulse    Exam:    Incisions  -R groin CDI and secured with surgical glue.  Mild scabbing along the incision which is otherwise closed.  No swelling, hematoma, breakdown or evidence of infection    -R lateral leg incision CDI.  Mild scabbing along the incisions which are overall healed and closed.    -R medial leg incisions x 4 CDI with staples.  Staples were removed.  Incisions cleansed with saline and Betadine.  Steri-Strips placed.      Pulses   -Excellent R GSV bypass pulse felt throughout the lateral incision  -Good right DP pulse; multiphasic DP Doppler signals  -R foot is warm and well-perfused   -No lower extremity edema      A/P    Overall stable POD #22 after artery to anterior tibial artery bypass.  Bypass is clinically patent. No acute concerns.  Incisions are all closed without dehiscence infection.  There is no significant lower extremity edema and we were able to remove the right lower extremity staples.  He was given instructions to try to avoid leg swelling with walking, periodic elevation and provided gentle Tubigrip.     *He is scheduled for a TURP on 12/2.    -Doing well no incisional issues.  No leg pain or claudication.  -Was removed in the office today and replaced with Steri-Strips.  -Continue smoking cessation encouraged.  He has not had a cigarette in 1 month.  -Continue with aspirin 81 and atorvastatin 20  -Patient education  regarding PAD and post-bypass instructions  -Discussed symptoms for which he should call or return to the office sooner  -Baseline PRESTON at 3 months (already scheduled) and follow up with Dr. Bray      Incisional care:  -Staples removed and Steri-Strips placed  -May shower and gently wash groin and leg incisions

## 2024-11-29 ENCOUNTER — HOSPITAL ENCOUNTER (OUTPATIENT)
Dept: NUCLEAR MEDICINE | Facility: HOSPITAL | Age: 64
End: 2024-11-29
Attending: UROLOGY
Payer: COMMERCIAL

## 2024-11-29 ENCOUNTER — TELEPHONE (OUTPATIENT)
Age: 64
End: 2024-11-29

## 2024-11-29 DIAGNOSIS — C67.4 MALIGNANT NEOPLASM OF POSTERIOR WALL OF URINARY BLADDER (HCC): ICD-10-CM

## 2024-11-29 LAB — GLUCOSE SERPL-MCNC: 104 MG/DL (ref 65–140)

## 2024-11-29 PROCEDURE — 78815 PET IMAGE W/CT SKULL-THIGH: CPT

## 2024-11-29 PROCEDURE — 82948 REAGENT STRIP/BLOOD GLUCOSE: CPT

## 2024-11-29 PROCEDURE — A9552 F18 FDG: HCPCS

## 2024-11-29 NOTE — TELEPHONE ENCOUNTER
Radiology Test Results - Maricarmen from Radiology Calling with report update    Pt under care of:     Imaging Completed: PET scan 11/29/24    Significant Findings - Please review

## 2024-11-30 NOTE — PROGRESS NOTES
"Cardiology and Heart Failure Clinic Note    Nahid Luis 64 y.o. male   MRN: [id number]  Encounter: [id number]        Assessment / Plan:    # HTN      Losartan 100 mg daily      norvasc 5 mg daily   BP improved, at goal.    # HLD   last year  Has PAD and coronary calcification so recommend aggressive LDL control  Lipitor 20 was started, repeat lipids    # PAD  On ASA 81, statin   s/p R CFA-AT bypass with ipsilateral rGSV 10/31/24     # Coronary calcification  On ASA, statin  No angina    # TORRES  Echo  - without concerns  Nuclear stress - no ischemia  May be due to COPD    # COPD  Recommend smoking cessation.   Recently quit.    # Tobacco abuse  Just recently quit smoking    # heavy ETOH use  Recommend cutting back.   Recently quit.    # Abnormal chest xray  Chest x-ray  - \"Abnormal opacity in the left upper lobe with the suggestion of cavitary components. Recommend CT scan of the chest to exclude a cavitary lesion, either inflammatory or neoplastic. \"  At multiple prior visits I emphasized to the patient the importance of getting this evaluated immediately but it was not done.  Now with PET scan done for bladder tumor showing hypermetabolic mass in left upper lobe.   I am going to place referral to thoracic surgery to evaluate this.  He was initially resistant to doing this but I told him it is very important and after some discussion he was ultimately agreeable.    # Bladder cancer  Dx 2024  S/p TURBT.      Today's Plan Summary:  See above assessment/plan for full details of today's plan.  Briefly,     Check lipids                Reason For Visit / Chief Complaint:  F/u - HTN, HLD, PAD.    HPI:   Nahid Luis is a 64 y.o.  male with history as noted in the problem list and further detailed in the above assessment and plan.    Initial:   August 2024  Referred by Dr. Bray (vascular) for a new patient visit for HTN, HLD, PAD.  The patient recently presented to the ER with leg pain and was " diagnosed with PAD.  He saw vascular in follow-up and was scheduled for an angiogram.  Because of his cardiac risk factors he was referred to cardiology.  Today, the patient reports -   no chest pain.   Does have TORRES.  Works with heavy .    .   3 children.  Smoking cigarettes.  Cutting back.   4 beers / day (used to drink a lot more).  No drugs.    Interval:    Last visit -->    reviewed echo, no concerns.  down to 5 cig per day.   Cut back on beer.   Reports will be having vascular surgery.  Here for preoperative risk assessment.     Plan last visit -->   Pharmacologic nuclear stress test    Nuclear stress test - 10/23/24   No ischemia    Given cardiac clearance.   Had the vascular surgery.    Dx with bladder cancer.  Seeing urology.     Today  - reports quit smoking.  Stopped ETOH.  Feeling well from the cardiac perspective.  No chest pain.   Stable SOB with COPD.  No leg edema.        Cardiac Imaging personally reviewed:  EKG 8-17-24  Sinus rhythm  Septal infarct  Nonspecific T wave changes       Holter or event monitor    Echo Echo - 08/29/24   EF 65%.  No WMA.   RUTH    Cardiac MRI    Stress testing Nuclear stress test - 10/23/24   No ischemia   Coronary CTA or [facility]    CPET              Patient Active Problem List    Diagnosis Date Noted    S/P femoral-tibial bypass 11/12/2024    Leukocytosis 11/05/2024    ABLA (acute blood loss anemia) 11/01/2024    Bladder mass 10/18/2024    Mixed hyperlipidemia 08/26/2024    PAD (peripheral artery disease) (HCC) 08/21/2024    Hypertension 09/08/2023    Chronic obstructive pulmonary disease (HCC) 09/08/2023    Gross hematuria 09/08/2023       Past Medical History:   Diagnosis Date    Bladder cancer (HCC)     Colon polyp     COPD (chronic obstructive pulmonary disease) (HCC)     Hyperlipidemia     Hypertension        No Known Allergies    Current Outpatient Medications   Medication Instructions    albuterol (Proventil HFA) 90 mcg/act inhaler 2 puffs,  "Inhalation, Every 6 hours PRN    amLODIPine (NORVASC) 5 mg, Oral, Daily    aspirin (ECOTRIN LOW STRENGTH) 81 mg, Oral, Daily    atorvastatin (LIPITOR) 20 mg, Oral, Daily    losartan (COZAAR) 100 mg, Oral, Daily    oxybutynin (DITROPAN) 5 mg, Oral, 3 times daily PRN       Social History     Socioeconomic History    Marital status: Single     Spouse name: Not on file    Number of children: Not on file    Years of education: Not on file    Highest education level: Not on file   Occupational History    Not on file   Tobacco Use    Smoking status: Former     Current packs/day: 0.25     Types: Cigarettes     Passive exposure: Current    Smokeless tobacco: Not on file   Vaping Use    Vaping status: Never Used   Substance and Sexual Activity    Alcohol use: Yes     Alcohol/week: 3.0 standard drinks of alcohol     Types: 3 Cans of beer per week     Comment: daily 3-5 beers daily, last drank 10/30    Drug use: No    Sexual activity: Not on file   Other Topics Concern    Not on file   Social History Narrative    Not on file     Social Drivers of Health     Financial Resource Strain: Not on file   Food Insecurity: Not on file   Transportation Needs: Not on file   Physical Activity: Not on file   Stress: Not on file   Social Connections: Not on file   Intimate Partner Violence: Not on file   Housing Stability: Not on file       Family History   Problem Relation Age of Onset    Alcohol abuse Father     Heart attack Father        Physical Exam:  Blood pressure 130/64, pulse 68, height 5' 8\" (1.727 m), weight 79.7 kg (175 lb 12.8 oz), SpO2 94%.  Body mass index is 26.73 kg/m².  Wt Readings from Last 3 Encounters:   12/02/24 79.7 kg (175 lb 12.8 oz)   11/22/24 79.8 kg (176 lb)   11/15/24 77.1 kg (170 lb)     Physical Exam  Vitals reviewed.   Constitutional:       General: He is not in acute distress.     Appearance: He is not toxic-appearing.   Neck:      Comments: No JVD  Cardiovascular:      Rate and Rhythm: Normal rate and " regular rhythm.      Heart sounds: No murmur heard.     No friction rub. No gallop.      Comments: Distant heart sounds  Pulmonary:      Breath sounds: No wheezing, rhonchi or rales.      Comments: Prolonged expiratory phase  Musculoskeletal:      Right lower leg: No edema.      Left lower leg: No edema.   Neurological:      Mental Status: He is alert.         Labs & Results:  Lab Results   Component Value Date    SODIUM 135 11/06/2024    K 4.3 11/06/2024     11/06/2024    CO2 30 11/06/2024    BUN 10 11/06/2024    CREATININE 0.74 11/06/2024    GLUC 144 (H) 11/06/2024    CALCIUM 8.6 11/06/2024     Time Statement:  I have spent a total time of 43 minutes in caring for this patient on the day of the visit/encounter including Diagnostic results, Prognosis, Risks and benefits of tx options, Instructions for management, Patient and family education, Importance of tx compliance, Risk factor reductions, Impressions, Counseling / Coordination of care, Documenting in the medical record, Reviewing / ordering tests, medicine, procedures  , Obtaining or reviewing history  , and Communicating with other healthcare professionals .      Thank you for the opportunity to participate in the care of this patient.    Toni Martinez MD, [facility]  Advanced Heart Failure and Transplant Cardiologist  Director of Cardio-Oncology  [facility]

## 2024-12-02 ENCOUNTER — OFFICE VISIT (OUTPATIENT)
Dept: CARDIOLOGY CLINIC | Facility: CLINIC | Age: 64
End: 2024-12-02
Payer: COMMERCIAL

## 2024-12-02 ENCOUNTER — TELEPHONE (OUTPATIENT)
Dept: UROLOGY | Facility: MEDICAL CENTER | Age: 64
End: 2024-12-02

## 2024-12-02 VITALS
HEART RATE: 68 BPM | HEIGHT: 68 IN | DIASTOLIC BLOOD PRESSURE: 64 MMHG | WEIGHT: 175.8 LBS | OXYGEN SATURATION: 94 % | BODY MASS INDEX: 26.64 KG/M2 | SYSTOLIC BLOOD PRESSURE: 130 MMHG

## 2024-12-02 DIAGNOSIS — R91.8 LUNG MASS: ICD-10-CM

## 2024-12-02 DIAGNOSIS — R06.09 DOE (DYSPNEA ON EXERTION): ICD-10-CM

## 2024-12-02 DIAGNOSIS — I25.10 CORONARY ARTERY CALCIFICATION: ICD-10-CM

## 2024-12-02 DIAGNOSIS — E78.2 MIXED HYPERLIPIDEMIA: Primary | ICD-10-CM

## 2024-12-02 DIAGNOSIS — I10 PRIMARY HYPERTENSION: ICD-10-CM

## 2024-12-02 DIAGNOSIS — I73.9 PAD (PERIPHERAL ARTERY DISEASE) (HCC): ICD-10-CM

## 2024-12-02 DIAGNOSIS — Z72.0 TOBACCO ABUSE: ICD-10-CM

## 2024-12-02 PROCEDURE — 99215 OFFICE O/P EST HI 40 MIN: CPT | Performed by: INTERNAL MEDICINE

## 2024-12-02 RX ORDER — FLUTICASONE PROPIONATE AND SALMETEROL 100; 50 UG/1; UG/1
1 POWDER RESPIRATORY (INHALATION) 2 TIMES DAILY
COMMUNITY
End: 2024-12-16

## 2024-12-02 NOTE — TELEPHONE ENCOUNTER
Discussed PET scan results with daughter    Upper lobe lung mass suspicious for cancer.  With long smoking history, it could be a second primary.    There is no cancer in the pelvis or lymph nodes from the bladder cancer.    He has surgery 1 week from now for TUR bladder tumor, redo after stage T1.    Will ask PCP to look into further the lung mass.

## 2024-12-02 NOTE — PRE-PROCEDURE INSTRUCTIONS
Pre-Surgery Instructions:   Medication Instructions    albuterol (Proventil HFA) 90 mcg/act inhaler Uses PRN- OK to take day of surgery    aspirin (ECOTRIN LOW STRENGTH) 81 mg EC tablet Take day of surgery.    atorvastatin (LIPITOR) 20 mg tablet Take day of surgery.    Fluticasone-Salmeterol (Advair) 100-50 mcg/dose inhaler Take day of surgery.    losartan (COZAAR) 50 mg tablet Hold day of surgery.    oxybutynin (DITROPAN) 5 mg tablet Uses PRN- DO NOT take day of surgery   Medication instructions for day surgery reviewed. Please use only a sip of water to take your instructed medications. Avoid all over the counter vitamins, supplements and NSAIDS for one week prior to surgery per anesthesia guidelines. Tylenol is ok to take as needed.     You will receive a call one business day prior to surgery with an arrival time and hospital directions. If your surgery is scheduled on a Monday, the hospital will be calling you on the Friday prior to your surgery. If you have not heard from anyone by 8pm, please call the hospital supervisor through the hospital  at [phone]. ([place] [phone] or [place] [phone]).    Do not eat or drink anything after midnight the night before your surgery, including candy, mints, lifesavers, or chewing gum. Do not drink alcohol 24hrs before your surgery. Try not to smoke at least 24hrs before your surgery.       Follow the pre surgery showering instructions as listed in the “My Surgical Experience Booklet” or otherwise provided by your surgeon's office. Do not use a blade to shave the surgical area 1 week before surgery. It is okay to use a clean electric clippers up to 24 hours before surgery. Do not apply any lotions, creams, including makeup, cologne, deodorant, or perfumes after showering on the day of your surgery. Do not use dry shampoo, hair spray, hair gel, or any type of hair products.     No contact lenses, eye make-up, or artificial eyelashes. Remove nail  polish, including gel polish, and any artificial, gel, or acrylic nails if possible. Remove all jewelry including rings and body piercing jewelry.     Wear causal clothing that is easy to take on and off. Consider your type of surgery.    Keep any valuables, jewelry, piercings at home. Please bring any specially ordered equipment (sling, braces) if indicated.    Arrange for a responsible person to drive you to and from the hospital on the day of your surgery. Please confirm the visitor policy for the day of your procedure when you receive your phone call with an arrival time.     Call the surgeon's office with any new illnesses, exposures, or additional questions prior to surgery.    Please reference your “My Surgical Experience Booklet” for additional information to prepare for your upcoming surgery.

## 2024-12-02 NOTE — Clinical Note
Gilmer,  I am a cardiologist who is seeing this patient today in follow-up.  I noticed the results of the recent PET scan you ordered (for bladder cancer).  He has had an abnormal chest x-ray with a left upper lobe lesion I have been telling him to get evaluated for the last few visits.  This area is hypermetabolic on the PET you ordered.    I just put in a referral to thoracic surgery.  Just wanted to keep you in the loop.  Toni Martinez

## 2024-12-02 NOTE — TELEPHONE ENCOUNTER
Pt's daughter Nany called and requested to be contacted directly to discuss the results of the PET scan, she stated they would like to discuss prior to his upcoming surgery on 12/11. Please advise.     Call back: [phone]

## 2024-12-03 ENCOUNTER — RA CDI HCC (OUTPATIENT)
Dept: OTHER | Facility: HOSPITAL | Age: 64
End: 2024-12-03

## 2024-12-03 ENCOUNTER — DOCUMENTATION (OUTPATIENT)
Dept: HEMATOLOGY ONCOLOGY | Facility: CLINIC | Age: 64
End: 2024-12-03

## 2024-12-03 NOTE — PROGRESS NOTES
All records needed are in patients chart. No records retrieval needed at this time.     Referral received/ Chart reviewed for work up completed for thoracic referral     Imaging completed: [facility]   [x] PET/CT 11/29/24    [] MRI   [x] CTA abdomen pelvis w wo contrast    [] US   [] Mammo   [] Bone scan   [] N/A    Pathology completed: [facility]   Date: 11/05/24   Location: Bladder   []N/A    Additional records needed:   [] Genomic report   [] Genetic testing results   [] Office Note   [] Procedure/ Operative note   [] Lab results   [x] N/A      [] Radiation Oncology records retrieval needed (PN to route to rad/onc clerical pool once scheduled)  Date:  Location:

## 2024-12-04 ENCOUNTER — TELEPHONE (OUTPATIENT)
Dept: VASCULAR SURGERY | Facility: CLINIC | Age: 64
End: 2024-12-04

## 2024-12-04 NOTE — TELEPHONE ENCOUNTER
Mr. Luis was recently seen by me in the office for Dr. Bray. See recent note.     He is vascularly stable to proceed with needed TURBT on 12/11/24. -Nimo Martell PA-C

## 2024-12-04 NOTE — TELEPHONE ENCOUNTER
Received fax from [facility] for Urology fora Pre-Surgical Vascular Clearance Request.  Patient is to have a Transurethral Resection of Bladder Tumor (TURBT) (Bladder) on 12/11/24 under General Anesthesia with Dr. Toni Real.  Please see attached form that was faxed to our office.

## 2024-12-04 NOTE — TELEPHONE ENCOUNTER
Received Epic Secure Chat from Nimo Martell PA-C inquiring if patient will need to hold Aspirin for upcoming procedure as this was not listed on the Clearance.  Will send to Urology for clarification and then inform Nimo Martell PA-C of information.

## 2024-12-05 ENCOUNTER — PREP FOR PROCEDURE (OUTPATIENT)
Dept: UROLOGY | Facility: MEDICAL CENTER | Age: 64
End: 2024-12-05

## 2024-12-05 DIAGNOSIS — C34.90 MALIGNANT NEOPLASM OF LUNG, UNSPECIFIED LATERALITY, UNSPECIFIED PART OF LUNG (HCC): Primary | ICD-10-CM

## 2024-12-05 NOTE — TELEPHONE ENCOUNTER
Would recommend he stay on ASA. He is s/p recent R CFA-AT bypass with ipsilateral rGSV.   Plavix was already being held post bypass 2/2 urological concerns.     Continue Aspirin

## 2024-12-05 NOTE — TELEPHONE ENCOUNTER
----- Message -----  From: Selena Salomon MA  Sent: 12/5/2024   9:23 AM EST  To: Jessi Austin RN    Only needs to hold Aspirin if he takes daily as a preventive  ----- Message -----  From: Toni Real MD  Sent: 12/5/2024   7:32 AM EST  To: Selena Salomon MA    Can continue if he takes it for cardiac stents etc    Would hold ASA if he takes it as a daily preventative  ----- Message -----  From: Selena Salomon MA  Sent: 12/4/2024  10:43 AM EST  To: Toni Real MD    Did you want his Aspirin held?

## 2024-12-05 NOTE — TELEPHONE ENCOUNTER
If it is not necessary to hold ASA for procedure, please continue the medication uninterrupted. Pt recently underwent a R CFA to AT bypass w/ ipsilateral rGSV on 10/31. Thank you.

## 2024-12-06 ENCOUNTER — TELEPHONE (OUTPATIENT)
Dept: FAMILY MEDICINE CLINIC | Facility: CLINIC | Age: 64
End: 2024-12-06

## 2024-12-06 NOTE — TELEPHONE ENCOUNTER
----- Message from Darcy AGUIRRE sent at 12/4/2024  3:21 PM EST -----  Regarding: FW: imaging    ----- Message -----  From: Diana Riley PA-C  Sent: 12/3/2024  12:38 PM EST  To: [facility]  Subject: imaging                                          Please   call  patient  to  make  sure  he  keeps  his  f/u  appt  next week  with  Dr. García.  There  are  some  abnormalities  on  his  PET  scan  that  need  to  be  addressed  ----- Message -----  From: Avril Hooker LPN  Sent: 12/2/2024   3:14 PM EST  To: Diana Riley PA-C      ----- Message -----  From: Gilmer Duke MD  Sent: 12/2/2024   2:26 PM EST  To: Kaitlin García MD    See PET scan.  No evidence of bladder cancer in the pelvis around the bladder.  However, lung mass suspicious for cancer.  ?  Second primary    Can you look into that, refer to pulmonary, or get chest CT with contrast first?  Thanks, Gilmer Duke

## 2024-12-07 ENCOUNTER — APPOINTMENT (OUTPATIENT)
Dept: LAB | Facility: MEDICAL CENTER | Age: 64
End: 2024-12-07
Payer: COMMERCIAL

## 2024-12-07 DIAGNOSIS — N32.89 BLADDER MASS: ICD-10-CM

## 2024-12-07 DIAGNOSIS — Z01.812 PRE-OPERATIVE LABORATORY EXAMINATION: ICD-10-CM

## 2024-12-07 DIAGNOSIS — E78.2 MIXED HYPERLIPIDEMIA: ICD-10-CM

## 2024-12-07 DIAGNOSIS — R39.89 SUSPECTED UTI: ICD-10-CM

## 2024-12-07 LAB
ANION GAP SERPL CALCULATED.3IONS-SCNC: 9 MMOL/L (ref 4–13)
BASOPHILS # BLD AUTO: 0.04 THOUSANDS/ÂΜL (ref 0–0.1)
BASOPHILS NFR BLD AUTO: 0 % (ref 0–1)
BUN SERPL-MCNC: 14 MG/DL (ref 5–25)
CALCIUM SERPL-MCNC: 9.3 MG/DL (ref 8.4–10.2)
CHLORIDE SERPL-SCNC: 94 MMOL/L (ref 96–108)
CHOLEST SERPL-MCNC: 123 MG/DL (ref ?–200)
CO2 SERPL-SCNC: 25 MMOL/L (ref 21–32)
CREAT SERPL-MCNC: 1 MG/DL (ref 0.6–1.3)
EOSINOPHIL # BLD AUTO: 0.09 THOUSAND/ÂΜL (ref 0–0.61)
EOSINOPHIL NFR BLD AUTO: 1 % (ref 0–6)
ERYTHROCYTE [DISTWIDTH] IN BLOOD BY AUTOMATED COUNT: 14.5 % (ref 11.6–15.1)
GFR SERPL CREATININE-BSD FRML MDRD: 79 ML/MIN/1.73SQ M
GLUCOSE P FAST SERPL-MCNC: 114 MG/DL (ref 65–99)
HCT VFR BLD AUTO: 34.9 % (ref 36.5–49.3)
HDLC SERPL-MCNC: 36 MG/DL
HGB BLD-MCNC: 11.1 G/DL (ref 12–17)
IMM GRANULOCYTES # BLD AUTO: 0.05 THOUSAND/UL (ref 0–0.2)
IMM GRANULOCYTES NFR BLD AUTO: 0 % (ref 0–2)
LDLC SERPL CALC-MCNC: 73 MG/DL (ref 0–100)
LYMPHOCYTES # BLD AUTO: 4.45 THOUSANDS/ÂΜL (ref 0.6–4.47)
LYMPHOCYTES NFR BLD AUTO: 31 % (ref 14–44)
MCH RBC QN AUTO: 29.1 PG (ref 26.8–34.3)
MCHC RBC AUTO-ENTMCNC: 31.8 G/DL (ref 31.4–37.4)
MCV RBC AUTO: 91 FL (ref 82–98)
MONOCYTES # BLD AUTO: 1.09 THOUSAND/ÂΜL (ref 0.17–1.22)
MONOCYTES NFR BLD AUTO: 8 % (ref 4–12)
NEUTROPHILS # BLD AUTO: 8.87 THOUSANDS/ÂΜL (ref 1.85–7.62)
NEUTS SEG NFR BLD AUTO: 60 % (ref 43–75)
NONHDLC SERPL-MCNC: 87 MG/DL
NRBC BLD AUTO-RTO: 0 /100 WBCS
PLATELET # BLD AUTO: 356 THOUSANDS/UL (ref 149–390)
PMV BLD AUTO: 9.2 FL (ref 8.9–12.7)
POTASSIUM SERPL-SCNC: 4.3 MMOL/L (ref 3.5–5.3)
RBC # BLD AUTO: 3.82 MILLION/UL (ref 3.88–5.62)
SODIUM SERPL-SCNC: 128 MMOL/L (ref 135–147)
TRIGL SERPL-MCNC: 72 MG/DL (ref ?–150)
WBC # BLD AUTO: 14.59 THOUSAND/UL (ref 4.31–10.16)

## 2024-12-07 PROCEDURE — 87086 URINE CULTURE/COLONY COUNT: CPT

## 2024-12-07 PROCEDURE — 80061 LIPID PANEL: CPT

## 2024-12-07 PROCEDURE — 85025 COMPLETE CBC W/AUTO DIFF WBC: CPT

## 2024-12-07 PROCEDURE — 36415 COLL VENOUS BLD VENIPUNCTURE: CPT

## 2024-12-07 PROCEDURE — 80048 BASIC METABOLIC PNL TOTAL CA: CPT

## 2024-12-08 ENCOUNTER — RESULTS FOLLOW-UP (OUTPATIENT)
Dept: CARDIOLOGY CLINIC | Facility: CLINIC | Age: 64
End: 2024-12-08

## 2024-12-08 LAB — BACTERIA UR CULT: NORMAL

## 2024-12-09 RX ORDER — FENTANYL CITRATE/PF 50 MCG/ML
50 SYRINGE (ML) INJECTION
Refills: 0 | OUTPATIENT
Start: 2024-12-09

## 2024-12-09 RX ORDER — PROMETHAZINE HYDROCHLORIDE 25 MG/ML
12.5 INJECTION, SOLUTION INTRAMUSCULAR; INTRAVENOUS ONCE AS NEEDED
OUTPATIENT
Start: 2024-12-09

## 2024-12-09 RX ORDER — ONDANSETRON 2 MG/ML
4 INJECTION INTRAMUSCULAR; INTRAVENOUS ONCE AS NEEDED
OUTPATIENT
Start: 2024-12-09

## 2024-12-09 RX ORDER — HYDROMORPHONE HCL/PF 1 MG/ML
0.5 SYRINGE (ML) INJECTION
Refills: 0 | OUTPATIENT
Start: 2024-12-09

## 2024-12-10 ENCOUNTER — OFFICE VISIT (OUTPATIENT)
Dept: FAMILY MEDICINE CLINIC | Facility: CLINIC | Age: 64
End: 2024-12-10
Payer: COMMERCIAL

## 2024-12-10 VITALS
TEMPERATURE: 96.9 F | DIASTOLIC BLOOD PRESSURE: 60 MMHG | HEART RATE: 50 BPM | OXYGEN SATURATION: 95 % | SYSTOLIC BLOOD PRESSURE: 126 MMHG | WEIGHT: 172.6 LBS | HEIGHT: 68 IN | BODY MASS INDEX: 26.16 KG/M2

## 2024-12-10 DIAGNOSIS — I10 PRIMARY HYPERTENSION: ICD-10-CM

## 2024-12-10 DIAGNOSIS — C34.90 MALIGNANT NEOPLASM OF LUNG, UNSPECIFIED LATERALITY, UNSPECIFIED PART OF LUNG (HCC): Primary | ICD-10-CM

## 2024-12-10 DIAGNOSIS — K11.8 PAROTID MASS: ICD-10-CM

## 2024-12-10 DIAGNOSIS — N32.89 BLADDER MASS: ICD-10-CM

## 2024-12-10 DIAGNOSIS — K11.9 LESION OF PAROTID GLAND: ICD-10-CM

## 2024-12-10 PROCEDURE — 99214 OFFICE O/P EST MOD 30 MIN: CPT | Performed by: INTERNAL MEDICINE

## 2024-12-10 NOTE — PROGRESS NOTES
Name: Nahid Luis      : 1960      MRN: [id number]  Encounter Provider: Kaitlin García MD  Encounter Date: 12/10/2024   Encounter department: [facility]    Assessment & Plan  Malignant neoplasm of lung, unspecified laterality, unspecified part of lung (HCC)  Patient was found to have multiple lesions on a PET scan presumably cancer of the lung due to his long-term smoking.  He will need a CT of the chest and to see pulmonary    Orders:    Ambulatory Referral to Pulmonology; Future    XR chest pa and lateral; Future    CT chest wo contrast; Future    Ambulatory Referral to Hematology / Oncology; Future    Comprehensive metabolic panel; Future    CBC and differential; Future    Parotid mass    Orders:    Ambulatory Referral to Otolaryngology; Future    Primary hypertension  His blood pressure appears to have resolved and it appears largely precipitated by anxiety.         Bladder mass  His bladder was biopsied and appears to be bladder cancer         Lesion of parotid gland  He was also found on that same pad to have a lesion of the left parotid gland will refer to otolaryngology              History of Present Illness     Patient was seen about a month ago following a femoropopliteal bypass and a biopsy of the bladder lesion at the time of being seen but biopsies were not back and I had not done his preop clearance at that point.  However since that time he has had numerous CAT scans PET scans and x-rays that are suggestive of a diffuse cancer including lungs parotid gland and bladder.  Unfortunately somehow he never  came back to me.  And he is still awaiting having further testing and seeing specialists..  At this point the family is feeling frustrated and wants to know what specialist they need and if they need any further testing      Review of Systems   Constitutional:  Positive for fatigue.   Eyes: Negative.    Respiratory:  Positive for cough and shortness of breath (mild).     Cardiovascular:  Negative for chest pain and leg swelling.        Claudication leg   Gastrointestinal: Negative.  Negative for abdominal distention, abdominal pain, constipation, diarrhea and nausea.   Musculoskeletal:  Positive for arthralgias and back pain.   Skin:  Negative for wound.   Allergic/Immunologic: Negative for immunocompromised state.   Hematological:  Does not bruise/bleed easily.   Psychiatric/Behavioral:  Negative for sleep disturbance. The patient is nervous/anxious.    All other systems reviewed and are negative.    Past Medical History:   Diagnosis Date    Bladder cancer (HCC)     Colon polyp     COPD (chronic obstructive pulmonary disease) (HCC)     History of transfusion 10/2024    Hyperlipidemia     Hypertension      Past Surgical History:   Procedure Laterality Date    APPENDECTOMY      COLONOSCOPY      CYSTOSCOPY      IR LOWER EXTREMITY ANGIOGRAM  2024    NM BYP FEM-ANT TIBL PST TIBL PRONEAL ART/OTH DSTL Right 10/31/2024    Procedure: right Common femoral artery to anterior tibial bypass with autologous vein;  Surgeon: Carlos Bray DO;  Location: [facility];  Service: Vascular    TRANSURETHRAL RESECTION OF BLADDER TUMOR N/A 2024    Procedure: (TURBT);  Surgeon: Gilmer Duke MD;  Location: [facility];  Service: Urology     Family History   Problem Relation Age of Onset    Alcohol abuse Father     Heart attack Father      Social History     Tobacco Use    Smoking status: Former     Current packs/day: 0.00     Average packs/day: 1 pack/day for 49.8 years (49.8 ttl pk-yrs)     Types: Cigarettes     Start date:      Quit date: 10/30/2024     Years since quittin.1     Passive exposure: Current    Smokeless tobacco: Not on file   Vaping Use    Vaping status: Never Used   Substance and Sexual Activity    Alcohol use: Yes     Alcohol/week: 3.0 standard drinks of alcohol     Types: 3 Cans of beer per week     Comment: daily 3-5 beers daily, last drank 10/30    Drug use: No     "Sexual activity: Not Currently     Current Outpatient Medications on File Prior to Visit   Medication Sig    amLODIPine (NORVASC) 5 mg tablet Take 1 tablet (5 mg total) by mouth daily    aspirin (ECOTRIN LOW STRENGTH) 81 mg EC tablet Take 1 tablet (81 mg total) by mouth daily    atorvastatin (LIPITOR) 20 mg tablet Take 1 tablet (20 mg total) by mouth daily    losartan (COZAAR) 50 mg tablet Take 2 tablets (100 mg total) by mouth daily    albuterol (Proventil HFA) 90 mcg/act inhaler Inhale 2 puffs every 6 (six) hours as needed for wheezing    Fluticasone-Salmeterol (Advair) 100-50 mcg/dose inhaler Inhale 1 puff 2 (two) times a day Rinse mouth after use. (Patient not taking: Reported on 12/10/2024)    oxybutynin (DITROPAN) 5 mg tablet Take 1 tablet (5 mg total) by mouth 3 (three) times a day as needed (bladder spasms) (Patient not taking: Reported on 12/10/2024)     No Known Allergies    There is no immunization history on file for this patient.  Objective   /60 (BP Location: Right arm, Patient Position: Sitting, Cuff Size: Standard)   Pulse (!) 50   Temp (!) 96.9 °F (36.1 °C) (Temporal)   Ht 5' 8\" (1.727 m)   Wt 78.3 kg (172 lb 9.6 oz)   SpO2 95%   BMI 26.24 kg/m²     Physical Exam  Constitutional:       Appearance: Normal appearance.   HENT:      Right Ear: Tympanic membrane normal.      Left Ear: Tympanic membrane normal.      Nose: Nose normal.      Mouth/Throat:      Mouth: Mucous membranes are moist.   Neck:      Vascular: No carotid bruit.   Cardiovascular:      Rate and Rhythm: Normal rate and regular rhythm.   Pulmonary:      Effort: Pulmonary effort is normal.      Breath sounds: Normal breath sounds.   Abdominal:      General: Bowel sounds are normal.      Palpations: Abdomen is soft.   Musculoskeletal:      Cervical back: Normal range of motion and neck supple.      Right lower leg: No edema.      Left lower leg: No edema.   Lymphadenopathy:      Cervical: No cervical adenopathy.   Skin:     " General: Skin is warm and dry.      Capillary Refill: Capillary refill takes less than 2 seconds.   Neurological:      General: No focal deficit present.      Mental Status: He is alert and oriented to person, place, and time. Mental status is at baseline.   Psychiatric:         Mood and Affect: Mood normal.         Behavior: Behavior normal.

## 2024-12-10 NOTE — ASSESSMENT & PLAN NOTE
He was also found on that same pad to have a lesion of the left parotid gland will refer to otolaryngology

## 2024-12-10 NOTE — ASSESSMENT & PLAN NOTE
His blood pressure appears to have resolved and it appears largely precipitated by anxiety.

## 2024-12-10 NOTE — ASSESSMENT & PLAN NOTE
Patient was found to have multiple lesions on a PET scan presumably cancer of the lung due to his long-term smoking.  He will need a CT of the chest and to see pulmonary    Orders:    Ambulatory Referral to Pulmonology; Future    XR chest pa and lateral; Future    CT chest wo contrast; Future    Ambulatory Referral to Hematology / Oncology; Future    Comprehensive metabolic panel; Future    CBC and differential; Future

## 2024-12-11 ENCOUNTER — DOCUMENTATION (OUTPATIENT)
Dept: HEMATOLOGY ONCOLOGY | Facility: CLINIC | Age: 64
End: 2024-12-11

## 2024-12-11 ENCOUNTER — HOSPITAL ENCOUNTER (OUTPATIENT)
Facility: HOSPITAL | Age: 64
Setting detail: OUTPATIENT SURGERY
Discharge: HOME | End: 2024-12-11
Attending: UROLOGY | Admitting: UROLOGY
Payer: COMMERCIAL

## 2024-12-11 VITALS
SYSTOLIC BLOOD PRESSURE: 147 MMHG | HEIGHT: 68 IN | WEIGHT: 145.94 LBS | OXYGEN SATURATION: 93 % | DIASTOLIC BLOOD PRESSURE: 71 MMHG | BODY MASS INDEX: 22.12 KG/M2 | HEART RATE: 82 BPM | RESPIRATION RATE: 16 BRPM | TEMPERATURE: 97.5 F

## 2024-12-11 LAB
ANION GAP SERPL CALCULATED.3IONS-SCNC: 10 MMOL/L (ref 4–13)
BUN SERPL-MCNC: 10 MG/DL (ref 5–25)
CALCIUM SERPL-MCNC: 9.4 MG/DL (ref 8.4–10.2)
CHLORIDE SERPL-SCNC: 98 MMOL/L (ref 96–108)
CO2 SERPL-SCNC: 25 MMOL/L (ref 21–32)
CREAT SERPL-MCNC: 0.88 MG/DL (ref 0.6–1.3)
GFR SERPL CREATININE-BSD FRML MDRD: 90 ML/MIN/1.73SQ M
GLUCOSE P FAST SERPL-MCNC: 116 MG/DL (ref 65–99)
GLUCOSE SERPL-MCNC: 116 MG/DL (ref 65–140)
POTASSIUM SERPL-SCNC: 4 MMOL/L (ref 3.5–5.3)
SODIUM SERPL-SCNC: 133 MMOL/L (ref 135–147)

## 2024-12-11 PROCEDURE — 80048 BASIC METABOLIC PNL TOTAL CA: CPT | Performed by: ANESTHESIOLOGY

## 2024-12-11 RX ORDER — CEFAZOLIN SODIUM 2 G/50ML
2000 SOLUTION INTRAVENOUS ONCE
Status: DISCONTINUED | OUTPATIENT
Start: 2024-12-11 | End: 2024-12-11 | Stop reason: HOSPADM

## 2024-12-11 RX ORDER — SODIUM CHLORIDE 9 MG/ML
125 INJECTION, SOLUTION INTRAVENOUS CONTINUOUS
Status: DISCONTINUED | OUTPATIENT
Start: 2024-12-11 | End: 2024-12-11 | Stop reason: HOSPADM

## 2024-12-11 RX ADMIN — SODIUM CHLORIDE 125 ML/HR: 0.9 INJECTION, SOLUTION INTRAVENOUS at 13:12

## 2024-12-11 NOTE — PROGRESS NOTES
Chart was clinically reviewed by Oncology Nurse Navigator.  At this time the referral to hematology-oncology for lung  will be closed, as there is limited work-up, including biopsy, for the specified referral diagnosis.  In order for Hematology Oncology to devise plan, confirmed tissue diagnosis is needed prior to consult. Patient was referred to Thoracic Surgery on 12/02/24 by Dr. Martinez- patient was called to schedule appointment he declined scheduling. He is currently scheduled with Pulmonary on 12/16/24.  Pulmonary will complete work-up and refer to medical oncology once there is a definitive diagnosis.  Please notify patient.     Thank you!

## 2024-12-16 ENCOUNTER — CONSULT (OUTPATIENT)
Dept: PULMONOLOGY | Facility: CLINIC | Age: 64
End: 2024-12-16
Payer: COMMERCIAL

## 2024-12-16 VITALS
DIASTOLIC BLOOD PRESSURE: 70 MMHG | HEART RATE: 89 BPM | OXYGEN SATURATION: 96 % | SYSTOLIC BLOOD PRESSURE: 122 MMHG | TEMPERATURE: 96.7 F | WEIGHT: 169.75 LBS | BODY MASS INDEX: 25.81 KG/M2

## 2024-12-16 DIAGNOSIS — J44.9 CHRONIC OBSTRUCTIVE PULMONARY DISEASE, UNSPECIFIED COPD TYPE (HCC): ICD-10-CM

## 2024-12-16 DIAGNOSIS — Z72.0 TOBACCO ABUSE: ICD-10-CM

## 2024-12-16 DIAGNOSIS — C34.90 MALIGNANT NEOPLASM OF LUNG, UNSPECIFIED LATERALITY, UNSPECIFIED PART OF LUNG (HCC): Primary | ICD-10-CM

## 2024-12-16 PROCEDURE — 99214 OFFICE O/P EST MOD 30 MIN: CPT | Performed by: INTERNAL MEDICINE

## 2024-12-16 RX ORDER — ALBUTEROL SULFATE 90 UG/1
2 INHALANT RESPIRATORY (INHALATION) EVERY 6 HOURS PRN
Qty: 6.7 G | Refills: 5 | Status: SHIPPED | OUTPATIENT
Start: 2024-12-16

## 2024-12-16 RX ORDER — UMECLIDINIUM BROMIDE AND VILANTEROL TRIFENATATE 62.5; 25 UG/1; UG/1
1 POWDER RESPIRATORY (INHALATION) DAILY
Qty: 60 BLISTER | Refills: 6 | Status: SHIPPED | OUTPATIENT
Start: 2024-12-16 | End: 2024-12-18

## 2024-12-16 NOTE — PROGRESS NOTES
Pulmonary Consultation   Nahid Luis 64 y.o. male MRN: [id number]  12/16/2024      Reason for Consultation:  pulmonary nodules    Requested by: Dr. García     Primary care provider: Kaitlin García MD    Assessment:  Pulmonary Nodules   PET scan reviewed.  Will order CT chest with emmy bronch protocol.  Message sent to interventional pulmonology for review for EBUS.  Updated PFTs ordered.    Invasive high-grade papillary urothelial carcinoma  Follow-up with urology as scheduled    Probable COPD  FTs ordered.  Will stop Wixela and start on Anoro along with as needed albuterol.    History of Tobacco Abuse  Quit in Oct 2024, smoked 1ppd x 50 years.  Encouraged continued cessation    Parotid mass  Recommend on following up for biopsy as this might be easier than bronchoscopy and can likely give diagnosis      Plan:    Diagnoses and all orders for this visit:    Malignant neoplasm of lung, unspecified laterality, unspecified part of lung (HCC)  -     Ambulatory Referral to Pulmonology  -     CT chest without contrast; Future  -     Complete PFT with post bronchodilator; Future    Chronic obstructive pulmonary disease, unspecified COPD type (HCC)  -     umeclidinium-vilanterol (Anoro Ellipta) 62.5-25 mcg/actuation inhaler; Inhale 1 puff daily  -     albuterol (Proventil HFA) 90 mcg/act inhaler; Inhale 2 puffs every 6 (six) hours as needed for wheezing  -     Complete PFT with post bronchodilator; Future    Tobacco abuse        History of Present Illness   HPI:  Nahid Luis is a 64 y.o. male who presents with multiple pulmonary nodules. He has been seeing urology and had a biopsy in Nov of 2024 that was invasive high grade papillary urothelial carcinoma. He had a PET scan after which showed pulmonary nodules. He is referred to us for further workup. He denies symptoms of weight loss, SOB, fevers, chills, night sweats, chest pain. He has never had lung cancer screening. He is currently on wixela for COPD  maintenance, without issues.  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      Review of Systems   Constitutional:  Negative for activity change, chills, diaphoresis, fatigue and fever.   HENT:  Negative for congestion, postnasal drip, rhinorrhea, sinus pressure, sinus pain and sore throat.    Eyes: Negative.    Respiratory:  Positive for shortness of breath. Negative for cough and chest tightness.    Cardiovascular:  Negative for chest pain, palpitations and leg swelling.   Gastrointestinal:  Negative for abdominal distention, abdominal pain, constipation, diarrhea, nausea and vomiting.   Endocrine: Negative.    Genitourinary: Negative.    Musculoskeletal:  Negative for back pain, gait problem and neck pain.   Skin: Negative.    Allergic/Immunologic: Negative.    Neurological:  Negative for dizziness, syncope, weakness, light-headedness and headaches.   Hematological: Negative.    Psychiatric/Behavioral: Negative.     All other systems reviewed and are negative.      Historical Information   Past Medical History:   Diagnosis Date    Bladder cancer (HCC)     Colon polyp     COPD (chronic obstructive pulmonary disease) (HCC)     History of transfusion 10/2024    Hyperlipidemia     Hypertension      Past Surgical History:   Procedure Laterality Date    APPENDECTOMY      COLONOSCOPY      CYSTOSCOPY      IR LOWER EXTREMITY ANGIOGRAM  09/24/2024    [place] BYP FEM-ANT TIBL PST TIBL PRONEAL ART/OTH DSTL Right 10/31/2024    Procedure: right Common femoral artery to anterior tibial bypass with autologous vein;  Surgeon: Carlos Bray DO;  Location: [facility];  Service: Vascular    TRANSURETHRAL RESECTION OF BLADDER TUMOR N/A 11/5/2024    Procedure: (TURBT);  Surgeon: Gilmer Duke MD;  Location: [facility];  Service: Urology     Family History   Problem Relation Age of Onset    Alcohol abuse Father     Heart attack Father        Occupational History:      Social History:   Alcohol:  quit Oct 2024  Smokinppd x 50 years, quit oct 2024  Illicit drugs: none   Home heating system: central  Pets: none   Hobbies: work, watching tv  Exposures: none (amio, nitrofurantoin, cyclophos, asbestos, beryllium, glass cutting, mining, sandblasting, farming, woodwork, Birds, down bedding, hot tubs)    Preventative:  Influenza vaccine: none   Pneumonia vaccine: none    Meds/Allergies     Current Outpatient Medications:     albuterol (Proventil HFA) 90 mcg/act inhaler, Inhale 2 puffs every 6 (six) hours as needed for wheezing, Disp: 6.7 g, Rfl: 5    amLODIPine (NORVASC) 5 mg tablet, Take 1 tablet (5 mg total) by mouth daily, Disp: 30 tablet, Rfl: 5    aspirin (ECOTRIN LOW STRENGTH) 81 mg EC tablet, Take 1 tablet (81 mg total) by mouth daily, Disp: 30 tablet, Rfl: 0    atorvastatin (LIPITOR) 20 mg tablet, Take 1 tablet (20 mg total) by mouth daily, Disp: 30 tablet, Rfl: 5    Fluticasone-Salmeterol (Advair) 100-50 mcg/dose inhaler, Inhale 1 puff 2 (two) times a day Rinse mouth after use., Disp: , Rfl:     losartan (COZAAR) 50 mg tablet, Take 2 tablets (100 mg total) by mouth daily, Disp: 180 tablet, Rfl: 1    oxybutynin (DITROPAN) 5 mg tablet, Take 1 tablet (5 mg total) by mouth 3 (three) times a day as needed (bladder spasms) (Patient not taking: Reported on 12/10/2024), Disp: 15 tablet, Rfl: 0  No Known Allergies    Vitals: Blood pressure 122/70, pulse 89, temperature (!) 96.7 °F (35.9 °C), weight 77 kg (169 lb 12.1 oz), SpO2 96%., Body mass index is 25.81 kg/m². Oxygen Therapy  SpO2: 96 %    Physical Exam  Physical Exam  Vitals and nursing note reviewed.   Constitutional:       Appearance: Normal appearance. He is normal weight.   HENT:      Head: Normocephalic and atraumatic.      Right Ear: External ear normal.      Left Ear: External ear normal.      Nose: Nose normal.      Mouth/Throat:      Mouth: Mucous membranes are moist.      Pharynx: Oropharynx is clear.   Eyes:      Conjunctiva/sclera: Conjunctivae  "normal.   Cardiovascular:      Rate and Rhythm: Normal rate and regular rhythm.      Pulses: Normal pulses.      Heart sounds: Normal heart sounds.   Pulmonary:      Effort: Pulmonary effort is normal.      Breath sounds: Wheezing present.      Comments: Diminished breath sounds  Abdominal:      General: Abdomen is flat. Bowel sounds are normal.      Palpations: Abdomen is soft.   Musculoskeletal:         General: No swelling or tenderness.      Cervical back: Neck supple. No muscular tenderness.   Skin:     General: Skin is warm and dry.      Capillary Refill: Capillary refill takes less than 2 seconds.   Neurological:      Mental Status: He is alert and oriented to person, place, and time. Mental status is at baseline.   Psychiatric:         Mood and Affect: Mood normal.         Behavior: Behavior normal.         Thought Content: Thought content normal.         Judgment: Judgment normal.         Labs: I have personally reviewed pertinent lab results.  Lab Results   Component Value Date    WBC 14.59 (H) 12/07/2024    HGB 11.1 (L) 12/07/2024    HCT 34.9 (L) 12/07/2024    MCV 91 12/07/2024     12/07/2024     Lab Results   Component Value Date    CALCIUM 9.4 12/11/2024    K 4.0 12/11/2024    CO2 25 12/11/2024    CL 98 12/11/2024    BUN 10 12/11/2024    CREATININE 0.88 12/11/2024     No results found for: \"IGE\"  Lab Results   Component Value Date    ALT 12 11/04/2024    AST 18 11/04/2024    ALKPHOS 50 11/04/2024       Imaging and other studies: Results Review Statement: I personally reviewed the following image studies in PACS and associated radiology reports: PET scan and chest xray. My interpretation of the radiology images/reports is:  .  PET scan 11/29/2024:1. Hypermetabolic mass in the left upper lobe with surrounding infiltrate. Findings could reflect primary lung neoplasm or metastasis.  Hypermetabolic hilar adenopathy and small nodules in the left upper lobe.  2. No hypermetabolic abdominal or pelvic " "adenopathy.  3. Hypermetabolic nodule in the left parotid gland. Evaluation with contrast-enhanced CT or parotid gland ultrasound recommended to assess for parotid neoplasm.  4. Small reticulonodular infiltrate in the left lower lobe and opacity in the left upper lobe surrounding the left upper lobe mass most likely reflecting postobstructive atelectasis.  Chest x-ray 11/25/2023:Abnormal opacity in the left upper lobe with the suggestion of cavitary components. Recommend CT scan of the chest to exclude a cavitary lesion, either inflammatory or neoplastic.   Pulmonary function testing: N/A    EKG, Pathology, and Other Studies: Results Review Statement: I personally reviewed the following image studies in PACS and associated radiology reports: Stress test and Echocardiogram. My interpretation of the radiology images/reports is:  .  Stress test 10/23/2024: Left ventricular perfusion defect, likely artifact  Echocardiogram 8/29/2024: EF 65%    Peyton Diaz MD  Pulmonary/Critical Care Fellow PGY-VI  [facility]      Disclaimer: Portions of the record may have been created with voice recognition software. Occasional wrong word or \"sound a like\" substitutions may have occurred due to the inherent limitations of voice recognition software. Careful consideration should be taken to recognize, using context, where substitutions have occurred.    "

## 2024-12-16 NOTE — PROGRESS NOTES
Attending Statement:  I have seen and examined the patient. I have discussed the patient's care with the pulmonary fellow.    I agree with the findings, assessment, and plan as outlined in the note by fellow with the addition of the following:    Imaging Studies were reviewed personally on the PAC system:   PET CT 11/2024: Hypermetabolic mass in the left upper lobe with surrounding infiltrate. Findings could reflect primary lung neoplasm or metastasis.  Hypermetabolic hilar adenopathy and small nodules in the left upper lobe.  2. No hypermetabolic abdominal or pelvic adenopathy.  3. Hypermetabolic nodule in the left parotid gland. Evaluation with contrast-enhanced CT or parotid gland ultrasound recommended to assess for parotid neoplasm.    EXAMINATION:   Clear lungs     A/P:  64M hx smoking, HTN, new bladder ca sp removal, presenting for evaluation of lung mass    # Lung Mass  # Heavy smoker  # New bladder Ca  # Suspected COPD  50 PY smoker, quit Oct 2024  New JOHN 5cm mass with Pet avid hilar notes and parotid mass. High suspicion for malignancy, though does not appear to be a bladder met, can also be new lung primary (first seen on CXR Nov 2023 but pt did not follow up).     - CT chest with Ezequiel bronch protocol  - will discuss biopsy w IP: Ezequiel bronch + EBUS  - follow up parotid gland biopsy  - continue not smoking   - change ICS/LABA to LAMA/LABA  - PFTs    Yin Banks MD  [facility]

## 2024-12-16 NOTE — H&P (VIEW-ONLY)
Pulmonary Consultation   Nahid Luis 64 y.o. male MRN: [id number]  12/16/2024      Reason for Consultation:  pulmonary nodules    Requested by: Dr. García     Primary care provider: Kaitlin García MD    Assessment:  Pulmonary Nodules   PET scan reviewed.  Will order CT chest with emmy bronch protocol.  Message sent to interventional pulmonology for review for EBUS.  Updated PFTs ordered.    Invasive high-grade papillary urothelial carcinoma  Follow-up with urology as scheduled    Probable COPD  FTs ordered.  Will stop Wixela and start on Anoro along with as needed albuterol.    History of Tobacco Abuse  Quit in Oct 2024, smoked 1ppd x 50 years.  Encouraged continued cessation    Parotid mass  Recommend on following up for biopsy as this might be easier than bronchoscopy and can likely give diagnosis      Plan:    Diagnoses and all orders for this visit:    Malignant neoplasm of lung, unspecified laterality, unspecified part of lung (HCC)  -     Ambulatory Referral to Pulmonology  -     CT chest without contrast; Future  -     Complete PFT with post bronchodilator; Future    Chronic obstructive pulmonary disease, unspecified COPD type (HCC)  -     umeclidinium-vilanterol (Anoro Ellipta) 62.5-25 mcg/actuation inhaler; Inhale 1 puff daily  -     albuterol (Proventil HFA) 90 mcg/act inhaler; Inhale 2 puffs every 6 (six) hours as needed for wheezing  -     Complete PFT with post bronchodilator; Future    Tobacco abuse        History of Present Illness   HPI:  Nahid Luis is a 64 y.o. male who presents with multiple pulmonary nodules. He has been seeing urology and had a biopsy in Nov of 2024 that was invasive high grade papillary urothelial carcinoma. He had a PET scan after which showed pulmonary nodules. He is referred to us for further workup. He denies symptoms of weight loss, SOB, fevers, chills, night sweats, chest pain. He has never had lung cancer screening. He is currently on wixela for COPD  maintenance, without issues.  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      Review of Systems   Constitutional:  Negative for activity change, chills, diaphoresis, fatigue and fever.   HENT:  Negative for congestion, postnasal drip, rhinorrhea, sinus pressure, sinus pain and sore throat.    Eyes: Negative.    Respiratory:  Positive for shortness of breath. Negative for cough and chest tightness.    Cardiovascular:  Negative for chest pain, palpitations and leg swelling.   Gastrointestinal:  Negative for abdominal distention, abdominal pain, constipation, diarrhea, nausea and vomiting.   Endocrine: Negative.    Genitourinary: Negative.    Musculoskeletal:  Negative for back pain, gait problem and neck pain.   Skin: Negative.    Allergic/Immunologic: Negative.    Neurological:  Negative for dizziness, syncope, weakness, light-headedness and headaches.   Hematological: Negative.    Psychiatric/Behavioral: Negative.     All other systems reviewed and are negative.      Historical Information   Past Medical History:   Diagnosis Date    Bladder cancer (HCC)     Colon polyp     COPD (chronic obstructive pulmonary disease) (HCC)     History of transfusion 10/2024    Hyperlipidemia     Hypertension      Past Surgical History:   Procedure Laterality Date    APPENDECTOMY      COLONOSCOPY      CYSTOSCOPY      IR LOWER EXTREMITY ANGIOGRAM  09/24/2024    [place] BYP FEM-ANT TIBL PST TIBL PRONEAL ART/OTH DSTL Right 10/31/2024    Procedure: right Common femoral artery to anterior tibial bypass with autologous vein;  Surgeon: Carlos Bray DO;  Location: [facility];  Service: Vascular    TRANSURETHRAL RESECTION OF BLADDER TUMOR N/A 11/5/2024    Procedure: (TURBT);  Surgeon: Gilmer Duke MD;  Location: [facility];  Service: Urology     Family History   Problem Relation Age of Onset    Alcohol abuse Father     Heart attack Father        Occupational History:      Social History:   Alcohol:  quit Oct 2024  Smokinppd x 50 years, quit oct 2024  Illicit drugs: none   Home heating system: central  Pets: none   Hobbies: work, watching tv  Exposures: none (amio, nitrofurantoin, cyclophos, asbestos, beryllium, glass cutting, mining, sandblasting, farming, woodwork, Birds, down bedding, hot tubs)    Preventative:  Influenza vaccine: none   Pneumonia vaccine: none    Meds/Allergies     Current Outpatient Medications:     albuterol (Proventil HFA) 90 mcg/act inhaler, Inhale 2 puffs every 6 (six) hours as needed for wheezing, Disp: 6.7 g, Rfl: 5    amLODIPine (NORVASC) 5 mg tablet, Take 1 tablet (5 mg total) by mouth daily, Disp: 30 tablet, Rfl: 5    aspirin (ECOTRIN LOW STRENGTH) 81 mg EC tablet, Take 1 tablet (81 mg total) by mouth daily, Disp: 30 tablet, Rfl: 0    atorvastatin (LIPITOR) 20 mg tablet, Take 1 tablet (20 mg total) by mouth daily, Disp: 30 tablet, Rfl: 5    Fluticasone-Salmeterol (Advair) 100-50 mcg/dose inhaler, Inhale 1 puff 2 (two) times a day Rinse mouth after use., Disp: , Rfl:     losartan (COZAAR) 50 mg tablet, Take 2 tablets (100 mg total) by mouth daily, Disp: 180 tablet, Rfl: 1    oxybutynin (DITROPAN) 5 mg tablet, Take 1 tablet (5 mg total) by mouth 3 (three) times a day as needed (bladder spasms) (Patient not taking: Reported on 12/10/2024), Disp: 15 tablet, Rfl: 0  No Known Allergies    Vitals: Blood pressure 122/70, pulse 89, temperature (!) 96.7 °F (35.9 °C), weight 77 kg (169 lb 12.1 oz), SpO2 96%., Body mass index is 25.81 kg/m². Oxygen Therapy  SpO2: 96 %    Physical Exam  Physical Exam  Vitals and nursing note reviewed.   Constitutional:       Appearance: Normal appearance. He is normal weight.   HENT:      Head: Normocephalic and atraumatic.      Right Ear: External ear normal.      Left Ear: External ear normal.      Nose: Nose normal.      Mouth/Throat:      Mouth: Mucous membranes are moist.      Pharynx: Oropharynx is clear.   Eyes:      Conjunctiva/sclera: Conjunctivae  "normal.   Cardiovascular:      Rate and Rhythm: Normal rate and regular rhythm.      Pulses: Normal pulses.      Heart sounds: Normal heart sounds.   Pulmonary:      Effort: Pulmonary effort is normal.      Breath sounds: Wheezing present.      Comments: Diminished breath sounds  Abdominal:      General: Abdomen is flat. Bowel sounds are normal.      Palpations: Abdomen is soft.   Musculoskeletal:         General: No swelling or tenderness.      Cervical back: Neck supple. No muscular tenderness.   Skin:     General: Skin is warm and dry.      Capillary Refill: Capillary refill takes less than 2 seconds.   Neurological:      Mental Status: He is alert and oriented to person, place, and time. Mental status is at baseline.   Psychiatric:         Mood and Affect: Mood normal.         Behavior: Behavior normal.         Thought Content: Thought content normal.         Judgment: Judgment normal.         Labs: I have personally reviewed pertinent lab results.  Lab Results   Component Value Date    WBC 14.59 (H) 12/07/2024    HGB 11.1 (L) 12/07/2024    HCT 34.9 (L) 12/07/2024    MCV 91 12/07/2024     12/07/2024     Lab Results   Component Value Date    CALCIUM 9.4 12/11/2024    K 4.0 12/11/2024    CO2 25 12/11/2024    CL 98 12/11/2024    BUN 10 12/11/2024    CREATININE 0.88 12/11/2024     No results found for: \"IGE\"  Lab Results   Component Value Date    ALT 12 11/04/2024    AST 18 11/04/2024    ALKPHOS 50 11/04/2024       Imaging and other studies: Results Review Statement: I personally reviewed the following image studies in PACS and associated radiology reports: PET scan and chest xray. My interpretation of the radiology images/reports is:  .  PET scan 11/29/2024:1. Hypermetabolic mass in the left upper lobe with surrounding infiltrate. Findings could reflect primary lung neoplasm or metastasis.  Hypermetabolic hilar adenopathy and small nodules in the left upper lobe.  2. No hypermetabolic abdominal or pelvic " "adenopathy.  3. Hypermetabolic nodule in the left parotid gland. Evaluation with contrast-enhanced CT or parotid gland ultrasound recommended to assess for parotid neoplasm.  4. Small reticulonodular infiltrate in the left lower lobe and opacity in the left upper lobe surrounding the left upper lobe mass most likely reflecting postobstructive atelectasis.  Chest x-ray 11/25/2023:Abnormal opacity in the left upper lobe with the suggestion of cavitary components. Recommend CT scan of the chest to exclude a cavitary lesion, either inflammatory or neoplastic.   Pulmonary function testing: N/A    EKG, Pathology, and Other Studies: Results Review Statement: I personally reviewed the following image studies in PACS and associated radiology reports: Stress test and Echocardiogram. My interpretation of the radiology images/reports is:  .  Stress test 10/23/2024: Left ventricular perfusion defect, likely artifact  Echocardiogram 8/29/2024: EF 65%    Peyton Diaz MD  Pulmonary/Critical Care Fellow PGY-VI  [facility]      Disclaimer: Portions of the record may have been created with voice recognition software. Occasional wrong word or \"sound a like\" substitutions may have occurred due to the inherent limitations of voice recognition software. Careful consideration should be taken to recognize, using context, where substitutions have occurred.    "

## 2024-12-18 ENCOUNTER — TELEPHONE (OUTPATIENT)
Age: 64
End: 2024-12-18

## 2024-12-18 ENCOUNTER — HOSPITAL ENCOUNTER (OUTPATIENT)
Dept: RADIOLOGY | Facility: MEDICAL CENTER | Age: 64
Discharge: HOME | End: 2024-12-18
Payer: COMMERCIAL

## 2024-12-18 ENCOUNTER — OFFICE VISIT (OUTPATIENT)
Dept: FAMILY MEDICINE CLINIC | Facility: CLINIC | Age: 64
End: 2024-12-18
Payer: COMMERCIAL

## 2024-12-18 ENCOUNTER — TELEPHONE (OUTPATIENT)
Dept: PULMONOLOGY | Facility: CLINIC | Age: 64
End: 2024-12-18

## 2024-12-18 VITALS
BODY MASS INDEX: 26.37 KG/M2 | OXYGEN SATURATION: 100 % | TEMPERATURE: 96.7 F | SYSTOLIC BLOOD PRESSURE: 120 MMHG | DIASTOLIC BLOOD PRESSURE: 64 MMHG | HEART RATE: 71 BPM | WEIGHT: 174 LBS | HEIGHT: 68 IN

## 2024-12-18 DIAGNOSIS — N32.89 BLADDER MASS: ICD-10-CM

## 2024-12-18 DIAGNOSIS — C34.90 MALIGNANT NEOPLASM OF LUNG, UNSPECIFIED LATERALITY, UNSPECIFIED PART OF LUNG (HCC): ICD-10-CM

## 2024-12-18 DIAGNOSIS — I25.10 CORONARY ARTERY CALCIFICATION: ICD-10-CM

## 2024-12-18 DIAGNOSIS — J44.9 CHRONIC OBSTRUCTIVE PULMONARY DISEASE, UNSPECIFIED COPD TYPE (HCC): ICD-10-CM

## 2024-12-18 DIAGNOSIS — I73.9 PAD (PERIPHERAL ARTERY DISEASE) (HCC): ICD-10-CM

## 2024-12-18 DIAGNOSIS — C34.90 MALIGNANT NEOPLASM OF LUNG, UNSPECIFIED LATERALITY, UNSPECIFIED PART OF LUNG (HCC): Primary | ICD-10-CM

## 2024-12-18 DIAGNOSIS — Z98.890 S/P FEMORAL-TIBIAL BYPASS: ICD-10-CM

## 2024-12-18 DIAGNOSIS — E78.2 MIXED HYPERLIPIDEMIA: ICD-10-CM

## 2024-12-18 DIAGNOSIS — K11.9 LESION OF PAROTID GLAND: ICD-10-CM

## 2024-12-18 DIAGNOSIS — K11.8 PAROTID MASS: ICD-10-CM

## 2024-12-18 DIAGNOSIS — I10 HYPERTENSION, UNSPECIFIED TYPE: Primary | ICD-10-CM

## 2024-12-18 PROCEDURE — 71250 CT THORAX DX C-: CPT

## 2024-12-18 PROCEDURE — 99214 OFFICE O/P EST MOD 30 MIN: CPT | Performed by: PHYSICIAN ASSISTANT

## 2024-12-18 NOTE — ASSESSMENT & PLAN NOTE
+statin, asa. Continue with vascular. No claudication sx  Orders:  •  CBC and differential; Future  •  Comprehensive metabolic panel; Future

## 2024-12-18 NOTE — ASSESSMENT & PLAN NOTE
Continue with pulmonary, awaiting biopsy  Orders:  •  CBC and differential; Future  •  Comprehensive metabolic panel; Future

## 2024-12-18 NOTE — PROGRESS NOTES
Name: Nahid Luis      : 1960      MRN: [id number]  Encounter Provider: Denys Moncada PA-C  Encounter Date: 2024   Encounter department: [facility]    Assessment & Plan  Hypertension, unspecified type  At goal, continue current regimen  Orders:  •  CBC and differential; Future  •  Comprehensive metabolic panel; Future    PAD (peripheral artery disease) (HCC)  +statin, asa. Continue with vascular. No claudication sx  Orders:  •  CBC and differential; Future  •  Comprehensive metabolic panel; Future    Coronary artery calcification  Continue with cardiology  Orders:  •  CBC and differential; Future  •  Comprehensive metabolic panel; Future    Malignant neoplasm of lung, unspecified laterality, unspecified part of lung (HCC)  Continue with pulmonary, awaiting biopsy  Orders:  •  CBC and differential; Future  •  Comprehensive metabolic panel; Future    Chronic obstructive pulmonary disease, unspecified COPD type (HCC)  Continue wixela in the meantime, call insurance to discuss LABA/LABA alternatives and continue with pulmonary   Orders:  •  CBC and differential; Future  •  Comprehensive metabolic panel; Future    Lesion of parotid gland  Follows ENT, awaiting bx  Orders:  •  CBC and differential; Future  •  Comprehensive metabolic panel; Future    Bladder mass  Awaiting repeat TURBT  Orders:  •  CBC and differential; Future  •  Comprehensive metabolic panel; Future    S/P femoral-tibial bypass    Orders:  •  CBC and differential; Future  •  Comprehensive metabolic panel; Future    Mixed hyperlipidemia  Continue statin  Orders:  •  CBC and differential; Future  •  Comprehensive metabolic panel; Future    Parotid mass    Orders:  •  CBC and differential; Future  •  Comprehensive metabolic panel; Future         History of Present Illness     Pt presents to establish care.  PMH to include CAD, PAD with intervention, HTN, HLD.   More recent hx of bladder cancer s/p TURBT with  residual, planning for repeat.   New likely lung cancer, ?parotid involvement, awaiting bx following with pulmonary  Follows with cardiology, urology, vascular  No longer smoking or drinking, long smoking hx  COPD, anoro wasn't covered, still has wixela. No acute respiratory complaints  No acute cardiac complaints        Review of Systems   Constitutional:  Negative for chills, fatigue and fever.   HENT:  Negative for congestion, ear pain, hearing loss, nosebleeds, postnasal drip, rhinorrhea, sinus pressure, sinus pain, sneezing and sore throat.    Eyes:  Negative for pain, discharge, itching and visual disturbance.   Respiratory:  Negative for cough, chest tightness, shortness of breath and wheezing.    Cardiovascular:  Negative for chest pain, palpitations and leg swelling.   Gastrointestinal:  Negative for abdominal pain, blood in stool, constipation, diarrhea, nausea and vomiting.   Genitourinary:  Negative for frequency and urgency.   Neurological:  Negative for dizziness, light-headedness and numbness.     Past Medical History:   Diagnosis Date   • Bladder cancer (HCC)    • Colon polyp    • COPD (chronic obstructive pulmonary disease) (HCC)    • History of transfusion 10/2024   • Hyperlipidemia    • Hypertension      Past Surgical History:   Procedure Laterality Date   • APPENDECTOMY     • COLONOSCOPY     • CYSTOSCOPY     • IR LOWER EXTREMITY ANGIOGRAM  09/24/2024   • SD BYP FEM-ANT TIBL PST TIBL PRONEAL ART/OTH DSTL Right 10/31/2024    Procedure: right Common femoral artery to anterior tibial bypass with autologous vein;  Surgeon: Carlos Bray DO;  Location: [facility];  Service: Vascular   • TRANSURETHRAL RESECTION OF BLADDER TUMOR N/A 11/5/2024    Procedure: (TURBT);  Surgeon: Gilmer Duke MD;  Location: [facility];  Service: Urology     Family History   Problem Relation Age of Onset   • Alcohol abuse Father    • Heart attack Father      Social History     Tobacco Use   • Smoking status: Former      "Current packs/day: 0.00     Average packs/day: 1 pack/day for 49.8 years (49.8 ttl pk-yrs)     Types: Cigarettes     Start date:      Quit date: 10/30/2024     Years since quittin.1     Passive exposure: Current   • Smokeless tobacco: Never   Vaping Use   • Vaping status: Never Used   Substance and Sexual Activity   • Alcohol use: Not Currently     Alcohol/week: 6.0 standard drinks of alcohol     Types: 6 Cans of beer per week     Comment: daily 3-5 beers daily and shots last drank 10/30   • Drug use: No   • Sexual activity: Not Currently     Current Outpatient Medications on File Prior to Visit   Medication Sig   • albuterol (Proventil HFA) 90 mcg/act inhaler Inhale 2 puffs every 6 (six) hours as needed for wheezing   • amLODIPine (NORVASC) 5 mg tablet Take 1 tablet (5 mg total) by mouth daily   • aspirin (ECOTRIN LOW STRENGTH) 81 mg EC tablet Take 1 tablet (81 mg total) by mouth daily   • atorvastatin (LIPITOR) 20 mg tablet Take 1 tablet (20 mg total) by mouth daily   • losartan (COZAAR) 50 mg tablet Take 2 tablets (100 mg total) by mouth daily   • [DISCONTINUED] oxybutynin (DITROPAN) 5 mg tablet Take 1 tablet (5 mg total) by mouth 3 (three) times a day as needed (bladder spasms) (Patient not taking: Reported on 12/10/2024)   • [DISCONTINUED] umeclidinium-vilanterol (Anoro Ellipta) 62.5-25 mcg/actuation inhaler Inhale 1 puff daily     No Known Allergies    There is no immunization history on file for this patient.  Objective   /64 (BP Location: Left arm, Patient Position: Sitting, Cuff Size: Large)   Pulse 71   Temp (!) 96.7 °F (35.9 °C)   Ht 5' 8\" (1.727 m)   Wt 78.9 kg (174 lb)   SpO2 100%   BMI 26.46 kg/m²     Physical Exam  Vitals and nursing note reviewed.   Constitutional:       General: He is not in acute distress.     Appearance: He is well-developed.   HENT:      Head: Normocephalic and atraumatic.   Eyes:      Conjunctiva/sclera: Conjunctivae normal.   Cardiovascular:      Rate and " Rhythm: Normal rate and regular rhythm.      Heart sounds: No murmur heard.  Pulmonary:      Effort: Pulmonary effort is normal. No respiratory distress.      Breath sounds: Wheezing and rhonchi present.   Abdominal:      Palpations: Abdomen is soft.      Tenderness: There is no abdominal tenderness.   Musculoskeletal:         General: No swelling.      Cervical back: Neck supple.   Skin:     General: Skin is warm and dry.      Capillary Refill: Capillary refill takes less than 2 seconds.   Neurological:      Mental Status: He is alert.

## 2024-12-18 NOTE — ASSESSMENT & PLAN NOTE
Continue wixela in the meantime, call insurance to discuss LABA/LABA alternatives and continue with pulmonary   Orders:  •  CBC and differential; Future  •  Comprehensive metabolic panel; Future

## 2024-12-18 NOTE — ASSESSMENT & PLAN NOTE
Awaiting repeat TURBT  Orders:  •  CBC and differential; Future  •  Comprehensive metabolic panel; Future

## 2024-12-18 NOTE — TELEPHONE ENCOUNTER
Dr. SZYMANSKI will be performing a SYED BRONCH on Nahid Luis on 12/24/2024     LOCATION: [facility]    ANTICOAGULATION INSTRUCTIONS: NONE    OTHER MEDICATION INSTRUCTIONS: NONE    LABS: (CBC/PT/PTT in last 90 days): CBC 12/07/2024   (If no, labs ordered / to be done at least one day prior to procedure)    LAST OFFICE NOTE/H&P within 30 days of procedure: 12/16/2024    INSTRUCTIONS GIVEN: Spoke with patients marie leelee advised of procedure date and location. Patient aware he is to be NPO after midnight the night prior and will need a . Patient advised he will receive a call the day before with time of arrival.    Thank You   Jie Griggs

## 2024-12-18 NOTE — ASSESSMENT & PLAN NOTE
At goal, continue current regimen  Orders:  •  CBC and differential; Future  •  Comprehensive metabolic panel; Future

## 2024-12-18 NOTE — ASSESSMENT & PLAN NOTE
Continue statin  Orders:  •  CBC and differential; Future  •  Comprehensive metabolic panel; Future

## 2024-12-18 NOTE — ASSESSMENT & PLAN NOTE
Continue with cardiology  Orders:  •  CBC and differential; Future  •  Comprehensive metabolic panel; Future

## 2024-12-18 NOTE — ASSESSMENT & PLAN NOTE
Follows ENT, awaiting bx  Orders:  •  CBC and differential; Future  •  Comprehensive metabolic panel; Future

## 2024-12-21 ENCOUNTER — APPOINTMENT (OUTPATIENT)
Dept: RADIOLOGY | Facility: MEDICAL CENTER | Age: 64
End: 2024-12-21
Payer: COMMERCIAL

## 2024-12-21 ENCOUNTER — APPOINTMENT (OUTPATIENT)
Dept: LAB | Facility: MEDICAL CENTER | Age: 64
End: 2024-12-21
Payer: COMMERCIAL

## 2024-12-21 DIAGNOSIS — E78.2 MIXED HYPERLIPIDEMIA: ICD-10-CM

## 2024-12-21 DIAGNOSIS — J44.9 CHRONIC OBSTRUCTIVE PULMONARY DISEASE, UNSPECIFIED COPD TYPE (HCC): ICD-10-CM

## 2024-12-21 DIAGNOSIS — N32.89 BLADDER MASS: ICD-10-CM

## 2024-12-21 DIAGNOSIS — C34.90 MALIGNANT NEOPLASM OF LUNG, UNSPECIFIED LATERALITY, UNSPECIFIED PART OF LUNG (HCC): ICD-10-CM

## 2024-12-21 DIAGNOSIS — Z98.890 S/P FEMORAL-TIBIAL BYPASS: ICD-10-CM

## 2024-12-21 DIAGNOSIS — I25.10 CORONARY ARTERY CALCIFICATION: ICD-10-CM

## 2024-12-21 DIAGNOSIS — I73.9 PAD (PERIPHERAL ARTERY DISEASE) (HCC): ICD-10-CM

## 2024-12-21 DIAGNOSIS — K11.9 LESION OF PAROTID GLAND: ICD-10-CM

## 2024-12-21 DIAGNOSIS — I10 HYPERTENSION, UNSPECIFIED TYPE: ICD-10-CM

## 2024-12-21 DIAGNOSIS — K11.8 PAROTID MASS: ICD-10-CM

## 2024-12-21 LAB
ALBUMIN SERPL BCG-MCNC: 4.1 G/DL (ref 3.5–5)
ALP SERPL-CCNC: 87 U/L (ref 34–104)
ALT SERPL W P-5'-P-CCNC: 11 U/L (ref 7–52)
ANION GAP SERPL CALCULATED.3IONS-SCNC: 11 MMOL/L (ref 4–13)
AST SERPL W P-5'-P-CCNC: 13 U/L (ref 13–39)
BASOPHILS # BLD AUTO: 0.07 THOUSANDS/ÂΜL (ref 0–0.1)
BASOPHILS NFR BLD AUTO: 1 % (ref 0–1)
BILIRUB SERPL-MCNC: 0.39 MG/DL (ref 0.2–1)
BUN SERPL-MCNC: 12 MG/DL (ref 5–25)
CALCIUM SERPL-MCNC: 10.2 MG/DL (ref 8.4–10.2)
CHLORIDE SERPL-SCNC: 99 MMOL/L (ref 96–108)
CO2 SERPL-SCNC: 27 MMOL/L (ref 21–32)
CREAT SERPL-MCNC: 0.91 MG/DL (ref 0.6–1.3)
EOSINOPHIL # BLD AUTO: 0.18 THOUSAND/ÂΜL (ref 0–0.61)
EOSINOPHIL NFR BLD AUTO: 1 % (ref 0–6)
ERYTHROCYTE [DISTWIDTH] IN BLOOD BY AUTOMATED COUNT: 14.8 % (ref 11.6–15.1)
GFR SERPL CREATININE-BSD FRML MDRD: 88 ML/MIN/1.73SQ M
GLUCOSE P FAST SERPL-MCNC: 119 MG/DL (ref 65–99)
HCT VFR BLD AUTO: 40.3 % (ref 36.5–49.3)
HGB BLD-MCNC: 12.3 G/DL (ref 12–17)
IMM GRANULOCYTES # BLD AUTO: 0.04 THOUSAND/UL (ref 0–0.2)
IMM GRANULOCYTES NFR BLD AUTO: 0 % (ref 0–2)
LYMPHOCYTES # BLD AUTO: 5.59 THOUSANDS/ÂΜL (ref 0.6–4.47)
LYMPHOCYTES NFR BLD AUTO: 45 % (ref 14–44)
MCH RBC QN AUTO: 27.3 PG (ref 26.8–34.3)
MCHC RBC AUTO-ENTMCNC: 30.5 G/DL (ref 31.4–37.4)
MCV RBC AUTO: 90 FL (ref 82–98)
MONOCYTES # BLD AUTO: 0.58 THOUSAND/ÂΜL (ref 0.17–1.22)
MONOCYTES NFR BLD AUTO: 5 % (ref 4–12)
NEUTROPHILS # BLD AUTO: 6.06 THOUSANDS/ÂΜL (ref 1.85–7.62)
NEUTS SEG NFR BLD AUTO: 48 % (ref 43–75)
NRBC BLD AUTO-RTO: 0 /100 WBCS
PLATELET # BLD AUTO: 431 THOUSANDS/UL (ref 149–390)
PMV BLD AUTO: 8.7 FL (ref 8.9–12.7)
POTASSIUM SERPL-SCNC: 4.3 MMOL/L (ref 3.5–5.3)
PROT SERPL-MCNC: 7.5 G/DL (ref 6.4–8.4)
RBC # BLD AUTO: 4.5 MILLION/UL (ref 3.88–5.62)
SODIUM SERPL-SCNC: 137 MMOL/L (ref 135–147)
WBC # BLD AUTO: 12.52 THOUSAND/UL (ref 4.31–10.16)

## 2024-12-21 PROCEDURE — 85025 COMPLETE CBC W/AUTO DIFF WBC: CPT

## 2024-12-21 PROCEDURE — 71046 X-RAY EXAM CHEST 2 VIEWS: CPT

## 2024-12-21 PROCEDURE — 80053 COMPREHEN METABOLIC PANEL: CPT

## 2024-12-21 PROCEDURE — 36415 COLL VENOUS BLD VENIPUNCTURE: CPT

## 2024-12-23 ENCOUNTER — RESULTS FOLLOW-UP (OUTPATIENT)
Dept: FAMILY MEDICINE CLINIC | Facility: CLINIC | Age: 64
End: 2024-12-23

## 2024-12-23 ENCOUNTER — RESULTS FOLLOW-UP (OUTPATIENT)
Dept: PULMONOLOGY | Facility: CLINIC | Age: 64
End: 2024-12-23

## 2024-12-23 NOTE — NURSING NOTE
Call placed to patient to discuss upcoming appointment at [facility] radiology department and complete consultation with patient's daughter Nany Huff. Patient is having a lymph node biopsy utilizing US  guidance. Reviewed patient's allergies, current anticoagulant medication of ASA 81 mg  present per patient but not required to stop per periprocedural management of coagulation status and hemostasis risk in percutaneous image guided procedure guidelines, also discussed the pre and post procedure expectations. Reminded patient's daughter of location and time expected for procedure, she expressed understanding by verbalizing and repeating instructions.

## 2024-12-24 ENCOUNTER — HOSPITAL ENCOUNTER (OUTPATIENT)
Dept: RADIOLOGY | Facility: HOSPITAL | Age: 64
Setting detail: OUTPATIENT SURGERY
Discharge: HOME | End: 2024-12-24

## 2024-12-24 DIAGNOSIS — R91.1 LUNG NODULE: ICD-10-CM

## 2024-12-24 NOTE — TELEPHONE ENCOUNTER
Pts daughter canceled procedure due to bad weather.    Nany is asking for a call to R/S pts GI Ezequiel Bronch w/ EBUS

## 2024-12-26 ENCOUNTER — HOSPITAL ENCOUNTER (OUTPATIENT)
Facility: HOSPITAL | Age: 64
Setting detail: OUTPATIENT SURGERY
End: 2024-12-26
Attending: THORACIC SURGERY (CARDIOTHORACIC VASCULAR SURGERY) | Admitting: THORACIC SURGERY (CARDIOTHORACIC VASCULAR SURGERY)
Payer: COMMERCIAL

## 2024-12-26 NOTE — TELEPHONE ENCOUNTER
I called and spoke with patients daughter, Nany. I introduced myself and role. We confirmed details and pre-op instructions prior to patients Navigational Bronchoscopy with Dr. Juarez on 1/7/25. She is aware the hospital will contact her the day before to confirm time specific details. All questions answered at this time. She was very appreciative and knows she can contact us should anything arise.

## 2024-12-26 NOTE — TELEPHONE ENCOUNTER
Called and spoke with patients daughter advised procedure will be rescheduled for 01/07/2025.  will be performing the procedure. Estrella will be calling patient shortly to go over any further procedure instructions.

## 2024-12-27 ENCOUNTER — RESULTS FOLLOW-UP (OUTPATIENT)
Dept: FAMILY MEDICINE CLINIC | Facility: CLINIC | Age: 64
End: 2024-12-27

## 2024-12-27 DIAGNOSIS — J18.9 PNEUMONIA OF RIGHT LOWER LOBE DUE TO INFECTIOUS ORGANISM: Primary | ICD-10-CM

## 2024-12-27 RX ORDER — AZITHROMYCIN 250 MG/1
TABLET, FILM COATED ORAL
Qty: 6 TABLET | Refills: 0 | Status: SHIPPED | OUTPATIENT
Start: 2024-12-27 | End: 2025-01-01

## 2024-12-27 RX ORDER — FLUTICASONE PROPIONATE AND SALMETEROL 100; 50 UG/1; UG/1
1 POWDER RESPIRATORY (INHALATION) AS NEEDED
COMMUNITY

## 2024-12-27 NOTE — PRE-PROCEDURE INSTRUCTIONS
Pre-Surgery Instructions:   Medication Instructions    albuterol (Proventil HFA) 90 mcg/act inhaler Take Day of Surgery; unless usually taken at night     amLODIPine (NORVASC) 5 mg tablet Take Day of Surgery; unless usually taken at night     aspirin (ECOTRIN LOW STRENGTH) 81 mg EC tablet Take Day of Surgery; unless usually taken at night     atorvastatin (LIPITOR) 20 mg tablet Take Day of Surgery; unless usually taken at night     Fluticasone-Salmeterol (Wixela Inhub) 100-50 mcg/dose inhaler Take Day of Surgery; unless usually taken at night     losartan (COZAAR) 50 mg tablet Do not take day of surgery; continue as prescribed excluding DOS     Medication instructions for day surgery reviewed. Please use only a sip of water to take your instructed medications. Avoid all over the counter vitamins, supplements and NSAIDS for one week prior to surgery per anesthesia guidelines. Tylenol is ok to take as needed.     You will receive a call one business day prior to surgery with an arrival time and hospital directions. If your surgery is scheduled on a Monday, the hospital will be calling you on the Friday prior to your surgery. If you have not heard from anyone by 8pm, please call the hospital supervisor through the hospital  at [phone]. ([place] [phone] or [place] [phone]).    Do not eat or drink anything after midnight the night before your surgery, including candy, mints, lifesavers, or chewing gum. Do not drink alcohol 24hrs before your surgery. Try not to smoke at least 24hrs before your surgery.       Follow the pre surgery showering instructions as listed in the “My Surgical Experience Booklet” or otherwise provided by your surgeon's office. Do not use a blade to shave the surgical area 1 week before surgery. It is okay to use a clean electric clippers up to 24 hours before surgery. Do not apply any lotions, creams, including makeup, cologne, deodorant, or perfumes after showering on the  day of your surgery. Do not use dry shampoo, hair spray, hair gel, or any type of hair products.     No contact lenses, eye make-up, or artificial eyelashes. Remove nail polish, including gel polish, and any artificial, gel, or acrylic nails if possible. Remove all jewelry including rings and body piercing jewelry.     Wear causal clothing that is easy to take on and off. Consider your type of surgery.    Keep any valuables, jewelry, piercings at home. Please bring any specially ordered equipment (sling, braces) if indicated.    Arrange for a responsible person to drive you to and from the hospital on the day of your surgery. Please confirm the visitor policy for the day of your procedure when you receive your phone call with an arrival time.     Call the surgeon's office with any new illnesses, exposures, or additional questions prior to surgery.    Please reference your “My Surgical Experience Booklet” for additional information to prepare for your upcoming surgery.

## 2024-12-31 ENCOUNTER — PREP FOR PROCEDURE (OUTPATIENT)
Dept: PULMONOLOGY | Facility: CLINIC | Age: 64
End: 2024-12-31

## 2024-12-31 DIAGNOSIS — R91.8 LUNG MASS: ICD-10-CM

## 2024-12-31 DIAGNOSIS — C34.90 MALIGNANT NEOPLASM OF LUNG, UNSPECIFIED LATERALITY, UNSPECIFIED PART OF LUNG (HCC): Primary | ICD-10-CM

## 2024-12-31 RX ORDER — ALBUTEROL SULFATE 0.83 MG/ML
2.5 SOLUTION RESPIRATORY (INHALATION) ONCE
Status: CANCELLED | OUTPATIENT
Start: 2024-12-31

## 2025-01-02 ENCOUNTER — TELEPHONE (OUTPATIENT)
Age: 65
End: 2025-01-02

## 2025-01-02 ENCOUNTER — HOSPITAL ENCOUNTER (OUTPATIENT)
Dept: RADIOLOGY | Facility: HOSPITAL | Age: 65
Discharge: HOME | End: 2025-01-02
Payer: COMMERCIAL

## 2025-01-02 ENCOUNTER — NURSE TRIAGE (OUTPATIENT)
Age: 65
End: 2025-01-02

## 2025-01-02 DIAGNOSIS — K11.8 PAROTID MASS: ICD-10-CM

## 2025-01-02 PROCEDURE — 88173 CYTOPATH EVAL FNA REPORT: CPT | Performed by: STUDENT IN AN ORGANIZED HEALTH CARE EDUCATION/TRAINING PROGRAM

## 2025-01-02 PROCEDURE — 10005 FNA BX W/US GDN 1ST LES: CPT

## 2025-01-02 PROCEDURE — 88305 TISSUE EXAM BY PATHOLOGIST: CPT | Performed by: STUDENT IN AN ORGANIZED HEALTH CARE EDUCATION/TRAINING PROGRAM

## 2025-01-02 RX ORDER — LIDOCAINE HYDROCHLORIDE 10 MG/ML
2 INJECTION, SOLUTION EPIDURAL; INFILTRATION; INTRACAUDAL; PERINEURAL ONCE
Status: COMPLETED | OUTPATIENT
Start: 2025-01-02 | End: 2025-01-02

## 2025-01-02 RX ADMIN — LIDOCAINE HYDROCHLORIDE 2 ML: 10 INJECTION, SOLUTION EPIDURAL; INFILTRATION; INTRACAUDAL; PERINEURAL at 10:26

## 2025-01-02 NOTE — TELEPHONE ENCOUNTER
reviewed. had TURBT while inpatient 11/5, plan 6-8 week re-look TUR (12/11 cancelled, not yet scheduled)    no testing needed, see at upcoming appointment with Dr Real

## 2025-01-02 NOTE — TELEPHONE ENCOUNTER
Spoke with pt and daughter to advise to keep appt on 1-6 to discuss symptoms/rescheduling surgery. Pt did state urine flow is slowing down. Pt asked what he should do in the meantime until he is seen if his urine stops and he is unable to urinate: go to ER?

## 2025-01-02 NOTE — TELEPHONE ENCOUNTER
Call placed to pt's daughter and spoke with her. She states that pt's urinary symptoms are gradually getting worse. Daughter states she may have to take him to the ER.   Offered appointment for tomorrow with CRNP to discuss new onset of urinary symptoms and to discuss rescheduling TURBT.     Daughter was thankful for the sooner appointment and is aware if symptoms worsen overnight she will need to take patient to the ER.

## 2025-01-02 NOTE — TELEPHONE ENCOUNTER
Pt's daughter Nany called and stated the pt is scheduled for a post op appointment on 1/6/25 but the procedure never took place due to some abnormal lab work. Questioning if the pt should keep the appointment to follow up for next steps or if they will be contacted to reschedule the procedure.     Pt has been experiencing a new symptom of difficulty with urination, they are concerned about tumor growth. Call connected with CTS for assistance.

## 2025-01-02 NOTE — TELEPHONE ENCOUNTER
Last seen by Dr. Duke 11/14/24    Needs to discuss rescheduling TUR bladder tumor surgery that was cancelled.  Patient reports increased frequency last 2 days;  feels like he is emptying but take multiple tries;  weak urine stream;  Denies hematuria, pain, or fever.  Reviewed hydration/ER precautions    Currently taking Augmentin/Z-lakeisha for pneumonia.   Patient has appointment scheduled on Monday 1/6/25 (visit was sched as post op).  Did you want any labs prior to visit?    Answer Assessment - Initial Assessment Questions  1. When did your urinary frequency begin? Can you describe if you are voiding normal or small amounts of urine? Do you have any other urinary symptoms such as incontinence, nocturia (urinating frequently at night), weak urine stream or dysuria (discomfort, pain such as burning, itching, or stinging?)  Frequency worsened last couple days  2. Have you seen any blood in your urine?   Clear yellow urine  3. Do you have a fever of 101 or higher?   no  5. Have you had a recent urologic surgery or procedure?  no  6 .Are you diabetic?  no  7. Have you ever been diagnosed with BPH, benign prostatic hypertrophy? If yes, are you taking medication for it? (Tamsulosin, finasteride or other similar medications?)    Taking augmentin and z-lakeisha for pneumonia;   8. Do you have a history of recurrent UTI (urinary tract infections) with self-start therapy? If yes, to start antibiotics after submitting your urine for testing.  Frequency.    Protocols used: Urology-Urinary Frequency-ADULT-[place]

## 2025-01-03 ENCOUNTER — OFFICE VISIT (OUTPATIENT)
Dept: UROLOGY | Facility: MEDICAL CENTER | Age: 65
End: 2025-01-03
Payer: COMMERCIAL

## 2025-01-03 VITALS
OXYGEN SATURATION: 92 % | SYSTOLIC BLOOD PRESSURE: 138 MMHG | HEART RATE: 71 BPM | HEIGHT: 68 IN | BODY MASS INDEX: 26.83 KG/M2 | WEIGHT: 177 LBS | DIASTOLIC BLOOD PRESSURE: 70 MMHG

## 2025-01-03 DIAGNOSIS — R35.0 URINARY FREQUENCY: ICD-10-CM

## 2025-01-03 DIAGNOSIS — N32.89 BLADDER MASS: Primary | ICD-10-CM

## 2025-01-03 LAB
POST-VOID RESIDUAL VOLUME, ML POC: 36 ML
SL AMB  POCT GLUCOSE, UA: ABNORMAL
SL AMB LEUKOCYTE ESTERASE,UA: ABNORMAL
SL AMB POCT BILIRUBIN,UA: ABNORMAL
SL AMB POCT BLOOD,UA: ABNORMAL
SL AMB POCT CLARITY,UA: CLEAR
SL AMB POCT COLOR,UA: YELLOW
SL AMB POCT KETONES,UA: ABNORMAL
SL AMB POCT NITRITE,UA: ABNORMAL
SL AMB POCT PH,UA: 6
SL AMB POCT SPECIFIC GRAVITY,UA: 1.01
SL AMB POCT URINE PROTEIN: ABNORMAL
SL AMB POCT UROBILINOGEN: 0.2

## 2025-01-03 PROCEDURE — 87086 URINE CULTURE/COLONY COUNT: CPT

## 2025-01-03 PROCEDURE — 99214 OFFICE O/P EST MOD 30 MIN: CPT

## 2025-01-03 PROCEDURE — 81003 URINALYSIS AUTO W/O SCOPE: CPT

## 2025-01-03 PROCEDURE — 51798 US URINE CAPACITY MEASURE: CPT

## 2025-01-03 NOTE — ASSESSMENT & PLAN NOTE
TURBT 11/5 by Dr. Duke   Was scheduled for repeat TURBT on 12/11/24 but surgery was cancelled d/t abnormal pre-op labs- Na 128    Repeat labs on 12/11/24- Na 133 (at baseline)   Patient needs to be rescheduled for TURBT  New case request placed and consent signed in office today   Orders:    POCT Measure PVR    POCT urine dip auto non-scope    Case request operating room: TRANSURETHRAL RESECTION OF BLADDER TUMOR (TURBT); Standing

## 2025-01-03 NOTE — ASSESSMENT & PLAN NOTE
Patient reports increased urinary frequency for the past two days with a very weak steam and prolonged emptying time   He was concerned that he was not fully emptying his bladder   PVR today 36ml  UA with small leukocytes, negative blood   Will send for urine culture   Patient to be scheduled for repeat TURBT for known bladder cancer  Orders:    Urine culture; Future

## 2025-01-03 NOTE — PROGRESS NOTES
Name: Nahid Luis      : 1960      MRN: [id number]  Encounter Provider: LISA Cobb  Encounter Date: 1/3/2025   Encounter department[facility] [place]  :  Assessment & Plan  Bladder mass  TURBT  by Dr. Duke   Was scheduled for repeat TURBT on 24 but surgery was cancelled d/t abnormal pre-op labs- Na 128    Repeat labs on 24- Na 133 (at baseline)   Patient needs to be rescheduled for TURBT  New case request placed and consent signed in office today   Orders:    POCT Measure PVR    POCT urine dip auto non-scope    Case request operating room: TRANSURETHRAL RESECTION OF BLADDER TUMOR (TURBT); Standing    Urinary frequency  Patient reports increased urinary frequency for the past two days with a very weak steam and prolonged emptying time   He was concerned that he was not fully emptying his bladder   PVR today 36ml  UA with small leukocytes, negative blood   Will send for urine culture   Patient to be scheduled for repeat TURBT for known bladder cancer  Orders:    Urine culture; Future      History of Present Illness   Nahid Luis is a 64 y.o. male who presents for increased urinary frequency over the past 2 days and a weak urinary stream.   He has a history of high-grade bladder cancer, invasive to the level of the muscularis mucosa. He underwent initial TURBT on 24 by Dr. Duke. Malone was removed post op on 24 by office RN. PVR was 12ml. He was then scheduled for a repeat TURBT on 24 to get more muscle samples to evaluate for deeper invasion of bladder tumor however surgery was cancelled d/t abnormal pre-op labs and has yet to be rescheduled (Na 128).  He is now complaining of worsening urinary frequency for the past 2 days along with a weakened urinary stream. He denies burning, dysuria, or gross hematuria. He is concerned that he is not fully emptying his bladder.       Review of Systems   Constitutional:  Negative for  "chills, diaphoresis, fatigue and fever.   Respiratory:  Negative for cough and shortness of breath.    Gastrointestinal:  Negative for abdominal pain, diarrhea, nausea and vomiting.   Genitourinary:  Positive for frequency. Negative for decreased urine volume, difficulty urinating, dysuria, flank pain, hematuria and urgency.        Weak stream   Musculoskeletal:  Negative for back pain and myalgias.   Skin:  Negative for pallor and wound.   Neurological:  Negative for dizziness, weakness, light-headedness and numbness.     Objective   /70 (BP Location: Left arm, Patient Position: Sitting, Cuff Size: Adult)   Pulse 71   Ht 5' 8\" (1.727 m)   Wt 80.3 kg (177 lb)   SpO2 92%   BMI 26.91 kg/m²     Physical Exam  Constitutional:       Appearance: Normal appearance.   HENT:      Head: Normocephalic and atraumatic.   Eyes:      Conjunctiva/sclera: Conjunctivae normal.   Pulmonary:      Effort: Pulmonary effort is normal. No respiratory distress.   Musculoskeletal:         General: Normal range of motion.      Cervical back: Normal range of motion.   Skin:     General: Skin is warm and dry.   Neurological:      General: No focal deficit present.      Mental Status: He is alert and oriented to person, place, and time.   Psychiatric:         Mood and Affect: Mood normal.         Behavior: Behavior normal.        Results  Lab Results   Component Value Date    PSA 2.41 11/25/2023     Lab Results   Component Value Date    CALCIUM 10.2 12/21/2024    K 4.3 12/21/2024    CO2 27 12/21/2024    CL 99 12/21/2024    BUN 12 12/21/2024    CREATININE 0.91 12/21/2024     Lab Results   Component Value Date    WBC 12.52 (H) 12/21/2024    HGB 12.3 12/21/2024    HCT 40.3 12/21/2024    MCV 90 12/21/2024     (H) 12/21/2024       Office Urine Dip  Recent Results (from the past hour)   POCT urine dip auto non-scope    Collection Time: 01/03/25  9:23 AM   Result Value Ref Range     COLOR,UA yellow     CLARITY,UA clear     SPECIFIC " GRAVITY,UA 1.015      PH,UA 6.0     LEUKOCYTE ESTERASE,UA small     NITRITE,UA neg     GLUCOSE, UA neg     KETONES,UA neg     BILIRUBIN,UA neg     BLOOD,UA neg     POCT URINE PROTEIN neg     SL AMB POCT UROBILINOGEN 0.2    POCT Measure PVR    Collection Time: 01/03/25  9:24 AM   Result Value Ref Range    POST-VOID RESIDUAL VOLUME, ML POC 36 mL

## 2025-01-04 LAB — BACTERIA UR CULT: NORMAL

## 2025-01-06 DIAGNOSIS — I10 HYPERTENSION, UNSPECIFIED TYPE: ICD-10-CM

## 2025-01-06 RX ORDER — LOSARTAN POTASSIUM 50 MG/1
100 TABLET ORAL DAILY
Qty: 180 TABLET | Refills: 1 | Status: SHIPPED | OUTPATIENT
Start: 2025-01-06

## 2025-01-06 RX ORDER — SODIUM CHLORIDE, SODIUM LACTATE, POTASSIUM CHLORIDE, CALCIUM CHLORIDE 600; 310; 30; 20 MG/100ML; MG/100ML; MG/100ML; MG/100ML
125 INJECTION, SOLUTION INTRAVENOUS CONTINUOUS
Status: CANCELLED | OUTPATIENT
Start: 2025-01-06

## 2025-01-06 RX ORDER — HYDROMORPHONE HCL/PF 1 MG/ML
0.5 SYRINGE (ML) INJECTION
Refills: 0 | Status: CANCELLED | OUTPATIENT
Start: 2025-01-06

## 2025-01-06 RX ORDER — METOCLOPRAMIDE HYDROCHLORIDE 5 MG/ML
10 INJECTION INTRAMUSCULAR; INTRAVENOUS ONCE AS NEEDED
Status: CANCELLED | OUTPATIENT
Start: 2025-01-06

## 2025-01-06 RX ORDER — ONDANSETRON 2 MG/ML
4 INJECTION INTRAMUSCULAR; INTRAVENOUS ONCE AS NEEDED
Status: CANCELLED | OUTPATIENT
Start: 2025-01-06

## 2025-01-06 RX ORDER — DIPHENHYDRAMINE HYDROCHLORIDE 50 MG/ML
12.5 INJECTION INTRAMUSCULAR; INTRAVENOUS ONCE AS NEEDED
Status: CANCELLED | OUTPATIENT
Start: 2025-01-06

## 2025-01-06 RX ORDER — LIDOCAINE HYDROCHLORIDE 10 MG/ML
0.5 INJECTION, SOLUTION EPIDURAL; INFILTRATION; INTRACAUDAL; PERINEURAL ONCE AS NEEDED
Status: CANCELLED | OUTPATIENT
Start: 2025-01-06

## 2025-01-06 RX ORDER — HYDROMORPHONE HCL IN WATER/PF 6 MG/30 ML
0.2 PATIENT CONTROLLED ANALGESIA SYRINGE INTRAVENOUS
Refills: 0 | Status: CANCELLED | OUTPATIENT
Start: 2025-01-06

## 2025-01-06 RX ORDER — FENTANYL CITRATE/PF 50 MCG/ML
50 SYRINGE (ML) INJECTION
Refills: 0 | Status: CANCELLED | OUTPATIENT
Start: 2025-01-06

## 2025-01-07 ENCOUNTER — HOSPITAL ENCOUNTER (INPATIENT)
Dept: PERIOP | Facility: HOSPITAL | Age: 65
LOS: 1 days | Discharge: HOME | DRG: 309 | End: 2025-01-08
Attending: INTERNAL MEDICINE | Admitting: STUDENT IN AN ORGANIZED HEALTH CARE EDUCATION/TRAINING PROGRAM
Payer: COMMERCIAL

## 2025-01-07 ENCOUNTER — DOCUMENTATION (OUTPATIENT)
Dept: PULMONOLOGY | Facility: CLINIC | Age: 65
End: 2025-01-07

## 2025-01-07 ENCOUNTER — PREP FOR PROCEDURE (OUTPATIENT)
Dept: PULMONOLOGY | Facility: CLINIC | Age: 65
End: 2025-01-07

## 2025-01-07 ENCOUNTER — DOCUMENTATION (OUTPATIENT)
Dept: OTHER | Facility: HOSPITAL | Age: 65
End: 2025-01-07

## 2025-01-07 ENCOUNTER — ANESTHESIA (OUTPATIENT)
Dept: PERIOP | Facility: HOSPITAL | Age: 65
DRG: 309 | End: 2025-01-07
Payer: COMMERCIAL

## 2025-01-07 ENCOUNTER — PREP FOR PROCEDURE (OUTPATIENT)
Dept: UROLOGY | Facility: MEDICAL CENTER | Age: 65
End: 2025-01-07

## 2025-01-07 ENCOUNTER — ANESTHESIA EVENT (OUTPATIENT)
Dept: PERIOP | Facility: HOSPITAL | Age: 65
DRG: 309 | End: 2025-01-07
Payer: COMMERCIAL

## 2025-01-07 ENCOUNTER — HOSPITAL ENCOUNTER (OUTPATIENT)
Dept: RADIOLOGY | Facility: HOSPITAL | Age: 65
Setting detail: OUTPATIENT SURGERY
Discharge: HOME | DRG: 309 | End: 2025-01-07
Payer: COMMERCIAL

## 2025-01-07 DIAGNOSIS — R91.8 LUNG MASS: Primary | ICD-10-CM

## 2025-01-07 DIAGNOSIS — R91.8 LUNG MASS: ICD-10-CM

## 2025-01-07 DIAGNOSIS — I48.91 ATRIAL FIBRILLATION, UNSPECIFIED TYPE (HCC): Primary | ICD-10-CM

## 2025-01-07 DIAGNOSIS — I48.91 ATRIAL FIBRILLATION WITH RAPID VENTRICULAR RESPONSE (HCC): ICD-10-CM

## 2025-01-07 LAB
ANION GAP SERPL CALCULATED.3IONS-SCNC: 7 MMOL/L (ref 4–13)
ATRIAL RATE: 85 BPM
BASOPHILS # BLD MANUAL: 0 THOUSAND/UL (ref 0–0.1)
BASOPHILS NFR MAR MANUAL: 0 % (ref 0–1)
BUN SERPL-MCNC: 12 MG/DL (ref 5–25)
CALCIUM SERPL-MCNC: 9.5 MG/DL (ref 8.4–10.2)
CHLORIDE SERPL-SCNC: 103 MMOL/L (ref 96–108)
CO2 SERPL-SCNC: 27 MMOL/L (ref 21–32)
CREAT SERPL-MCNC: 0.72 MG/DL (ref 0.6–1.3)
EOSINOPHIL # BLD MANUAL: 0.22 THOUSAND/UL (ref 0–0.4)
EOSINOPHIL NFR BLD MANUAL: 2 % (ref 0–6)
ERYTHROCYTE [DISTWIDTH] IN BLOOD BY AUTOMATED COUNT: 15.4 % (ref 11.6–15.1)
GFR SERPL CREATININE-BSD FRML MDRD: 98 ML/MIN/1.73SQ M
GLUCOSE SERPL-MCNC: 107 MG/DL (ref 65–140)
GLUCOSE SERPL-MCNC: 135 MG/DL (ref 65–140)
HCT VFR BLD AUTO: 38.2 % (ref 36.5–49.3)
HGB BLD-MCNC: 12 G/DL (ref 12–17)
LYMPHOCYTES # BLD AUTO: 29 % (ref 14–44)
LYMPHOCYTES # BLD AUTO: 3.5 THOUSAND/UL (ref 0.6–4.47)
MCH RBC QN AUTO: 27.9 PG (ref 26.8–34.3)
MCHC RBC AUTO-ENTMCNC: 31.4 G/DL (ref 31.4–37.4)
MCV RBC AUTO: 89 FL (ref 82–98)
MONOCYTES # BLD AUTO: 0.55 THOUSAND/UL (ref 0–1.22)
MONOCYTES NFR BLD: 5 % (ref 4–12)
NEUTROPHILS # BLD MANUAL: 6.68 THOUSAND/UL (ref 1.85–7.62)
NEUTS SEG NFR BLD AUTO: 61 % (ref 43–75)
P AXIS: 84 DEGREES
PLATELET # BLD AUTO: 291 THOUSANDS/UL (ref 149–390)
PLATELET BLD QL SMEAR: ADEQUATE
PMV BLD AUTO: 8.5 FL (ref 8.9–12.7)
POTASSIUM SERPL-SCNC: 4.1 MMOL/L (ref 3.5–5.3)
PR INTERVAL: 168 MS
QRS AXIS: 79 DEGREES
QRSD INTERVAL: 78 MS
QT INTERVAL: 388 MS
QTC INTERVAL: 462 MS
RBC # BLD AUTO: 4.3 MILLION/UL (ref 3.88–5.62)
RBC MORPH BLD: NORMAL
SODIUM SERPL-SCNC: 137 MMOL/L (ref 135–147)
T WAVE AXIS: 82 DEGREES
TSH SERPL DL<=0.05 MIU/L-ACNC: 2.15 UIU/ML (ref 0.45–4.5)
VARIANT LYMPHS # BLD AUTO: 3 %
VENTRICULAR RATE: 85 BPM
WBC # BLD AUTO: 10.95 THOUSAND/UL (ref 4.31–10.16)

## 2025-01-07 PROCEDURE — 99223 1ST HOSP IP/OBS HIGH 75: CPT | Performed by: STUDENT IN AN ORGANIZED HEALTH CARE EDUCATION/TRAINING PROGRAM

## 2025-01-07 PROCEDURE — 85027 COMPLETE CBC AUTOMATED: CPT | Performed by: STUDENT IN AN ORGANIZED HEALTH CARE EDUCATION/TRAINING PROGRAM

## 2025-01-07 PROCEDURE — 99222 1ST HOSP IP/OBS MODERATE 55: CPT | Performed by: INTERNAL MEDICINE

## 2025-01-07 PROCEDURE — 80048 BASIC METABOLIC PNL TOTAL CA: CPT | Performed by: STUDENT IN AN ORGANIZED HEALTH CARE EDUCATION/TRAINING PROGRAM

## 2025-01-07 PROCEDURE — 85007 BL SMEAR W/DIFF WBC COUNT: CPT | Performed by: STUDENT IN AN ORGANIZED HEALTH CARE EDUCATION/TRAINING PROGRAM

## 2025-01-07 PROCEDURE — NC001 PR NO CHARGE: Performed by: STUDENT IN AN ORGANIZED HEALTH CARE EDUCATION/TRAINING PROGRAM

## 2025-01-07 PROCEDURE — 84443 ASSAY THYROID STIM HORMONE: CPT | Performed by: STUDENT IN AN ORGANIZED HEALTH CARE EDUCATION/TRAINING PROGRAM

## 2025-01-07 PROCEDURE — 93010 ELECTROCARDIOGRAM REPORT: CPT | Performed by: INTERNAL MEDICINE

## 2025-01-07 PROCEDURE — 93005 ELECTROCARDIOGRAM TRACING: CPT

## 2025-01-07 PROCEDURE — 82948 REAGENT STRIP/BLOOD GLUCOSE: CPT

## 2025-01-07 PROCEDURE — 94640 AIRWAY INHALATION TREATMENT: CPT

## 2025-01-07 RX ORDER — MIDAZOLAM HYDROCHLORIDE 2 MG/2ML
INJECTION, SOLUTION INTRAMUSCULAR; INTRAVENOUS AS NEEDED
Status: DISCONTINUED | OUTPATIENT
Start: 2025-01-07 | End: 2025-01-07

## 2025-01-07 RX ORDER — ONDANSETRON 2 MG/ML
4 INJECTION INTRAMUSCULAR; INTRAVENOUS EVERY 6 HOURS PRN
Status: DISCONTINUED | OUTPATIENT
Start: 2025-01-07 | End: 2025-01-08 | Stop reason: HOSPADM

## 2025-01-07 RX ORDER — FLUTICASONE FUROATE AND VILANTEROL 100; 25 UG/1; UG/1
1 POWDER RESPIRATORY (INHALATION)
Status: DISCONTINUED | OUTPATIENT
Start: 2025-01-08 | End: 2025-01-08 | Stop reason: HOSPADM

## 2025-01-07 RX ORDER — HEPARIN SODIUM 5000 [USP'U]/ML
5000 INJECTION, SOLUTION INTRAVENOUS; SUBCUTANEOUS EVERY 8 HOURS SCHEDULED
Status: DISCONTINUED | OUTPATIENT
Start: 2025-01-07 | End: 2025-01-08 | Stop reason: HOSPADM

## 2025-01-07 RX ORDER — SODIUM CHLORIDE, SODIUM LACTATE, POTASSIUM CHLORIDE, CALCIUM CHLORIDE 600; 310; 30; 20 MG/100ML; MG/100ML; MG/100ML; MG/100ML
INJECTION, SOLUTION INTRAVENOUS CONTINUOUS PRN
Status: DISCONTINUED | OUTPATIENT
Start: 2025-01-07 | End: 2025-01-07

## 2025-01-07 RX ORDER — METOPROLOL TARTRATE 25 MG/1
25 TABLET, FILM COATED ORAL EVERY 12 HOURS SCHEDULED
Status: DISCONTINUED | OUTPATIENT
Start: 2025-01-07 | End: 2025-01-08 | Stop reason: HOSPADM

## 2025-01-07 RX ORDER — SODIUM CHLORIDE, SODIUM LACTATE, POTASSIUM CHLORIDE, CALCIUM CHLORIDE 600; 310; 30; 20 MG/100ML; MG/100ML; MG/100ML; MG/100ML
125 INJECTION, SOLUTION INTRAVENOUS CONTINUOUS
Status: DISCONTINUED | OUTPATIENT
Start: 2025-01-07 | End: 2025-01-07

## 2025-01-07 RX ORDER — SODIUM CHLORIDE, SODIUM LACTATE, POTASSIUM CHLORIDE, CALCIUM CHLORIDE 600; 310; 30; 20 MG/100ML; MG/100ML; MG/100ML; MG/100ML
125 INJECTION, SOLUTION INTRAVENOUS CONTINUOUS
Status: DISCONTINUED | OUTPATIENT
Start: 2025-01-07 | End: 2025-01-08 | Stop reason: HOSPADM

## 2025-01-07 RX ORDER — ALBUTEROL SULFATE 0.83 MG/ML
2.5 SOLUTION RESPIRATORY (INHALATION) ONCE
Status: COMPLETED | OUTPATIENT
Start: 2025-01-07 | End: 2025-01-07

## 2025-01-07 RX ORDER — LIDOCAINE HYDROCHLORIDE 10 MG/ML
0.5 INJECTION, SOLUTION EPIDURAL; INFILTRATION; INTRACAUDAL; PERINEURAL ONCE AS NEEDED
Status: DISCONTINUED | OUTPATIENT
Start: 2025-01-07 | End: 2025-01-08 | Stop reason: HOSPADM

## 2025-01-07 RX ORDER — ALBUTEROL SULFATE 90 UG/1
2 INHALANT RESPIRATORY (INHALATION) EVERY 6 HOURS PRN
Status: DISCONTINUED | OUTPATIENT
Start: 2025-01-07 | End: 2025-01-08 | Stop reason: HOSPADM

## 2025-01-07 RX ORDER — LOSARTAN POTASSIUM 50 MG/1
100 TABLET ORAL DAILY
Status: DISCONTINUED | OUTPATIENT
Start: 2025-01-08 | End: 2025-01-08 | Stop reason: HOSPADM

## 2025-01-07 RX ADMIN — SODIUM CHLORIDE, SODIUM LACTATE, POTASSIUM CHLORIDE, AND CALCIUM CHLORIDE: .6; .31; .03; .02 INJECTION, SOLUTION INTRAVENOUS at 09:40

## 2025-01-07 RX ADMIN — SODIUM CHLORIDE, SODIUM LACTATE, POTASSIUM CHLORIDE, AND CALCIUM CHLORIDE 125 ML/HR: .6; .31; .03; .02 INJECTION, SOLUTION INTRAVENOUS at 14:40

## 2025-01-07 RX ADMIN — METOPROLOL TARTRATE 25 MG: 25 TABLET, FILM COATED ORAL at 16:35

## 2025-01-07 RX ADMIN — SODIUM CHLORIDE, SODIUM LACTATE, POTASSIUM CHLORIDE, AND CALCIUM CHLORIDE 125 ML/HR: .6; .31; .03; .02 INJECTION, SOLUTION INTRAVENOUS at 08:10

## 2025-01-07 RX ADMIN — MIDAZOLAM 2 MG: 1 INJECTION INTRAMUSCULAR; INTRAVENOUS at 09:49

## 2025-01-07 RX ADMIN — HEPARIN SODIUM 5000 UNITS: 5000 INJECTION, SOLUTION INTRAVENOUS; SUBCUTANEOUS at 21:41

## 2025-01-07 RX ADMIN — HEPARIN SODIUM 5000 UNITS: 5000 INJECTION, SOLUTION INTRAVENOUS; SUBCUTANEOUS at 14:43

## 2025-01-07 RX ADMIN — ALBUTEROL SULFATE 2.5 MG: 2.5 SOLUTION RESPIRATORY (INHALATION) at 08:11

## 2025-01-07 RX ADMIN — SODIUM CHLORIDE, SODIUM LACTATE, POTASSIUM CHLORIDE, AND CALCIUM CHLORIDE 125 ML/HR: .6; .31; .03; .02 INJECTION, SOLUTION INTRAVENOUS at 21:41

## 2025-01-07 NOTE — ASSESSMENT & PLAN NOTE
PTA amlodipine 5 mg daily, losartan 100 mg daily  Patient started on metoprolol 25 mg twice daily by cardiology  BP reviewed and soft, will hold home medications  Plan to DC amlodipine at discharge per cardiology recommendations

## 2025-01-07 NOTE — ASSESSMENT & PLAN NOTE
At time of evaluation patient had regular rate and rhythm on physical exam  Repeat EKG showed patient to be in NSR without evidence of Afib  Suspect patient's A-fib to be paroxsymal brought on by acuity of condition and procedure  Given acute onset and spontaneous conversion to NSR without rate or rhythm control agents suspect overall low burden  Would benefit from continuing rate control strategy with AC given elevated [id number] score based on severe PAD, HTN and Age    Plan  Start Lopressor 25 mg BID  Discontinue Amlodipine while patient is on Lopressor  Will repeat limited TTE to assess for acute EF changes  F/U TSH and Mg  Plan to start AC with Eliquis following EBUS completion  Patient can otherwise proceed with EBUS tomorrow  Will likely not require Heparin gtt due to high likelihood that underlying A-fib is paroxsymal

## 2025-01-07 NOTE — ANESTHESIA POSTPROCEDURE EVALUATION
Post-Op Assessment Note    CV Status:  Stable    Pain management: adequate       Mental Status:  Alert and awake   Hydration Status:  Stable   PONV Controlled:  None   Airway Patency:  Patent  Two or more mitigation strategies used for obstructive sleep apnea   Post Op Vitals Reviewed: Yes    No anethesia notable event occurred.    Staff: CRNA, Anesthesiologist   Comments: Case cancelled due to new onset Atrial fibrillation, noticed once the patient was put on monitors in the OR. EKG performed in the OR to verify arrhythmia.        Last Filed PACU Vitals:  Vitals Value Taken Time   Temp     Pulse 63 01/07/25 1012   BP     Resp 23 01/07/25 1012   SpO2 97 % 01/07/25 1012   Vitals shown include unfiled device data.

## 2025-01-07 NOTE — CONSULTS
Consultation - Cardiology   Name: Nahid Luis 64 y.o. male I MRN: [id number]  [facility] I Date of Admission: 1/7/2025   Date of Service: 1/7/2025 I Hospital Day: 0   Inpatient consult to Cardiology  Consult performed by: Kunal Parrish MD  Consult ordered by: Yvon Park DO        Physician Requesting Evaluation: Lucila Lopez MD   Reason for Evaluation / Principal Problem: New Onset Atrial Fibrillation in RVR    Assessment & Plan  Atrial fibrillation with rapid ventricular response (HCC)  At time of evaluation patient had regular rate and rhythm on physical exam  Repeat EKG showed patient to be in NSR without evidence of Afib  Suspect patient's A-fib to be paroxsymal brought on by acuity of condition and procedure  Given acute onset and spontaneous conversion to NSR without rate or rhythm control agents suspect overall low burden  Would benefit from continuing rate control strategy with AC given elevated [id number] score based on severe PAD, HTN and Age    Plan  Start Lopressor 25 mg BID  Discontinue Amlodipine while patient is on Lopressor  Will repeat limited TTE to assess for acute EF changes  F/U TSH and Mg  Plan to start AC with Eliquis following EBUS completion  Patient can otherwise proceed with EBUS tomorrow  Will likely not require Heparin gtt due to high likelihood that underlying A-fib is paroxsymal  Hypertension  Hold Amlodipine given initiation of Lopressor  Can resume Cozaar  PAD (peripheral artery disease) (HCC)  On ASA and Lipitor  Discontinue Lipitor with patient planned to begin Eliquis following EBUS  Mixed hyperlipidemia  Continue Lipitor  Bladder mass  Per Primary Team  Lung cancer (HCC)  Per Primary Team      History of Present Illness   Nahid Luis is a 64 y.o. male PMHx of HTN, PAD, HLD, COPD, CAD, bladder malignancy, prior tobacco use and extensive EtOH abuse who presents with new onset Atrial Fibrillation in RVR prior to planned EBUS today of a left  upper lobe mass suspicious for malignancy. Patient reports feeling asymptomatic prior to procedure other than underlying anxiety before anesthetic induction. Patient was given 2 mg IV Versed following was reported to have increased heart rate prompting EKG which showed A-fib in RVR. Patient was started on IVF with the planned procedure postponed and patient was transferred to the floors for further medical evaluation. Patient reports feeling asymptomatic during this time denying any chest pain, palpitations, dyspnea, nausea/vomiting, lightheadedness/dizziness or syncopal episodes during this time. At time of evaluation patient reports feeling well and no acute changes.    Review of Systems   Constitutional:  Negative for chills and fever.   HENT:  Negative for ear pain and sore throat.    Eyes:  Negative for pain and visual disturbance.   Respiratory:  Negative for cough and shortness of breath.    Cardiovascular:  Negative for chest pain and palpitations.   Gastrointestinal:  Negative for abdominal pain and vomiting.   Genitourinary:  Negative for dysuria and hematuria.   Musculoskeletal:  Negative for arthralgias and back pain.   Skin:  Negative for color change and rash.   Neurological:  Negative for seizures and syncope.   All other systems reviewed and are negative.    I have reviewed the patient's PMH, PSH, Social History, Family History, Meds, and Allergies  Historical Information   Past Medical History:   Diagnosis Date    Bladder cancer (HCC)     Colon polyp     COPD (chronic obstructive pulmonary disease) (HCC)     History of transfusion 10/2024    Hyperlipidemia     Hypertension      Past Surgical History:   Procedure Laterality Date    APPENDECTOMY      COLONOSCOPY      CYSTOSCOPY      IR LOWER EXTREMITY ANGIOGRAM  09/24/2024    PA BYP FEM-ANT TIBL PST TIBL PRONEAL ART/OTH DSTL Right 10/31/2024    Procedure: right Common femoral artery to anterior tibial bypass with autologous vein;  Surgeon: Carlos TREJO  DO Katarina;  Location: [facility];  Service: Vascular    TRANSURETHRAL RESECTION OF BLADDER TUMOR N/A 2024    Procedure: (TURBT);  Surgeon: Gilmer Duke MD;  Location: [facility];  Service: Urology    US GUIDED LYMPH NODE BIOPSY LEFT  2025     Social History     Tobacco Use    Smoking status: Former     Current packs/day: 0.00     Average packs/day: 1 pack/day for 49.8 years (49.8 ttl pk-yrs)     Types: Cigarettes     Start date:      Quit date: 10/30/2024     Years since quittin.1     Passive exposure: Current    Smokeless tobacco: Never   Vaping Use    Vaping status: Never Used   Substance and Sexual Activity    Alcohol use: Not Currently     Alcohol/week: 6.0 standard drinks of alcohol     Types: 6 Cans of beer per week     Comment: daily 3-5 beers daily and shots last drank 10/30    Drug use: No    Sexual activity: Not Currently     E-Cigarette/Vaping    E-Cigarette Use Never User      E-Cigarette/Vaping Substances    Nicotine No     THC No     CBD No     Flavoring No     Other No     Unknown No        Social History     Tobacco Use    Smoking status: Former     Current packs/day: 0.00     Average packs/day: 1 pack/day for 49.8 years (49.8 ttl pk-yrs)     Types: Cigarettes     Start date:      Quit date: 10/30/2024     Years since quittin.1     Passive exposure: Current    Smokeless tobacco: Never   Vaping Use    Vaping status: Never Used   Substance and Sexual Activity    Alcohol use: Not Currently     Alcohol/week: 6.0 standard drinks of alcohol     Types: 6 Cans of beer per week     Comment: daily 3-5 beers daily and shots last drank 10/30    Drug use: No    Sexual activity: Not Currently       Current Facility-Administered Medications:     heparin (porcine) subcutaneous injection 5,000 Units, Q8H MICHAEL **AND** [CANCELED] Platelet count, Once    lactated ringers infusion, Continuous, Last Rate: 125 mL/hr (25 1440)    lidocaine (PF) (XYLOCAINE-MPF) 1 % injection 0.5 mL, Once PRN     lidocaine (PF) (XYLOCAINE-MPF) 1 % injection 0.5 mL, Once PRN    metoprolol tartrate (LOPRESSOR) tablet 25 mg, Q12H MICHAEL    ondansetron (ZOFRAN) injection 4 mg, Q6H PRN  Prior to Admission Medications   Prescriptions Last Dose Informant Patient Reported? Taking?   Fluticasone-Salmeterol (Wixela Inhub) 100-50 mcg/dose inhaler 1/5/2025  Yes No   Sig: Inhale 1 puff 2 (two) times a day Rinse mouth after use.   albuterol (Proventil HFA) 90 mcg/act inhaler 1/7/2025 at  4:00 AM  No Yes   Sig: Inhale 2 puffs every 6 (six) hours as needed for wheezing   amLODIPine (NORVASC) 5 mg tablet 1/7/2025 at  5:00 AM Self No Yes   Sig: Take 1 tablet (5 mg total) by mouth daily   aspirin (ECOTRIN LOW STRENGTH) 81 mg EC tablet 1/7/2025 at  5:00 AM Self No Yes   Sig: Take 1 tablet (81 mg total) by mouth daily   atorvastatin (LIPITOR) 20 mg tablet 1/7/2025 at  5:00 AM Self No Yes   Sig: Take 1 tablet (20 mg total) by mouth daily   losartan (COZAAR) 50 mg tablet 1/3/2025  No No   Sig: Take 2 tablets (100 mg total) by mouth daily      Facility-Administered Medications: None     Patient has no known allergies.    Objective :  Temp:  [96.7 °F (35.9 °C)-97.8 °F (36.6 °C)] 97.3 °F (36.3 °C)  HR:  [40-97] 73  BP: ()/(56-78) 118/66  Resp:  [18] 18  SpO2:  [91 %-100 %] 96 %  O2 Device: None (Room air)  Orthostatic Blood Pressures      Flowsheet Row Most Recent Value   Blood Pressure 118/66 filed at 01/07/2025 1521          First Weight: Weight - Scale: 80.7 kg (178 lb) (01/07/25 0751)  Vitals:    01/07/25 0751 01/07/25 1423   Weight: 80.7 kg (178 lb) 79.7 kg (175 lb 11.3 oz)       Physical Exam  Vitals and nursing note reviewed.   Constitutional:       General: He is not in acute distress.     Appearance: He is well-developed.   HENT:      Head: Normocephalic and atraumatic.   Eyes:      Conjunctiva/sclera: Conjunctivae normal.   Neck:      Comments: No obvious JVD on observation  Cardiovascular:      Rate and Rhythm: Normal rate and regular  "rhythm.      Heart sounds: No murmur heard.  Pulmonary:      Effort: Pulmonary effort is normal. No respiratory distress.      Breath sounds: Normal breath sounds.      Comments: Diminished breath sounds in the left upper lung field with fine crackle in the right lower lung base  Abdominal:      Palpations: Abdomen is soft.      Tenderness: There is no abdominal tenderness.   Musculoskeletal:         General: No swelling.      Cervical back: Neck supple.   Skin:     General: Skin is warm and dry.      Capillary Refill: Capillary refill takes less than 2 seconds.   Neurological:      Mental Status: He is alert.   Psychiatric:         Mood and Affect: Mood normal.           Lab Results: I have reviewed the following results:  Results from last 7 days   Lab Units 01/07/25  1330   WBC Thousand/uL 10.95*   HEMOGLOBIN g/dL 12.0   HEMATOCRIT % 38.2   PLATELETS Thousands/uL 291     Results from last 7 days   Lab Units 01/07/25  1330   POTASSIUM mmol/L 4.1   CHLORIDE mmol/L 103   CO2 mmol/L 27   BUN mg/dL 12   CREATININE mg/dL 0.72   CALCIUM mg/dL 9.5         No results found for: \"HGBA1C\"  No results found for: \"CKTOTAL\", \"CKMB\", \"CKMBINDEX\", \"TROPONINI\"    Imaging Results Review: I reviewed radiology reports from this admission including: chest xray and Echocardiogram.  Other Study Results Review: EKG was reviewed.     VTE Prophylaxis: VTE covered by:  heparin (porcine), Subcutaneous, 5,000 Units at 01/07/25 1443        Administrative Statements   I have spent a total time of 30 minutes in caring for this patient on the day of the visit/encounter including Diagnostic results, Prognosis, Risks and benefits of tx options, Instructions for management, Patient and family education, Importance of tx compliance, Risk factor reductions, Impressions, Counseling / Coordination of care, Documenting in the medical record, Reviewing / ordering tests, medicine, procedures  , Obtaining or reviewing history  , and Communicating with " other healthcare professionals .

## 2025-01-07 NOTE — ASSESSMENT & PLAN NOTE
Patient had a PET/CT in November 2024 that revealed a left upper lobe mass.  He presented to the hospital today for EBUS but was found to be in A-fib with RVR  Discussed with pulmonology and plan to proceed with EBUS tomorrow  N.p.o. after midnight

## 2025-01-07 NOTE — INTERVAL H&P NOTE
H&P reviewed. After examining the patient I find no changes in the patients condition since the H&P had been written.  Patient is on a 10-day course of Augmentin for presumed postobstructive pneumonia.  Patient reports chronic cough.  He denied fevers, chills, mucus production, chest pain, abdominal pain, nausea, vomiting, peripheral edema.    Vitals:    01/07/25 0846   BP:    Pulse:    Resp:    Temp:    SpO2: 100%     Physical exam:  General: No acute distress  HENT: Normocephalic, atraumatic.  PERRL, EOMI, Moist mucous membranes.  Neck: Supple  Lungs: Diminished breath sounds, no wheezes, rales, rhonchi.  On room air  Heart: Regular rate and rhythm, S1-S2 present, no murmurs rubs or gallops  Abdomen: Soft, nontender, nondistended, no organomegaly  Extremities: Warm and well perfused, no cyanosis, clubbing, or edema  Skin: Warm, dry, no rashes or lesions  Neurologic: No focal neurologic deficits     Assessment/plan:  Left upper lobe mass  Hilar/mediastinal adenopathy  Proceed with bronchoscopy for airway survey, navigational robotic- assisted bronchoscopy with fine-needle aspiration, biopsy, brushing and bronchoalveolar lavage and endobronchial ultrasound.  Patient understands the risks and benefits.  Specifically, we discussed there is a 3-5% risk of pneumothorax and 1% risk of significant bleeding related to the procedure.  Additional potential risks include COPD exacerbation, atelectasis and pneumonia.  They understand that diagnostic yield is generally 80-85%.  In the event of a nondiagnostic procedure, patient understands additional testing and follow-up may be necessary.  Patient verbalizes understanding and consent has been signed.  We will plan to perform postprocedure chest x-ray prior to discharge.    Yvon Park, DO  Pulmonary critical care, PGY V

## 2025-01-07 NOTE — ASSESSMENT & PLAN NOTE
Found to be in A-fib with RVR once hooked up to monitor by anesthesia prior to intervention  Cardiology consulted  TSH ordered  Discussed with cardiology and plan for limited TTE  Started on Lopressor 25 mg twice daily  Will hold on starting heparin drip  Eliquis 5 mg twice daily sent for pricing

## 2025-01-07 NOTE — CONSULTS
Please review the consult note done by Dr. Mcwilliams   Show Applicator Variable?: Yes Consent: The patient's consent was obtained including but not limited to risks of crusting, scabbing, blistering, scarring, darker or lighter pigmentary change, recurrence, incomplete removal and infection. Application Tool (Optional): Liquid Nitrogen Sprayer Render Note In Bullet Format When Appropriate: No Duration Of Freeze Thaw-Cycle (Seconds): 5 Post-Care Instructions: I reviewed with the patient in detail post-care instructions. Patient is to wear sunprotection, and avoid picking at any of the treated lesions. Pt may apply Vaseline to crusted or scabbing areas. Number Of Freeze-Thaw Cycles: 1 freeze-thaw cycle Detail Level: Detailed

## 2025-01-07 NOTE — ANESTHESIA PREPROCEDURE EVALUATION
Procedure:  NAVIGATIONAL BRONCHOSCOPY WITH EBUS    Relevant Problems   ANESTHESIA (within normal limits)      CARDIO   (+) Coronary artery calcification   (+) Hypertension   (+) Mixed hyperlipidemia   (+) PAD (peripheral artery disease) (HCC)      ENDO (within normal limits)      GI/HEPATIC (within normal limits)      /RENAL (within normal limits)      HEMATOLOGY   (+) ABLA (acute blood loss anemia)      MUSCULOSKELETAL (within normal limits)      NEURO/PSYCH (within normal limits)      PULMONARY   (+) Chronic obstructive pulmonary disease (HCC)      Digestive   (+) Lesion of parotid gland      Behavioral Health   (+) Tobacco abuse      Urinary   (+) Bladder mass      Oncology   (+) Lung cancer (HCC)      Surgery/Wound/Pain   (+) S/P femoral-tibial bypass      Other   (+) Parotid mass        Physical Exam    Airway    Mallampati score: II  TM Distance: >3 FB  Neck ROM: full     Dental   No notable dental hx     Cardiovascular  Rhythm: regular, Rate: normal, Cardiovascular exam normal    Pulmonary  Pulmonary exam normal Breath sounds clear to auscultation    Other Findings        Anesthesia Plan  ASA Score- 3     Anesthesia Type- general with ASA Monitors.         Additional Monitors:     Airway Plan: ETT.           Plan Factors-Exercise tolerance (METS): >4 METS.    Chart reviewed. EKG reviewed. Imaging results reviewed. Existing labs reviewed. Patient summary reviewed.          Obstructive sleep apnea risk education given perioperatively.        Induction- intravenous.    Postoperative Plan- Plan for postoperative opioid use.     Perioperative Resuscitation Plan - Level 1 - Full Code.       Informed Consent- Anesthetic plan and risks discussed with patient.  I personally reviewed this patient with the CRNA. Discussed and agreed on the Anesthesia Plan with the CRNA..      Recent labs personally reviewed:  Lab Results   Component Value Date    WBC 12.52 (H) 12/21/2024    HGB 12.3 12/21/2024     (H)  "12/21/2024     Lab Results   Component Value Date    K 4.3 12/21/2024    BUN 12 12/21/2024    CREATININE 0.91 12/21/2024     No results found for: \"PTT\"   Lab Results   Component Value Date    INR 0.92 09/24/2024       Blood type O    No results found for: \"HGBA1C\"    I, Vick Velasquez MD, have personally seen and evaluated the patient prior to anesthetic care.  I have reviewed the pre-anesthetic record, and other medical records if appropriate to the anesthetic care.  If a CRNA is involved in the case, I have reviewed the CRNA assessment, if present, and agree. Risks/benefits and alternatives discussed with patient including possible PONV, sore throat, and possibility of rare anesthetic and surgical emergencies.      "

## 2025-01-07 NOTE — TELEPHONE ENCOUNTER
Spoke with patient and confirmed surgery date of 1/21/2025  Type of surgery: TURBT  Operating physician:Dr. Real  Location of surgery: [facility]    Verbally went over prep with patient on   NPO  Bowel prep? No  Hospital calls afternoon prior with arrival time (calls Friday afternoon for Monday surgery)  Patient needs ride to and from surgery   outpatient  Pre-op testing to be done 2 weeks prior to surgery. All testing can be done as a walk-in. EKG can only be done as a walk-in at any [facility].  None  Blood thinners:   None  Clearances needed: None    Mailed to patient on 1/7/2025  Copy of packet scanned into Media  Labs in packet and in electronic record   Soap prep in packet  post-op in packet     Consent: in media

## 2025-01-07 NOTE — H&P (VIEW-ONLY)
Pulmonary Consultation   Nahid Luis 64 y.o. male MRN: [id number]  Unit/Bed#: [facility] 925-01 Encounter: [id number]      Reason for consultation: EBUS     Requesting physician: ELLIS Lopez MD    Impression:     Mediastinal lymphadenopathy with left lower lobe mass along with history of urinary bladder malignancy.  Plan was for outpatient EBUS along with transbronchial biopsy under fluoroscopy but patient presented in A-fib therefore procedure was canceled, admitted to medicine, cardiology consulted  Left lower lobe mass concern for primary malignancy versus metastatic disease from bladder  High-grade papillary urothelial carcinoma  COPD mMRC 2-3, recently saw fellows clinic Dr. Diaz now on Anoro  New diagnosis of A-fib, cardiology consulted placed heparin infusion along with as needed beta-blockers    Recommendation:    Pending cardiology evaluation EBUS scheduled for tomorrow 830 under general anesthesia  Plan for EBUS along with transbronchial brushing  N.p.o. after midnight  Heparin infusion until 4 AM  A-fib management per cardiology  Case discussed with primary service and family/patient    History of Present Illness   HPI:     64-year-old gentleman admitted to medicine after new diagnosis of A-fib while in the operating room prior to undergoing procedure.  He has a history of bladder high-grade papillary urothelial carcinoma, COPD, now found to have left lower lobe mass.  Had a PET scan followed by CT afterwards.  Plan was for outpatient EBUS plus transbronchial biopsy today but found to have atrial fibrillation therefore procedure was canceled, he was admitted to medicine and cardiology was consulted for management.    Pulmonary consulted to perform procedure tomorrow pending cardiology evaluation    Review of systems:  12 point review of systems was completed and was otherwise negative except as listed in HPI.  Denies any palpitations chest pain nausea vomiting diarrhea  "constipation      Historical Information   Past Medical History:   Diagnosis Date    Bladder cancer (HCC)     Colon polyp     COPD (chronic obstructive pulmonary disease) (HCC)     History of transfusion 10/2024    Hyperlipidemia     Hypertension      Past Surgical History:   Procedure Laterality Date    APPENDECTOMY      COLONOSCOPY      CYSTOSCOPY      IR LOWER EXTREMITY ANGIOGRAM  09/24/2024    [place] BYP FEM-ANT TIBL PST TIBL PRONEAL ART/OTH DSTL Right 10/31/2024    Procedure: right Common femoral artery to anterior tibial bypass with autologous vein;  Surgeon: Carlos Bray DO;  Location[facility];  Service: Vascular    TRANSURETHRAL RESECTION OF BLADDER TUMOR N/A 11/5/2024    Procedure: (TURBT);  Surgeon: Gilmer Duke MD;  Location[facility];  Service: Urology    US GUIDED LYMPH NODE BIOPSY LEFT  1/2/2025     Family History   Problem Relation Age of Onset    Alcohol abuse Father     Heart attack Father        Exposures: Ex-smoker    Meds/Allergies     Current Facility-Administered Medications:     heparin (porcine) subcutaneous injection 5,000 Units, Q8H MICHAEL **AND** [CANCELED] Platelet count, Once    lactated ringers infusion, Continuous, Last Rate: 125 mL/hr (01/07/25 1440)    lidocaine (PF) (XYLOCAINE-MPF) 1 % injection 0.5 mL, Once PRN    lidocaine (PF) (XYLOCAINE-MPF) 1 % injection 0.5 mL, Once PRN    ondansetron (ZOFRAN) injection 4 mg, Q6H PRN    Medications Prior to Admission:     albuterol (Proventil HFA) 90 mcg/act inhaler    amLODIPine (NORVASC) 5 mg tablet    aspirin (ECOTRIN LOW STRENGTH) 81 mg EC tablet    atorvastatin (LIPITOR) 20 mg tablet    Fluticasone-Salmeterol (Wixela Inhub) 100-50 mcg/dose inhaler    losartan (COZAAR) 50 mg tablet  No Known Allergies    Vitals: Blood pressure 118/66, pulse 73, temperature (!) 97.3 °F (36.3 °C), resp. rate 18, height 5' 8\" (1.727 m), weight 79.7 kg (175 lb 11.3 oz), SpO2 96%.,  Body mass index is 26.72 kg/m².      Intake/Output Summary (Last 24 hours) at " 1/7/2025 1527  Last data filed at 1/7/2025 1009  Gross per 24 hour   Intake 50 ml   Output --   Net 50 ml       Physical Exam  General:  Awake alert and oriented x 3, conversant without conversational dyspnea, NAD  HEENT:  No nasal drainage, Mucous membranes moist   NECK: Trachea midline, no accessory muscle use, JVP not elevated  CARDIAC: Reg, S1/S2  PULM: Clear bilaterally  CHEST: No gross deformities, equal chest expansion on inspiration bilaterally  ABD: Soft nontender, nondistended, no rebound  EXT: No clubbing, normal capillary refill, no edema   SKIN:  No rashes, no lesions  NEURO: no focal neurologic deficits    Labs: I have personally reviewed pertinent lab results.    Imaging and other studies: Results Review Statement: I personally reviewed the following image studies in PACS and associated radiology reports: CT chest. My interpretation of the radiology images/reports is: CT chest and PET scan reviewed, left circumferential mass around the mainstem.    Pulmonary function testing:Results Review Statement: No pertinent imaging studies reviewed.  PFTs not performed    EKG, Pathology, and Other Studies: Results Review Statement: No pertinent imaging studies reviewed.      Shahana Mcwilliams MD  Pulmonary & Critical Care Medicine

## 2025-01-07 NOTE — ASSESSMENT & PLAN NOTE
High-grade papillary urothelial carcinoma  S/p TURBT in 11/5, urology rescheduling repeat TURBT outpatient  Start urinary retention protocol

## 2025-01-07 NOTE — ASSESSMENT & PLAN NOTE
On statin at home, will discontinue per cardiology recommendation  Reviewed lipid profile from December 2024, LDL 73

## 2025-01-07 NOTE — CONSULTS
Pulmonary Consultation   Nahid Luis 64 y.o. male MRN: [id number]  Unit/Bed#: [facility] 925-01 Encounter: [id number]      Reason for consultation: EBUS     Requesting physician: ELLIS Lopez MD    Impression:     Mediastinal lymphadenopathy with left lower lobe mass along with history of urinary bladder malignancy.  Plan was for outpatient EBUS along with transbronchial biopsy under fluoroscopy but patient presented in A-fib therefore procedure was canceled, admitted to medicine, cardiology consulted  Left lower lobe mass concern for primary malignancy versus metastatic disease from bladder  High-grade papillary urothelial carcinoma  COPD mMRC 2-3, recently saw fellows clinic Dr. Diaz now on [place]  New diagnosis of A-fib, cardiology consulted placed heparin infusion along with as needed beta-blockers    Recommendation:    Pending cardiology evaluation EBUS scheduled for tomorrow 830 under general anesthesia  Plan for EBUS along with transbronchial brushing  N.p.o. after midnight  Heparin infusion until 4 AM  A-fib management per cardiology  Case discussed with primary service and family/patient    History of Present Illness   HPI:     64-year-old gentleman admitted to medicine after new diagnosis of A-fib while in the operating room prior to undergoing procedure.  He has a history of bladder high-grade papillary urothelial carcinoma, COPD, now found to have left lower lobe mass.  Had a PET scan followed by CT afterwards.  Plan was for outpatient EBUS plus transbronchial biopsy today but found to have atrial fibrillation therefore procedure was canceled, he was admitted to medicine and cardiology was consulted for management.    Pulmonary consulted to perform procedure tomorrow pending cardiology evaluation    Review of systems:  12 point review of systems was completed and was otherwise negative except as listed in HPI.  Denies any palpitations chest pain nausea vomiting diarrhea  "constipation      Historical Information   Past Medical History:   Diagnosis Date    Bladder cancer (HCC)     Colon polyp     COPD (chronic obstructive pulmonary disease) (HCC)     History of transfusion 10/2024    Hyperlipidemia     Hypertension      Past Surgical History:   Procedure Laterality Date    APPENDECTOMY      COLONOSCOPY      CYSTOSCOPY      IR LOWER EXTREMITY ANGIOGRAM  09/24/2024    LA BYP FEM-ANT TIBL PST TIBL PRONEAL ART/OTH DSTL Right 10/31/2024    Procedure: right Common femoral artery to anterior tibial bypass with autologous vein;  Surgeon: Carlos Bray DO;  [facility];  Service: Vascular    TRANSURETHRAL RESECTION OF BLADDER TUMOR N/A 11/5/2024    Procedure: (TURBT);  Surgeon: Gilmer Duke MD;  [facility];  Service: Urology    US GUIDED LYMPH NODE BIOPSY LEFT  1/2/2025     Family History   Problem Relation Age of Onset    Alcohol abuse Father     Heart attack Father        Exposures: Ex-smoker    Meds/Allergies     Current Facility-Administered Medications:     heparin (porcine) subcutaneous injection 5,000 Units, Q8H MICHAEL **AND** [CANCELED] Platelet count, Once    lactated ringers infusion, Continuous, Last Rate: 125 mL/hr (01/07/25 1440)    lidocaine (PF) (XYLOCAINE-MPF) 1 % injection 0.5 mL, Once PRN    lidocaine (PF) (XYLOCAINE-MPF) 1 % injection 0.5 mL, Once PRN    ondansetron (ZOFRAN) injection 4 mg, Q6H PRN    Medications Prior to Admission:     albuterol (Proventil HFA) 90 mcg/act inhaler    amLODIPine (NORVASC) 5 mg tablet    aspirin (ECOTRIN LOW STRENGTH) 81 mg EC tablet    atorvastatin (LIPITOR) 20 mg tablet    Fluticasone-Salmeterol (Wixela Inhub) 100-50 mcg/dose inhaler    losartan (COZAAR) 50 mg tablet  No Known Allergies    Vitals: Blood pressure 118/66, pulse 73, temperature (!) 97.3 °F (36.3 °C), resp. rate 18, height 5' 8\" (1.727 m), weight 79.7 kg (175 lb 11.3 oz), SpO2 96%.,  Body mass index is 26.72 kg/m².      Intake/Output Summary (Last 24 hours) at " 1/7/2025 1527  Last data filed at 1/7/2025 1009  Gross per 24 hour   Intake 50 ml   Output --   Net 50 ml       Physical Exam  General:  Awake alert and oriented x 3, conversant without conversational dyspnea, NAD  HEENT:  No nasal drainage, Mucous membranes moist   NECK: Trachea midline, no accessory muscle use, JVP not elevated  CARDIAC: Reg, S1/S2  PULM: Clear bilaterally  CHEST: No gross deformities, equal chest expansion on inspiration bilaterally  ABD: Soft nontender, nondistended, no rebound  EXT: No clubbing, normal capillary refill, no edema   SKIN:  No rashes, no lesions  NEURO: no focal neurologic deficits    Labs: I have personally reviewed pertinent lab results.    Imaging and other studies: Results Review Statement: I personally reviewed the following image studies in PACS and associated radiology reports: CT chest. My interpretation of the radiology images/reports is: CT chest and PET scan reviewed, left circumferential mass around the mainstem.    Pulmonary function testing:Results Review Statement: No pertinent imaging studies reviewed.  PFTs not performed    EKG, Pathology, and Other Studies: Results Review Statement: No pertinent imaging studies reviewed.      Shahana Mcwilliams MD  Pulmonary & Critical Care Medicine

## 2025-01-07 NOTE — H&P
H&P - Hospitalist   Name: Nahid Luis 64 y.o. male I MRN: [id number]  Unit/Bed#: [facility] 925-01 I Date of Admission: 1/7/2025   Date of Service: 1/7/2025 I Hospital Day: 0     Assessment & Plan  Lung mass  Patient had a PET/CT in November 2024 that revealed a left upper lobe mass.  He presented to the hospital today for EBUS but was found to be in A-fib with RVR  Discussed with pulmonology and plan to proceed with EBUS tomorrow  N.p.o. after midnight  Atrial fibrillation with rapid ventricular response (HCC)  Found to be in A-fib with RVR once hooked up to monitor by anesthesia prior to intervention  Cardiology consulted  TSH ordered  Discussed with cardiology and plan for limited TTE  Started on Lopressor 25 mg twice daily  Will hold on starting heparin drip  Eliquis 5 mg twice daily sent for pricing  Hypertension  PTA amlodipine 5 mg daily, losartan 100 mg daily  Patient started on metoprolol 25 mg twice daily by cardiology  BP reviewed and soft, will hold home medications  Plan to DC amlodipine at discharge per cardiology recommendations  PAD (peripheral artery disease) (HCC)  Holding home aspirin in anticipation of EBUS/biopsy  ?Discontinuing home statin per cardiology  Mixed hyperlipidemia  On statin at home, will discontinue per cardiology recommendation  Reviewed lipid profile from December 2024, LDL 73  Bladder mass  High-grade papillary urothelial carcinoma  S/p TURBT in 11/5, urology rescheduling repeat TURBT outpatient  Start urinary retention protocol  Chronic obstructive pulmonary disease (HCC)  Restart home inhalers  Patient stable saturating above 95% on room air      VTE Pharmacologic Prophylaxis: VTE Score: 4 Moderate Risk (Score 3-4) - Pharmacological DVT Prophylaxis Ordered: heparin.  Code Status: Level 1 - Full Code   Discussion with family: Updated  (daughter) at bedside.    Anticipated Length of Stay: Patient will be admitted on an inpatient basis with an anticipated length  of stay of greater than 2 midnights secondary to pulmonology interventions, cardiology follow-up.    History of Present Illness   Chief Complaint: A-fib with RVR    Nahid Luis is a 64 y.o. male with a PMH of bladder carcinoma, lung mass, COPD, hypertension, hyperlipidemia and PAD, who presents for outpatient intervention.  She was found to have have A-fib with RVR on monitor and was admitted to the hospital for cardiology evaluation.  Patient reports feeling well over the last few days, denies any palpitations, chest pain, shortness of breath, lightheadedness.  He has never been diagnosed with A-fib.  Reports taking his medications as prescribed.    Review of Systems   Constitutional:  Negative for appetite change, chills, fatigue and fever.   HENT:  Negative for congestion and postnasal drip.    Respiratory:  Negative for cough, chest tightness, shortness of breath and wheezing.    Cardiovascular:  Negative for chest pain, palpitations and leg swelling.   Gastrointestinal:  Negative for abdominal pain, nausea and vomiting.   Genitourinary:  Negative for difficulty urinating and dysuria.   Musculoskeletal:  Negative for back pain and gait problem.   Neurological:  Negative for dizziness, weakness and light-headedness.   All other systems reviewed and are negative.      Historical Information   Past Medical History:   Diagnosis Date    Bladder cancer (HCC)     Colon polyp     COPD (chronic obstructive pulmonary disease) (HCC)     History of transfusion 10/2024    Hyperlipidemia     Hypertension      Past Surgical History:   Procedure Laterality Date    APPENDECTOMY      COLONOSCOPY      CYSTOSCOPY      IR LOWER EXTREMITY ANGIOGRAM  09/24/2024    ND BYP FEM-ANT TIBL PST TIBL PRONEAL ART/OTH DSTL Right 10/31/2024    Procedure: right Common femoral artery to anterior tibial bypass with autologous vein;  Surgeon: Carlos Bray DO;  [facility];  Service: Vascular    TRANSURETHRAL RESECTION OF BLADDER  TUMOR N/A 2024    Procedure: (TURBT);  Surgeon: Gilmer Duke MD;  [facility];  Service: Urology    US GUIDED LYMPH NODE BIOPSY LEFT  2025     Social History     Tobacco Use    Smoking status: Former     Current packs/day: 0.00     Average packs/day: 1 pack/day for 49.8 years (49.8 ttl pk-yrs)     Types: Cigarettes     Start date:      Quit date: 10/30/2024     Years since quittin.1     Passive exposure: Current    Smokeless tobacco: Never   Vaping Use    Vaping status: Never Used   Substance and Sexual Activity    Alcohol use: Not Currently     Alcohol/week: 6.0 standard drinks of alcohol     Types: 6 Cans of beer per week     Comment: daily 3-5 beers daily and shots last drank 10/30    Drug use: No    Sexual activity: Not Currently     E-Cigarette/Vaping    E-Cigarette Use Never User      E-Cigarette/Vaping Substances    Nicotine No     THC No     CBD No     Flavoring No     Other No     Unknown No      Family History   Problem Relation Age of Onset    Alcohol abuse Father     Heart attack Father      Social History:  Marital Status: Single   Occupation: Not on file  Patient Pre-hospital Living Situation: Home  Patient Pre-hospital Level of Mobility: unable to be assessed at time of evaluation  Patient Pre-hospital Diet Restrictions: None    Meds/Allergies   I have reviewed home medications using recent Epic encounter.  Prior to Admission medications    Medication Sig Start Date End Date Taking? Authorizing Provider   albuterol (Proventil HFA) 90 mcg/act inhaler Inhale 2 puffs every 6 (six) hours as needed for wheezing 24  Yes Peyton Diaz MD   amLODIPine (NORVASC) 5 mg tablet Take 1 tablet (5 mg total) by mouth daily 24  Yes Toni Martinez MD   aspirin (ECOTRIN LOW STRENGTH) 81 mg EC tablet Take 1 tablet (81 mg total) by mouth daily 24 Yes Robyn Negrete DO   atorvastatin (LIPITOR) 20 mg tablet Take 1 tablet (20 mg total) by mouth daily 24   Yes Carlos Bray,    Fluticasone-Salmeterol (Wixela Inhub) 100-50 mcg/dose inhaler Inhale 1 puff 2 (two) times a day Rinse mouth after use.    Historical Provider, MD   losartan (COZAAR) 50 mg tablet Take 2 tablets (100 mg total) by mouth daily 1/6/25   Denys Mnocada PA-C     No Known Allergies    Objective :  Temp:  [96.7 °F (35.9 °C)-97.8 °F (36.6 °C)] 97.3 °F (36.3 °C)  HR:  [40-97] 73  BP: ()/(56-78) 118/66  Resp:  [18] 18  SpO2:  [91 %-100 %] 96 %  O2 Device: None (Room air)    Physical Exam  Vitals and nursing note reviewed.   Constitutional:       General: He is not in acute distress.     Appearance: He is not ill-appearing.   HENT:      Head: Normocephalic and atraumatic.   Eyes:      Conjunctiva/sclera: Conjunctivae normal.   Cardiovascular:      Rate and Rhythm: Normal rate and regular rhythm.   Pulmonary:      Effort: No respiratory distress.      Breath sounds: Rales present. No wheezing or rhonchi.   Abdominal:      General: Bowel sounds are normal. There is no distension.      Palpations: Abdomen is soft.      Tenderness: There is no abdominal tenderness.   Musculoskeletal:      Right lower leg: No edema.      Left lower leg: No edema.   Skin:     General: Skin is warm and dry.   Neurological:      Mental Status: He is alert.   Psychiatric:         Mood and Affect: Mood normal.         Behavior: Behavior normal.              Lines/Drains:            Lab Results: I have reviewed the following results:  Results from last 7 days   Lab Units 01/07/25  1330   WBC Thousand/uL 10.95*   HEMOGLOBIN g/dL 12.0   HEMATOCRIT % 38.2   PLATELETS Thousands/uL 291   LYMPHO PCT % 29   MONO PCT % 5   EOS PCT % 2     Results from last 7 days   Lab Units 01/07/25  1330   SODIUM mmol/L 137   POTASSIUM mmol/L 4.1   CHLORIDE mmol/L 103   CO2 mmol/L 27   BUN mg/dL 12   CREATININE mg/dL 0.72   ANION GAP mmol/L 7   CALCIUM mg/dL 9.5   GLUCOSE RANDOM mg/dL 107         Results from last 7 days   Lab Units 01/07/25  1603  "  POC GLUCOSE mg/dl 135     No results found for: \"HGBA1C\"        Imaging Results Review: No pertinent imaging studies reviewed.  Other Study Results Review: EKG was reviewed.     Administrative Statements       ** Please Note: This note has been constructed using a voice recognition system. **    "

## 2025-01-07 NOTE — PLAN OF CARE
Problem: PAIN - ADULT  Goal: Verbalizes/displays adequate comfort level or baseline comfort level  Description: Interventions:  - Encourage patient to monitor pain and request assistance  - Assess pain using appropriate pain scale  - Administer analgesics based on type and severity of pain and evaluate response  - Implement non-pharmacological measures as appropriate and evaluate response  - Consider cultural and social influences on pain and pain management  - Notify physician/advanced practitioner if interventions unsuccessful or patient reports new pain  Outcome: Progressing     Problem: INFECTION - ADULT  Goal: Absence or prevention of progression during hospitalization  Description: INTERVENTIONS:  - Assess and monitor for signs and symptoms of infection  - Monitor lab/diagnostic results  - Monitor all insertion sites, i.e. indwelling lines, tubes, and drains  - Monitor endotracheal if appropriate and nasal secretions for changes in amount and color  - [place] appropriate cooling/warming therapies per order  - Administer medications as ordered  - Instruct and encourage patient and family to use good hand hygiene technique  - Identify and instruct in appropriate isolation precautions for identified infection/condition  Outcome: Progressing  Goal: Absence of fever/infection during neutropenic period  Description: INTERVENTIONS:  - Monitor WBC    Outcome: Progressing     Problem: SAFETY ADULT  Goal: Patient will remain free of falls  Description: INTERVENTIONS:  - Educate patient/family on patient safety including physical limitations  - Instruct patient to call for assistance with activity   - Consult OT/PT to assist with strengthening/mobility   - Keep Call bell within reach  - Keep bed low and locked with side rails adjusted as appropriate  - Keep care items and personal belongings within reach  - Initiate and maintain comfort rounds  - Make Fall Risk Sign visible to staff  - Offer Toileting every  Hours,  in advance of need  - Initiate/Maintain larm  - Obtain necessary fall risk management equipment:   - Apply yellow socks and bracelet for high fall risk patients  - Consider moving patient to room near nurses station  Outcome: Progressing  Goal: Maintain or return to baseline ADL function  Description: INTERVENTIONS:  -  Assess patient's ability to carry out ADLs; assess patient's baseline for ADL function and identify physical deficits which impact ability to perform ADLs (bathing, care of mouth/teeth, toileting, grooming, dressing, etc.)  - Assess/evaluate cause of self-care deficits   - Assess range of motion  - Assess patient's mobility; develop plan if impaired  - Assess patient's need for assistive devices and provide as appropriate  - Encourage maximum independence but intervene and supervise when necessary  - Involve family in performance of ADLs  - Assess for home care needs following discharge   - Consider OT consult to assist with ADL evaluation and planning for discharge  - Provide patient education as appropriate  Outcome: Progressing  Goal: Maintains/Returns to pre admission functional level  Description: INTERVENTIONS:  - Perform AM-PAC 6 Click Basic Mobility/ Daily Activity assessment daily.  - Set and communicate daily mobility goal to care team and patient/family/caregiver.   - Collaborate with rehabilitation services on mobility goals if consulted  - Perform Range of Motion  times a day.  - Reposition patient every  hours.  - Dangle patient  times a day  - Stand patient  times a day  - Ambulate patient  times a day  - Out of bed to chair  times a day   - Out of bed for meals 3 times a day  - Out of bed for toileting  - Record patient progress and toleration of activity level   Outcome: Progressing

## 2025-01-08 ENCOUNTER — APPOINTMENT (INPATIENT)
Dept: RADIOLOGY | Facility: HOSPITAL | Age: 65
DRG: 309 | End: 2025-01-08
Attending: INTERNAL MEDICINE
Payer: COMMERCIAL

## 2025-01-08 ENCOUNTER — ANESTHESIA (INPATIENT)
Dept: PERIOP | Facility: HOSPITAL | Age: 65
DRG: 309 | End: 2025-01-08
Payer: COMMERCIAL

## 2025-01-08 ENCOUNTER — APPOINTMENT (INPATIENT)
Dept: NON INVASIVE DIAGNOSTICS | Facility: HOSPITAL | Age: 65
DRG: 309 | End: 2025-01-08
Payer: COMMERCIAL

## 2025-01-08 ENCOUNTER — RESULTS FOLLOW-UP (OUTPATIENT)
Dept: FAMILY MEDICINE CLINIC | Facility: CLINIC | Age: 65
End: 2025-01-08

## 2025-01-08 ENCOUNTER — HOSPITAL ENCOUNTER (OUTPATIENT)
Dept: PERIOP | Facility: HOSPITAL | Age: 65
Setting detail: OUTPATIENT SURGERY
Discharge: HOME | DRG: 309 | End: 2025-01-08
Attending: INTERNAL MEDICINE
Payer: COMMERCIAL

## 2025-01-08 ENCOUNTER — ANESTHESIA EVENT (INPATIENT)
Dept: PERIOP | Facility: HOSPITAL | Age: 65
DRG: 309 | End: 2025-01-08
Payer: COMMERCIAL

## 2025-01-08 VITALS
TEMPERATURE: 97.9 F | OXYGEN SATURATION: 93 % | HEART RATE: 63 BPM | DIASTOLIC BLOOD PRESSURE: 58 MMHG | SYSTOLIC BLOOD PRESSURE: 112 MMHG | RESPIRATION RATE: 18 BRPM

## 2025-01-08 VITALS
HEIGHT: 68 IN | TEMPERATURE: 97.8 F | HEART RATE: 69 BPM | DIASTOLIC BLOOD PRESSURE: 59 MMHG | WEIGHT: 175 LBS | OXYGEN SATURATION: 89 % | BODY MASS INDEX: 26.52 KG/M2 | SYSTOLIC BLOOD PRESSURE: 115 MMHG | RESPIRATION RATE: 17 BRPM

## 2025-01-08 DIAGNOSIS — R91.8 LUNG MASS: ICD-10-CM

## 2025-01-08 LAB
ANION GAP SERPL CALCULATED.3IONS-SCNC: 6 MMOL/L (ref 4–13)
BSA FOR ECHO PROCEDURE: 1.93 M2
BUN SERPL-MCNC: 10 MG/DL (ref 5–25)
CALCIUM SERPL-MCNC: 9.3 MG/DL (ref 8.4–10.2)
CHLORIDE SERPL-SCNC: 104 MMOL/L (ref 96–108)
CO2 SERPL-SCNC: 28 MMOL/L (ref 21–32)
CREAT SERPL-MCNC: 0.7 MG/DL (ref 0.6–1.3)
ERYTHROCYTE [DISTWIDTH] IN BLOOD BY AUTOMATED COUNT: 15.5 % (ref 11.6–15.1)
GFR SERPL CREATININE-BSD FRML MDRD: 99 ML/MIN/1.73SQ M
GLUCOSE SERPL-MCNC: 92 MG/DL (ref 65–140)
HCT VFR BLD AUTO: 36.2 % (ref 36.5–49.3)
HGB BLD-MCNC: 11.2 G/DL (ref 12–17)
MAGNESIUM SERPL-MCNC: 2 MG/DL (ref 1.9–2.7)
MCH RBC QN AUTO: 27.1 PG (ref 26.8–34.3)
MCHC RBC AUTO-ENTMCNC: 30.9 G/DL (ref 31.4–37.4)
MCV RBC AUTO: 88 FL (ref 82–98)
PLATELET # BLD AUTO: 291 THOUSANDS/UL (ref 149–390)
PMV BLD AUTO: 9 FL (ref 8.9–12.7)
POTASSIUM SERPL-SCNC: 4.2 MMOL/L (ref 3.5–5.3)
RA PRESSURE ESTIMATED: 5 MMHG
RBC # BLD AUTO: 4.13 MILLION/UL (ref 3.88–5.62)
SL CV LV EF: 65
SODIUM SERPL-SCNC: 138 MMOL/L (ref 135–147)
WBC # BLD AUTO: 11.9 THOUSAND/UL (ref 4.31–10.16)

## 2025-01-08 PROCEDURE — 93308 TTE F-UP OR LMTD: CPT

## 2025-01-08 PROCEDURE — 93321 DOPPLER ECHO F-UP/LMTD STD: CPT | Performed by: INTERNAL MEDICINE

## 2025-01-08 PROCEDURE — 87116 MYCOBACTERIA CULTURE: CPT | Performed by: STUDENT IN AN ORGANIZED HEALTH CARE EDUCATION/TRAINING PROGRAM

## 2025-01-08 PROCEDURE — 99232 SBSQ HOSP IP/OBS MODERATE 35: CPT | Performed by: STUDENT IN AN ORGANIZED HEALTH CARE EDUCATION/TRAINING PROGRAM

## 2025-01-08 PROCEDURE — 87181 SC STD AGAR DILUTION PER AGT: CPT | Performed by: STUDENT IN AN ORGANIZED HEALTH CARE EDUCATION/TRAINING PROGRAM

## 2025-01-08 PROCEDURE — 31623 DX BRONCHOSCOPE/BRUSH: CPT | Performed by: STUDENT IN AN ORGANIZED HEALTH CARE EDUCATION/TRAINING PROGRAM

## 2025-01-08 PROCEDURE — 31624 DX BRONCHOSCOPE/LAVAGE: CPT | Performed by: STUDENT IN AN ORGANIZED HEALTH CARE EDUCATION/TRAINING PROGRAM

## 2025-01-08 PROCEDURE — 07978ZX DRAINAGE OF THORAX LYMPHATIC, VIA NATURAL OR ARTIFICIAL OPENING ENDOSCOPIC APPROACH, DIAGNOSTIC: ICD-10-PCS | Performed by: STUDENT IN AN ORGANIZED HEALTH CARE EDUCATION/TRAINING PROGRAM

## 2025-01-08 PROCEDURE — 80048 BASIC METABOLIC PNL TOTAL CA: CPT | Performed by: STUDENT IN AN ORGANIZED HEALTH CARE EDUCATION/TRAINING PROGRAM

## 2025-01-08 PROCEDURE — 0B9G8ZX DRAINAGE OF LEFT UPPER LUNG LOBE, VIA NATURAL OR ARTIFICIAL OPENING ENDOSCOPIC, DIAGNOSTIC: ICD-10-PCS | Performed by: STUDENT IN AN ORGANIZED HEALTH CARE EDUCATION/TRAINING PROGRAM

## 2025-01-08 PROCEDURE — 87070 CULTURE OTHR SPECIMN AEROBIC: CPT | Performed by: STUDENT IN AN ORGANIZED HEALTH CARE EDUCATION/TRAINING PROGRAM

## 2025-01-08 PROCEDURE — 93321 DOPPLER ECHO F-UP/LMTD STD: CPT

## 2025-01-08 PROCEDURE — 88172 CYTP DX EVAL FNA 1ST EA SITE: CPT | Performed by: PATHOLOGY

## 2025-01-08 PROCEDURE — 85027 COMPLETE CBC AUTOMATED: CPT | Performed by: STUDENT IN AN ORGANIZED HEALTH CARE EDUCATION/TRAINING PROGRAM

## 2025-01-08 PROCEDURE — 87102 FUNGUS ISOLATION CULTURE: CPT | Performed by: STUDENT IN AN ORGANIZED HEALTH CARE EDUCATION/TRAINING PROGRAM

## 2025-01-08 PROCEDURE — 88173 CYTOPATH EVAL FNA REPORT: CPT | Performed by: PATHOLOGY

## 2025-01-08 PROCEDURE — 99239 HOSP IP/OBS DSCHRG MGMT >30: CPT | Performed by: STUDENT IN AN ORGANIZED HEALTH CARE EDUCATION/TRAINING PROGRAM

## 2025-01-08 PROCEDURE — 88112 CYTOPATH CELL ENHANCE TECH: CPT | Performed by: PATHOLOGY

## 2025-01-08 PROCEDURE — 83735 ASSAY OF MAGNESIUM: CPT | Performed by: STUDENT IN AN ORGANIZED HEALTH CARE EDUCATION/TRAINING PROGRAM

## 2025-01-08 PROCEDURE — 87205 SMEAR GRAM STAIN: CPT | Performed by: STUDENT IN AN ORGANIZED HEALTH CARE EDUCATION/TRAINING PROGRAM

## 2025-01-08 PROCEDURE — 87206 SMEAR FLUORESCENT/ACID STAI: CPT | Performed by: STUDENT IN AN ORGANIZED HEALTH CARE EDUCATION/TRAINING PROGRAM

## 2025-01-08 PROCEDURE — 93308 TTE F-UP OR LMTD: CPT | Performed by: INTERNAL MEDICINE

## 2025-01-08 PROCEDURE — 99232 SBSQ HOSP IP/OBS MODERATE 35: CPT | Performed by: INTERNAL MEDICINE

## 2025-01-08 PROCEDURE — 71045 X-RAY EXAM CHEST 1 VIEW: CPT

## 2025-01-08 PROCEDURE — 88305 TISSUE EXAM BY PATHOLOGIST: CPT | Performed by: PATHOLOGY

## 2025-01-08 PROCEDURE — 31652 BRONCH EBUS SAMPLNG 1/2 NODE: CPT | Performed by: STUDENT IN AN ORGANIZED HEALTH CARE EDUCATION/TRAINING PROGRAM

## 2025-01-08 PROCEDURE — 93325 DOPPLER ECHO COLOR FLOW MAPG: CPT

## 2025-01-08 PROCEDURE — 93325 DOPPLER ECHO COLOR FLOW MAPG: CPT | Performed by: INTERNAL MEDICINE

## 2025-01-08 RX ORDER — LIDOCAINE HYDROCHLORIDE 10 MG/ML
INJECTION, SOLUTION EPIDURAL; INFILTRATION; INTRACAUDAL; PERINEURAL AS NEEDED
Status: DISCONTINUED | OUTPATIENT
Start: 2025-01-08 | End: 2025-01-08

## 2025-01-08 RX ORDER — HYDROMORPHONE HCL IN WATER/PF 6 MG/30 ML
0.2 PATIENT CONTROLLED ANALGESIA SYRINGE INTRAVENOUS
Refills: 0 | Status: DISCONTINUED | OUTPATIENT
Start: 2025-01-08 | End: 2025-01-12 | Stop reason: HOSPADM

## 2025-01-08 RX ORDER — ONDANSETRON 2 MG/ML
INJECTION INTRAMUSCULAR; INTRAVENOUS AS NEEDED
Status: DISCONTINUED | OUTPATIENT
Start: 2025-01-08 | End: 2025-01-08

## 2025-01-08 RX ORDER — DEXAMETHASONE SODIUM PHOSPHATE 10 MG/ML
INJECTION, SOLUTION INTRAMUSCULAR; INTRAVENOUS AS NEEDED
Status: DISCONTINUED | OUTPATIENT
Start: 2025-01-08 | End: 2025-01-08

## 2025-01-08 RX ORDER — METOCLOPRAMIDE HYDROCHLORIDE 5 MG/ML
10 INJECTION INTRAMUSCULAR; INTRAVENOUS ONCE AS NEEDED
Status: DISCONTINUED | OUTPATIENT
Start: 2025-01-08 | End: 2025-01-12 | Stop reason: HOSPADM

## 2025-01-08 RX ORDER — ONDANSETRON 2 MG/ML
4 INJECTION INTRAMUSCULAR; INTRAVENOUS ONCE AS NEEDED
Status: DISCONTINUED | OUTPATIENT
Start: 2025-01-08 | End: 2025-01-12 | Stop reason: HOSPADM

## 2025-01-08 RX ORDER — PROPOFOL 10 MG/ML
INJECTION, EMULSION INTRAVENOUS CONTINUOUS PRN
Status: DISCONTINUED | OUTPATIENT
Start: 2025-01-08 | End: 2025-01-08

## 2025-01-08 RX ORDER — HYDROMORPHONE HCL/PF 1 MG/ML
0.5 SYRINGE (ML) INJECTION
Refills: 0 | Status: DISCONTINUED | OUTPATIENT
Start: 2025-01-08 | End: 2025-01-12 | Stop reason: HOSPADM

## 2025-01-08 RX ORDER — FENTANYL CITRATE 50 UG/ML
INJECTION, SOLUTION INTRAMUSCULAR; INTRAVENOUS AS NEEDED
Status: DISCONTINUED | OUTPATIENT
Start: 2025-01-08 | End: 2025-01-08

## 2025-01-08 RX ORDER — DIPHENHYDRAMINE HYDROCHLORIDE 50 MG/ML
12.5 INJECTION INTRAMUSCULAR; INTRAVENOUS ONCE AS NEEDED
Status: DISCONTINUED | OUTPATIENT
Start: 2025-01-08 | End: 2025-01-12 | Stop reason: HOSPADM

## 2025-01-08 RX ORDER — METOPROLOL SUCCINATE 50 MG/1
50 TABLET, EXTENDED RELEASE ORAL DAILY
Qty: 30 TABLET | Refills: 0 | Status: SHIPPED | OUTPATIENT
Start: 2025-01-08

## 2025-01-08 RX ORDER — MIDAZOLAM HYDROCHLORIDE 2 MG/2ML
INJECTION, SOLUTION INTRAMUSCULAR; INTRAVENOUS AS NEEDED
Status: DISCONTINUED | OUTPATIENT
Start: 2025-01-08 | End: 2025-01-08

## 2025-01-08 RX ORDER — ROCURONIUM BROMIDE 10 MG/ML
INJECTION, SOLUTION INTRAVENOUS AS NEEDED
Status: DISCONTINUED | OUTPATIENT
Start: 2025-01-08 | End: 2025-01-08

## 2025-01-08 RX ORDER — PROPOFOL 10 MG/ML
INJECTION, EMULSION INTRAVENOUS AS NEEDED
Status: DISCONTINUED | OUTPATIENT
Start: 2025-01-08 | End: 2025-01-08

## 2025-01-08 RX ORDER — SODIUM CHLORIDE, SODIUM LACTATE, POTASSIUM CHLORIDE, CALCIUM CHLORIDE 600; 310; 30; 20 MG/100ML; MG/100ML; MG/100ML; MG/100ML
INJECTION, SOLUTION INTRAVENOUS CONTINUOUS PRN
Status: DISCONTINUED | OUTPATIENT
Start: 2025-01-08 | End: 2025-01-08

## 2025-01-08 RX ORDER — FENTANYL CITRATE/PF 50 MCG/ML
50 SYRINGE (ML) INJECTION
Refills: 0 | Status: DISCONTINUED | OUTPATIENT
Start: 2025-01-08 | End: 2025-01-12 | Stop reason: HOSPADM

## 2025-01-08 RX ADMIN — DEXAMETHASONE SODIUM PHOSPHATE 10 MG: 10 INJECTION, SOLUTION INTRAMUSCULAR; INTRAVENOUS at 08:38

## 2025-01-08 RX ADMIN — SODIUM CHLORIDE, SODIUM LACTATE, POTASSIUM CHLORIDE, AND CALCIUM CHLORIDE: .6; .31; .03; .02 INJECTION, SOLUTION INTRAVENOUS at 08:13

## 2025-01-08 RX ADMIN — ROCURONIUM BROMIDE 50 MG: 10 INJECTION, SOLUTION INTRAVENOUS at 08:38

## 2025-01-08 RX ADMIN — SUGAMMADEX 200 MG: 100 INJECTION, SOLUTION INTRAVENOUS at 10:25

## 2025-01-08 RX ADMIN — PROPOFOL 140 MCG/KG/MIN: 10 INJECTION, EMULSION INTRAVENOUS at 08:39

## 2025-01-08 RX ADMIN — METOPROLOL TARTRATE 25 MG: 25 TABLET, FILM COATED ORAL at 07:51

## 2025-01-08 RX ADMIN — NOREPINEPHRINE BITARTRATE 2 MCG: 1 INJECTION, SOLUTION, CONCENTRATE INTRAVENOUS at 10:24

## 2025-01-08 RX ADMIN — PROPOFOL 200 MG: 10 INJECTION, EMULSION INTRAVENOUS at 08:38

## 2025-01-08 RX ADMIN — LIDOCAINE HYDROCHLORIDE 50 MG: 10 INJECTION, SOLUTION EPIDURAL; INFILTRATION; INTRACAUDAL; PERINEURAL at 08:38

## 2025-01-08 RX ADMIN — NOREPINEPHRINE BITARTRATE 16 MCG: 1 INJECTION, SOLUTION, CONCENTRATE INTRAVENOUS at 09:04

## 2025-01-08 RX ADMIN — NOREPINEPHRINE BITARTRATE 16 MCG: 1 INJECTION, SOLUTION, CONCENTRATE INTRAVENOUS at 08:52

## 2025-01-08 RX ADMIN — MIDAZOLAM 2 MG: 1 INJECTION INTRAMUSCULAR; INTRAVENOUS at 08:27

## 2025-01-08 RX ADMIN — NOREPINEPHRINE BITARTRATE 3 MCG/MIN: 1 INJECTION, SOLUTION, CONCENTRATE INTRAVENOUS at 08:49

## 2025-01-08 RX ADMIN — SODIUM CHLORIDE, SODIUM LACTATE, POTASSIUM CHLORIDE, AND CALCIUM CHLORIDE 125 ML/HR: .6; .31; .03; .02 INJECTION, SOLUTION INTRAVENOUS at 05:28

## 2025-01-08 RX ADMIN — FENTANYL CITRATE 50 MCG: 50 INJECTION INTRAMUSCULAR; INTRAVENOUS at 08:38

## 2025-01-08 RX ADMIN — ONDANSETRON 4 MG: 2 INJECTION INTRAMUSCULAR; INTRAVENOUS at 08:38

## 2025-01-08 NOTE — PROGRESS NOTES
Progress Note - Pulmonology   Name: Nahid Luis 64 y.o. male I MRN: [id number]  Unit/Bed#: [facility] 925-01 I Date of Admission: 1/7/2025   Date of Service: 1/8/2025 I Hospital Day: 1     Assessment & Plan  Chronic obstructive pulmonary disease (HCC)  Patient has a history of COPD  Not in exacerbation  Continue albuterol PRN, continue Wixela at discharge  Lung mass  Patient has a known lung mass in the left upper lobe, 8.3 cm  Had EBUS done on 1/8/2025 with lymph node biopsy, BAL and brushing  Follow up with results    24 Hour Events : No overnight events. Yesterday patient had Afib with RVR, delaying the EBUS into today  Subjective : Was seen in the OR prior to EBUS, did not have acute complaints    Objective :  Temp:  [96.7 °F (35.9 °C)-98 °F (36.7 °C)] 97.8 °F (36.6 °C)  HR:  [40-83] 74  BP: (118-135)/(65-77) 132/75  Resp:  [14-20] 20  SpO2:  [91 %-96 %] 93 %  O2 Device: None (Room air)    Physical Exam      Lab Results: I have reviewed the following results:   .     01/08/25  0534   WBC 11.90*   HGB 11.2*   HCT 36.2*      SODIUM 138   K 4.2      CO2 28   BUN 10   CREATININE 0.70   GLUC 92   MG 2.0     ABG: No new results in last 24 hours.    Reviewed CT imaging and radiology impressions

## 2025-01-08 NOTE — ANESTHESIA PREPROCEDURE EVALUATION
Procedure:  ENDOBRONCHIAL ULTRASOUND (EBUS)    Relevant Problems   CARDIO   (+) Atrial fibrillation with rapid ventricular response (HCC)   (+) Coronary artery calcification   (+) Hypertension   (+) Mixed hyperlipidemia   (+) PAD (peripheral artery disease) (HCC)      HEMATOLOGY   (+) ABLA (acute blood loss anemia)      PULMONARY   (+) Chronic obstructive pulmonary disease (HCC)        Physical Exam    Airway    Mallampati score: II  TM Distance: >3 FB  Neck ROM: full     Dental    upper dentures and lower dentures    Cardiovascular      Pulmonary      Other Findings        Anesthesia Plan  ASA Score- 3     Anesthesia Type- general with ASA Monitors.         Additional Monitors:     Airway Plan:            Plan Factors-Exercise tolerance (METS): >4 METS.    Chart reviewed.   Existing labs reviewed. Patient summary reviewed.                  Induction- intravenous.    Postoperative Plan- Plan for postoperative opioid use. Planned trial extubation    Perioperative Resuscitation Plan - Level 1 - Full Code.       Informed Consent- Anesthetic plan and risks discussed with patient.  I personally reviewed this patient with the CRNA. Discussed and agreed on the Anesthesia Plan with the CRNA..

## 2025-01-08 NOTE — CASE MANAGEMENT
Case Management Discharge Planning Note    Patient name Nahid Luis  [facility] MRN [id number]  : 1960 Date 2025       Current Admission Date: 2025  Current Admission Diagnosis:Atrial fibrillation with rapid ventricular response (HCC)   Patient Active Problem List    Diagnosis Date Noted Date Diagnosed    Atrial fibrillation with rapid ventricular response (HCC) 2025     Urinary frequency 2025     Lung mass 12/10/2024     Lesion of parotid gland 12/10/2024     Parotid mass 12/10/2024     Coronary artery calcification 2024     Tobacco abuse 2024     S/P femoral-tibial bypass 2024     Leukocytosis 2024     ABLA (acute blood loss anemia) 2024     Bladder mass 10/18/2024     Mixed hyperlipidemia 2024     PAD (peripheral artery disease) ([facility]) 2024     Hypertension 2023     Chronic obstructive pulmonary disease ([facility]) 2023     Gross hematuria 2023       LOS (days): 1  Geometric Mean LOS (GMLOS) (days):   Days to GMLOS:     OBJECTIVE:  Risk of Unplanned Readmission Score: 11.21         Current admission status: Inpatient   Preferred Pharmacy:   [id number] [place] - [address] [facility].  [address] [place] [address]  [place] [address]  Phone: [phone] Fax: [phone]    [facility] [place] [place] - [address]  [facility],  [address]  [facility],  First Floor [facility] [place] [address]  Phone: [phone] Fax: [phone]    [facility] [place]  [place] - [address]  [address]  [place] [address]  Phone: [phone] Fax: [phone]    Primary Care Provider: Denys Moncada PA-C    Primary Insurance: [employer].  Secondary Insurance:     DISCHARGE DETAILS:    Discharge planning discussed with:: pt at bedside  [place] of Choice: Yes  Comments - [place] of Choice: Discussed FOC  CM contacted family/caregiver?: Yes  Were Treatment Team discharge  recommendations reviewed with patient/caregiver?: Yes  Did patient/caregiver verbalize understanding of patient care needs?: Yes  Were patient/caregiver advised of the risks associated with not following Treatment Team discharge recommendations?: Yes         Requested Home Health Care         Is the patient interested in HHC at discharge?: No    DME Referral Provided  Referral made for DME?: No    Other Referral/Resources/Interventions Provided:  Interventions: Prescription Price Check  Referral Comments: Eliquis - $50    Would you like to participate in our [facility] service program?  : No - Declined    Treatment Team Recommendation: Home  Discharge Destination Plan:: Home  Transport at Discharge : Family                                      Additional Comments: CM introduced herself and role to pt at bedside. Cm price checked Eliquis $50. Pt is agreeable with price. Possible DC home today. CM will continue to follow as needed.

## 2025-01-08 NOTE — UTILIZATION REVIEW
Initial Clinical Review    Admission: Date/Time/Statement:   Admission Orders (From admission, onward)       Ordered        01/07/25 1308  Inpatient Admission  Once                          Orders Placed This Encounter   Procedures    Inpatient Admission     Standing Status:   Standing     Number of Occurrences:   1     Level of Care:   Med Surg [16]     Estimated length of stay:   More than 2 Midnights     Certification:   I certify that inpatient services are medically necessary for this patient for a duration of greater than two midnights. See H&P and MD Progress Notes for additional information about the patient's course of treatment.     Initial Presentation: 64 y.o. male  with PMHx of bladder high-grade papillary urothelial carcinoma, COPD, now found to have left lower lobe mass. Had a PET scan followed by CT afterwards. Plan was for outpatient EBUS plus transbronchial biopsy today but found to have atrial fibrillation therefore procedure was canceled, he was admitted to medicine and cardiology was consulted for management. Inpatient Admission to med surg telemetry, Cardiology consult, NPO after MN.    1/7 Per Cardiology:  recommend repeat limited echo, although fairly unremarkable echo 8/24. Recommend metoprolol, discontinue home amlodipine. Hold home losartan for now, can resume after bronchoscopy. Okay to proceed with bronchoscopy tomorrow. No need for anticoagulation at this time considering brief A-fib event.    1/7 Per Pulmonology: Plan for EBUS along with transbronchial brushing.     Date: 1/8   Day 2:  Yesterday patient had Afib with RVR, delaying the EBUS into today. Status post EBUS along with BAL and brushing. Okay for discharge from a pulmonary perspective. Discharged to home.     Scheduled Medications:  Fluticasone Furoate-Vilanterol, 1 puff, Inhalation, Daily  heparin (porcine), 5,000 Units, Subcutaneous, Q8H MICHAEL  [Held by provider] losartan, 100 mg, Oral, Daily  metoprolol tartrate, 25 mg, Oral,  Q12H [facility]      Continuous IV Infusions:  lactated ringers, 125 mL/hr, Intravenous, Continuous      PRN Meds:  albuterol, 2 puff, Inhalation, Q6H PRN  lidocaine (PF), 0.5 mL, Infiltration, Once PRN  lidocaine (PF), 0.5 mL, Infiltration, Once PRN  ondansetron, 4 mg, Intravenous, Q6H PRN      Triage Vitals [01/07/25 0751]   Temperature Pulse Respirations Blood Pressure SpO2 Pain Score   (!) 97.1 °F (36.2 °C) 88 18 160/78 99 % No Pain     Weight (last 2 days)       Date/Time Weight    01/07/25 14:23:46 79.7 (175.71)    01/07/25 0751 80.7 (178)            Vital Signs (last 3 days)       Date/Time Temp Pulse Resp BP MAP (mmHg) SpO2 O2 Device Patient Position - Orthostatic VS Pain    01/08/25 0756 -- -- -- -- -- -- -- -- No Pain    01/08/25 07:50:17 97.8 °F (36.6 °C) 74 20 132/75 94 93 % None (Room air) -- No Pain    01/08/25 02:44:35 -- 83 -- 135/77 96 94 % -- -- --    01/07/25 21:48:28 97.6 °F (36.4 °C) 64 15 133/69 90 94 % None (Room air) Lying --    01/07/25 2000 -- -- -- -- -- -- -- -- No Pain    01/07/25 19:22:08 98 °F (36.7 °C) 69 14 121/67 85 94 % -- -- --    01/07/25 15:21:46 97.3 °F (36.3 °C) 73 18 118/66 83 96 % -- -- --    01/07/25 1446 -- -- -- -- -- -- -- -- No Pain    01/07/25 14:23:46 96.7 °F (35.9 °C) 40 18 119/65 83 91 % -- -- --    01/07/25 1348 97.8 °F (36.6 °C) 60 18 123/67 -- 94 % None (Room air) -- No Pain    01/07/25 1130 -- 78 18 121/65 -- 95 % None (Room air) -- No Pain    01/07/25 1036 -- -- 18 -- -- -- -- -- --    01/07/25 1035 -- 92 18 109/69 -- 93 % None (Room air) -- No Pain    01/07/25 1015 97.6 °F (36.4 °C) 97 18 91/56 -- 93 % None (Room air) -- No Pain    01/07/25 0846 -- -- -- -- -- 100 % -- -- --    01/07/25 0838 -- -- -- -- -- 97 % -- -- --    01/07/25 0751 97.1 °F (36.2 °C) 88 18 160/78 -- 99 % -- -- No Pain              Pertinent Labs/Diagnostic Test Results:   Radiology:  No orders to display     Cardiology:  ECG 12 lead   Final Result by Vinny Hay MD (01/07 4248)   Sinus rhythm  with artifact   Normal ECG   When compared with ECG of 07-Jan-2025 10:58, (unconfirmed)   Sinus rhythm has replaced Atrial fibrillation   Confirmed by Vinny Hay ([id number]) on 1/7/2025 5:48:43 PM        GI:  No orders to display           Results from last 7 days   Lab Units 01/08/25  0534 01/07/25  1330   WBC Thousand/uL 11.90* 10.95*   HEMOGLOBIN g/dL 11.2* 12.0   HEMATOCRIT % 36.2* 38.2   PLATELETS Thousands/uL 291 291         Results from last 7 days   Lab Units 01/08/25  0534 01/07/25  1330   SODIUM mmol/L 138 137   POTASSIUM mmol/L 4.2 4.1   CHLORIDE mmol/L 104 103   CO2 mmol/L 28 27   ANION GAP mmol/L 6 7   BUN mg/dL 10 12   CREATININE mg/dL 0.70 0.72   EGFR ml/min/1.73sq m 99 98   CALCIUM mg/dL 9.3 9.5   MAGNESIUM mg/dL 2.0  --          Results from last 7 days   Lab Units 01/07/25  1603   POC GLUCOSE mg/dl 135     Results from last 7 days   Lab Units 01/08/25  0534 01/07/25  1330   GLUCOSE RANDOM mg/dL 92 107     Results from last 7 days   Lab Units 01/07/25  1330   TSH 3RD GENERATON uIU/mL 2.155     Results from last 7 days   Lab Units 01/03/25  0923   CLARITY UA  clear   COLOR UA  yellow   GLUCOSE UA  neg   KETONES UA  neg   BLOOD UA  neg   PROTEIN UA  neg   NITRITE UA  neg   BILIRUBIN UA POC  neg   UROBILINOGEN UA  0.2   LEUKOCYTES UA  small     Results from last 7 days   Lab Units 01/03/25  0955   URINE CULTURE  No Growth <1000 cfu/mL     Past Medical History:   Diagnosis Date    Bladder cancer (HCC)     Colon polyp     COPD (chronic obstructive pulmonary disease) (HCC)     History of transfusion 10/2024    Hyperlipidemia     Hypertension      Present on Admission:   Hypertension   PAD (peripheral artery disease) (HCC)   Mixed hyperlipidemia   Bladder mass   Lung mass   Chronic obstructive pulmonary disease (HCC)      Admitting Diagnosis: Lung mass [R91.8]  Age/Sex: 64 y.o. male    Network Utilization Review Department  ATTENTION: Please call with any questions or concerns to [phone] and carefully  listen to the prompts so that you are directed to the right person. All voicemails are confidential.   For Discharge needs, contact Care Management DC Support Team at [phone] opt. 2  Send all requests for admission clinical reviews, approved or denied determinations and any other requests to dedicated fax number below belonging to the campus where the patient is receiving treatment. List of dedicated fax numbers for the Facilities:  FACILITY NAME UR FAX NUMBER   ADMISSION DENIALS (Administrative/Medical Necessity) [phone]   DISCHARGE SUPPORT TEAM (NETWORK) [phone]   PARENT CHILD HEALTH (Maternity/NICU/Pediatrics) [phone]   [facility] [phone]   [facility] [phone]   [facility] [phone]   [facility] [phone]   [facility] [phone]   [facility] [phone]   [facility] [phone]   [facility] [phone]   [facility] [phone]   [facility] [phone]   [facility] [phone]   [facility] [phone]

## 2025-01-08 NOTE — PLAN OF CARE
Problem: DISCHARGE PLANNING  Goal: Discharge to home or other facility with appropriate resources  Description: INTERVENTIONS:  - Identify barriers to discharge w/patient and caregiver  - Arrange for needed discharge resources and transportation as appropriate  - Identify discharge learning needs (meds, wound care, etc.)  - Arrange for interpretive services to assist at discharge as needed  - Refer to Case Management Department for coordinating discharge planning if the patient needs post-hospital services based on physician/advanced practitioner order or complex needs related to functional status, cognitive ability, or social support system  Outcome: Progressing     Problem: CARDIOVASCULAR - ADULT  Goal: Absence of cardiac dysrhythmias or at baseline rhythm  Description: INTERVENTIONS:  - Continuous cardiac monitoring, vital signs, obtain 12 lead EKG if ordered  - Administer antiarrhythmic and heart rate control medications as ordered  - Monitor electrolytes and administer replacement therapy as ordered  Outcome: Progressing

## 2025-01-08 NOTE — ASSESSMENT & PLAN NOTE
Patient has a known lung mass in the left upper lobe, 8.3 cm  Had EBUS done on 1/8/2025 with lymph node biopsy, BAL and brushing  Follow up with results

## 2025-01-08 NOTE — ASSESSMENT & PLAN NOTE
At time of evaluation patient had regular rate and rhythm on physical exam  Repeat EKG showed patient to be in NSR without evidence of Afib  Suspect patient's A-fib to be paroxsymal brought on by acuity of condition and procedure  Given acute onset and spontaneous conversion to NSR without rate or rhythm control agents suspect overall low burden  Would benefit from continuing rate control strategy with AC given elevated [id number] score based on severe PAD, HTN and Age  At this time following telemetry monitoring, patient appears to be in normal sinus rhythm with current rate control strategy  Following planned EBUS this morning, patient is cleared from cardiac standpoint to continue oral rate control strategy with anticoagulation    Plan  Transition to Toprol-XL 50 mg once daily  Discontinue Amlodipine while patient is on Lopressor  Follow-up repeat limited TTE to assess EF  Start Eliquis 5 mg twice daily; can otherwise stop aspirin daily  Will recommend outpatient monitoring with KATE to assess atrial fibrillation burden

## 2025-01-08 NOTE — PROGRESS NOTES
Progress Note - Cardiology   Name: Nahid Luis 64 y.o. male I MRN: [id number]  [facility] Date of Admission: 1/7/2025   Date of Service: 1/8/2025 I Hospital Day: 1    Assessment & Plan  Atrial fibrillation with rapid ventricular response (HCC)  At time of evaluation patient had regular rate and rhythm on physical exam  Repeat EKG showed patient to be in NSR without evidence of Afib  Suspect patient's A-fib to be paroxsymal brought on by acuity of condition and procedure  Given acute onset and spontaneous conversion to NSR without rate or rhythm control agents suspect overall low burden  Would benefit from continuing rate control strategy with AC given elevated [id number] score based on severe PAD, HTN and Age  At this time following telemetry monitoring, patient appears to be in normal sinus rhythm with current rate control strategy  Following planned EBUS this morning, patient is cleared from cardiac standpoint to continue oral rate control strategy with anticoagulation    Plan  Transition to Toprol-XL 50 mg once daily  Discontinue Amlodipine while patient is on Lopressor  Follow-up repeat limited TTE to assess EF  Start Eliquis 5 mg twice daily; can otherwise stop aspirin daily  Will recommend outpatient monitoring with ZIO to assess atrial fibrillation burden  Hypertension  Hold Amlodipine given initiation of Lopressor  Can resume Cozaar on DC following EBUS  PAD (peripheral artery disease) (HCC)  On ASA and Lipitor  Discontinue ASA with patient planned to begin Eliquis following EBUS  Mixed hyperlipidemia  Continue Lipitor  Bladder mass  Per Primary Team  Lung mass  Per Primary Team  Chronic obstructive pulmonary disease (HCC)      Subjective     Patient seen and evaluated at bedside.  Patient going for EBUS this morning.  Denies any overnight events.  Denies any chest pain, shortness of breath, palpitations, nausea/vomiting, lightheadedness/dizziness, or syncopal episodes.  Patient on  telemetry noted to have normal sinus rhythm with stable heart rate between 70-80.    Objective :  Temp:  [96.7 °F (35.9 °C)-98 °F (36.7 °C)] 97.6 °F (36.4 °C)  HR:  [40-97] 83  BP: ()/(56-78) 135/77  Resp:  [14-18] 15  SpO2:  [91 %-100 %] 94 %  O2 Device: None (Room air)  Orthostatic Blood Pressures      Flowsheet Row Most Recent Value   Blood Pressure 135/77 filed at 01/08/2025 0244   Patient Position - Orthostatic VS Lying filed at 01/07/2025 2148          First Weight: Weight - Scale: 80.7 kg (178 lb) (01/07/25 0751)  Vitals:    01/07/25 0751 01/07/25 1423   Weight: 80.7 kg (178 lb) 79.7 kg (175 lb 11.3 oz)     Physical Exam  Vitals and nursing note reviewed.   Constitutional:       General: He is not in acute distress.     Appearance: He is well-developed.   HENT:      Head: Normocephalic and atraumatic.   Eyes:      Conjunctiva/sclera: Conjunctivae normal.   Cardiovascular:      Rate and Rhythm: Normal rate and regular rhythm.      Heart sounds: No murmur heard.  Pulmonary:      Effort: Pulmonary effort is normal. No respiratory distress.      Breath sounds: Normal breath sounds.   Abdominal:      Palpations: Abdomen is soft.      Tenderness: There is no abdominal tenderness.   Musculoskeletal:         General: No swelling.      Cervical back: Neck supple.   Skin:     General: Skin is warm and dry.      Capillary Refill: Capillary refill takes less than 2 seconds.   Neurological:      Mental Status: He is alert.   Psychiatric:         Mood and Affect: Mood normal.         Lab Results: I have reviewed the following results:  Results from last 7 days   Lab Units 01/08/25  0534 01/07/25  1330   WBC Thousand/uL 11.90* 10.95*   HEMOGLOBIN g/dL 11.2* 12.0   HEMATOCRIT % 36.2* 38.2   PLATELETS Thousands/uL 291 291     Results from last 7 days   Lab Units 01/08/25  0534 01/07/25  1330   POTASSIUM mmol/L 4.2 4.1   CHLORIDE mmol/L 104 103   CO2 mmol/L 28 27   BUN mg/dL 10 12   CREATININE mg/dL 0.70 0.72   CALCIUM  "mg/dL 9.3 9.5         No results found for: \"HGBA1C\"  No results found for: \"CKTOTAL\", \"CKMB\", \"CKMBINDEX\", \"TROPONINI\"    Imaging Results Review: I reviewed radiology reports from this admission including: chest xray and Echocardiogram.  Other Study Results Review: EKG was reviewed.     VTE Pharmacologic Prophylaxis: VTE covered by:  heparin (porcine), Subcutaneous, 5,000 Units at 01/07/25 5617     VTE Mechanical Prophylaxis: sequential compression device    Administrative Statements   I have spent a total time of 30 minutes in caring for this patient on the day of the visit/encounter including Diagnostic results, Prognosis, Risks and benefits of tx options, Instructions for management, Patient and family education, Importance of tx compliance, Risk factor reductions, Impressions, Counseling / Coordination of care, Documenting in the medical record, Reviewing / ordering tests, medicine, procedures  , Obtaining or reviewing history  , and Communicating with other healthcare professionals .  "

## 2025-01-08 NOTE — PLAN OF CARE
Problem: PAIN - ADULT  Goal: Verbalizes/displays adequate comfort level or baseline comfort level  Description: Interventions:  - Encourage patient to monitor pain and request assistance  - Assess pain using appropriate pain scale  - Administer analgesics based on type and severity of pain and evaluate response  - Implement non-pharmacological measures as appropriate and evaluate response  - Consider cultural and social influences on pain and pain management  - Notify physician/advanced practitioner if interventions unsuccessful or patient reports new pain  Outcome: Progressing     Problem: INFECTION - ADULT  Goal: Absence or prevention of progression during hospitalization  Description: INTERVENTIONS:  - Assess and monitor for signs and symptoms of infection  - Monitor lab/diagnostic results  - Monitor all insertion sites, i.e. indwelling lines, tubes, and drains  - Monitor endotracheal if appropriate and nasal secretions for changes in amount and color  - [place] appropriate cooling/warming therapies per order  - Administer medications as ordered  - Instruct and encourage patient and family to use good hand hygiene technique  - Identify and instruct in appropriate isolation precautions for identified infection/condition  Outcome: Progressing  Goal: Absence of fever/infection during neutropenic period  Description: INTERVENTIONS:  - Monitor WBC    Outcome: Progressing     Problem: SAFETY ADULT  Goal: Patient will remain free of falls  Description: INTERVENTIONS:  - Educate patient/family on patient safety including physical limitations  - Instruct patient to call for assistance with activity   - Consult OT/PT to assist with strengthening/mobility   - Keep Call bell within reach  - Keep bed low and locked with side rails adjusted as appropriate  - Keep care items and personal belongings within reach  - Initiate and maintain comfort rounds  - Make Fall Risk Sign visible to staff  - Offer Toileting every  Hours,  in advance of need  - Initiate/Maintain alarm  - Obtain necessary fall risk management equipment:   - Apply yellow socks and bracelet for high fall risk patients  - Consider moving patient to room near nurses station  Outcome: Progressing  Goal: Maintain or return to baseline ADL function  Description: INTERVENTIONS:  -  Assess patient's ability to carry out ADLs; assess patient's baseline for ADL function and identify physical deficits which impact ability to perform ADLs (bathing, care of mouth/teeth, toileting, grooming, dressing, etc.)  - Assess/evaluate cause of self-care deficits   - Assess range of motion  - Assess patient's mobility; develop plan if impaired  - Assess patient's need for assistive devices and provide as appropriate  - Encourage maximum independence but intervene and supervise when necessary  - Involve family in performance of ADLs  - Assess for home care needs following discharge   - Consider OT consult to assist with ADL evaluation and planning for discharge  - Provide patient education as appropriate  Outcome: Progressing  Goal: Maintains/Returns to pre admission functional level  Description: INTERVENTIONS:  - Perform AM-PAC 6 Click Basic Mobility/ Daily Activity assessment daily.  - Set and communicate daily mobility goal to care team and patient/family/caregiver.   - Collaborate with rehabilitation services on mobility goals if consulted  - Perform Range of Motion  times a day.  - Reposition patient every hours.  - Dangle patient  times a day  - Stand patient  times a day  - Ambulate patient  times a day  - Out of bed to chair 3 times a day   - Out of bed for meals  times a day  - Out of bed for toileting  - Record patient progress and toleration of activity level   Outcome: Progressing

## 2025-01-08 NOTE — INTERVAL H&P NOTE
H&P reviewed. After examining the patient I find no changes in the patients condition since the H&P had been written.    Risks and benefits discussed with patient, he signed consent freely  Proceed under GA with ETT  EBUS, brushing, BAL     There were no vitals filed for this visit.

## 2025-01-08 NOTE — ANESTHESIA POSTPROCEDURE EVALUATION
Post-Op Assessment Note    CV Status:  Stable  Pain Score: 0    Pain management: adequate       Mental Status:  Sleepy and awake   Hydration Status:  Stable   PONV Controlled:  None   Airway Patency:  Patent     Post Op Vitals Reviewed: Yes    No anethesia notable event occurred.    Staff: Anesthesiologist, CRNA           Last Filed PACU Vitals:  Vitals Value Taken Time   Temp     Pulse 59 01/08/25 1038   BP 95/50 01/08/25 1039   Resp 22 01/08/25 1038   SpO2 100 % 01/08/25 1038   Vitals shown include unfiled device data.

## 2025-01-08 NOTE — ASSESSMENT & PLAN NOTE
Patient has a history of COPD  Not in exacerbation  Continue albuterol PRN, continue Wixela at discharge

## 2025-01-08 NOTE — NURSING NOTE
Reviewed AVS with patient and pt's son.   Reviewed follow up appointments for patient and when to call the MD.  Pt starting on Eliquis, reviewed when to call the MD, sign/symptoms of bleeding and how to take the medication.  Pt verbalized understanding.  Pt leaving in stable condition with all belongings.

## 2025-01-09 ENCOUNTER — TRANSITIONAL CARE MANAGEMENT (OUTPATIENT)
Dept: FAMILY MEDICINE CLINIC | Facility: CLINIC | Age: 65
End: 2025-01-09

## 2025-01-09 LAB
QRS AXIS: 69 DEGREES
QRSD INTERVAL: 76 MS
QT INTERVAL: 334 MS
QTC INTERVAL: 468 MS
RHODAMINE-AURAMINE STN SPEC: NORMAL
RHODAMINE-AURAMINE STN SPEC: NORMAL
T WAVE AXIS: 92 DEGREES
VENTRICULAR RATE: 118 BPM

## 2025-01-09 PROCEDURE — 99239 HOSP IP/OBS DSCHRG MGMT >30: CPT | Performed by: STUDENT IN AN ORGANIZED HEALTH CARE EDUCATION/TRAINING PROGRAM

## 2025-01-09 PROCEDURE — 93010 ELECTROCARDIOGRAM REPORT: CPT | Performed by: INTERNAL MEDICINE

## 2025-01-09 NOTE — ASSESSMENT & PLAN NOTE
Patient had a PET/CT in November 2024 that revealed a left upper lobe mass.  He presented to the hospital today for EBUS but was found to be in A-fib with RVR  S/p EBUS on 1/8  Patient cleared for discharge by pulmonology with outpatient follow-up

## 2025-01-09 NOTE — ANESTHESIA PROCEDURE NOTES
Anesthesia Notable Event    Date/Time: 1/7/2025 7:52 AM    Patient location during procedure: OR procedure room  Performed by: Vick Velasquez MD  Authorized by: Vick Velasquez MD    Anesthesia notable event Intraoperative:new onset/unexpected arrhythmia  Patient was noted to have stable vital signs when admitted to ASC, notably EKG was NSR however patient was transported to start the case and when he was connected to our intraoperative monitors he was noted to be in AFIB with RVR, HR ranging from  and irregular with normal BP. AFIB was confirmed with a 12-lead EKG in the OR and discussed with operative team that it would be safest to cancel his case to allow for cardiology evaluation and proper rate control before proceeding. This was explained to the patient and all were in agreement.

## 2025-01-09 NOTE — ASSESSMENT & PLAN NOTE
High-grade papillary urothelial carcinoma  S/p TURBT in 11/5, urology rescheduling repeat TURBT outpatient

## 2025-01-09 NOTE — DISCHARGE SUMMARY
Discharge Summary - Hospitalist   Name: Nahid Luis 64 y.o. male I MRN: [id number]  [facility] I Date of Admission: 1/7/2025   Date of Service: 1/9/2025 I Hospital Day: 1     Assessment & Plan  Lung mass  Patient had a PET/CT in November 2024 that revealed a left upper lobe mass.  He presented to the hospital today for EBUS but was found to be in A-fib with RVR  S/p EBUS on 1/8  Patient cleared for discharge by pulmonology with outpatient follow-up   Atrial fibrillation with rapid ventricular response (HCC)  Found to be in A-fib with RVR once connected to monitor by anesthesia prior to intervention  TSH within normal limits  TTE on 1/8 with LVEF 65%, no wall motion abnormality and normal diastolic function  Discussed with cardiology and plan to discharge on Toprol 50 mg daily and Eliquis 5 mg twice daily.  Pricing completed, co-pay of Eliquis $50 per month.  Patient and family report able to afford  Hypertension  PTA amlodipine 5 mg daily, losartan 100 mg daily  Continue losartan 100 mg daily  Discontinue amlodipine  Start Toprol 50 mg daily  BP reviewed and acceptable at discharge  PAD (peripheral artery disease) (HCC)  Restart aspirin at discharge  Continue statin  Mixed hyperlipidemia  Continue statin  Reviewed lipid profile from December 2024, LDL 73  Bladder mass  High-grade papillary urothelial carcinoma  S/p TURBT in 11/5, urology rescheduling repeat TURBT outpatient  Chronic obstructive pulmonary disease (HCC)  Continue home inhalers  Patient stable saturating above 95% on room air     Medical Problems       Resolved Problems  Date Reviewed: 1/7/2025   None       Discharging Physician / Practitioner: Lucila Lopez MD  PCP: Denys Moncada PA-C  Admission Date:   Admission Orders (From admission, onward)       Ordered        01/07/25 1308  Inpatient Admission  Once                          Discharge Date: 01/08/25    Consultations During Hospital  "Stay:  Pulmonology  Cardiology    Procedures Performed:   EBUS    Significant Findings / Test Results:   A-fib with RVR    Incidental Findings:   None    Test Results Pending at Discharge (will require follow up):   None     Outpatient Tests Requested:  None    Complications: None    Reason for Admission: A-fib with RVR    Hospital Course:   Nahid Luis is a 64 y.o. male patient who originally presented to the hospital on 1/7/2025 due to A-fib with RVR upon presentation for outpatient EBUS.  Patient was admitted and evaluated by cardiology who started metoprolol and Eliquis.  Patient underwent EBUS on 1/8 and was discharged in stable condition.  During hospital stay also underwent TTE with plan for outpatient follow-up.    Please see above list of diagnoses and related plan for additional information.     Condition at Discharge: stable    Discharge Day Visit / Exam:   Subjective: No events overnight.  Patient reports feeling well.  Has no complaints.  Tolerating oral intake.  No cough or shortness of breath.  Wants to go home today  Vitals: Blood Pressure: 115/59 (01/08/25 1328)  Pulse: 69 (01/08/25 1328)  Temperature: 97.8 °F (36.6 °C) (01/08/25 0750)  Temp Source: Oral (01/07/25 2148)  Respirations: 17 (01/08/25 1204)  Height: 5' 8\" (172.7 cm) (01/08/25 1328)  Weight - Scale: 79.4 kg (175 lb) (01/08/25 1328)  SpO2: (!) 89 % (01/08/25 1204)  Physical Exam  Vitals and nursing note reviewed.   Constitutional:       General: He is not in acute distress.     Appearance: He is not ill-appearing.   HENT:      Head: Normocephalic and atraumatic.   Eyes:      Conjunctiva/sclera: Conjunctivae normal.   Cardiovascular:      Rate and Rhythm: Normal rate and regular rhythm.   Pulmonary:      Effort: No respiratory distress.      Breath sounds: No wheezing or rales.   Musculoskeletal:      Right lower leg: No edema.      Left lower leg: No edema.   Skin:     General: Skin is warm and dry.   Neurological:      Mental " Status: He is alert.   Psychiatric:         Mood and Affect: Mood normal.         Behavior: Behavior normal.          Discussion with Family: Updated  (daughter) at bedside.    Discharge instructions/Information to patient and family:   See after visit summary for information provided to patient and family.      Provisions for Follow-Up Care:  See after visit summary for information related to follow-up care and any pertinent home health orders.      Mobility at time of Discharge:   Basic Mobility Inpatient Raw Score: 24  JH-HLM Goal: 8: Walk 250 feet or more  JH-HLM Achieved: 7: Walk 25 feet or more  HLM Goal NOT achieved. Continue to encourage mobility in post discharge setting.     Disposition:   Home    Planned Readmission: no    Discharge Medications:  See after visit summary for reconciled discharge medications provided to patient and/or family.      Administrative Statements   Discharge Statement:  I have spent a total time of 40 minutes in caring for this patient on the day of the visit/encounter. >30 minutes of time was spent on: Diagnostic results, Risks and benefits of tx options, Instructions for management, Patient and family education, Importance of tx compliance, Risk factor reductions, Impressions, Counseling / Coordination of care, Documenting in the medical record, Reviewing / ordering tests, medicine, procedures  , and Communicating with other healthcare professionals .    **Please Note: This note may have been constructed using a voice recognition system**

## 2025-01-09 NOTE — ASSESSMENT & PLAN NOTE
Found to be in A-fib with RVR once connected to monitor by anesthesia prior to intervention  TSH within normal limits  TTE on 1/8 with LVEF 65%, no wall motion abnormality and normal diastolic function  Discussed with cardiology and plan to discharge on Toprol 50 mg daily and Eliquis 5 mg twice daily.  Pricing completed, co-pay of Eliquis $50 per month.  Patient and family report able to afford

## 2025-01-09 NOTE — ASSESSMENT & PLAN NOTE
PTA amlodipine 5 mg daily, losartan 100 mg daily  Continue losartan 100 mg daily  Discontinue amlodipine  Start Toprol 50 mg daily  BP reviewed and acceptable at discharge

## 2025-01-10 PROCEDURE — 88172 CYTP DX EVAL FNA 1ST EA SITE: CPT | Performed by: PATHOLOGY

## 2025-01-10 PROCEDURE — 88112 CYTOPATH CELL ENHANCE TECH: CPT | Performed by: PATHOLOGY

## 2025-01-10 PROCEDURE — 88173 CYTOPATH EVAL FNA REPORT: CPT | Performed by: PATHOLOGY

## 2025-01-10 PROCEDURE — 88305 TISSUE EXAM BY PATHOLOGIST: CPT | Performed by: PATHOLOGY

## 2025-01-10 NOTE — PRE-PROCEDURE INSTRUCTIONS
Pre-Surgery Instructions:   Medication Instructions    albuterol (Proventil HFA) 90 mcg/act inhaler Uses PRN- OK to take day of surgery    apixaban (Eliquis) 5 mg Instructions provided by MD    atorvastatin (LIPITOR) 20 mg tablet Take night before surgery    Fluticasone-Salmeterol (Wixela Inhub) 100-50 mcg/dose inhaler Uses PRN- OK to take day of surgery    losartan (COZAAR) 50 mg tablet Hold day of surgery.    metoprolol succinate (TOPROL-XL) 50 mg 24 hr tablet Take day of surgery.     Pt's dghtr verbalizes understanding of the following:    Please reference your “My Surgical Experience Booklet” for additional information to prepare for your upcoming surgery.      - DO NOT EAT OR DRINK ANYTHING after midnight on the evening before your procedure including coffee, tea, gum or hard candy.    - ONLY SIPS OF WATER with your medications are allowed on the morning of your procedure.  - Avoid OTC non-directed Vit/ Suppl/ Herbals 7 days prior to surgery to ensure no drug interactions with perioperative surgical/ anesthetic meds  - Avoid NSAIDs 3 days prior. Tylenol is ok to take as needed.   - Avoid ASA containing products 5 days prior, unless otherwise instructed by your provider   - Continue statin medication up through and including the day of surgery  - Bring a list of meds you take at home with your last dose noted  - Bring INHALER you take for breathing problems     - Follow the pre surgery showering instructions as listed in the “My Surgical Experience Booklet” or otherwise provided by your surgeon's office.  - Bathing instructions, dghtr to get chg, neck down, no genitals, regular shower & rinse then apply chg with hand  - No lotions, powders, sprays, deodorant, cologne, jewelry, body piercings  - Do not use a blade to shave the surgical area 1 week before surgery. It is ok to use clean electric clippers up to 24 hours before surgery. Do not shave any body parts with a razor within 24hrs.  - Do not use dry shampoo,  hair spray, hair gel, or any type of hair products.     - For outpatient surgery, arrange for someone to drive you home after the procedure & stay with you until the next morning. Visitor guidelines discussed.   - Bring insurance cards & photo id    - Please Bring a case for your glasses   - Leave all valuables such as credit cards, money & jewelry at home  - Wear causal clothing that is easy to take on and off. Consider your type of surgery.    - Notify surgeon if you develop any new illnesses, exposure, develop a rash/ open wounds or have additional questions prior to your surgery.    - Did the surgeon's office give you any other special instructions? no  - Did surgeon require any clearances? CC 1/17    You will receive a call one business day prior to surgery with an arrival time and hospital directions. If your surgery is scheduled on a Monday, the hospital will be calling you on the Friday prior to your surgery.     Please confirm the visitor policy for the day of your procedure when you receive your phone call with an arrival time.

## 2025-01-10 NOTE — UTILIZATION REVIEW
NOTIFICATION OF ADMISSION DISCHARGE   This is a Notification of Discharge from [facility]. Please be advised that this patient has been discharge from our facility. Below you will find the admission and discharge date and time including the patient’s disposition.   UTILIZATION REVIEW CONTACT:  Mechelle Kang  Utilization   Network Utilization Review Department  Phone: [phone] x carefully listen to the prompts. All voicemails are confidential.  Email: [email or web address]     ADMISSION INFORMATION  PRESENTATION DATE: 1/7/2025  1:59 PM  OBERVATION ADMISSION DATE: N/A  INPATIENT ADMISSION DATE: 1/7/25  1:59 PM   DISCHARGE DATE: 1/8/2025  3:14 PM   DISPOSITION:Home/Self Care    Network Utilization Review Department  ATTENTION: Please call with any questions or concerns to [phone] and carefully listen to the prompts so that you are directed to the right person. All voicemails are confidential.   For Discharge needs, contact Care Management DC Support Team at [phone] opt. [phone]  Send all requests for admission clinical reviews, approved or denied determinations and any other requests to dedicated fax number below belonging to the campus where the patient is receiving treatment. List of dedicated fax numbers for the Facilities:  FACILITY NAME UR FAX NUMBER   ADMISSION DENIALS (Administrative/Medical Necessity) [phone]   DISCHARGE SUPPORT TEAM ([facility]) [phone]   PARENT CHILD HEALTH (Maternity/NICU/Pediatrics) [phone]   [facility] [phone]   [facility] [phone]   [facility] [phone]   [facility] [phone]   [facility] [phone]   [facility] [phone]   [facility] [phone]   [facility] [phone]    [facility] [phone]   [facility] [phone]   [facility] [phone]   [facility] [phone]

## 2025-01-11 ENCOUNTER — RESULTS FOLLOW-UP (OUTPATIENT)
Dept: PERIOP | Facility: HOSPITAL | Age: 65
End: 2025-01-11

## 2025-01-11 DIAGNOSIS — R91.8 LUNG MASS: Primary | ICD-10-CM

## 2025-01-11 DIAGNOSIS — J15.4 STREPTOCOCCAL PNEUMONIA (HCC): ICD-10-CM

## 2025-01-11 LAB
BACTERIA BRONCH AEROBE CULT: ABNORMAL
BACTERIA BRONCH AEROBE CULT: ABNORMAL
GRAM STN SPEC: ABNORMAL

## 2025-01-11 RX ORDER — LEVOFLOXACIN 750 MG/1
750 TABLET, FILM COATED ORAL EVERY 24 HOURS
Qty: 7 TABLET | Refills: 0 | Status: SHIPPED | OUTPATIENT
Start: 2025-01-11 | End: 2025-01-18

## 2025-01-11 NOTE — TELEPHONE ENCOUNTER
Pulmonary    I called patient and discussed with him and his daughter on the phone regarding the EBUS and the BAL.    EBUS of the lymph nodes was negative with no malignancy identified.  I was not able to get into the mass as it was difficult to traverse into the lingula.    The BAL showing Streptococcus pneumonia and we will prescribe Levaquin for this.  This will be sent to [facility] in [place].    I discussed further biopsy options such as IR guided transthoracic biopsy and they are agreeable.    Plan  IR lung biopsy  Levaquin for 7 days to [facility] in [place]  Message to lung cancer navigator       Shahana Mcwilliams MD  Pulmonary and Critical Care Medicine

## 2025-01-13 ENCOUNTER — PREP FOR PROCEDURE (OUTPATIENT)
Dept: INTERVENTIONAL RADIOLOGY/VASCULAR | Facility: CLINIC | Age: 65
End: 2025-01-13

## 2025-01-13 DIAGNOSIS — R91.8 LUNG MASS: Primary | ICD-10-CM

## 2025-01-13 LAB
FUNGUS SPEC CULT: NORMAL
FUNGUS SPEC CULT: NORMAL

## 2025-01-14 LAB
MYCOBACTERIUM SPEC CULT: NORMAL
MYCOBACTERIUM SPEC CULT: NORMAL
RHODAMINE-AURAMINE STN SPEC: NORMAL
RHODAMINE-AURAMINE STN SPEC: NORMAL

## 2025-01-15 ENCOUNTER — OFFICE VISIT (OUTPATIENT)
Dept: FAMILY MEDICINE CLINIC | Facility: CLINIC | Age: 65
End: 2025-01-15
Payer: COMMERCIAL

## 2025-01-15 VITALS
BODY MASS INDEX: 27.28 KG/M2 | HEIGHT: 68 IN | OXYGEN SATURATION: 100 % | SYSTOLIC BLOOD PRESSURE: 118 MMHG | WEIGHT: 180 LBS | HEART RATE: 62 BPM | DIASTOLIC BLOOD PRESSURE: 68 MMHG | TEMPERATURE: 96.9 F

## 2025-01-15 DIAGNOSIS — J18.9 PNEUMONIA OF RIGHT LOWER LOBE DUE TO INFECTIOUS ORGANISM: ICD-10-CM

## 2025-01-15 DIAGNOSIS — C34.90 MALIGNANT NEOPLASM OF LUNG, UNSPECIFIED LATERALITY, UNSPECIFIED PART OF LUNG (HCC): Primary | ICD-10-CM

## 2025-01-15 DIAGNOSIS — N32.89 BLADDER MASS: ICD-10-CM

## 2025-01-15 DIAGNOSIS — I48.91 ATRIAL FIBRILLATION WITH RAPID VENTRICULAR RESPONSE (HCC): ICD-10-CM

## 2025-01-15 PROCEDURE — 99495 TRANSJ CARE MGMT MOD F2F 14D: CPT | Performed by: PHYSICIAN ASSISTANT

## 2025-01-15 NOTE — PROGRESS NOTES
"Name: Nahid Luis      : 1960      MRN: [id number]  Encounter Provider: Denys Moncada PA-C  Encounter Date: 1/15/2025   Encounter department: [facility]    Assessment & Plan  Malignant neoplasm of lung, unspecified laterality, unspecified part of lung (HCC)  Plan for IR bx of lung mass  Continue with pulmonary       Atrial fibrillation with rapid ventricular response (HCC)  Now in NSR, tolerating eliquis, metoprolol  Follow up cardiology        Pneumonia of right lower lobe due to infectious organism  Completing levaquin       Bladder mass  Repeat TURBT planned  ER precautions for anuria provided             History of Present Illness     Hospital course from discharge summary written by on 2025  \"Nahid Luis is a 64 y.o. male patient who originally presented to the hospital on 2025 due to A-fib with RVR upon presentation for outpatient EBUS.  Patient was admitted and evaluated by cardiology who started metoprolol and Eliquis.  Patient underwent EBUS on  and was discharged in stable condition.  During hospital stay also underwent TTE with plan for outpatient follow-up.\"    TCM 1/15/2025  Feels well, no cardiac or respiratory complaints. EBUS resulted showing lung mass was not sampled, planing for IR bx. Pt was treated again for pneumonia currently on levaquin. No fevers. Has upcoming appearing for repeat TURBT with hx of high grade bladder mass. He continues to note slow urinary stream and hesitancy without hematuria. He has planned follow up with cardiology       Review of Systems   Constitutional:  Negative for chills, fatigue and fever.   HENT:  Negative for congestion, ear pain, hearing loss, nosebleeds, postnasal drip, rhinorrhea, sinus pressure, sinus pain, sneezing and sore throat.    Eyes:  Negative for pain, discharge, itching and visual disturbance.   Respiratory:  Negative for cough, chest tightness, shortness of breath and wheezing.  "   Cardiovascular:  Negative for chest pain, palpitations and leg swelling.   Gastrointestinal:  Negative for abdominal pain, blood in stool, constipation, diarrhea, nausea and vomiting.   Genitourinary:  Negative for frequency and urgency.   Neurological:  Negative for dizziness, light-headedness and numbness.     Past Medical History:   Diagnosis Date   • Anemia    • Anxiety    • Bladder cancer (HCC)    • Cancer (HCC)    • Colon polyp    • COPD (chronic obstructive pulmonary disease) (HCC)    • Depression    • History of transfusion 10/2024   • Hyperlipidemia    • Hypertension    • Irregular heart beat     afib   • Mass of left lung    • No natural teeth    • Pneumonia    • Wears glasses      Past Surgical History:   Procedure Laterality Date   • APPENDECTOMY     • COLONOSCOPY     • CYSTOSCOPY     • ENDOBRONCHIAL ULTRASOUND (EBUS)  2025   • IR LOWER EXTREMITY ANGIOGRAM  2024   • TN BYP FEM-ANT TIBL PST TIBL PRONEAL ART/OTH DSTL Right 10/31/2024    Procedure: right Common femoral artery to anterior tibial bypass with autologous vein;  Surgeon: Carlos Bray DO;  Location: [facility];  Service: Vascular   • TRANSURETHRAL RESECTION OF BLADDER TUMOR N/A 2024    Procedure: (TURBT);  Surgeon: Gilmer Duke MD;  Location: [facility];  Service: Urology   • US GUIDED LYMPH NODE BIOPSY LEFT  2025     Family History   Problem Relation Age of Onset   • Alcohol abuse Father    • Heart attack Father      Social History     Tobacco Use   • Smoking status: Former     Current packs/day: 0.00     Average packs/day: 1 pack/day for 49.8 years (49.8 ttl pk-yrs)     Types: Cigarettes     Start date:      Quit date: 10/30/2024     Years since quittin.2     Passive exposure: Current   • Smokeless tobacco: Never   Vaping Use   • Vaping status: Never Used   Substance and Sexual Activity   • Alcohol use: Not Currently     Alcohol/week: 6.0 standard drinks of alcohol     Types: 6 Cans of beer per week      "Comment: daily 3-5 beers daily and shots last drank 10/30   • Drug use: No   • Sexual activity: Not Currently     Current Outpatient Medications on File Prior to Visit   Medication Sig   • albuterol (Proventil HFA) 90 mcg/act inhaler Inhale 2 puffs every 6 (six) hours as needed for wheezing   • apixaban (Eliquis) 5 mg Take 1 tablet (5 mg total) by mouth 2 (two) times a day   • atorvastatin (LIPITOR) 20 mg tablet Take 1 tablet (20 mg total) by mouth daily   • Fluticasone-Salmeterol (Wixela Inhub) 100-50 mcg/dose inhaler Inhale 1 puff if needed Rinse mouth after use.   • levofloxacin (LEVAQUIN) 750 mg tablet Take 1 tablet (750 mg total) by mouth every 24 hours for 7 days   • losartan (COZAAR) 50 mg tablet Take 2 tablets (100 mg total) by mouth daily   • metoprolol succinate (TOPROL-XL) 50 mg 24 hr tablet Take 1 tablet (50 mg total) by mouth daily   • [DISCONTINUED] apixaban (Eliquis) 5 mg Take 1 tablet (5 mg total) by mouth 2 (two) times a day     No Known Allergies    There is no immunization history on file for this patient.  Objective   /68 (BP Location: Left arm, Patient Position: Sitting, Cuff Size: Large)   Pulse 62   Temp (!) 96.9 °F (36.1 °C)   Ht 5' 8\" (1.727 m)   Wt 81.6 kg (180 lb)   SpO2 100%   BMI 27.37 kg/m²     Physical Exam  Vitals and nursing note reviewed.   Constitutional:       General: He is not in acute distress.     Appearance: He is well-developed.   HENT:      Head: Normocephalic and atraumatic.   Eyes:      Conjunctiva/sclera: Conjunctivae normal.   Cardiovascular:      Rate and Rhythm: Normal rate and regular rhythm.      Heart sounds: No murmur heard.     Comments: Maintaining NSR  Pulmonary:      Effort: Pulmonary effort is normal. No respiratory distress.      Breath sounds: Rhonchi present.   Musculoskeletal:         General: No swelling.      Cervical back: Neck supple.   Skin:     General: Skin is warm and dry.      Capillary Refill: Capillary refill takes less than 2 " seconds.   Neurological:      Mental Status: He is alert.   Psychiatric:         Mood and Affect: Mood normal.

## 2025-01-16 ENCOUNTER — HOSPITAL ENCOUNTER (EMERGENCY)
Facility: HOSPITAL | Age: 65
Discharge: HOME | End: 2025-01-17
Attending: EMERGENCY MEDICINE
Payer: COMMERCIAL

## 2025-01-16 DIAGNOSIS — N39.0 UTI (URINARY TRACT INFECTION): ICD-10-CM

## 2025-01-16 DIAGNOSIS — R33.9 URINARY RETENTION: Primary | ICD-10-CM

## 2025-01-16 LAB
ANION GAP SERPL CALCULATED.3IONS-SCNC: 8 MMOL/L (ref 4–13)
BASOPHILS # BLD AUTO: 0.05 THOUSANDS/ΜL (ref 0–0.1)
BASOPHILS NFR BLD AUTO: 0 % (ref 0–1)
BUN SERPL-MCNC: 19 MG/DL (ref 5–25)
CALCIUM SERPL-MCNC: 9.6 MG/DL (ref 8.4–10.2)
CHLORIDE SERPL-SCNC: 101 MMOL/L (ref 96–108)
CO2 SERPL-SCNC: 25 MMOL/L (ref 21–32)
CREAT SERPL-MCNC: 1.12 MG/DL (ref 0.6–1.3)
EOSINOPHIL # BLD AUTO: 0.36 THOUSAND/ΜL (ref 0–0.61)
EOSINOPHIL NFR BLD AUTO: 2 % (ref 0–6)
ERYTHROCYTE [DISTWIDTH] IN BLOOD BY AUTOMATED COUNT: 15.8 % (ref 11.6–15.1)
GFR SERPL CREATININE-BSD FRML MDRD: 69 ML/MIN/1.73SQ M
GLUCOSE SERPL-MCNC: 130 MG/DL (ref 65–140)
HCT VFR BLD AUTO: 39.3 % (ref 36.5–49.3)
HGB BLD-MCNC: 12.6 G/DL (ref 12–17)
IMM GRANULOCYTES # BLD AUTO: 0.07 THOUSAND/UL (ref 0–0.2)
IMM GRANULOCYTES NFR BLD AUTO: 1 % (ref 0–2)
LYMPHOCYTES # BLD AUTO: 5.75 THOUSANDS/ΜL (ref 0.6–4.47)
LYMPHOCYTES NFR BLD AUTO: 39 % (ref 14–44)
MCH RBC QN AUTO: 27.5 PG (ref 26.8–34.3)
MCHC RBC AUTO-ENTMCNC: 32.1 G/DL (ref 31.4–37.4)
MCV RBC AUTO: 86 FL (ref 82–98)
MONOCYTES # BLD AUTO: 0.86 THOUSAND/ΜL (ref 0.17–1.22)
MONOCYTES NFR BLD AUTO: 6 % (ref 4–12)
NEUTROPHILS # BLD AUTO: 7.68 THOUSANDS/ΜL (ref 1.85–7.62)
NEUTS SEG NFR BLD AUTO: 52 % (ref 43–75)
NRBC BLD AUTO-RTO: 0 /100 WBCS
PLATELET # BLD AUTO: 326 THOUSANDS/UL (ref 149–390)
PMV BLD AUTO: 8.5 FL (ref 8.9–12.7)
POTASSIUM SERPL-SCNC: 4.6 MMOL/L (ref 3.5–5.3)
RBC # BLD AUTO: 4.58 MILLION/UL (ref 3.88–5.62)
SODIUM SERPL-SCNC: 134 MMOL/L (ref 135–147)
WBC # BLD AUTO: 14.77 THOUSAND/UL (ref 4.31–10.16)

## 2025-01-16 PROCEDURE — 36415 COLL VENOUS BLD VENIPUNCTURE: CPT

## 2025-01-16 PROCEDURE — 81001 URINALYSIS AUTO W/SCOPE: CPT | Performed by: EMERGENCY MEDICINE

## 2025-01-16 PROCEDURE — 80048 BASIC METABOLIC PNL TOTAL CA: CPT

## 2025-01-16 PROCEDURE — 51702 INSERT TEMP BLADDER CATH: CPT | Performed by: EMERGENCY MEDICINE

## 2025-01-16 PROCEDURE — 85025 COMPLETE CBC W/AUTO DIFF WBC: CPT

## 2025-01-16 PROCEDURE — 99284 EMERGENCY DEPT VISIT MOD MDM: CPT

## 2025-01-16 PROCEDURE — 99284 EMERGENCY DEPT VISIT MOD MDM: CPT | Performed by: EMERGENCY MEDICINE

## 2025-01-16 PROCEDURE — 96374 THER/PROPH/DIAG INJ IV PUSH: CPT

## 2025-01-16 PROCEDURE — 87086 URINE CULTURE/COLONY COUNT: CPT | Performed by: EMERGENCY MEDICINE

## 2025-01-16 RX ORDER — FENTANYL CITRATE 50 UG/ML
50 INJECTION, SOLUTION INTRAMUSCULAR; INTRAVENOUS ONCE
Refills: 0 | Status: COMPLETED | OUTPATIENT
Start: 2025-01-16 | End: 2025-01-16

## 2025-01-16 RX ORDER — LIDOCAINE HYDROCHLORIDE 20 MG/ML
1 JELLY TOPICAL ONCE
Status: COMPLETED | OUTPATIENT
Start: 2025-01-16 | End: 2025-01-16

## 2025-01-16 RX ADMIN — LIDOCAINE HYDROCHLORIDE 1 APPLICATION: 20 JELLY TOPICAL at 23:31

## 2025-01-16 RX ADMIN — FENTANYL CITRATE 50 MCG: 50 INJECTION INTRAMUSCULAR; INTRAVENOUS at 23:57

## 2025-01-16 RX ADMIN — SODIUM CHLORIDE 500 ML: 0.9 INJECTION, SOLUTION INTRAVENOUS at 23:04

## 2025-01-17 ENCOUNTER — ANESTHESIA EVENT (OUTPATIENT)
Dept: PERIOP | Facility: HOSPITAL | Age: 65
End: 2025-01-17
Payer: COMMERCIAL

## 2025-01-17 ENCOUNTER — TELEPHONE (OUTPATIENT)
Dept: CARDIOLOGY CLINIC | Facility: CLINIC | Age: 65
End: 2025-01-17

## 2025-01-17 VITALS
DIASTOLIC BLOOD PRESSURE: 64 MMHG | HEART RATE: 64 BPM | HEIGHT: 68 IN | BODY MASS INDEX: 27.37 KG/M2 | SYSTOLIC BLOOD PRESSURE: 120 MMHG | RESPIRATION RATE: 18 BRPM | OXYGEN SATURATION: 92 % | TEMPERATURE: 97.8 F

## 2025-01-17 LAB
BACTERIA UR QL AUTO: ABNORMAL /HPF
BILIRUB UR QL STRIP: NEGATIVE
CLARITY UR: ABNORMAL
COLOR UR: YELLOW
GLUCOSE UR STRIP-MCNC: NEGATIVE MG/DL
HGB UR QL STRIP.AUTO: ABNORMAL
HYALINE CASTS #/AREA URNS LPF: ABNORMAL /LPF
KETONES UR STRIP-MCNC: NEGATIVE MG/DL
LEUKOCYTE ESTERASE UR QL STRIP: ABNORMAL
MUCOUS THREADS UR QL AUTO: ABNORMAL
NITRITE UR QL STRIP: NEGATIVE
NON-SQ EPI CELLS URNS QL MICRO: ABNORMAL /HPF
PH UR STRIP.AUTO: 6 [PH]
PROT UR STRIP-MCNC: ABNORMAL MG/DL
RBC #/AREA URNS AUTO: ABNORMAL /HPF
SP GR UR STRIP.AUTO: 1.02 (ref 1–1.03)
UROBILINOGEN UR STRIP-ACNC: <2 MG/DL
WBC #/AREA URNS AUTO: ABNORMAL /HPF

## 2025-01-17 PROCEDURE — 96376 TX/PRO/DX INJ SAME DRUG ADON: CPT

## 2025-01-17 RX ORDER — CEFPODOXIME PROXETIL 200 MG/1
200 TABLET, FILM COATED ORAL ONCE
Status: COMPLETED | OUTPATIENT
Start: 2025-01-17 | End: 2025-01-17

## 2025-01-17 RX ORDER — CEFPODOXIME PROXETIL 200 MG/1
200 TABLET, FILM COATED ORAL 2 TIMES DAILY
Qty: 20 TABLET | Refills: 0 | Status: SHIPPED | OUTPATIENT
Start: 2025-01-17 | End: 2025-01-27

## 2025-01-17 RX ORDER — FENTANYL CITRATE 50 UG/ML
50 INJECTION, SOLUTION INTRAMUSCULAR; INTRAVENOUS ONCE
Refills: 0 | Status: COMPLETED | OUTPATIENT
Start: 2025-01-17 | End: 2025-01-17

## 2025-01-17 RX ORDER — LIDOCAINE HYDROCHLORIDE 20 MG/ML
1 JELLY TOPICAL ONCE
Status: COMPLETED | OUTPATIENT
Start: 2025-01-17 | End: 2025-01-17

## 2025-01-17 RX ADMIN — FENTANYL CITRATE 50 MCG: 50 INJECTION INTRAMUSCULAR; INTRAVENOUS at 01:25

## 2025-01-17 RX ADMIN — LIDOCAINE HYDROCHLORIDE 1 APPLICATION: 20 JELLY TOPICAL at 01:20

## 2025-01-17 RX ADMIN — CEFPODOXIME PROXETIL 200 MG: 200 TABLET, FILM COATED ORAL at 02:26

## 2025-01-17 NOTE — ED NOTES
Malone insertion successful w/ 6f after urethral dilation w/ needle .      Estrella Liu, CRISTY  01/17/25 0002

## 2025-01-17 NOTE — ED NOTES
Pt's son called RN to the room. Pt states he felt a pop and noticed urine was leaking around the brambila. Upon asessment, brambila was half way out. When attempting to drain brambila balloon, urine was aspirated. Brambila removed. Provider notified. This RN, fellow RN, and provider all attempted brambila insertion. Significant resistance was met. Provider able to successfully advance 8fr w/ + urine return. Patient denied pain when balloon was inflated.      Estrella Liu RN  01/17/25 0145

## 2025-01-17 NOTE — ED PROVIDER NOTES
Time reflects when diagnosis was documented in both MDM as applicable and the Disposition within this note       Time User Action Codes Description Comment    1/17/2025  2:31 AM Lizzy Cheema Add [R33.9] Urinary retention     1/17/2025  2:31 AM Lizzy Cheema Add [N39.0] UTI (urinary tract infection)           ED Disposition       ED Disposition   Discharge    Condition   Stable    Date/Time   Fri Jan 17, 2025  2:37 AM    Comment   Nahid Luis discharge to home/self care.                   Assessment & Plan       Medical Decision Making  64-year-old male presents for evaluation of difficulty urinating.      On initial evaluation, patient in no acute distress, resting comfortably in bed.  Initial bladder scan showed no urine in bladder on arrival to ED.  Given patient's lack of bladder control, it was decided that Malone could be placed.  However, there was difficulty placing Malone due to urethral stricture.  This was resolved by gently dilating the urethra in the ED.  Was able to successfully place a 6 Fr Malone catheter.  Unfortunately, Malone catheter fell out because the balloon ruptured multiple attempts were then done by this provider as well as to nurses to place another Malone catheter.  Eventually, an 8 French was able to be placed, with urine return, although slow.  Reached out to urology PA on-call to discuss case, and she was in agreement with the plan to keep the Malone in until his surgery on Tuesday.   UA showed findings consistent with UTI, patient was started on Vantin, first dose given in ED.  Creatinine level was slightly elevated, so IV fluids were also given.  Return precautions discussed with patient, he expresses understanding and is in agreement with this plan.    Amount and/or Complexity of Data Reviewed  Labs: ordered.    Risk  Prescription drug management.             Medications   sodium chloride 0.9 % bolus 500 mL (0 mL Intravenous Stopped 1/16/25 5250)   lidocaine  (URO-JET) 2 % urethral/mucosal gel 1 Application (1 Application Urethral Given 1/16/25 2331)   fentaNYL injection 50 mcg (50 mcg Intravenous Given 1/16/25 2357)   lidocaine (URO-JET) 2 % urethral/mucosal gel 1 Application (1 Application Urethral Given 1/17/25 0120)   fentaNYL injection 50 mcg (50 mcg Intravenous Given 1/17/25 0125)   cefpodoxime (VANTIN) tablet 200 mg (200 mg Oral Given 1/17/25 0226)       ED Risk Strat Scores                                              History of Present Illness       Chief Complaint   Patient presents with    Difficulty Urinating     Pt presents with difficulty urinating for the past week. Diagnosed with bladder tumor and was advised to be seen in the ED if he had difficulty urinating. Was able to urinate normal just prior to arrival.        Past Medical History:   Diagnosis Date    Anemia     Anxiety     Bladder cancer (HCC)     Cancer (HCC)     Colon polyp     COPD (chronic obstructive pulmonary disease) (HCC)     Depression     History of transfusion 10/2024    Hyperlipidemia     Hypertension     Irregular heart beat     afib    Mass of left lung     No natural teeth     Pneumonia     Wears glasses       Past Surgical History:   Procedure Laterality Date    APPENDECTOMY      COLONOSCOPY      CYSTOSCOPY      ENDOBRONCHIAL ULTRASOUND (EBUS)  01/08/2025    IR LOWER EXTREMITY ANGIOGRAM  09/24/2024    DE BYP FEM-ANT TIBL PST TIBL PRONEAL ART/OTH DSTL Right 10/31/2024    Procedure: right Common femoral artery to anterior tibial bypass with autologous vein;  Surgeon: Carlos Bray DO;  Location: [facility];  Service: Vascular    TRANSURETHRAL RESECTION OF BLADDER TUMOR N/A 11/05/2024    Procedure: (TURBT);  Surgeon: Gilmer Duke MD;  Location: [facility];  Service: Urology    US GUIDED LYMPH NODE BIOPSY LEFT  01/02/2025      Family History   Problem Relation Age of Onset    Alcohol abuse Father     Heart attack Father       Social History     Tobacco Use    Smoking status: Former      Current packs/day: 0.00     Average packs/day: 1 pack/day for 49.8 years (49.8 ttl pk-yrs)     Types: Cigarettes     Start date:      Quit date: 10/30/2024     Years since quittin.2     Passive exposure: Current    Smokeless tobacco: Never   Vaping Use    Vaping status: Never Used   Substance Use Topics    Alcohol use: Not Currently     Alcohol/week: 6.0 standard drinks of alcohol     Types: 6 Cans of beer per week     Comment: daily 3-5 beers daily and shots last drank 10/30    Drug use: No      E-Cigarette/Vaping    E-Cigarette Use Never User       E-Cigarette/Vaping Substances    Nicotine No     THC No     CBD No     Flavoring No     Other No     Unknown No       I have reviewed and agree with the history as documented.     64-year-old male presents for evaluation of occultly urinating.  He has a history of bladder cancer, scheduled for tumor resection on  and reports progressive difficulty urinating for the past week.  Today, it progressively worsened until he was unable to urinate at all, although he felt the pressure to.  On the way to the ED, he had an incontinent episode, to improve the pressure.  He denies dysuria, hematuria, abdominal pain, back pain, nausea, vomiting.          Review of Systems   Constitutional:  Negative for fever.   HENT:  Negative for sore throat.    Respiratory:  Negative for shortness of breath.    Gastrointestinal:  Negative for abdominal pain, nausea and vomiting.   Genitourinary:  Positive for difficulty urinating. Negative for dysuria and penile pain.   Neurological:  Negative for dizziness and headaches.           Objective       ED Triage Vitals   Temperature Pulse Blood Pressure Respirations SpO2 Patient Position - Orthostatic VS   25   97.8 °F (36.6 °C) 69 (!) 153/114 18 96 % Sitting      Temp Source Heart Rate Source BP Location FiO2 (%) Pain Score    25 0145  01/16/25 2205 -- 01/16/25 2205    Oral Monitor Right arm  No Pain      Vitals      Date and Time Temp Pulse SpO2 Resp BP Pain Score FACES Pain Rating User   01/17/25 0145 -- 64 92 % -- 120/64 4 --    01/17/25 0026 -- -- -- -- -- No Pain --    01/17/25 0015 -- 73 94 % -- 109/53 -- --    01/17/25 0010 -- 72 94 % -- -- -- --    01/17/25 0005 -- 70 95 % -- -- -- --    01/17/25 0004 -- 70 -- -- -- -- --    01/17/25 0000 -- 71 93 % -- -- -- --    01/16/25 2353 -- 67 94 % -- -- -- -- [facility]   01/16/25 2205 97.8 °F (36.6 °C) 69 96 % 18 153/114 No Pain -- JY            Physical Exam  Vitals and nursing note reviewed.   Constitutional:       General: He is not in acute distress.     Appearance: He is well-developed.   HENT:      Head: Normocephalic and atraumatic.   Eyes:      Conjunctiva/sclera: Conjunctivae normal.   Cardiovascular:      Rate and Rhythm: Normal rate and regular rhythm.   Pulmonary:      Effort: Pulmonary effort is normal. No respiratory distress.   Abdominal:      Palpations: Abdomen is soft.      Tenderness: There is no abdominal tenderness.   Genitourinary:     Penis: Normal.       Testes: Normal.   Skin:     General: Skin is warm and dry.      Capillary Refill: Capillary refill takes less than 2 seconds.   Neurological:      General: No focal deficit present.      Mental Status: He is alert and oriented to person, place, and time.         Results Reviewed       Procedure Component Value Units Date/Time    Urine Microscopic [[id number]]  (Abnormal) Collected: 01/16/25 2304    Lab Status: Final result Specimen: Urine Updated: 01/17/25 0143     RBC, UA 4-10 /hpf      WBC, UA Innumerable /hpf      Epithelial Cells Occasional /hpf      Bacteria, UA Occasional /hpf      MUCUS THREADS Occasional     Hyaline Casts, UA 0-3 /lpf     Urine culture [[id number]] Collected: 01/16/25 2304    Lab Status: In process Specimen: Urine Updated: 01/17/25 0143    UA w Reflex to Microscopic w Reflex to Culture  [[id number]]  (Abnormal) Collected: 01/16/25 2304    Lab Status: Final result Specimen: Urine Updated: 01/17/25 0141     Color, UA Yellow     Clarity, UA Turbid     Specific Gravity, UA 1.018     pH, UA 6.0     Leukocytes, UA Moderate     Nitrite, UA Negative     Protein, UA 30 (1+) mg/dl      Glucose, UA Negative mg/dl      Ketones, UA Negative mg/dl      Urobilinogen, UA <2.0 mg/dl      Bilirubin, UA Negative     Occult Blood, UA Trace    Basic metabolic panel [[id number]]  (Abnormal) Collected: 01/16/25 2236    Lab Status: Final result Specimen: Blood from Arm, Left Updated: 01/16/25 2306     Sodium 134 mmol/L      Potassium 4.6 mmol/L      Chloride 101 mmol/L      CO2 25 mmol/L      ANION GAP 8 mmol/L      BUN 19 mg/dL      Creatinine 1.12 mg/dL      Glucose 130 mg/dL      Calcium 9.6 mg/dL      eGFR 69 ml/min/1.73sq m     Narrative:      National Kid[employer] guidelines for Chronic Kidney Disease (CKD):     Stage 1 with normal or high GFR (GFR > 90 mL/min/1.73 square meters)    Stage 2 Mild CKD (GFR = 60-89 mL/min/1.73 square meters)    Stage 3A Moderate CKD (GFR = 45-59 mL/min/1.73 square meters)    Stage 3B Moderate CKD (GFR = 30-44 mL/min/1.73 square meters)    Stage 4 Severe CKD (GFR = 15-29 mL/min/1.73 square meters)    Stage 5 End Stage CKD (GFR <15 mL/min/1.73 square meters)  Note: GFR calculation is accurate only with a steady state creatinine    CBC and differential [[id number]]  (Abnormal) Collected: 01/16/25 2236    Lab Status: Final result Specimen: Blood from Arm, Left Updated: 01/16/25 2244     WBC 14.77 Thousand/uL      RBC 4.58 Million/uL      Hemoglobin 12.6 g/dL      Hematocrit 39.3 %      MCV 86 fL      MCH 27.5 pg      MCHC 32.1 g/dL      RDW 15.8 %      MPV 8.5 fL      Platelets 326 Thousands/uL      nRBC 0 /100 WBCs      Segmented % 52 %      Immature Grans % 1 %      Lymphocytes % 39 %      Monocytes % 6 %      Eosinophils Relative 2 %      Basophils Relative 0 %      Absolute  Neutrophils 7.68 Thousands/µL      Absolute Immature Grans 0.07 Thousand/uL      Absolute Lymphocytes 5.75 Thousands/µL      Absolute Monocytes 0.86 Thousand/µL      Eosinophils Absolute 0.36 Thousand/µL      Basophils Absolute 0.05 Thousands/µL             No orders to display         Bladder catheterization    Date/Time: 1/17/2025 1:20 AM    Performed by: Lizzy Cheema MD  Authorized by: Lizzy Cheema MD    Pre-procedure details:     Procedure purpose:  Therapeutic  Anesthesia (see MAR for exact dosages):     Anesthesia method:  Topical application    Topical anesthetic:  Lidocaine gel  Procedure details:     Bladder irrigation: no      Catheter insertion:  Indwelling    Approach: natural orifice      Catheter type:  Malone    Catheter size:  8 Fr    Number of attempts:  4    Successful placement: yes      Urine characteristics:  Clear and yellow    Provider performed due to:  Complicated insertion and nurse unable to complete  Post-procedure details:     Patient tolerance of procedure:  Tolerated with difficulty    Complications (if applicable):  Urethral strictures, complicating bladder catheterization.      ED Medication and Procedure Management   Prior to Admission Medications   Prescriptions Last Dose Informant Patient Reported? Taking?   Fluticasone-Salmeterol (Wixela Inhub) 100-50 mcg/dose inhaler   Yes No   Sig: Inhale 1 puff if needed Rinse mouth after use.   albuterol (Proventil HFA) 90 mcg/act inhaler   No No   Sig: Inhale 2 puffs every 6 (six) hours as needed for wheezing   apixaban (Eliquis) 5 mg   No No   Sig: Take 1 tablet (5 mg total) by mouth 2 (two) times a day   atorvastatin (LIPITOR) 20 mg tablet  Self No No   Sig: Take 1 tablet (20 mg total) by mouth daily   levofloxacin (LEVAQUIN) 750 mg tablet   No No   Sig: Take 1 tablet (750 mg total) by mouth every 24 hours for 7 days   losartan (COZAAR) 50 mg tablet   No No   Sig: Take 2 tablets (100 mg total) by mouth daily   metoprolol  succinate (TOPROL-XL) 50 mg 24 hr tablet   No No   Sig: Take 1 tablet (50 mg total) by mouth daily      Facility-Administered Medications: None     Discharge Medication List as of 1/17/2025  2:37 AM        START taking these medications    Details   cefpodoxime (VANTIN) 200 mg tablet Take 1 tablet (200 mg total) by mouth 2 (two) times a day for 10 days, Starting Fri 1/17/2025, Until Mon 1/27/2025, Normal           CONTINUE these medications which have NOT CHANGED    Details   albuterol (Proventil HFA) 90 mcg/act inhaler Inhale 2 puffs every 6 (six) hours as needed for wheezing, Starting Mon 12/16/2024, Normal      apixaban (Eliquis) 5 mg Take 1 tablet (5 mg total) by mouth 2 (two) times a day, Starting Tue 1/7/2025, Normal      atorvastatin (LIPITOR) 20 mg tablet Take 1 tablet (20 mg total) by mouth daily, Starting Tue 9/24/2024, Normal      Fluticasone-Salmeterol (Wixela Inhub) 100-50 mcg/dose inhaler Inhale 1 puff if needed Rinse mouth after use., Historical Med      levofloxacin (LEVAQUIN) 750 mg tablet Take 1 tablet (750 mg total) by mouth every 24 hours for 7 days, Starting Sat 1/11/2025, Until Sat 1/18/2025, Normal      losartan (COZAAR) 50 mg tablet Take 2 tablets (100 mg total) by mouth daily, Starting Mon 1/6/2025, Normal      metoprolol succinate (TOPROL-XL) 50 mg 24 hr tablet Take 1 tablet (50 mg total) by mouth daily, Starting Wed 1/8/2025, Normal           No discharge procedures on file.  ED SEPSIS DOCUMENTATION   Time reflects when diagnosis was documented in both MDM as applicable and the Disposition within this note       Time User Action Codes Description Comment    1/17/2025  2:31 AM Lizzy Cheema [R33.9] Urinary retention     1/17/2025  2:31 AM Lizzy Cheema Add [N39.0] UTI (urinary tract infection)                  Lizzy Cheema MD  01/17/25 7031       Lizzy Cheema MD  01/17/25 1004

## 2025-01-17 NOTE — ED NOTES
Brambila education and supplies provided to patient and son. Patient demonstrated understanding of brambila care, teachback method utilized. Pt denied questions upon discharge. Pt instructed to return to ED should the brambila cease draining, become dislodged, or if he experiences significant bladder pressure. Patient ambulated out of ED with son with steady gait.      Estrella Liu RN  01/17/25 0254

## 2025-01-17 NOTE — ED NOTES
Attempted to insert brambila x 3 with 3 different sized catheters. Unable to bypass the meatus. Provider made aware.     Nisa Killian RN  01/16/25 1482

## 2025-01-17 NOTE — DISCHARGE INSTRUCTIONS
Please keep your scheduled surgical appointment on 1/21.      Please return to ED if you develop worsening pelvic pain, have no urinary output in 12 hours.

## 2025-01-17 NOTE — TELEPHONE ENCOUNTER
Rosa Will spoke with his daughter Nany letting her know that as per Dr.McCullouch Solis should hold the Eliquis for 48h before the surgery which means last pill should be taken on Saturday,  She is aware and understood.

## 2025-01-17 NOTE — PROGRESS NOTES
"Cardiology and Heart Failure Clinic Note    Nahid Luis 64 y.o. male   MRN: [id number]  Encounter: [id number]        Assessment / Plan:    # Afib - paroxysmal  History:  Had an admission in January 2025 when he presented for outpatient EBUS and was incidentally found to be in A-fib.  He was admitted.  He quickly converted on his own back to sinus.     Rate:  metoprolol  Rhythm:  none  Anticoag:  eliquis    Plan:  Eventual ziopatch    # Preoperative risk assessment  Bladder surgery tomorrow  We called him last week to give instructions to hold eliquis 48 hrs  I have reviewed his most recent echocardiogram and stress test.  I have reviewed today's EKG.  No concerns on exam today.  The patient is at an acceptable level of risk to proceed with the planned surgery without the need for further cardiac testing.    # HTN      Losartan 100 mg daily      Metoprolol 50 mg daily  /64.   Avoid change just prior to surgery.  Continue to monitor.     # HLD  Has PAD and coronary calcification so recommend aggressive LDL control  Lipitor 20 was started,   --> 73     # PAD  s/p R CFA-AT bypass with ipsilateral rGSV 10/31/24   No aspirin  (on anticoagulation)  On statin    # Coronary calcification  No aspirin  (on anticoagulation)  On statin  No angina    # TORRES  Echo  - without concerns  Nuclear stress - no ischemia  May be due to COPD    # COPD  Recommend smoking cessation.   Recently quit.    # Tobacco abuse  recently quit smoking    # heavy ETOH use  Recommend cutting back.   Recently quit.    # Concern for lung cancer  Chest x-ray  - \"Abnormal opacity in the left upper lobe with the suggestion of cavitary components. Recommend CT scan of the chest to exclude a cavitary lesion, either inflammatory or neoplastic. \"  At multiple prior visits I emphasized to the patient the importance of getting this evaluated immediately but it was not done.  Now with PET scan done for bladder tumor showing hypermetabolic " mass in left upper lobe.   Last visit I referred to thoracic surgery, he declined this consult  Discussed prior results and importance of follow-up again today.  Now seeing pulm.  Plan for IR bx.     # Bladder cancer  Dx 2024  S/p TURBT.      Today's Plan Summary:  See above assessment/plan for full details of today's plan.  Briefly,     The patient is at an acceptable level of risk to proceed with the planned surgery without the need for further cardiac testing.              Reason For Visit / Chief Complaint:  F/u - HTN, HLD, afib    HPI:   Nahid Luis is a 64 y.o.  male with history as noted in the problem list and further detailed in the above assessment and plan.    Initial:   August 2024  Referred by Dr. Bray (vascular) for a new patient visit for HTN, HLD, PAD.  The patient recently presented to the ER with leg pain and was diagnosed with PAD.  He saw vascular in follow-up and was scheduled for an angiogram.  Because of his cardiac risk factors he was referred to cardiology.  Today, the patient reports -   no chest pain.   Does have TORRES.  Works with heavy .    .   3 children.  Smoking cigarettes.  Cutting back.   4 beers / day (used to drink a lot more).  No drugs.    Interval:    Last visit -->   Dx with bladder cancer.  Seeing urology.  No chest pain.  Stable SOB with COPD.    Plan last visit -->   Check lipids    Repeat lipids on statin -  --> 73     Had an admission in early January when he presented for EBUS and was incidentally found to be in A-fib.  He was admitted and had a cardiology consult.  He quickly converted on his own back to sinus.  Echo demonstrated normal EF.  Started on beta-blocker and Eliquis.  Previous amlodipine was discontinued.    Today  -  needs preoperative clearance for bladder surgery.  No chest pain.  Stable SOB with COPD.  No edema.  No syncope.           Cardiac Imaging personally reviewed:  EKG 8-17-24  Sinus rhythm  Septal  infarct  Nonspecific T wave changes    1-20-25  Sinus bradycardia at 58 bpm         Holter or event monitor    Echo Echo - 08/29/24   EF 65%.  No WMA.    Echo - 1-8-25  EF 65%.        RUTH    Cardiac MRI    Stress testing Nuclear stress test - 10/23/24   No ischemia   Coronary CTA or [facility]    RHC    CPET              Patient Active Problem List    Diagnosis Date Noted   • Paroxysmal A-fib ([facility]) 01/20/2025   • Malignant neoplasm of lung, unspecified laterality, unspecified part of lung ([facility]) 01/15/2025   • Atrial fibrillation with rapid ventricular response ([facility]) 01/07/2025   • Urinary frequency 01/03/2025   • Lung mass 12/10/2024   • Lesion of parotid gland 12/10/2024   • Parotid mass 12/10/2024   • Coronary artery calcification 12/02/2024   • Tobacco abuse 12/02/2024   • S/P femoral-tibial bypass 11/12/2024   • Leukocytosis 11/05/2024   • ABLA (acute blood loss anemia) 11/01/2024   • Bladder mass 10/18/2024   • Mixed hyperlipidemia 08/26/2024   • PAD (peripheral artery disease) ([facility]) 08/21/2024   • Hypertension 09/08/2023   • Chronic obstructive pulmonary disease ([facility]) 09/08/2023   • Gross hematuria 09/08/2023       Past Medical History:   Diagnosis Date   • Anemia    • Anxiety    • Bladder cancer ([facility])    • Cancer ([facility])    • Colon polyp    • COPD (chronic obstructive pulmonary disease) ([facility])    • Depression    • History of transfusion 10/2024   • Hyperlipidemia    • Hypertension    • Irregular heart beat     afib   • Mass of left lung    • No natural teeth    • Pneumonia    • Wears glasses        No Known Allergies    Current Outpatient Medications   Medication Instructions   • albuterol (Proventil HFA) 90 mcg/act inhaler 2 puffs, Inhalation, Every 6 hours PRN   • apixaban (ELIQUIS) 5 mg, Oral, 2 times daily   • atorvastatin (LIPITOR) 20 mg, Oral, Daily   • cefpodoxime (VANTIN) 200 mg, Oral, 2 times daily   • Fluticasone-Salmeterol (Wixela Inhub) 100-50 mcg/dose inhaler 1 puff, As needed   • losartan (COZAAR) 100 mg,  Oral, Daily   • metoprolol succinate (TOPROL-XL) 50 mg, Oral, Daily       Social History     Socioeconomic History   • Marital status: Single     Spouse name: Not on file   • Number of children: Not on file   • Years of education: Not on file   • Highest education level: Not on file   Occupational History   • Not on file   Tobacco Use   • Smoking status: Former     Current packs/day: 0.00     Average packs/day: 1 pack/day for 49.8 years (49.8 ttl pk-yrs)     Types: Cigarettes     Start date:      Quit date: 10/30/2024     Years since quittin.2     Passive exposure: Current   • Smokeless tobacco: Never   Vaping Use   • Vaping status: Never Used   Substance and Sexual Activity   • Alcohol use: Not Currently     Alcohol/week: 6.0 standard drinks of alcohol     Types: 6 Cans of beer per week     Comment: daily 3-5 beers daily and shots last drank 10/30   • Drug use: No   • Sexual activity: Not Currently   Other Topics Concern   • Not on file   Social History Narrative   • Not on file     Social Drivers of Health     Financial Resource Strain: Not on file   Food Insecurity: No Food Insecurity (2025)    Nursing - Inadequate Food Risk Classification    • Worried About Running Out of Food in the Last Year: Not on file    • Ran Out of Food in the Last Year: Not on file    • Ran Out of Food in the Last Year: Never true   Transportation Needs: No Transportation Needs (2025)    Nursing - Transportation Risk Classification    • Lack of Transportation: Not on file    • Lack of Transportation: No   Physical Activity: Not on file   Stress: Not on file   Social Connections: Not on file   Intimate Partner Violence: Unknown (2025)    Nursing IPS    • Feels Physically and Emotionally Safe: Not on file    • Physically Hurt by Someone: Not on file    • Humiliated or Emotionally Abused by Someone: Not on file    • Physically Hurt by Someone: No    • Hurt or Threatened by Someone: No   Housing Stability: Unknown  "(2025)    Nursing: Inadequate Housing Risk Classification    • Has Housing: Not on file    • Worried About Losing Housing: Not on file    • Unable to Get Utilities: Not on file    • Unable to Pay for Housing in the Last Year: No    • Has Housin       Family History   Problem Relation Age of Onset   • Alcohol abuse Father    • Heart attack Father        Physical Exam:  Blood pressure 138/64, pulse 58, height 5' 8\" (1.727 m), weight 82.5 kg (181 lb 12.8 oz).  Body mass index is 27.64 kg/m².  Wt Readings from Last 3 Encounters:   25 82.5 kg (181 lb 12.8 oz)   01/15/25 81.6 kg (180 lb)   25 79.4 kg (175 lb)     Physical Exam  Vitals reviewed.   Constitutional:       General: He is not in acute distress.     Appearance: He is not toxic-appearing.   Neck:      Comments: No JVD  Cardiovascular:      Rate and Rhythm: Normal rate and regular rhythm.      Heart sounds: No murmur heard.     No friction rub. No gallop.      Comments: Distant heart sounds  Pulmonary:      Breath sounds: No wheezing, rhonchi or rales.      Comments: Prolonged expiratory phase  Rhonchi right base  Musculoskeletal:      Right lower leg: No edema.      Left lower leg: No edema.   Neurological:      Mental Status: He is alert.         Labs & Results:  Lab Results   Component Value Date    SODIUM 134 (L) 2025    K 4.6 2025     2025    CO2 25 2025    BUN 19 2025    CREATININE 1.12 2025    GLUC 130 2025    CALCIUM 9.6 2025       Time Statement:  I have spent a total time of 41 minutes in caring for this patient on the day of the visit/encounter including Diagnostic results, Prognosis, Risks and benefits of tx options, Instructions for management, Patient and family education, Importance of tx compliance, Risk factor reductions, Impressions, Counseling / Coordination of care, Documenting in the medical record, Reviewing / ordering tests, medicine, procedures  , Obtaining or " reviewing history  , and Communicating with other healthcare professionals .      Thank you for the opportunity to participate in the care of this patient.    Toni Martinez MD, [facility]  Advanced Heart Failure and Transplant Cardiologist  Director of Cardio-Oncology  [facility]

## 2025-01-17 NOTE — ED ATTENDING ATTESTATION
1/16/2025  I, Grant Ko MD, saw and evaluated the patient. I have discussed the patient with the resident/non-physician practitioner and agree with the resident's/non-physician practitioner's findings, Plan of Care, and MDM as documented in the resident's/non-physician practitioner's note, except where noted. All available labs and Radiology studies were reviewed.  I was present for key portions of any procedure(s) performed by the resident/non-physician practitioner and I was immediately available to provide assistance.       At this point I agree with the current assessment done in the Emergency Department.  I have conducted an independent evaluation of this patient a history and physical is as follows: Patient is a 64 year old male with difficulty urinating tonight and then urinated en route to the ED tonight. Has bladder tumor which is due to be removed soon. No fever. No N/V. Was last seen at [facility] on 1/15/25 for lung cancer. [facility] website checked on this patient and no Rx found. NCAT. No scleral icterus. Moist mucous membranes. Pharynx clear. Heart regular without murmur. Lungs clear. Abdomen soft and nontender. Good bowel sounds. No edema. No rash noted. DDX including but not limited to: urinary retention, reaction to anesthesia, BPH, hematuria, UTI, tumor, neurologic etiology, renal disease, dehydration. Will check labs.     ED Course         Critical Care Time  Procedures

## 2025-01-18 ENCOUNTER — HOSPITAL ENCOUNTER (EMERGENCY)
Facility: HOSPITAL | Age: 65
Discharge: HOME | End: 2025-01-18
Attending: EMERGENCY MEDICINE | Admitting: EMERGENCY MEDICINE
Payer: COMMERCIAL

## 2025-01-18 VITALS
RESPIRATION RATE: 16 BRPM | SYSTOLIC BLOOD PRESSURE: 157 MMHG | DIASTOLIC BLOOD PRESSURE: 67 MMHG | TEMPERATURE: 98 F | HEART RATE: 78 BPM | OXYGEN SATURATION: 92 %

## 2025-01-18 DIAGNOSIS — T83.9XXA PROBLEM WITH FOLEY CATHETER, INITIAL ENCOUNTER (HCC): Primary | ICD-10-CM

## 2025-01-18 LAB — BACTERIA UR CULT: NORMAL

## 2025-01-18 PROCEDURE — 99283 EMERGENCY DEPT VISIT LOW MDM: CPT

## 2025-01-18 PROCEDURE — 99283 EMERGENCY DEPT VISIT LOW MDM: CPT | Performed by: EMERGENCY MEDICINE

## 2025-01-18 NOTE — ED NOTES
This rn at bedside with bladder scanner and flushing supplies.  Bladder scanner not offering a read.  20cc sterile water instilled via lachelle syringe into pts catheter with no resistance.  Able to withdrawal 40cc of urine. Catheter re-flushed with urine return.  Hooked up to leg bag at this time to monitor drainage.  Education provided concerning vasoline at meatus for irritation.      Thom Maldonado RN  01/18/25 [id number]

## 2025-01-18 NOTE — ED ATTENDING ATTESTATION
1/18/2025  IAmi MD, saw and evaluated the patient. I have discussed the patient with the resident/non-physician practitioner and agree with the resident's/non-physician practitioner's findings, Plan of Care, and MDM as documented in the resident's/non-physician practitioner's note, except where noted. All available labs and Radiology studies were reviewed.  I was present for key portions of any procedure(s) performed by the resident/non-physician practitioner and I was immediately available to provide assistance.       At this point I agree with the current assessment done in the Emergency Department.  I have conducted an independent evaluation of this patient a history and physical is as follows:    64-year-old presenting to the ER due to clogged Malone catheter.  Was placed 2 days ago.  Malone catheter was flushed in the ER and now draining.  Patient has no complaints.  Has urology follow-up coming up next week.    Declined getting any blood work to evaluate for any endorgan injury.  Prefers to follow-up outpatient.  Understands risks.  Agree with discharge.      ED Course         Critical Care Time  Procedures

## 2025-01-18 NOTE — ED PROVIDER NOTES
Time reflects when diagnosis was documented in both MDM as applicable and the Disposition within this note       Time User Action Codes Description Comment    1/18/2025  6:57 AM Benson Greenberg Add [T83.9XXA] Problem with Brambila catheter, initial encounter (HCC)     1/18/2025  6:58 AM Benson Greenberg Modify [T83.9XXA] Problem with Brambila catheter, initial encounter ([facility]) irrigated, resolved          ED Disposition       ED Disposition   Discharge    Condition   Stable    Date/Time   Sat Jan 18, 2025  6:57 AM    Comment   Nahid Luis discharge to home/self care.                   Assessment & Plan       Medical Decision Making  DDx including but not limited to: brambila catheter problem, mass/tumor, strictures, bladder obstruction, UTI    Patient is a 64-year-old who presented to ED with Brambila catheter obstruction,, expresses that since midnight has had some leaking around the catheter of urine.  Mild pelvic pressure which is now resolved as well as leaking after bladder irrigation with syringe, states he is currently back to baseline and does not need further evaluation.  He would like a larger Brambila bag as well.  Has appointment for cardiac clearance on 1/20 for resection of bladder tumor on 1/21. On vantin after assessment here yesterday early morning wherein brambila catheter was placed after some difficulty. Urology is aware. He denies any other symptoms.     Pt is feeling with brief intervention in triage with irrigation. Catheter is draining appropriately. He does not want any blood work nor UA - understands the risks of worsening renal function. He would like to be discharged home and will f/u as scheduled Monday and Tuesday of this week. Hemodynamically stable, no acute distress. He and son are agreeable to strict return precautions.  Continuing Vantin.    Amount and/or Complexity of Data Reviewed  Labs: ordered.             Medications - No data to display    ED Risk Strat Scores                                               History of Present Illness       Chief Complaint   Patient presents with    Urinary Catheter Problem     Pt presents to the ED with reports of brambila complications, reports leaking around catheter.        Past Medical History:   Diagnosis Date    Anemia     Anxiety     Bladder cancer (HCC)     Cancer (HCC)     Colon polyp     COPD (chronic obstructive pulmonary disease) (HCC)     Depression     History of transfusion 10/2024    Hyperlipidemia     Hypertension     Irregular heart beat     afib    Mass of left lung     No natural teeth     Pneumonia     Wears glasses       Past Surgical History:   Procedure Laterality Date    APPENDECTOMY      COLONOSCOPY      CYSTOSCOPY      ENDOBRONCHIAL ULTRASOUND (EBUS)  2025    IR LOWER EXTREMITY ANGIOGRAM  2024    [place] BYP FEM-ANT TIBL PST TIBL PRONEAL ART/OTH DSTL Right 10/31/2024    Procedure: right Common femoral artery to anterior tibial bypass with autologous vein;  Surgeon: Carlos Bray DO;  Location: [facility];  Service: Vascular    TRANSURETHRAL RESECTION OF BLADDER TUMOR N/A 2024    Procedure: (TURBT);  Surgeon: Gilmer Duke MD;  Location: [facility];  Service: Urology    US GUIDED LYMPH NODE BIOPSY LEFT  2025      Family History   Problem Relation Age of Onset    Alcohol abuse Father     Heart attack Father       Social History     Tobacco Use    Smoking status: Former     Current packs/day: 0.00     Average packs/day: 1 pack/day for 49.8 years (49.8 ttl pk-yrs)     Types: Cigarettes     Start date:      Quit date: 10/30/2024     Years since quittin.2     Passive exposure: Current    Smokeless tobacco: Never   Vaping Use    Vaping status: Never Used   Substance Use Topics    Alcohol use: Not Currently     Alcohol/week: 6.0 standard drinks of alcohol     Types: 6 Cans of beer per week     Comment: daily 3-5 beers daily and shots last drank 10/30    Drug use: No      E-Cigarette/Vaping     E-Cigarette Use Never User       E-Cigarette/Vaping Substances    Nicotine No     THC No     CBD No     Flavoring No     Other No     Unknown No       I have reviewed and agree with the history as documented.     Patient is a 64-year-old who presented to ED with Brambila catheter obstruction,, expresses that since midnight has had some leaking around the catheter of urine.  Mild pelvic pressure which is now resolved as well as leaking after bladder irrigation with syringe, states he is currently back to baseline and does not need further evaluation.  He would like a larger Brambila bag as well.  Has appointment for cardiac clearance on 1/20 for resection of bladder [facility]21[facility] wherein brambila catheter was placed after some difficulty. Urology is aware. He denies any other symptoms.       Urinary Catheter Problem  Associated symptoms: abdominal pain (lower, pressure - resolved after irrigation prior to assessment)        Review of Systems   Gastrointestinal:  Positive for abdominal pain (lower, pressure - resolved after irrigation prior to assessment).   All other systems reviewed and are negative.          Objective       ED Triage Vitals [01/18/25 0624]   Temperature Pulse Blood Pressure Respirations SpO2 Patient Position - Orthostatic VS   98 °F (36.7 °C) 78 157/67 16 92 % Sitting      Temp Source Heart Rate Source BP Location FiO2 (%) Pain Score    Axillary Monitor Right arm -- --      Vitals      Date and Time Temp Pulse SpO2 Resp BP Pain Score FACES Pain Rating User   01/18/25 0624 98 °F (36.7 °C) 78 92 % 16 157/67 -- -- HVL            Physical Exam  Vitals and nursing note reviewed.   Constitutional:       General: He is not in acute distress.     Appearance: Normal appearance. He is not ill-appearing, toxic-appearing or diaphoretic.   HENT:      Head: Normocephalic and atraumatic.      Nose: Nose normal. No congestion or rhinorrhea.      Mouth/Throat:       Mouth: Mucous membranes are moist.      Pharynx: Oropharynx is clear. No oropharyngeal exudate or posterior oropharyngeal erythema.   Eyes:      General: No scleral icterus.        Right eye: No discharge.         Left eye: No discharge.      Extraocular Movements: Extraocular movements intact.      Conjunctiva/sclera: Conjunctivae normal.      Pupils: Pupils are equal, round, and reactive to light.   Neck:      Vascular: No carotid bruit.   Cardiovascular:      Rate and Rhythm: Normal rate and regular rhythm.      Pulses: Normal pulses.      Heart sounds: Normal heart sounds. No murmur heard.     No friction rub. No gallop.   Pulmonary:      Effort: Pulmonary effort is normal. No respiratory distress.      Breath sounds: Normal breath sounds. No stridor. No wheezing, rhonchi or rales.   Chest:      Chest wall: No tenderness.   Abdominal:      General: Abdomen is flat. Bowel sounds are normal. There is no distension.      Palpations: Abdomen is soft.      Tenderness: There is no abdominal tenderness. There is no right CVA tenderness, left CVA tenderness, guarding or rebound.   Musculoskeletal:         General: No swelling, tenderness, deformity or signs of injury. Normal range of motion.      Cervical back: Normal range of motion and neck supple. No rigidity or tenderness.      Right lower leg: No edema.      Left lower leg: No edema.   Lymphadenopathy:      Cervical: No cervical adenopathy.   Skin:     General: Skin is warm and dry.      Coloration: Skin is not jaundiced or pale.      Findings: No bruising, erythema, lesion or rash.   Neurological:      General: No focal deficit present.      Mental Status: He is alert and oriented to person, place, and time.      Cranial Nerves: No cranial nerve deficit.      Sensory: No sensory deficit.      Motor: No weakness.      Coordination: Coordination normal.      Gait: Gait normal.      Deep Tendon Reflexes: Reflexes normal.   Psychiatric:         Mood and Affect: Mood  normal.         Behavior: Behavior normal.         Results Reviewed       Procedure Component Value Units Date/Time    CBC and differential [[id number]]     Lab Status: No result Specimen: Blood     Comprehensive metabolic panel [[id number]]     Lab Status: No result Specimen: Blood             No orders to display       Procedures    ED Medication and Procedure Management   Prior to Admission Medications   Prescriptions Last Dose Informant Patient Reported? Taking?   Fluticasone-Salmeterol (Wixela Inhub) 100-50 mcg/dose inhaler   Yes No   Sig: Inhale 1 puff if needed Rinse mouth after use.   albuterol (Proventil HFA) 90 mcg/act inhaler   No No   Sig: Inhale 2 puffs every 6 (six) hours as needed for wheezing   apixaban (Eliquis) 5 mg   No No   Sig: Take 1 tablet (5 mg total) by mouth 2 (two) times a day   atorvastatin (LIPITOR) 20 mg tablet  Self No No   Sig: Take 1 tablet (20 mg total) by mouth daily   cefpodoxime (VANTIN) 200 mg tablet   No No   Sig: Take 1 tablet (200 mg total) by mouth 2 (two) times a day for 10 days   levofloxacin (LEVAQUIN) 750 mg tablet   No No   Sig: Take 1 tablet (750 mg total) by mouth every 24 hours for 7 days   losartan (COZAAR) 50 mg tablet   No No   Sig: Take 2 tablets (100 mg total) by mouth daily   metoprolol succinate (TOPROL-XL) 50 mg 24 hr tablet   No No   Sig: Take 1 tablet (50 mg total) by mouth daily      Facility-Administered Medications: None     Patient's Medications   Discharge Prescriptions    No medications on file     No discharge procedures on file.  ED SEPSIS DOCUMENTATION   Time reflects when diagnosis was documented in both MDM as applicable and the Disposition within this note       Time User Action Codes Description Comment    1/18/2025  6:57 AM Benson Greenberg Add [T83.9XXA] Problem with Malone catheter, initial encounter ([facility])     1/18/2025  6:58 AM Benson Greenberg Modify [T83.9XXA] Problem with Malone catheter, initial encounter ([facility]) irrigated, resolved                  Benson Greenberg,   01/18/25 0739

## 2025-01-18 NOTE — DISCHARGE INSTRUCTIONS
Please follow-up with urology, call them Monday morning.  Return to ER if having any fevers, vomiting, abdominal pain, urine stops draining or you feel worse overall.

## 2025-01-18 NOTE — Clinical Note
Nahid Luis was seen and treated in our emergency department on 1/18/2025.                Diagnosis:     Nahid  may return to work on return date.    He may return on this date: 01/22/2025         If you have any questions or concerns, please don't hesitate to call.      Benson Greenberg, DO    ______________________________           _______________          _______________  Hospital Representative                              Date                                Time

## 2025-01-20 ENCOUNTER — VBI (OUTPATIENT)
Dept: FAMILY MEDICINE CLINIC | Facility: CLINIC | Age: 65
End: 2025-01-20

## 2025-01-20 ENCOUNTER — OFFICE VISIT (OUTPATIENT)
Dept: CARDIOLOGY CLINIC | Facility: CLINIC | Age: 65
End: 2025-01-20
Payer: COMMERCIAL

## 2025-01-20 VITALS
HEIGHT: 68 IN | WEIGHT: 181.8 LBS | BODY MASS INDEX: 27.55 KG/M2 | DIASTOLIC BLOOD PRESSURE: 64 MMHG | HEART RATE: 58 BPM | SYSTOLIC BLOOD PRESSURE: 138 MMHG

## 2025-01-20 DIAGNOSIS — I48.0 PAROXYSMAL A-FIB (HCC): ICD-10-CM

## 2025-01-20 DIAGNOSIS — Z01.818 PREOPERATIVE CLEARANCE: Primary | ICD-10-CM

## 2025-01-20 DIAGNOSIS — E78.2 MIXED HYPERLIPIDEMIA: ICD-10-CM

## 2025-01-20 DIAGNOSIS — I10 PRIMARY HYPERTENSION: ICD-10-CM

## 2025-01-20 DIAGNOSIS — I25.10 CORONARY ARTERY CALCIFICATION: ICD-10-CM

## 2025-01-20 DIAGNOSIS — R06.09 DOE (DYSPNEA ON EXERTION): ICD-10-CM

## 2025-01-20 DIAGNOSIS — I73.9 PAD (PERIPHERAL ARTERY DISEASE) (HCC): ICD-10-CM

## 2025-01-20 DIAGNOSIS — Z01.818 PRE-OP EVALUATION: ICD-10-CM

## 2025-01-20 DIAGNOSIS — R93.89 ABNORMAL CHEST X-RAY: ICD-10-CM

## 2025-01-20 LAB
FUNGUS SPEC CULT: NORMAL
FUNGUS SPEC CULT: NORMAL

## 2025-01-20 PROCEDURE — 93000 ELECTROCARDIOGRAM COMPLETE: CPT | Performed by: INTERNAL MEDICINE

## 2025-01-20 PROCEDURE — 99215 OFFICE O/P EST HI 40 MIN: CPT | Performed by: INTERNAL MEDICINE

## 2025-01-20 NOTE — TELEPHONE ENCOUNTER
01/20/25 12:43 PM    Patient contacted post ED visit, [facility] department spoke with patient/caregiver and outreach was successful.    Thank you.  Maira Baez MA  [facility]

## 2025-01-20 NOTE — Clinical Note
Patient seen in cardiology clinic today for preoperative risk assessment.  Patient is acceptable risk to proceed with the urology surgery tomorrow.  Eliquis on hold for surgery.  Kavon Martinez

## 2025-01-21 ENCOUNTER — ANESTHESIA (OUTPATIENT)
Dept: PERIOP | Facility: HOSPITAL | Age: 65
End: 2025-01-21
Payer: COMMERCIAL

## 2025-01-21 ENCOUNTER — TELEPHONE (OUTPATIENT)
Dept: UROLOGY | Facility: MEDICAL CENTER | Age: 65
End: 2025-01-21

## 2025-01-21 ENCOUNTER — HOSPITAL ENCOUNTER (OUTPATIENT)
Facility: HOSPITAL | Age: 65
Setting detail: OUTPATIENT SURGERY
Discharge: HOME | End: 2025-01-21
Attending: UROLOGY | Admitting: UROLOGY
Payer: COMMERCIAL

## 2025-01-21 VITALS
BODY MASS INDEX: 27.43 KG/M2 | HEIGHT: 68 IN | WEIGHT: 181 LBS | DIASTOLIC BLOOD PRESSURE: 80 MMHG | RESPIRATION RATE: 16 BRPM | OXYGEN SATURATION: 91 % | HEART RATE: 57 BPM | TEMPERATURE: 96.7 F | SYSTOLIC BLOOD PRESSURE: 183 MMHG

## 2025-01-21 DIAGNOSIS — C67.9 UROTHELIAL CARCINOMA OF BLADDER (HCC): Primary | ICD-10-CM

## 2025-01-21 DIAGNOSIS — N32.89 BLADDER MASS: ICD-10-CM

## 2025-01-21 PROCEDURE — 88307 TISSUE EXAM BY PATHOLOGIST: CPT | Performed by: PATHOLOGY

## 2025-01-21 PROCEDURE — NC001 PR NO CHARGE: Performed by: UROLOGY

## 2025-01-21 PROCEDURE — 52235 CYSTOSCOPY AND TREATMENT: CPT | Performed by: UROLOGY

## 2025-01-21 PROCEDURE — 88342 IMHCHEM/IMCYTCHM 1ST ANTB: CPT | Performed by: PATHOLOGY

## 2025-01-21 PROCEDURE — 88341 IMHCHEM/IMCYTCHM EA ADD ANTB: CPT | Performed by: PATHOLOGY

## 2025-01-21 RX ORDER — HYDROCODONE BITARTRATE AND ACETAMINOPHEN 5; 325 MG/1; MG/1
1 TABLET ORAL EVERY 6 HOURS PRN
Status: DISCONTINUED | OUTPATIENT
Start: 2025-01-21 | End: 2025-01-21 | Stop reason: HOSPADM

## 2025-01-21 RX ORDER — LIDOCAINE HYDROCHLORIDE 10 MG/ML
INJECTION, SOLUTION EPIDURAL; INFILTRATION; INTRACAUDAL; PERINEURAL AS NEEDED
Status: DISCONTINUED | OUTPATIENT
Start: 2025-01-21 | End: 2025-01-21

## 2025-01-21 RX ORDER — PHENAZOPYRIDINE HYDROCHLORIDE 100 MG/1
100 TABLET, FILM COATED ORAL
Status: DISCONTINUED | OUTPATIENT
Start: 2025-01-21 | End: 2025-01-21 | Stop reason: HOSPADM

## 2025-01-21 RX ORDER — MIDAZOLAM HYDROCHLORIDE 2 MG/2ML
INJECTION, SOLUTION INTRAMUSCULAR; INTRAVENOUS AS NEEDED
Status: DISCONTINUED | OUTPATIENT
Start: 2025-01-21 | End: 2025-01-21

## 2025-01-21 RX ORDER — ALBUTEROL SULFATE 0.83 MG/ML
2.5 SOLUTION RESPIRATORY (INHALATION) ONCE AS NEEDED
Status: DISCONTINUED | OUTPATIENT
Start: 2025-01-21 | End: 2025-01-21 | Stop reason: HOSPADM

## 2025-01-21 RX ORDER — PROPOFOL 10 MG/ML
INJECTION, EMULSION INTRAVENOUS AS NEEDED
Status: DISCONTINUED | OUTPATIENT
Start: 2025-01-21 | End: 2025-01-21

## 2025-01-21 RX ORDER — ONDANSETRON 2 MG/ML
4 INJECTION INTRAMUSCULAR; INTRAVENOUS ONCE AS NEEDED
Status: DISCONTINUED | OUTPATIENT
Start: 2025-01-21 | End: 2025-01-21 | Stop reason: HOSPADM

## 2025-01-21 RX ORDER — ROCURONIUM BROMIDE 10 MG/ML
INJECTION, SOLUTION INTRAVENOUS AS NEEDED
Status: DISCONTINUED | OUTPATIENT
Start: 2025-01-21 | End: 2025-01-21

## 2025-01-21 RX ORDER — FENTANYL CITRATE 50 UG/ML
INJECTION, SOLUTION INTRAMUSCULAR; INTRAVENOUS AS NEEDED
Status: DISCONTINUED | OUTPATIENT
Start: 2025-01-21 | End: 2025-01-21

## 2025-01-21 RX ORDER — IBUPROFEN 600 MG/1
600 TABLET, FILM COATED ORAL EVERY 6 HOURS PRN
Qty: 30 TABLET | Refills: 0 | Status: SHIPPED | OUTPATIENT
Start: 2025-01-21

## 2025-01-21 RX ORDER — OXYBUTYNIN CHLORIDE 5 MG/1
5 TABLET ORAL 3 TIMES DAILY
Status: DISCONTINUED | OUTPATIENT
Start: 2025-01-21 | End: 2025-01-21 | Stop reason: HOSPADM

## 2025-01-21 RX ORDER — PHENYLEPHRINE HCL IN 0.9% NACL 1 MG/10 ML
SYRINGE (ML) INTRAVENOUS AS NEEDED
Status: DISCONTINUED | OUTPATIENT
Start: 2025-01-21 | End: 2025-01-21

## 2025-01-21 RX ORDER — DEXAMETHASONE SODIUM PHOSPHATE 10 MG/ML
INJECTION, SOLUTION INTRAMUSCULAR; INTRAVENOUS AS NEEDED
Status: DISCONTINUED | OUTPATIENT
Start: 2025-01-21 | End: 2025-01-21

## 2025-01-21 RX ORDER — SODIUM CHLORIDE, SODIUM LACTATE, POTASSIUM CHLORIDE, CALCIUM CHLORIDE 600; 310; 30; 20 MG/100ML; MG/100ML; MG/100ML; MG/100ML
INJECTION, SOLUTION INTRAVENOUS CONTINUOUS PRN
Status: DISCONTINUED | OUTPATIENT
Start: 2025-01-21 | End: 2025-01-21

## 2025-01-21 RX ORDER — MAGNESIUM HYDROXIDE 1200 MG/15ML
LIQUID ORAL AS NEEDED
Status: DISCONTINUED | OUTPATIENT
Start: 2025-01-21 | End: 2025-01-21 | Stop reason: HOSPADM

## 2025-01-21 RX ORDER — CEFAZOLIN SODIUM 2 G/50ML
2000 SOLUTION INTRAVENOUS ONCE
Status: COMPLETED | OUTPATIENT
Start: 2025-01-21 | End: 2025-01-21

## 2025-01-21 RX ORDER — LABETALOL HYDROCHLORIDE 5 MG/ML
5 INJECTION, SOLUTION INTRAVENOUS
Status: DISCONTINUED | OUTPATIENT
Start: 2025-01-21 | End: 2025-01-21 | Stop reason: HOSPADM

## 2025-01-21 RX ORDER — PHENAZOPYRIDINE HYDROCHLORIDE 200 MG/1
200 TABLET, FILM COATED ORAL
Qty: 10 TABLET | Refills: 0 | Status: SHIPPED | OUTPATIENT
Start: 2025-01-21

## 2025-01-21 RX ORDER — FENTANYL CITRATE/PF 50 MCG/ML
25 SYRINGE (ML) INJECTION
Status: DISCONTINUED | OUTPATIENT
Start: 2025-01-21 | End: 2025-01-21 | Stop reason: HOSPADM

## 2025-01-21 RX ADMIN — FENTANYL CITRATE 25 MCG: 0.05 INJECTION, SOLUTION INTRAMUSCULAR; INTRAVENOUS at 10:15

## 2025-01-21 RX ADMIN — ROCURONIUM BROMIDE 50 MG: 10 INJECTION INTRAVENOUS at 08:43

## 2025-01-21 RX ADMIN — FENTANYL CITRATE 50 MCG: 50 INJECTION, SOLUTION INTRAMUSCULAR; INTRAVENOUS at 08:43

## 2025-01-21 RX ADMIN — PROPOFOL 200 MG: 10 INJECTION, EMULSION INTRAVENOUS at 08:43

## 2025-01-21 RX ADMIN — OXYBUTYNIN CHLORIDE 5 MG: 5 TABLET ORAL at 10:46

## 2025-01-21 RX ADMIN — DEXAMETHASONE SODIUM PHOSPHATE 10 MG: 10 INJECTION, SOLUTION INTRAMUSCULAR; INTRAVENOUS at 09:10

## 2025-01-21 RX ADMIN — MIDAZOLAM 2 MG: 1 INJECTION INTRAMUSCULAR; INTRAVENOUS at 08:35

## 2025-01-21 RX ADMIN — CEFAZOLIN SODIUM 2000 MG: 2 SOLUTION INTRAVENOUS at 08:47

## 2025-01-21 RX ADMIN — SUGAMMADEX 320 MG: 100 INJECTION, SOLUTION INTRAVENOUS at 09:23

## 2025-01-21 RX ADMIN — FENTANYL CITRATE 50 MCG: 50 INJECTION, SOLUTION INTRAMUSCULAR; INTRAVENOUS at 08:58

## 2025-01-21 RX ADMIN — DEXMEDETOMIDINE HYDROCHLORIDE 8 MCG: 100 INJECTION, SOLUTION INTRAVENOUS at 09:02

## 2025-01-21 RX ADMIN — LIDOCAINE HYDROCHLORIDE 50 MG: 10 SOLUTION INTRAVENOUS at 08:43

## 2025-01-21 RX ADMIN — Medication 100 MCG: at 08:50

## 2025-01-21 RX ADMIN — LABETALOL HYDROCHLORIDE 5 MG: 5 INJECTION, SOLUTION INTRAVENOUS at 09:46

## 2025-01-21 RX ADMIN — SODIUM CHLORIDE, SODIUM LACTATE, POTASSIUM CHLORIDE, AND CALCIUM CHLORIDE: .6; .31; .03; .02 INJECTION, SOLUTION INTRAVENOUS at 08:36

## 2025-01-21 RX ADMIN — DEXMEDETOMIDINE HYDROCHLORIDE 12 MCG: 100 INJECTION, SOLUTION INTRAVENOUS at 09:19

## 2025-01-21 NOTE — DISCHARGE INSTR - AVS FIRST PAGE
Nahid,  Everything went well today.  I re-resected the bladder where the tumor was previously located to get more tissue deeper in the wall of the bladder.  You have a new Malone that we can remove in the office in 2 weeks.  Complete the course of the antibiotic started by the ED (Vantin).  You can restart the Eliquis on Friday if the urine is clear.

## 2025-01-21 NOTE — OP NOTE
OPERATIVE REPORT  PATIENT NAME: Nahid Luis    :  1960  MRN: [id number]  Pt Location:  OR ROOM 10    SURGERY DATE: 2025    Surgeons and Role:     * Toni Real MD - Primary    Preop Diagnosis:  Bladder mass [N32.89]    Post-Op Diagnosis Codes:     * Bladder mass [N32.89]    Procedure(s):  TRANSURETHRAL RESECTION OF BLADDER TUMOR (TURBT)    Specimen(s):  ID Type Source Tests Collected by Time Destination   1 : posterior wall bladder Tissue Urinary Bladder TISSUE EXAM Toni Real MD 2025 0905        Estimated Blood Loss:   Minimal    Drains:  Urethral Catheter Latex 20 Fr. (Active)   Number of days: 0       Anesthesia Type:   General    Operative Indications:  Bladder mass [N32.89]      Operative Findings:  Calcification of posterior wall of bladder at location of prior resection of bladder tumor  Right ureteral orifice previously unroofed and displaced laterally.  Left ureteral orifice identified in normal orthotopic position      Complications:   None    Procedure and Technique:  PROCEDURE SUMMARY:    The patient was brought to the operating room and anesthesia obtained.  The patient was then placed in the lithotomy position and prepped and draped using standard sterile technique.  All pressure points were carefully padded.  A surgical time-out occurred, antibiotics were administered, and thromboembolism prophylaxis was given.  A 27 F saline resectoscope was inserted into the urethra after dilation of the urethral meatus to 28 F using standard urethral sounds. The urethra and bladder were carefully inspected.     Cystoscopy findings:    Urethra:  Tight fossa navicularis was dilated with male urethral sounds from 14-Qatari to 30-Qatari  Prostate:  Normal  Bladder:  Lesion identified, characteristics below  Ureteral orifices: Right ureteral orifice displaced laterally from prior resection, left ureter in normal orthotopic position   Urachus:   Normal    Lesion  characteristics:  -Appearance:    Flat  -Number of foci:   1  -Aggregate tumor diameter:  4-5 cm  -Largest individual tumor:   4-5 cm  -Location:    Posterior wall and trigone  -Clinical stage:   T1    Bladder tumor resection:    Using a 26-Italian resectoscope, all abnormal lesions noted above were resected and sent for pathology.  Resection craters and surrounding urothelium was electrofulgurated and hemostasis was achieved.  Each section was submitted for pathology as documented above.  The bladder was then observed multiple cycles of filling and emptying hemostasis was deemed to be excellent prior to terminating the procedure.    A 20-Italian Malone was placed at the conclusion of the case.  Patient had preoperative Malone placed for urinary retention       I was present for the entire procedure.    Patient Disposition:  PACU       PLAN:   Will remove Malone in 2 weeks.        SIGNATURE: Toni Real MD  DATE: January 21, 2025  TIME: 9:28 AM

## 2025-01-21 NOTE — TELEPHONE ENCOUNTER
TURBT and dilation of fossa navicularis.  Has 20-Upper sorbian Malone and will need TOV in 2 weeks.

## 2025-01-21 NOTE — NURSING NOTE
Patient arrived with brambila catheter in place. He stated he had it placed 1 week ago in the ER.    admission

## 2025-01-21 NOTE — ANESTHESIA PREPROCEDURE EVALUATION
"Procedure:  TRANSURETHRAL RESECTION OF BLADDER TUMOR (TURBT) (Bladder)    Relevant Problems   CARDIO   (+) Atrial fibrillation with rapid ventricular response (HCC)   (+) Coronary artery calcification   (+) Hypertension   (+) Mixed hyperlipidemia   (+) PAD (peripheral artery disease) (HCC)   (+) Paroxysmal A-fib (HCC)      HEMATOLOGY   (+) ABLA (acute blood loss anemia)      PULMONARY   (+) Chronic obstructive pulmonary disease (HCC)      Paroxysmal Afib   - happens often when he goes under anesthesia  - seen by cardiologist 1/20/25, no active concerns     Finishing Levaquin for Strep pneumonia seen on BAL. Has occasional cough, using maintenance inhaler as prescribed. Not needing rescue inhaler much, overall feels well.     Echo 1/08/2025:    Left Ventricle: Left ventricular cavity size is normal. Wall thickness is normal. The left ventricular ejection fraction is 65%. Systolic function is normal. Although no diagnostic regional wall motion abnormality was identified, this possibility cannot be completely excluded on the basis of this study. Diastolic function is normal.    Right Ventricle: Right ventricular cavity size is normal. Systolic function is normal.    Mitral Valve: There is trace regurgitation.       Lab Results   Component Value Date    WBC 14.77 (H) 01/16/2025    HGB 12.6 01/16/2025    HCT 39.3 01/16/2025    MCV 86 01/16/2025     01/16/2025     Lab Results   Component Value Date    SODIUM 134 (L) 01/16/2025    K 4.6 01/16/2025     01/16/2025    CO2 25 01/16/2025    BUN 19 01/16/2025    CREATININE 1.12 01/16/2025    GLUC 130 01/16/2025    CALCIUM 9.6 01/16/2025     Lab Results   Component Value Date    INR 0.92 09/24/2024    PROTIME 12.7 09/24/2024     No results found for: \"HGBA1C\"         Physical Exam    Airway    Mallampati score: II  TM Distance: >3 FB  Neck ROM: full     Dental   No notable dental hx     Cardiovascular      Pulmonary      Other Findings        Anesthesia Plan  ASA " Score- 3     Anesthesia Type- general with ASA Monitors.         Additional Monitors:     Airway Plan: ETT.           Plan Factors-Exercise tolerance (METS): >4 METS.    Chart reviewed.   Existing labs reviewed. Patient summary reviewed.    Patient is not a current smoker.      Obstructive sleep apnea risk education given perioperatively.        Induction- intravenous.    Postoperative Plan- Plan for postoperative opioid use. Planned trial extubation    Perioperative Resuscitation Plan - Level 1 - Full Code.       Informed Consent- Anesthetic plan and risks discussed with patient.  I personally reviewed this patient with the CRNA. Discussed and agreed on the Anesthesia Plan with the CRNA..      NPO Status:  Vitals Value Taken Time   Date of last liquid 01/20/25 01/21/25 0714   Time of last liquid 2200 01/21/25 0714   Date of last solid 01/20/25 01/21/25 0714   Time of last solid 2200 01/21/25 0714

## 2025-01-21 NOTE — NURSING NOTE
Awake and alert. Urine pink tinged in brambila catheter bag. Pain 5/10 to penis from brambila catheter being in place. Scant bloody drainage at tip of penis.

## 2025-01-21 NOTE — H&P
H&P - Urology   Name: Nahid Luis 64 y.o. male I MRN: [id number]  Unit/Bed#: OR POOL I Date of Admission: 2025   Date of Service: 2025 I Hospital Day: 0     Assessment & Plan   This is a 64 y.o. year old male here for TURBT, and he is stable and optimized for his procedure.    History of Present Illness    Nahid Luis is a 64 y.o. year old male who presents for restaging TURBT for his bladder cancer    REVIEW OF SYSTEMS: Per the HPI, and otherwise unremarkable.    Historical Information   Past Medical History:   Diagnosis Date    Anemia     Anxiety     Bladder cancer (HCC)     Cancer (HCC)     Colon polyp     COPD (chronic obstructive pulmonary disease) (HCC)     Depression     History of transfusion 10/2024    Hyperlipidemia     Hypertension     Irregular heart beat     afib    Mass of left lung     No natural teeth     Pneumonia     Wears glasses      Past Surgical History:   Procedure Laterality Date    APPENDECTOMY      COLONOSCOPY      CYSTOSCOPY      ENDOBRONCHIAL ULTRASOUND (EBUS)  2025    IR LOWER EXTREMITY ANGIOGRAM  2024    TX BYP FEM-ANT TIBL PST TIBL PRONEAL ART/OTH DSTL Right 10/31/2024    Procedure: right Common femoral artery to anterior tibial bypass with autologous vein;  Surgeon: Carlos Bray DO;  Location: [facility];  Service: Vascular    TRANSURETHRAL RESECTION OF BLADDER TUMOR N/A 2024    Procedure: (TURBT);  Surgeon: Gilmer Duke MD;  Location: [facility];  Service: Urology    US GUIDED LYMPH NODE BIOPSY LEFT  2025     Social History     Tobacco Use    Smoking status: Former     Current packs/day: 0.00     Average packs/day: 1 pack/day for 49.8 years (49.8 ttl pk-yrs)     Types: Cigarettes     Start date:      Quit date: 10/30/2024     Years since quittin.2     Passive exposure: Current    Smokeless tobacco: Never   Vaping Use    Vaping status: Never Used   Substance and Sexual Activity    Alcohol use: Not Currently     Alcohol/week:  6.0 standard drinks of alcohol     Types: 6 Cans of beer per week     Comment: daily 3-5 beers daily and shots last drank 10/30    Drug use: No    Sexual activity: Not Currently     E-Cigarette/Vaping    E-Cigarette Use Never User      E-Cigarette/Vaping Substances    Nicotine No     THC No     CBD No     Flavoring No     Other No     Unknown No      Family History   Problem Relation Age of Onset    Alcohol abuse Father     Heart attack Father        Meds/Allergies     Current Facility-Administered Medications:     ceFAZolin (ANCEF) IVPB (premix in dextrose) 2,000 mg 50 mL, 2,000 mg, Intravenous, Once  No Known Allergies    Objective :  Temp:  [97.5 °F (36.4 °C)] 97.5 °F (36.4 °C)  HR:  [64] 64  BP: (110)/(71) 110/71  Resp:  [18] 18  SpO2:  [96 %] 96 %  O2 Device: None (Room air)    Physical Exam  Vitals and nursing note reviewed.   Constitutional:       General: He is not in acute distress.     Appearance: He is well-developed.   HENT:      Head: Normocephalic and atraumatic.   Eyes:      Conjunctiva/sclera: Conjunctivae normal.   Cardiovascular:      Rate and Rhythm: Normal rate and regular rhythm.      Heart sounds: No murmur heard.  Pulmonary:      Effort: Pulmonary effort is normal. No respiratory distress.      Breath sounds: Normal breath sounds.   Abdominal:      Palpations: Abdomen is soft.      Tenderness: There is no abdominal tenderness.   Musculoskeletal:         General: No swelling.      Cervical back: Neck supple.   Skin:     General: Skin is warm and dry.      Capillary Refill: Capillary refill takes less than 2 seconds.   Neurological:      Mental Status: He is alert.   Psychiatric:         Mood and Affect: Mood normal.       Gen: NAD  Head: NCAT  CV: RRR  CHEST: Clear  ABD: soft, NT/ND  EXT: no edema

## 2025-01-21 NOTE — ANESTHESIA POSTPROCEDURE EVALUATION
Post-Op Assessment Note    CV Status:  Stable  Pain Score: 0    Pain management: adequate       Mental Status:  Alert and awake   Hydration Status:  Euvolemic   PONV Controlled:  Controlled   Airway Patency:  Patent  Two or more mitigation strategies used for obstructive sleep apnea   Post Op Vitals Reviewed: Yes    No anethesia notable event occurred.    Staff: CRNA       Last Filed PACU Vitals:  Vitals Value Taken Time   Temp 97.8    Pulse 68 01/21/25 0937   /85 01/21/25 0933   Resp 13 01/21/25 0937   SpO2 95 % 01/21/25 0937   Vitals shown include unfiled device data.

## 2025-01-21 NOTE — ANESTHESIA POSTPROCEDURE EVALUATION
Post-Op Assessment Note    CV Status:  Stable    Pain management: satisfactory to patient       Mental Status:  Awake and alert   Hydration Status:  Stable   PONV Controlled:  None   Airway Patency:  Patent     Post Op Vitals Reviewed: Yes    No anethesia notable event occurred.    Staff: Anesthesiologist           Last Filed PACU Vitals:  Vitals Value Taken Time   Temp 96.7 °F (35.9 °C) 01/21/25 1030   Pulse 65 01/21/25 1030   /92 01/21/25 1030   Resp 16 01/21/25 1030   SpO2 91 % 01/21/25 1030       Modified Aracelis:     Vitals Value Taken Time   Activity 2 01/21/25 1020   Respiration 2 01/21/25 1020   Circulation 2 01/21/25 1020   Consciousness 2 01/21/25 1020   Oxygen Saturation 2 01/21/25 1020     Modified Aracelis Score: 10

## 2025-01-21 NOTE — NURSING NOTE
Patient and daughter stated that he was diagnosed with pneumonia 1 month ago. Had lung biopsy 2 weeks ago. Currently on antibiotic.

## 2025-01-22 ENCOUNTER — TELEPHONE (OUTPATIENT)
Age: 65
End: 2025-01-22

## 2025-01-22 NOTE — TELEPHONE ENCOUNTER
Pt daughter calling in regards to Eliquis due to pt had recent surgery with urology and will have upcoming pulmonary bx and would like to know when to resume and hold Eliquis again for upcoming procedure.    Advised that Cardiology would be able to better assist since they prescribe medication and provided phone number for dept. Pt daughter understood and will reach out for discuss further with cardiology.

## 2025-01-22 NOTE — TELEPHONE ENCOUNTER
Post Op Note    Nahid Luis is a 64 y.o. male s/p TRANSURETHRAL RESECTION OF BLADDER TUMOR (TURBT) (Bladder) performed 1/21/25.  Nahid Luis is a patient of Dr. Dr. Real and is seen at the [place] office.     How would you rate your pain on a scale from 1 to 10, 10 being the worst pain ever? None at all   Have you had a fever? No  Have your bowel movements been regular? Yes  Do you have any difficulty urinating? No; Has a Brambila  If the patient has a brambila- are you comfortable caring for your brambila? Yes Is it draining urine? Yes  Do you have any other questions or concerns that I can address at this time? None at this time    Call placed. Spoke to pt daughter Nany per med com consent. She stated that pt is doing well and is having no issues at this time. Advised daughter that the catheter can come out in 2 weeks. Pt is scheduled for brambila removal/TOV 2/4/25. Daughter was appreciative of the call and confirmed the appointment for 2/4. Advised if any other questions or concerns to give our office a call.

## 2025-01-22 NOTE — PRE-PROCEDURE INSTRUCTIONS
Pre-procedure Instructions for Interventional Radiology  [address] [address]  INTERVENTIONAL RADIOLOGY [phone]    You are scheduled for a/an Left Lung Mass Biopsy.    On Wednesday 1/29/25.    Your tentative arrival time is 7:30 AM.  Short stay will notify you the day before your procedure with the exact arrival time and the location to arrive.    To prepare for your procedure:  Please arrange for someone to drive you home after the procedure and stay with you until the next morning if you are instructed to do so.  This is typically for patients receiving some type of sedative or anesthetic for the procedure.  DO NOT EAT OR DRINK ANYTHING after midnight on the evening before your procedure including candy & gum.  ONLY SIPS OF WATER with your medications are allowed on the morning of your procedure.  TAKE ALL OF YOUR REGULAR MEDICATIONS THE MORNING OF YOUR PROCEDURE with sips of water!  We may call you to stop some of your blood sugar, blood pressure and blood thinning medications depending on the procedure.  Please take all of these medications unless we instruct you to stop them.  If you have an allergy to x-ray dye, please contact Interventional Radiology for an x-ray dye preparation which usually consists of an oral steroid and Benadryl.    The day of your procedure:  Bring a list of the medications you take at home.  Bring medications you take for breathing problems (such as inhalers), medications for chest pain, or both.  Bring a case for your glasses or contacts.  Bring your insurance card and a form of photo ID.  Please leave all valuables such as credit cards and jewelry at home.  Report to the admitting office to the left of the registration desk in the main lobby at the [facility], Entrance B.  You will then be directed to the Short Stay Center.  While your procedure is being performed, your family may wait in the Radiology Waiting Room on the 1st  floor in Radiology.  if they need to leave, they may provide a number to be called following the procedure.   Be prepared to stay overnight just in case. Sometimes procedures will indicate the need for further observation or treatment.   If you are scheduled for a follow-up visit with the Interventional Radiologist after your procedure, you will be called with a date and time.    Special Instructions (Medications to stop taking before your procedure etc.):  Eliquis last dose 1/26/25 and restart 1/30/25. Hold AM losartan 1/29/24.

## 2025-01-24 ENCOUNTER — TELEPHONE (OUTPATIENT)
Age: 65
End: 2025-01-24

## 2025-01-24 PROCEDURE — 88307 TISSUE EXAM BY PATHOLOGIST: CPT | Performed by: PATHOLOGY

## 2025-01-24 PROCEDURE — 88341 IMHCHEM/IMCYTCHM EA ADD ANTB: CPT | Performed by: PATHOLOGY

## 2025-01-24 PROCEDURE — 88342 IMHCHEM/IMCYTCHM 1ST ANTB: CPT | Performed by: PATHOLOGY

## 2025-01-24 NOTE — TELEPHONE ENCOUNTER
Patient's daughter called in for biopsy of bladder mass results.     Final Diagnosis   A. Urinary bladder, posterior wall, transurethral resection:  -Fibromuscular tissue with associated granulation tissue, multinucleated giant cells and calcifications, consistent with previous surgical site changes.  -Reactive urothelial mucosa.  -Muscularis propria (detrusor) present, negative for carcinoma.

## 2025-01-27 LAB
FUNGUS SPEC CULT: NORMAL
FUNGUS SPEC CULT: NORMAL

## 2025-01-28 ENCOUNTER — RESULTS FOLLOW-UP (OUTPATIENT)
Dept: UROLOGY | Facility: MEDICAL CENTER | Age: 65
End: 2025-01-28

## 2025-01-29 ENCOUNTER — HOSPITAL ENCOUNTER (OUTPATIENT)
Dept: RADIOLOGY | Facility: HOSPITAL | Age: 65
Discharge: HOME | End: 2025-01-29
Attending: RADIOLOGY
Payer: COMMERCIAL

## 2025-01-29 VITALS
WEIGHT: 180 LBS | HEART RATE: 56 BPM | SYSTOLIC BLOOD PRESSURE: 117 MMHG | HEIGHT: 68 IN | RESPIRATION RATE: 17 BRPM | OXYGEN SATURATION: 91 % | TEMPERATURE: 96.1 F | DIASTOLIC BLOOD PRESSURE: 53 MMHG | BODY MASS INDEX: 27.28 KG/M2

## 2025-01-29 DIAGNOSIS — R91.8 LUNG MASS: ICD-10-CM

## 2025-01-29 LAB
INR PPP: 1.03 (ref 0.85–1.19)
PROTHROMBIN TIME: 13.8 SECONDS (ref 12.3–15)

## 2025-01-29 PROCEDURE — 88342 IMHCHEM/IMCYTCHM 1ST ANTB: CPT | Performed by: PATHOLOGY

## 2025-01-29 PROCEDURE — 85610 PROTHROMBIN TIME: CPT | Performed by: RADIOLOGY

## 2025-01-29 PROCEDURE — 77012 CT SCAN FOR NEEDLE BIOPSY: CPT

## 2025-01-29 PROCEDURE — 32408 CORE NDL BX LNG/MED PERQ: CPT | Performed by: RADIOLOGY

## 2025-01-29 PROCEDURE — 32408 CORE NDL BX LNG/MED PERQ: CPT

## 2025-01-29 PROCEDURE — 99152 MOD SED SAME PHYS/QHP 5/>YRS: CPT

## 2025-01-29 PROCEDURE — 88341 IMHCHEM/IMCYTCHM EA ADD ANTB: CPT | Performed by: PATHOLOGY

## 2025-01-29 PROCEDURE — 99152 MOD SED SAME PHYS/QHP 5/>YRS: CPT | Performed by: RADIOLOGY

## 2025-01-29 PROCEDURE — 99153 MOD SED SAME PHYS/QHP EA: CPT

## 2025-01-29 PROCEDURE — 88305 TISSUE EXAM BY PATHOLOGIST: CPT | Performed by: PATHOLOGY

## 2025-01-29 RX ORDER — SODIUM CHLORIDE 9 MG/ML
75 INJECTION, SOLUTION INTRAVENOUS CONTINUOUS
Status: DISCONTINUED | OUTPATIENT
Start: 2025-01-29 | End: 2025-01-30 | Stop reason: HOSPADM

## 2025-01-29 RX ORDER — LIDOCAINE WITH 8.4% SOD BICARB 0.9%(10ML)
SYRINGE (ML) INJECTION AS NEEDED
Status: COMPLETED | OUTPATIENT
Start: 2025-01-29 | End: 2025-01-29

## 2025-01-29 RX ORDER — MIDAZOLAM HYDROCHLORIDE 2 MG/2ML
INJECTION, SOLUTION INTRAMUSCULAR; INTRAVENOUS AS NEEDED
Status: COMPLETED | OUTPATIENT
Start: 2025-01-29 | End: 2025-01-29

## 2025-01-29 RX ORDER — FENTANYL CITRATE 50 UG/ML
INJECTION, SOLUTION INTRAMUSCULAR; INTRAVENOUS AS NEEDED
Status: COMPLETED | OUTPATIENT
Start: 2025-01-29 | End: 2025-01-29

## 2025-01-29 RX ADMIN — FENTANYL CITRATE 50 MCG: 50 INJECTION INTRAMUSCULAR; INTRAVENOUS at 09:10

## 2025-01-29 RX ADMIN — FENTANYL CITRATE 25 MCG: 50 INJECTION INTRAMUSCULAR; INTRAVENOUS at 09:54

## 2025-01-29 RX ADMIN — FENTANYL CITRATE 25 MCG: 50 INJECTION INTRAMUSCULAR; INTRAVENOUS at 09:22

## 2025-01-29 RX ADMIN — Medication 10 ML: at 09:28

## 2025-01-29 RX ADMIN — MIDAZOLAM 0.5 MG: 1 INJECTION INTRAMUSCULAR; INTRAVENOUS at 09:54

## 2025-01-29 RX ADMIN — FENTANYL CITRATE 25 MCG: 50 INJECTION INTRAMUSCULAR; INTRAVENOUS at 09:36

## 2025-01-29 RX ADMIN — Medication 10 ML: at 09:56

## 2025-01-29 RX ADMIN — MIDAZOLAM 0.5 MG: 1 INJECTION INTRAMUSCULAR; INTRAVENOUS at 09:22

## 2025-01-29 RX ADMIN — MIDAZOLAM 0.5 MG: 1 INJECTION INTRAMUSCULAR; INTRAVENOUS at 09:36

## 2025-01-29 RX ADMIN — MIDAZOLAM 1 MG: 1 INJECTION INTRAMUSCULAR; INTRAVENOUS at 09:10

## 2025-01-29 RX ADMIN — Medication 10 ML: at 09:39

## 2025-01-29 RX ADMIN — SODIUM CHLORIDE 75 ML/HR: 0.9 INJECTION, SOLUTION INTRAVENOUS at 08:30

## 2025-01-29 NOTE — DISCHARGE INSTRUCTIONS
Needle Biopsy of the Lung    WHAT YOU NEED TO KNOW:  A needle biopsy of the lung is a procedure to remove cells or tissue from your lung. You may have a fine needle aspiration biopsy (FNAB), or a core needle biopsy (CNB). A FNAB is used to remove cells through a thin needle. CNB uses a thicker needle to remove lung tissue. The samples are collected and tested for inflammation, infection, or cancer.     DISCHARGE INSTRUCTIONS:   Resume your normal diet. Small sips of flat soda will help with nausea. Limit your activity for 24 hours.    Wound Care:      - Remove band aid in 24 hours.    Contact Interventional Radiology at [phone] ([facility] PATIENTS: Contact Interventional Radiology at [phone]) ([facility] PATIENTS: Contact Interventional Radiology at [phone]) if any of the following occur:    - You have a fever greater than 101*    - You cough up large amounts of bright red blood     - You have chest pain with breathing    - You have shortness of breath    -You have persistent nausea and vomiting    - You have pus, redness or swelling around your biopsy site    - You have questions or concerns about your condition or care       Moderate Sedation   WHAT YOU NEED TO KNOW:   Moderate sedation, or conscious sedation, is medicine used during procedures to help you feel relaxed and calm. You will be awake and able to follow directions without anxiety or pain. You will remember little to none of the procedure. You may feel tired, weak, or unsteady on your feet after you get sedation. You may also have trouble concentrating or short-term memory loss. These symptoms should go away in 24 hours or less.   DISCHARGE INSTRUCTIONS:   Call 911 or have someone else call for any of the following:   You have sudden trouble breathing.     You cannot be woken.  Seek care immediately if:   You have a severe headache or dizziness.     Your heart is beating faster than usual.  Contact your healthcare provider if:   You  have a fever.     You have nausea or are vomiting for more than 8 hours after the procedure.      Your skin is itchy, swollen, or you have a rash.     You have questions or concerns about your condition or care.  Self-care:   Have someone stay with you for 24 hours. This person can drive you to errands and help you do things around the house. This person can also watch for problems.      Rest and do quiet activities for 24 hours. Do not exercise, ride a bike, or play sports. Stand up slowly to prevent dizziness and falls. Take short walks around the house with another person. Slowly return to your usual activities the next day.      Do not drive or use dangerous machines or tools for 24 hours. You may injure yourself or others. Examples include a lawnmower, saw, or drill. Do not return to work for 24 hours if you use dangerous machines or tools for work.      Do not make important decisions for 24 hours. For example, do not sign important papers or invest money.      Drink liquids as directed. Liquids help flush the sedation medicine out of your body. Ask how much liquid to drink each day and which liquids are best for you.      Eat small, frequent meals to prevent nausea and vomiting. Start with clear liquids such as juice or broth. If you do not vomit after clear liquids, you can eat your usual foods.      Do not drink alcohol or take medicines that make you drowsy. This includes medicines that help you sleep and anxiety medicines. Ask your healthcare provider if it is safe for you to take pain medicine.  Follow up with your healthcare provider as directed: Write down your questions so you remember to ask them during your visits.   © 2017 [employer] Information is for End User's use only and may not be sold, redistributed or otherwise used for commercial purposes. All illustrations and images included in CareNotes® are the copyrighted property of [employer].D.A[employer]., [employer] or [employer].  The  above information is an  only. It is not intended as medical advice for individual conditions or treatments. Talk to your doctor, nurse or pharmacist before following any medical regimen to see if it is safe and effective for you.

## 2025-01-29 NOTE — BRIEF OP NOTE (RAD/CATH)
IR BIOPSY LUNG Procedure Note    PATIENT NAME: Nahid Luis  : 1960  MRN: [id number]    Pre-op Diagnosis:   1. Lung mass      Post-op Diagnosis:   1. Lung mass        Surgeon:   MD Malcolm Armendariz DO    Estimated Blood Loss: None  Findings: Core needle biopsy of the left upper lobe lung mass.    Specimens: Core needle biopsy samples x4 sent to pathology to formalin.    Complications:  None    Anesthesia: conscious sedation    Malcolm Townsend DO     Date: 2025  Time: 10:15 AM

## 2025-02-03 ENCOUNTER — RESULTS FOLLOW-UP (OUTPATIENT)
Dept: CCU | Facility: HOSPITAL | Age: 65
End: 2025-02-03

## 2025-02-03 DIAGNOSIS — C34.92 MALIGNANT NEOPLASM OF LEFT LUNG, UNSPECIFIED PART OF LUNG (HCC): Primary | ICD-10-CM

## 2025-02-03 LAB
FUNGUS SPEC CULT: NORMAL
FUNGUS SPEC CULT: NORMAL

## 2025-02-03 PROCEDURE — 88341 IMHCHEM/IMCYTCHM EA ADD ANTB: CPT | Performed by: PATHOLOGY

## 2025-02-03 PROCEDURE — 88305 TISSUE EXAM BY PATHOLOGIST: CPT | Performed by: PATHOLOGY

## 2025-02-03 PROCEDURE — 88342 IMHCHEM/IMCYTCHM 1ST ANTB: CPT | Performed by: PATHOLOGY

## 2025-02-04 ENCOUNTER — PROCEDURE VISIT (OUTPATIENT)
Dept: UROLOGY | Facility: MEDICAL CENTER | Age: 65
End: 2025-02-04

## 2025-02-04 VITALS
OXYGEN SATURATION: 89 % | DIASTOLIC BLOOD PRESSURE: 70 MMHG | BODY MASS INDEX: 26.61 KG/M2 | SYSTOLIC BLOOD PRESSURE: 130 MMHG | HEIGHT: 68 IN | WEIGHT: 175.6 LBS

## 2025-02-04 DIAGNOSIS — C34.92 ADENOCARCINOMA OF LEFT LUNG (HCC): Primary | ICD-10-CM

## 2025-02-04 DIAGNOSIS — N32.89 BLADDER MASS: Primary | ICD-10-CM

## 2025-02-04 PROCEDURE — 99024 POSTOP FOLLOW-UP VISIT: CPT

## 2025-02-04 NOTE — PROGRESS NOTES
"2/4/2025    Nahid JODI Harrisn  1960  [id number]    Diagnosis  Chief Complaint    Post-op         Patient presents for brambila removal s/p TURBT 1/21/25 managed by Dr. Real    Plan  Return to office this afternoon for TOV  Return to office for PO appointment 2/10/25    Procedure Brambila removal/voiding trial    Brambila catheter removed after deflation of an intact balloon. Patient tolerated well. Encouraged patient to hydrate well and return this afternoon for post void residual. He knows he may return early if uncomfortable and unable to urinate. Patient agrees to this plan.    Advised that pt is scheduled for TOV this afternoon. Pt stated that he would rather go to the ED if not able to urinate instead of coming back to the office due to distance from the office. ED precautions made aware to pt.      Pt did not return to office for his afternoon appointment. As stated ED precautions were made aware to pt at this mornings appointment. Advised also that he has a FU appointment 2/10 which he understood.     Vitals:    02/04/25 0815   BP: 130/70   BP Location: Left arm   Patient Position: Sitting   Cuff Size: Adult   SpO2: (!) 89%   Weight: 79.7 kg (175 lb 9.6 oz)   Height: 5' 8\" (1.727 m)           Meaghan Man RN       "

## 2025-02-05 ENCOUNTER — DOCUMENTATION (OUTPATIENT)
Dept: HEMATOLOGY ONCOLOGY | Facility: CLINIC | Age: 65
End: 2025-02-05

## 2025-02-05 NOTE — PROGRESS NOTES
All records needed are in patients chart. No records retrieval needed at this time.     Pt referred to medical oncology and should be scheduled within 7 days after 02/11/25.  Please notify me if not scheduled within time frame.    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

## 2025-02-05 NOTE — PROGRESS NOTES
In-basket message received from Marycarmen Boyer RN to add patient to the thoracic [facility] on 2/10/2025. Chart reviewed and prep completed.

## 2025-02-06 ENCOUNTER — TELEPHONE (OUTPATIENT)
Age: 65
End: 2025-02-06

## 2025-02-06 ENCOUNTER — PATIENT OUTREACH (OUTPATIENT)
Dept: HEMATOLOGY ONCOLOGY | Facility: CLINIC | Age: 65
End: 2025-02-06

## 2025-02-06 DIAGNOSIS — C34.90 MALIGNANT NEOPLASM OF LUNG, UNSPECIFIED LATERALITY, UNSPECIFIED PART OF LUNG (HCC): Primary | ICD-10-CM

## 2025-02-06 NOTE — TELEPHONE ENCOUNTER
I called Nahid in response to a referral that was received for patient to establish care with Medical Oncology    Outreach was made to schedule a consultation.    A consultation was scheduled for patient during this call. Patient is scheduled on 3/4/25 at 1:40 PM with Dr Napier at the [facility]  Has the patient been seen inpatient or outpatient ? (Within in the last 3 years) No If so when, N/A    Schedule within: 7 days after 2/10/25    Pt scheduled for first available appt at requested location.   Please advise if this appt can be moved sooner to meet scheduling guidelines per NN review.

## 2025-02-06 NOTE — PROGRESS NOTES
"Initial outreach. Spoke to Gemma as she picked up the phone.  She connect Will and her brother to the call. Introduced myself and explained the role of an Oncology Nurse Navigator to assist with coordination of cancer care, preparation for upcoming appointment, be a point of contact prior to oncology consult, provide support and connect  with available resources. Discussed medical oncology referral placed by Dr Mcwilliams for lung cancer. Explained I will be their main contact until they are established with their team and a treatment plan is established. Advised we will be discussing case a tumor board on Monday. He will be updated on Monday after tumor board. He will need MRI brain to complete staging. I will have someone reach out today or tomorrow  to schedule all appointments.      Introduced Silvana, and explained she will be their patient navigator, who will reach out after the first consult to introduce themselves. The PN will provide support, make sure they has a good understanding of the plan and schedule and to connect with additional resources if/when needed.     Assessed for barriers of care. My direct contact information provided. Family is aware that they can call me should they need any further assistance. Patient was appreciative of assistance.     Lung Symptoms:   Smoking: Was smoking 1 1/2 ppd states he hasn't had any cigarettes for the past 3 days. States he also stopped drinking  Cough: Yes, describe: productive cough for white phlegm not new  Hemoptysis:No  SOB: Yes, describe: with activity no new  Chest pain:No  Wheezing:Yes, describe: wheezing which is not new   Chronic lung infections:Yes, describe: Gemma states he has had pneumonia which is not new  Pain(back,pain,bone): Yes, describe: chronic lower back pain for 30 years due to dislocated disc    Headache No  Dizziness Yes, describe: reports it happens from\" time to time out of the blue\"  Lightheadedness No  Appetite: No change in appetite or weight " loss states he gained about 5 lbs as he is eating and not working  Difficulty ambulating: No  Patient just wants to go back to work.       PCP: Dr. Moncada

## 2025-02-07 ENCOUNTER — PATIENT OUTREACH (OUTPATIENT)
Dept: CASE MANAGEMENT | Facility: OTHER | Age: 65
End: 2025-02-07

## 2025-02-10 ENCOUNTER — OFFICE VISIT (OUTPATIENT)
Dept: UROLOGY | Facility: MEDICAL CENTER | Age: 65
End: 2025-02-10
Payer: COMMERCIAL

## 2025-02-10 ENCOUNTER — TELEPHONE (OUTPATIENT)
Dept: UROLOGY | Facility: MEDICAL CENTER | Age: 65
End: 2025-02-10

## 2025-02-10 ENCOUNTER — DOCUMENTATION (OUTPATIENT)
Dept: HEMATOLOGY ONCOLOGY | Facility: CLINIC | Age: 65
End: 2025-02-10

## 2025-02-10 VITALS
BODY MASS INDEX: 27.74 KG/M2 | HEIGHT: 68 IN | SYSTOLIC BLOOD PRESSURE: 128 MMHG | DIASTOLIC BLOOD PRESSURE: 80 MMHG | OXYGEN SATURATION: 94 % | HEART RATE: 88 BPM | WEIGHT: 183 LBS

## 2025-02-10 DIAGNOSIS — N32.89 BLADDER MASS: Primary | ICD-10-CM

## 2025-02-10 DIAGNOSIS — R91.8 LUNG MASS: Primary | ICD-10-CM

## 2025-02-10 DIAGNOSIS — C67.9 UROTHELIAL CARCINOMA OF BLADDER (HCC): ICD-10-CM

## 2025-02-10 DIAGNOSIS — Z12.5 SCREENING FOR PROSTATE CANCER: ICD-10-CM

## 2025-02-10 LAB
CARIS ALK: NEGATIVE
CARIS GENOMIC LOH - EXOME: NORMAL
CARIS HER2/NEU: NEGATIVE
CARIS HLA-A: NORMAL
CARIS HLA-B: NORMAL
CARIS HLA-C: NORMAL
CARIS MSI - EXOME: NORMAL
CARIS PD-L1 (22C3): POSITIVE
CARIS PD-L1 (SP263): POSITIVE
CARIS PD-L1 FDA (28-8): POSITIVE
CARIS PD-L1 FDA(SP142): POSITIVE
CARIS TMB - EXOME: NORMAL
FUNGUS SPEC CULT: NORMAL
FUNGUS SPEC CULT: NORMAL
POST-VOID RESIDUAL VOLUME, ML POC: 53 ML

## 2025-02-10 PROCEDURE — 99214 OFFICE O/P EST MOD 30 MIN: CPT | Performed by: UROLOGY

## 2025-02-10 PROCEDURE — 51798 US URINE CAPACITY MEASURE: CPT | Performed by: UROLOGY

## 2025-02-10 NOTE — PROGRESS NOTES
Name: Nahid Luis      : 1960      MRN: [id number]  Encounter Provider: Toni Real MD  Encounter Date: 2/10/2025   Encounter department: [facility] UROLOGY [facility]  :  Assessment & Plan  Urothelial carcinoma of bladder (HCC)  HGT1 on TURBT 2024  Restaging TURBT on 2025: negative for residual carcinoma and muscle present in specimen    - discussed restaging TURBT standard of care for HGT1 disease on initial resection and can also be considered in patients with HGTa disease  - discussed on restaging TURBT there is residual disease in 50-70% of cases, upstaging to muscle-invasive disease in 30% of cases (15-20% risk if muscle was present in initial specimen)  - discussed with patient the importance of differentiating between muscle-invasive and non-muscle invasive bladder  - discussed recommendation for neoadjuvant chemotherapy and radical cystectomy for MIBC   - discussed intravesical treatment with BCG for high risk NMIBC  - also discussed option of early cystectomy in high risk patients with NMIBC  - strong indicators for early cystectomy in NMIBC include:    - adverse variant histology: micropapillary, adenocarcinoma, small cell, SCC   - + LVI in HGT1 disease   - BCG failure of HGT1 or CIS   - deep prostatic duct involvement   - incompletely resectable, multifocal HGT1   - dysfunctional bladder with recurrent disease  - relative indicators for early cystectomy in NMIBC include:   - HGT1 on restaging TURBT   - recurrence of HGT1 after BCG   - multifocal HGT1 + CIS at primary presentation   - HGT1 in bladder diverticulum   - recurrent prostatic involvement    - up to 40% of high risk cases treated with BCG will relapse and 20% will progress to muscle-invasive disease within 5 years  - both 5 and 10-year disease specific survival  is improved in patients undergoing early cystectomy as compared to patients who failed intravesical BCG therapy     Patient consented for  intravesical BCG and Gemcitabine/Docetaxel pending availability of BCG    Will proceed with induction intravesical therapy weekly x 6 weeks and subsequent cystoscopy 6 weeks after completing intravesical treatment    Patient also recently diagnosed with lung cancer and pending treatment planning for his lung cancer         Screening for prostate cancer    Orders:    PSA Total, Diagnostic; Future        History of Present Illness   Nahid Luis is a 64 y.o. male who presents for follow up for restaging TURBT    No residual urothelial carcinoma on TURBT and muscle present in specimen    No voiding complaints at this time  No hematuria       Recent lung biopsy positive for adenocarcinoma (lung primary)    Review of Systems   All other systems reviewed and are negative.    Past Medical History   Past Medical History:   Diagnosis Date    Anemia     Anxiety     Bladder cancer (HCC)     Cancer (HCC)     Colon polyp     COPD (chronic obstructive pulmonary disease) (HCC)     Depression     History of transfusion 10/2024    Hyperlipidemia     Hypertension     Irregular heart beat     afib    Mass of left lung     No natural teeth     Pneumonia     Wears glasses      Past Surgical History:   Procedure Laterality Date    APPENDECTOMY      COLONOSCOPY      CYSTOSCOPY      ENDOBRONCHIAL ULTRASOUND (EBUS)  01/08/2025    IR BIOPSY LUNG  1/29/2025    IR LOWER EXTREMITY ANGIOGRAM  09/24/2024    MT BYP FEM-ANT TIBL PST TIBL PRONEAL ART/OTH DSTL Right 10/31/2024    Procedure: right Common femoral artery to anterior tibial bypass with autologous vein;  Surgeon: Carlos Bray DO;  Location: [facility];  Service: Vascular    MT CYSTO W/REMOVAL OF LESIONS SMALL N/A 1/21/2025    Procedure: TRANSURETHRAL RESECTION OF BLADDER TUMOR (TURBT);  Surgeon: Toni Real MD;  Location:  MAIN OR;  Service: Urology    TRANSURETHRAL RESECTION OF BLADDER TUMOR N/A 11/05/2024    Procedure: (TURBT);  Surgeon: Gilmer Duke MD;  Location: [facility];  Service: Urology    US GUIDED LYMPH NODE BIOPSY LEFT  01/02/2025     Family History   Problem Relation Age of Onset    Alcohol abuse Father     Heart attack Father       reports that he quit smoking about 3 months ago. His smoking use included cigarettes. He started smoking about 50 years ago. He has a 49.8 pack-year smoking history. He has been exposed to tobacco smoke. He has never used smokeless tobacco. He reports that he does not currently use alcohol after a past usage of about 6.0 standard drinks of alcohol per week. He reports that he does not use drugs.  Current Outpatient Medications on File Prior to Visit   Medication Sig Dispense Refill    albuterol (Proventil HFA) 90 mcg/act inhaler Inhale 2 puffs every 6 (six) hours as needed for wheezing 6.7 g 5    apixaban (Eliquis) 5 mg Take 1 tablet (5 mg total) by mouth 2 (two) times a day 60 tablet 0    atorvastatin (LIPITOR) 20 mg tablet Take 1 tablet (20 mg total) by mouth daily 30 tablet 5    Fluticasone-Salmeterol (Wixela Inhub) 100-50 mcg/dose inhaler Inhale 1 puff if needed Rinse mouth after use.      ibuprofen (MOTRIN) 600 mg tablet Take 1 tablet (600 mg total) by mouth every 6 (six) hours as needed for mild pain 30 tablet 0    losartan (COZAAR) 50 mg tablet Take 2 tablets (100 mg total) by mouth daily 180 tablet 1    metoprolol succinate (TOPROL-XL) 50 mg 24 hr tablet Take 1 tablet (50 mg total) by mouth daily 30 tablet 0    phenazopyridine (PYRIDIUM) 200 mg tablet Take 1 tablet (200 mg total) by mouth 3 (three) times a day with meals 10 tablet 0     No current facility-administered medications on file prior to visit.   No Known Allergies      Objective   /80 (BP Location: Left arm, Patient Position: Sitting, Cuff Size: Adult)   Pulse 88   Ht 5' 8\" (1.727 m)   Wt 83 kg (183 lb)   SpO2 94%   BMI 27.83 kg/m²     Physical Exam  Vitals and nursing note reviewed.   Constitutional:       General: He is not in acute distress.     Appearance: " He is well-developed.   HENT:      Head: Normocephalic and atraumatic.   Eyes:      Conjunctiva/sclera: Conjunctivae normal.   Cardiovascular:      Rate and Rhythm: Normal rate and regular rhythm.      Heart sounds: No murmur heard.  Pulmonary:      Effort: Pulmonary effort is normal. No respiratory distress.      Breath sounds: Normal breath sounds.   Abdominal:      Palpations: Abdomen is soft.      Tenderness: There is no abdominal tenderness.   Musculoskeletal:         General: No swelling.      Cervical back: Neck supple.   Skin:     General: Skin is warm and dry.      Capillary Refill: Capillary refill takes less than 2 seconds.   Neurological:      Mental Status: He is alert.   Psychiatric:         Mood and Affect: Mood normal.          Results  Lab Results   Component Value Date    PSA 2.41 11/25/2023     Lab Results   Component Value Date    CALCIUM 9.6 01/16/2025    K 4.6 01/16/2025    CO2 25 01/16/2025     01/16/2025    BUN 19 01/16/2025    CREATININE 1.12 01/16/2025     Lab Results   Component Value Date    WBC 14.77 (H) 01/16/2025    HGB 12.6 01/16/2025    HCT 39.3 01/16/2025    MCV 86 01/16/2025     01/16/2025       Office Urine Dip  No results found for this or any previous visit (from the past hour).]

## 2025-02-10 NOTE — TELEPHONE ENCOUNTER
Consented for intravesical induction BCG x 6 for high risk non-muscle invasive bladder cancer (HGT1).  Also consented for intravesical gemcitabine/docetaxel in the event that BCG is not available.  Will need cystoscopy 6 weeks after completing intravesical treatment.  Also recently diagnosed with lung cancer and will need to coordinate treatment.

## 2025-02-10 NOTE — ASSESSMENT & PLAN NOTE
HGT1 on TURBT 11/5/2024  Restaging TURBT on 1/21/2025: negative for residual carcinoma and muscle present in specimen    - discussed restaging TURBT standard of care for HGT1 disease on initial resection and can also be considered in patients with HGTa disease  - discussed on restaging TURBT there is residual disease in 50-70% of cases, upstaging to muscle-invasive disease in 30% of cases (15-20% risk if muscle was present in initial specimen)  - discussed with patient the importance of differentiating between muscle-invasive and non-muscle invasive bladder  - discussed recommendation for neoadjuvant chemotherapy and radical cystectomy for MIBC   - discussed intravesical treatment with BCG for high risk NMIBC  - also discussed option of early cystectomy in high risk patients with NMIBC  - strong indicators for early cystectomy in NMIBC include:    - adverse variant histology: micropapillary, adenocarcinoma, small cell, SCC   - + LVI in HGT1 disease   - BCG failure of HGT1 or CIS   - deep prostatic duct involvement   - incompletely resectable, multifocal HGT1   - dysfunctional bladder with recurrent disease  - relative indicators for early cystectomy in NMIBC include:   - HGT1 on restaging TURBT   - recurrence of HGT1 after BCG   - multifocal HGT1 + CIS at primary presentation   - HGT1 in bladder diverticulum   - recurrent prostatic involvement    - up to 40% of high risk cases treated with BCG will relapse and 20% will progress to muscle-invasive disease within 5 years  - both 5 and 10-year disease specific survival  is improved in patients undergoing early cystectomy as compared to patients who failed intravesical BCG therapy     Patient consented for intravesical BCG and Gemcitabine/Docetaxel pending availability of BCG    Will proceed with induction intravesical therapy weekly x 6 weeks and subsequent cystoscopy 6 weeks after completing intravesical treatment    Patient also recently diagnosed with lung cancer  and pending treatment planning for his lung cancer

## 2025-02-10 NOTE — PROGRESS NOTES
Chart reviewed in preparation of Thoracic Tumor Conference presentation by Dr. Diaz on 02/10/25. Patient with history of hx smoking, HTN, HLD, PAD, COPD, tobacco abuse, alcohol abuse, high grade papillary urothelial carcinoma diagnosed  now s/p TUBRT 25, Warthin tumor, with left lower lobe mass and mediastinal lymphadenopathy and new diagnosis of A-fib placed on anticoagulation therapy.  He is being discussed today for full review of plan.        Workup:    Imagin24 CT chest wo contrast -     Combination of left upper lobe neoplasm and surrounding atelectasis measuring up to 8.3 cm which can represent primary or metastatic neoplasm given history of urinary bladder neoplasm. Tissue sampling is recommended for further differentiation..     A few mildly enlarged mediastinal lymph nodes, increased in size since PET/CT 2024. Findings can be metastatic or reactive in etiology.     Multifocal infectious/aspiration pneumonitis, involving bilateral lower lobes and dependent aspect of the right upper lobe     Bilateral bronchial wall thickening, which can be reactive or secondary to infectious bronchitis.     Moderate atherosclerotic coronary artery calcifications.     24 - PET CT -  Hypermetabolic mass in the left upper lobe with surrounding infiltrate. Findings could reflect primary lung neoplasm or metastasis.  Hypermetabolic hilar adenopathy and small nodules in the left upper lobe.  2. No hypermetabolic abdominal or pelvic adenopathy.  3. Hypermetabolic nodule in the left parotid gland. Evaluation with contrast-enhanced CT or parotid gland ultrasound recommended to assess for parotid neoplasm.  4. Small reticulonodular infiltrate in the left lower lobe and opacity in the left upper lobe surrounding the left upper lobe mass most likely reflecting postobstructive atelectasis.      Pathology:  25 IR Biopsy   25 EBUS      PFT:  scheduled 25

## 2025-02-10 NOTE — PROGRESS NOTES
THORACIC ONCOLOGY MULTIDISCIPLINARY CASE REVIEW    DATE:  2/10/2025    PRESENTING DOCTOR:  Dr. Diaz     DIAGNOSIS:  Adenocarcinoma Left Lung  STAGING: Stage IIIB    Nahid Luis is a 64 y.o. male who was presented at the Thoracic Oncology Multidisciplinary Tumor Conference today. He has a hx of smoking, HTN, HLD, PAD, COPD, alcohol abuse, high grade papillary urothelial carcinoma diagnosed 2024 now s/p TUBRT 01/21/25, Warthin tumor, with left lower lobe mass and mediastinal lymphadenopathy and new diagnosis of A-fib placed on anticoagulation therapy. Work up included  CTA abdomen pelvis, PET CT, CT chest wo contrast, EBUS, US guided biopsy of lymph node and IR biopsy.  He will be starting intravesical induction BCG x 6 for high risk non-muscle invasive bladder cancer (HGT1). He is being discussed today for full review of plan.         PHYSICIAN RECOMMENDED PLAN:  - Induction therapy  - Referral to Thoracic Surgery       Imaging reviewed:   12/18/24 CT chest wo contrast   11/29/24 PET CT     Pathology reviewed:   01/29//25 Tissue Exam    PFT's reviewed: Scheduled for 02/19/25    Future imaging: MRI brain 02/11/25    Referrals:   - Medical Oncology placed and scheduled to be moved up.   - Thoracic Surgery     Clinical Trials Reviewed:  No  Clinical Trial Eligibility:     Team agreed to plan.  NCCN guidelines were readily available for review at this discussion    The final treatment plan will be left to the discretion of the patient and the treating physician.     DISCLAIMERS:  TO THE TREATING PHYSICIAN:  This conference is a meeting of clinicians from various specialty areas who evaluate and discuss patients for whom a multidisciplinary treatment approach is being considered. Please note that the above opinion was a consensus of the conference attendees and is intended only to assist in quality care of the discussed patient.  The responsibility for follow up on the input given during the conference, along  with any final decisions regarding plan of care, is that of the patient and the patient's provider. Accordingly, appointments have only been recommended based on this information and have NOT been scheduled unless otherwise noted.      TO THE PATIENT:  This summary is a brief record of major aspects of your cancer treatment. You may choose to share a copy with any of your doctors or nurses. However, this is not a detailed or comprehensive record of your care.

## 2025-02-11 ENCOUNTER — PATIENT OUTREACH (OUTPATIENT)
Dept: HEMATOLOGY ONCOLOGY | Facility: CLINIC | Age: 65
End: 2025-02-11

## 2025-02-11 ENCOUNTER — TELEPHONE (OUTPATIENT)
Dept: HEMATOLOGY ONCOLOGY | Facility: CLINIC | Age: 65
End: 2025-02-11

## 2025-02-11 ENCOUNTER — HOSPITAL ENCOUNTER (OUTPATIENT)
Dept: MRI IMAGING | Facility: HOSPITAL | Age: 65
Discharge: HOME | End: 2025-02-11
Attending: STUDENT IN AN ORGANIZED HEALTH CARE EDUCATION/TRAINING PROGRAM
Payer: COMMERCIAL

## 2025-02-11 DIAGNOSIS — C34.90 MALIGNANT NEOPLASM OF LUNG, UNSPECIFIED LATERALITY, UNSPECIFIED PART OF LUNG (HCC): ICD-10-CM

## 2025-02-11 PROCEDURE — 70553 MRI BRAIN STEM W/O & W/DYE: CPT

## 2025-02-11 PROCEDURE — A9585 GADOBUTROL INJECTION: HCPCS | Performed by: PHYSICIAN ASSISTANT

## 2025-02-11 RX ORDER — GADOBUTROL 604.72 MG/ML
8 INJECTION INTRAVENOUS
Status: COMPLETED | OUTPATIENT
Start: 2025-02-11 | End: 2025-02-11

## 2025-02-11 RX ADMIN — GADOBUTROL 8 ML: 604.72 INJECTION INTRAVENOUS at 19:51

## 2025-02-11 NOTE — PROGRESS NOTES
Outreach to Nahid Ayon she connected her brother Kenny on the call. Reviewed that Nahid will also need to see Thoracic Surgery as part of his treatment team.   I also sent a message to move up appointment with medical oncology. Advised they may need to be seen at a different site and transferred. Verbalized understanding. They expressed appreciation of phone call.

## 2025-02-13 ENCOUNTER — RESULTS FOLLOW-UP (OUTPATIENT)
Dept: PULMONOLOGY | Facility: MEDICAL CENTER | Age: 65
End: 2025-02-13

## 2025-02-13 ENCOUNTER — HOSPITAL ENCOUNTER (OUTPATIENT)
Dept: NON INVASIVE DIAGNOSTICS | Facility: CLINIC | Age: 65
Discharge: HOME | End: 2025-02-13
Payer: COMMERCIAL

## 2025-02-13 DIAGNOSIS — I73.9 PAD (PERIPHERAL ARTERY DISEASE) (HCC): ICD-10-CM

## 2025-02-13 PROCEDURE — 93923 UPR/LXTR ART STDY 3+ LVLS: CPT

## 2025-02-13 PROCEDURE — 93925 LOWER EXTREMITY STUDY: CPT

## 2025-02-14 ENCOUNTER — OFFICE VISIT (OUTPATIENT)
Dept: FAMILY MEDICINE CLINIC | Facility: CLINIC | Age: 65
End: 2025-02-14
Payer: COMMERCIAL

## 2025-02-14 VITALS
HEART RATE: 72 BPM | OXYGEN SATURATION: 97 % | DIASTOLIC BLOOD PRESSURE: 70 MMHG | WEIGHT: 182 LBS | HEIGHT: 68 IN | BODY MASS INDEX: 27.58 KG/M2 | SYSTOLIC BLOOD PRESSURE: 128 MMHG | TEMPERATURE: 95.8 F

## 2025-02-14 DIAGNOSIS — I10 HYPERTENSION, UNSPECIFIED TYPE: Primary | ICD-10-CM

## 2025-02-14 DIAGNOSIS — C67.9 UROTHELIAL CARCINOMA OF BLADDER (HCC): ICD-10-CM

## 2025-02-14 DIAGNOSIS — J44.9 CHRONIC OBSTRUCTIVE PULMONARY DISEASE, UNSPECIFIED COPD TYPE (HCC): ICD-10-CM

## 2025-02-14 DIAGNOSIS — C34.90 MALIGNANT NEOPLASM OF LUNG, UNSPECIFIED LATERALITY, UNSPECIFIED PART OF LUNG (HCC): ICD-10-CM

## 2025-02-14 DIAGNOSIS — I48.91 ATRIAL FIBRILLATION, UNSPECIFIED TYPE (HCC): ICD-10-CM

## 2025-02-14 DIAGNOSIS — I73.9 PAD (PERIPHERAL ARTERY DISEASE) (HCC): ICD-10-CM

## 2025-02-14 DIAGNOSIS — I48.91 ATRIAL FIBRILLATION WITH RAPID VENTRICULAR RESPONSE (HCC): ICD-10-CM

## 2025-02-14 PROCEDURE — 93922 UPR/L XTREMITY ART 2 LEVELS: CPT | Performed by: SURGERY

## 2025-02-14 PROCEDURE — 93925 LOWER EXTREMITY STUDY: CPT | Performed by: SURGERY

## 2025-02-14 PROCEDURE — 99214 OFFICE O/P EST MOD 30 MIN: CPT | Performed by: PHYSICIAN ASSISTANT

## 2025-02-14 RX ORDER — ATORVASTATIN CALCIUM 20 MG/1
20 TABLET, FILM COATED ORAL DAILY
Qty: 100 TABLET | Refills: 1 | Status: SHIPPED | OUTPATIENT
Start: 2025-02-14 | End: 2025-09-02

## 2025-02-14 RX ORDER — METOPROLOL SUCCINATE 50 MG/1
50 TABLET, EXTENDED RELEASE ORAL DAILY
Qty: 100 TABLET | Refills: 1 | Status: SHIPPED | OUTPATIENT
Start: 2025-02-14 | End: 2025-09-02

## 2025-02-14 NOTE — PROGRESS NOTES
Name: Nahid Luis      : 1960      MRN: [id number]  Encounter Provider: Denys Moncada PA-C  Encounter Date: 2025   Encounter department: [facility] [address]    Assessment & Plan  Atrial fibrillation, unspecified type (HCC)  Maintaining NSR  Continue toprol and eliquis  Orders:  •  apixaban (Eliquis) 5 mg; Take 1 tablet (5 mg total) by mouth 2 (two) times a day    PAD (peripheral artery disease) (HCC)  Continues with vascular  Tight lipid control and BP control  No ASA, on eliquis  Orders:  •  atorvastatin (LIPITOR) 20 mg tablet; Take 1 tablet (20 mg total) by mouth daily    Atrial fibrillation with rapid ventricular response (HCC)    Orders:  •  metoprolol succinate (TOPROL-XL) 50 mg 24 hr tablet; Take 1 tablet (50 mg total) by mouth daily    Hypertension, unspecified type  At goal, continue losartan, metoprolol       Chronic obstructive pulmonary disease, unspecified COPD type (HCC)  Smoking cessation encouraged  Has upcoming PFT  Continues on wixela  Continue with pulmonary       Malignant neoplasm of lung, unspecified laterality, unspecified part of lung (HCC)  Adenocarcinoma on tissue exam  Continue with thoracic, pulm, oncology       Urothelial carcinoma of bladder (HCC)  Plan per urology  Intravesical therapy to be started             History of Present Illness     Pt presents for follow up    In the interim had repeat TURBT for bladder mass and will be undergoing intravesicle therapy with urology    Also had IR lung mass bx and dx with lung adenocarcinoma and is following with thoracic, pulmonary and med onc. MRI brain without metastatic disease.    He overall continues to feel well    BP remains at goal and he follows with cardiology, maintains a NSR            Review of Systems   Constitutional:  Negative for chills, fatigue and fever.   HENT:  Negative for congestion, ear pain, hearing loss, nosebleeds, postnasal drip, rhinorrhea, sinus pressure, sinus pain, sneezing  and sore throat.    Eyes:  Negative for pain, discharge, itching and visual disturbance.   Respiratory:  Negative for cough, chest tightness, shortness of breath and wheezing.    Cardiovascular:  Negative for chest pain, palpitations and leg swelling.   Gastrointestinal:  Negative for abdominal pain, blood in stool, constipation, diarrhea, nausea and vomiting.   Genitourinary:  Negative for frequency and urgency.   Neurological:  Negative for dizziness, light-headedness and numbness.     Past Medical History:   Diagnosis Date   • Anemia    • Anxiety    • Bladder cancer (HCC)    • Cancer (HCC)    • Colon polyp    • COPD (chronic obstructive pulmonary disease) (HCC)    • Depression    • History of transfusion 10/2024   • Hyperlipidemia    • Hypertension    • Irregular heart beat     afib   • Mass of left lung    • No natural teeth    • Pneumonia    • Wears glasses      Past Surgical History:   Procedure Laterality Date   • APPENDECTOMY     • COLONOSCOPY     • CYSTOSCOPY     • ENDOBRONCHIAL ULTRASOUND (EBUS)  01/08/2025   • IR BIOPSY LUNG  1/29/2025   • IR LOWER EXTREMITY ANGIOGRAM  09/24/2024   • TX BYP FEM-ANT TIBL PST TIBL PRONEAL ART/OTH DSTL Right 10/31/2024    Procedure: right Common femoral artery to anterior tibial bypass with autologous vein;  Surgeon: Carlos Bray DO;  Location: [facility];  Service: Vascular   • TX CYSTO W/REMOVAL OF LESIONS SMALL N/A 1/21/2025    Procedure: TRANSURETHRAL RESECTION OF BLADDER TUMOR (TURBT);  Surgeon: Toni Real MD;  Location:  MAIN OR;  Service: Urology   • TRANSURETHRAL RESECTION OF BLADDER TUMOR N/A 11/05/2024    Procedure: (TURBT);  Surgeon: Gilmer Duke MD;  Location: [facility];  Service: Urology   • US GUIDED LYMPH NODE BIOPSY LEFT  01/02/2025     Family History   Problem Relation Age of Onset   • Alcohol abuse Father    • Heart attack Father      Social History     Tobacco Use   • Smoking status: Former     Current packs/day: 0.00     Average packs/day: 1  "pack/day for 49.8 years (49.8 ttl pk-yrs)     Types: Cigarettes     Start date:      Quit date: 10/30/2024     Years since quittin.2     Passive exposure: Current   • Smokeless tobacco: Never   Vaping Use   • Vaping status: Never Used   Substance and Sexual Activity   • Alcohol use: Not Currently     Alcohol/week: 6.0 standard drinks of alcohol     Types: 6 Cans of beer per week     Comment: daily 3-5 beers daily and shots last drank 10/30   • Drug use: No   • Sexual activity: Not Currently     Current Outpatient Medications on File Prior to Visit   Medication Sig   • albuterol (Proventil HFA) 90 mcg/act inhaler Inhale 2 puffs every 6 (six) hours as needed for wheezing   • Fluticasone-Salmeterol (Wixela Inhub) 100-50 mcg/dose inhaler Inhale 1 puff if needed Rinse mouth after use.   • ibuprofen (MOTRIN) 600 mg tablet Take 1 tablet (600 mg total) by mouth every 6 (six) hours as needed for mild pain   • losartan (COZAAR) 50 mg tablet Take 2 tablets (100 mg total) by mouth daily   • phenazopyridine (PYRIDIUM) 200 mg tablet Take 1 tablet (200 mg total) by mouth 3 (three) times a day with meals   • [DISCONTINUED] apixaban (Eliquis) 5 mg Take 1 tablet (5 mg total) by mouth 2 (two) times a day   • [DISCONTINUED] atorvastatin (LIPITOR) 20 mg tablet Take 1 tablet (20 mg total) by mouth daily   • [DISCONTINUED] metoprolol succinate (TOPROL-XL) 50 mg 24 hr tablet Take 1 tablet (50 mg total) by mouth daily     No Known Allergies    There is no immunization history on file for this patient.  Objective   /70 (BP Location: Left arm, Patient Position: Sitting, Cuff Size: Large)   Pulse 72   Temp (!) 95.8 °F (35.4 °C)   Ht 5' 8\" (1.727 m)   Wt 82.6 kg (182 lb)   SpO2 97%   BMI 27.67 kg/m²     Physical Exam  Vitals and nursing note reviewed.   Constitutional:       General: He is not in acute distress.     Appearance: He is well-developed.   HENT:      Head: Normocephalic and atraumatic.   Cardiovascular:      Rate " and Rhythm: Normal rate and regular rhythm.      Heart sounds: No murmur heard.  Pulmonary:      Effort: Pulmonary effort is normal. No respiratory distress.      Breath sounds: Normal breath sounds. No wheezing, rhonchi or rales.   Musculoskeletal:         General: No swelling.      Cervical back: Neck supple.      Right lower leg: No edema.      Left lower leg: No edema.   Skin:     General: Skin is warm and dry.      Capillary Refill: Capillary refill takes less than 2 seconds.   Neurological:      Mental Status: He is alert.

## 2025-02-14 NOTE — ASSESSMENT & PLAN NOTE
Continues with vascular  Tight lipid control and BP control  No ASA, on eliquis  Orders:  •  atorvastatin (LIPITOR) 20 mg tablet; Take 1 tablet (20 mg total) by mouth daily

## 2025-02-17 ENCOUNTER — CONSULT (OUTPATIENT)
Dept: HEMATOLOGY ONCOLOGY | Facility: CLINIC | Age: 65
End: 2025-02-17
Payer: COMMERCIAL

## 2025-02-17 ENCOUNTER — TELEPHONE (OUTPATIENT)
Dept: HEMATOLOGY ONCOLOGY | Facility: CLINIC | Age: 65
End: 2025-02-17

## 2025-02-17 VITALS
TEMPERATURE: 96.4 F | WEIGHT: 182 LBS | BODY MASS INDEX: 27.58 KG/M2 | SYSTOLIC BLOOD PRESSURE: 146 MMHG | HEART RATE: 60 BPM | RESPIRATION RATE: 20 BRPM | HEIGHT: 68 IN | OXYGEN SATURATION: 93 % | DIASTOLIC BLOOD PRESSURE: 72 MMHG

## 2025-02-17 DIAGNOSIS — R11.2 NAUSEA AND VOMITING, UNSPECIFIED VOMITING TYPE: Primary | ICD-10-CM

## 2025-02-17 DIAGNOSIS — C34.92 ADENOCARCINOMA OF LEFT LUNG (HCC): Primary | ICD-10-CM

## 2025-02-17 DIAGNOSIS — C34.92 ADENOCARCINOMA OF LEFT LUNG (HCC): ICD-10-CM

## 2025-02-17 PROCEDURE — 99204 OFFICE O/P NEW MOD 45 MIN: CPT | Performed by: INTERNAL MEDICINE

## 2025-02-17 RX ORDER — CYANOCOBALAMIN 1000 UG/ML
1000 INJECTION, SOLUTION INTRAMUSCULAR; SUBCUTANEOUS ONCE
Status: CANCELLED | OUTPATIENT
Start: 2025-02-20 | End: 2025-02-19

## 2025-02-17 RX ORDER — ONDANSETRON 4 MG/1
4 TABLET, FILM COATED ORAL EVERY 8 HOURS PRN
Qty: 20 TABLET | Refills: 1 | Status: SHIPPED | OUTPATIENT
Start: 2025-02-17

## 2025-02-17 RX ORDER — DEXAMETHASONE 4 MG/1
4 TABLET ORAL 2 TIMES DAILY WITH MEALS
Qty: 16 TABLET | Refills: 0 | Status: SHIPPED | OUTPATIENT
Start: 2025-02-17

## 2025-02-17 NOTE — PROGRESS NOTES
Oncology Consult Note  Nahid Luis 65 y.o. male MRN: [id number]  Unit/Bed#:  Encounter: [id number]      Presenting Complaint: At least stage IIIa poorly differentiated adenocarcinoma of the lung    History of Presenting Illness: Nahid Luis is seen for initial consultation 2/17/2025 at the referral of Shahana Mcwilliams MD   65-year-old who used to be a heavy smoker and heavy drinker quit in October 2024, anxiety, history of superficial bladder cancer with intravesical chemotherapy, colon polyp, COPD, depression, peripheral vascular disease with femoral bypass surgery with CTA of the chest abdomen and pelvis showed aneurysmal dilatation at the bifurcation of the right external and internal iliac arteries, 6 cm mass over the posterior bladder concerning for bladder cancer    PET scan on 11/29/2024 showed hypermetabolic left upper lobe mass with SUV of 18, hypermetabolic nodules in the left upper lobe, hypermetabolic left hilar node and right hilar nodes, CAT scan component showed spiculated mass in the left upper lobe measuring 4.5 x 4.8 cm and subcentimeter areas of nodularity in the left upper lobe    He had TURBT on 11/5/2024 for high-grade tumor negative for residual carcinoma no muscle invasion status post intravesical BCG          Review of Systems - As stated in the HPI otherwise the fourteen point review of systems was negative.    Past Medical History:   Diagnosis Date    Anemia     Anxiety     Bladder cancer (HCC)     Cancer (HCC)     Colon polyp     COPD (chronic obstructive pulmonary disease) (HCC)     Depression     History of transfusion 10/2024    Hyperlipidemia     Hypertension     Irregular heart beat     afib    Mass of left lung     No natural teeth     Pneumonia     Wears glasses        Social History     Socioeconomic History    Marital status: Single     Spouse name: Not on file    Number of children: Not on file    Years of education: Not on file    Highest education level: Not on  file   Occupational History    Not on file   Tobacco Use    Smoking status: Former     Current packs/day: 0.00     Average packs/day: 1 pack/day for 49.8 years (49.8 ttl pk-yrs)     Types: Cigarettes     Start date:      Quit date: 10/30/2024     Years since quittin.3     Passive exposure: Current    Smokeless tobacco: Never   Vaping Use    Vaping status: Never Used   Substance and Sexual Activity    Alcohol use: Not Currently     Alcohol/week: 6.0 standard drinks of alcohol     Types: 6 Cans of beer per week     Comment: daily 3-5 beers daily and shots last drank 10/30    Drug use: No    Sexual activity: Not Currently   Other Topics Concern    Not on file   Social History Narrative    Not on file     Social Drivers of Health     Financial Resource Strain: Not on file   Food Insecurity: No Food Insecurity (2025)    Nursing - Inadequate Food Risk Classification     Worried About Running Out of Food in the Last Year: Not on file     Ran Out of Food in the Last Year: Not on file     Ran Out of Food in the Last Year: Never true   Transportation Needs: No Transportation Needs (2025)    Nursing - Transportation Risk Classification     Lack of Transportation: Not on file     Lack of Transportation: No   Physical Activity: Not on file   Stress: Not on file   Social Connections: Not on file   Intimate Partner Violence: Unknown (2025)    Nursing IPS     Feels Physically and Emotionally Safe: Not on file     Physically Hurt by Someone: Not on file     Humiliated or Emotionally Abused by Someone: Not on file     Physically Hurt by Someone: No     Hurt or Threatened by Someone: No   Housing Stability: Unknown (2025)    Nursing: Inadequate Housing Risk Classification     Has Housing: Not on file     Worried About Losing Housing: Not on file     Unable to Get Utilities: Not on file     Unable to Pay for Housing in the Last Year: No     Has Housin       Family History   Problem Relation Age of Onset     "Alcohol abuse Father     Heart attack Father        No Known Allergies      Current Outpatient Medications:     albuterol (Proventil HFA) 90 mcg/act inhaler, Inhale 2 puffs every 6 (six) hours as needed for wheezing, Disp: 6.7 g, Rfl: 5    apixaban (Eliquis) 5 mg, Take 1 tablet (5 mg total) by mouth 2 (two) times a day, Disp: 180 tablet, Rfl: 1    atorvastatin (LIPITOR) 20 mg tablet, Take 1 tablet (20 mg total) by mouth daily, Disp: 100 tablet, Rfl: 1    Fluticasone-Salmeterol (Wixela Inhub) 100-50 mcg/dose inhaler, Inhale 1 puff if needed Rinse mouth after use., Disp: , Rfl:     ibuprofen (MOTRIN) 600 mg tablet, Take 1 tablet (600 mg total) by mouth every 6 (six) hours as needed for mild pain, Disp: 30 tablet, Rfl: 0    losartan (COZAAR) 50 mg tablet, Take 2 tablets (100 mg total) by mouth daily, Disp: 180 tablet, Rfl: 1    metoprolol succinate (TOPROL-XL) 50 mg 24 hr tablet, Take 1 tablet (50 mg total) by mouth daily, Disp: 100 tablet, Rfl: 1    phenazopyridine (PYRIDIUM) 200 mg tablet, Take 1 tablet (200 mg total) by mouth 3 (three) times a day with meals, Disp: 10 tablet, Rfl: 0      /72 (BP Location: Left arm, Patient Position: Sitting, Cuff Size: Adult)   Pulse 60   Temp (!) 96.4 °F (35.8 °C) (Temporal)   Resp 20   Ht 5' 8\" (1.727 m)   Wt 82.6 kg (182 lb)   SpO2 93%   BMI 27.67 kg/m²       General Appearance:    Alert, oriented        Eyes:    PERRL   Ears:    Normal external ear canals, both ears   Nose:   Nares normal, septum midline   Throat:   Mucosa moist. Pharynx without injection.    Neck:   Supple       Lungs:     Clear to auscultation bilaterally   Chest Wall:    No tenderness or deformity    Heart:    Regular rate and rhythm       Abdomen:     Soft, non-tender, bowel sounds +, no organomegaly           Extremities:   Extremities no cyanosis or edema       Skin:   no rash or icterus.    Lymph nodes:   Cervical, supraclavicular, and axillary nodes normal   Neurologic:   CNII-XII intact, " normal strength, sensation and reflexes     Throughout               No results found for this or any previous visit (from the past 48 hours).      VAS ARTERIAL DUPLEX- LOWER LIMB BILATERAL  Result Date: 2/14/2025  Narrative:  [facility] REPORT CLINICAL: Indications: Initial Post-op evaluation s/p revascularization with right common femoral to anterior tibial artery bypass graft. Patient reports resolution of symptoms with minimal residual right foot numbness. He reports his left leg is asymptomatic. Operative History: 2024-10-31 Right Common femoral to anterior tibial artery ipsilateral reverse GSV bypass graft (Dr. Bray) 2024-09-24 IR lower extremity angiogram Risk Factors The patient has history of HTN, Hyperlipidemia, PAD, CAD, AFIB, COPD, Malignancy (Current) and previous smoking (quit <1yr ago). Clinical Right Pressure: 146/ mm Hg, Left Pressure: 135/ mm Hg.  FINDINGS:  Right                  Waveform   PSV (cm/s)  Post. Tibial           Triphasic              Ant. Tibial            Triphasic              Inflow Anastomosis                       179  High Thigh (Graft)                       148  Mid Thigh (Graft)                         93  Low Thigh (Graft)                         83  Near Knee (Graft)                         73  High Calf (Graft)                         86  Outflow Anastomosis                      110  Common Femoral Artery                    172  Dist Post Tibial                          78  Dist. Ant. Tibial                         65  Dist Peroneal                             50   Left                   Impression  Waveform    PSV (cm/s)  EDV  AP (cm)  Post. Tibial                       Monophasic                            Ant. Tibial                        Monophasic                            Common Femoral Artery  Aneurysm                       142           2.0  Prox Profunda          50-75%                         215                Prox SFA               Occluded                          0                Mid SFA                Occluded                         0                Dist SFA                                               65                Proximal Pop           >75%                           261   48           Distal Pop                                             32    8           Tibioperoneal                                          38    7           Dist Post Tibial                                       26    5           Dist. Ant. Tibial                                      26    5           Dist Peroneal                                          14    4              CONCLUSION:  Impression:  RIGHT LOWER LIMB: Evidence of a widely patent common femoral to anterior tibial artery bypass graft. Ankle/Brachial index: 0.90 which is in the mild disease category, (Prior: 0.28). PVR/ PPG tracings are normal. Triphasic waveforms noted at the ankle. Metatarsal pressure of 60 mm Hg, (Prior: Not obtained). Great toe pressure of 55 mm Hg, within the healing range, (Prior: Not obtained).  In comparison to the study on 8/17/2024 and s/p intervention 10/31/2024, the CFA to LINNETTE bypass graft is widely patent with significant improvement in the INOCENCIA, with obtainable metatarsal and great toe pressures.  LEFT LOWER LIMB: Evidence of high grade stenosis vs occlusion of the proximal superficial femoral artery with reconstitution in the distal superficial femoral artery. There is 50-75% stenosis of the profunda femoral artery and >75% stenosis of the proximal popliteal artery. 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 Ankle/Brachial index: 0.55 which is in the severe disease category, (Prior: 0.58). PVR/ PPG tracings are dampened. Monophasic waveforms noted at the ankle. Metatarsal pressure of 35 mm Hg, (Prior: Not obtained). Great toe pressure of 10 mm Hg, below the healing range, (Prior: Not obtained).  In comparison to the study on 8/17/2024, there are new  findings in the DFA and proximal popliteal artery, however metatarsal and great toe pressures were obtainable on this exam.  SIGNATURE: Electronically Signed by: EVY RYDER DO on 2025-02-14 01:56:35 PM    MRI brain w wo contrast  Result Date: 2/13/2025  Narrative: MRI BRAIN WITH AND WITHOUT CONTRAST INDICATION: C34.90: Malignant neoplasm of unspecified part of unspecified bronchus or lung. COMPARISON:  None. TECHNIQUE: Multiplanar, multisequence imaging of the brain was performed before and after gadolinium administration. IV Contrast:  8 mL of Gadobutrol injection IMAGE QUALITY:   Diagnostic. FINDINGS: BRAIN PARENCHYMA:  There is no discrete mass, mass effect or midline shift. There is no intracranial hemorrhage.  Normal posterior fossa.  Diffusion imaging is unremarkable. There are no white matter changes in the cerebral hemispheres. Postcontrast imaging of the brain demonstrates no abnormal enhancement. VENTRICLES:  Normal for the patient's age. SELLA AND PITUITARY GLAND:  Normal. ORBITS:  Normal. PARANASAL SINUSES:  Mucosal thickening of the sinuses with no air fluid levels. VASCULATURE:  Evaluation of the major intracranial vasculature demonstrates appropriate flow voids. CALVARIUM AND SKULL BASE:  Normal. EXTRACRANIAL SOFT TISSUES:  Normal.     Impression: No intracranial metastatic disease. No acute intracranial process. Workstation performed: [id number]     IR biopsy lung  Result Date: 1/29/2025  Narrative: IMAGE-GUIDED BIOPSY Indication: FDG-avid left upper lobe lung mass Procedure: In the supine position, a suitable location for biopsy was identified with CT . The site and surrounding skin were prepped and draped in sterile fashion. 1% lidocaine was used for local anesthetic and conscious sedation was administered. Using  CT guidance, a 17 gauge introducer needle was advanced into the left upper lobe mass using a lateral approach.  Core biopsy was performed with 4 passes made coaxially with a 18 gauge  Biopince biopsy device. Specimens were submitted to on-site cytology. The introducer needle was removed. The puncture site was cleansed and a dressing was applied. This examination, like all CT scans performed in the [facility], was performed utilizing techniques to minimize radiation dose exposure, including the use of iterative reconstruction and automated exposure control. Sedation time: 1 hour Comparison: CT chest 12/18/2024; PET/CT 11/29/2024 Findings: Left upper lobe mass sampled. 4 core passes submitted to pathology in formalin. No immediate post-biopsy complication.     Impression: Impression: Technically successful image-guided biopsy of left upper lobe lung mass. Attestation Signer name: SCOTT WALLACE I attest that I was present for the entire procedure. I reviewed the stored images and agree with the report as written. Workstation performed: [id number]     ECOG PS:0      Assessment and plan:  #1.  Adenocarcinoma of the left upper lobe of the lung with mass measuring about 4 x 5 cm, possible spiculated lesions in the left upper lobe, with mediastinal lymphadenopathy, the most recent CAT scan of the chest showed increase in the subcarinal lymphadenopathy, at least stage IIIa disease    MRI of the brain was reported normal    Molecular test showed PDL expression more than 90% no other actionable mutations    The case discussed in the tumor conference    If the patient is candidate for surgical resection he would be treated with neoadjuvant pemetrexed/carboplatin/pembrolizumab    The patient has tingling and numbness in his lower extremities, he works on heavy machines  he has vascular disease as well that is why the choice of carboplatin instead of cisplatin    If the patient is not candidate for surgical resection I believe doing at least 3 induction therapy with Alimta/carboplatin/pembrolizumab followed by concurrent radiation therapy is a good idea as well    High-grade  superficial bladder cancer status post TURBT on 11/2024 no muscle invasion    Status post BCG followed by urology    I will hold BCG at this time especially with immunotherapy and chemotherapy for lung cancer

## 2025-02-17 NOTE — LETTER
February 17, 2025     Shahana Mcwilliasm MD  [address] [facility] [address]    Patient: Nahid Luis   YOB: 1960   Date of Visit: 2/17/2025       Dear Dr. Mcwilliams:    Thank you for referring Nahid Luis to me for evaluation. Below are my notes for this consultation.    If you have questions, please do not hesitate to call me. I look forward to following your patient along with you.         Sincerely,        Taqueria Macedo MD        CC: MD Yen Mays MD Yacoub Faroun, MD  2/17/2025 10:34 AM  Incomplete  Oncology Consult Note  Nahid Luis 65 y.o. male MRN: [id number]  Unit/Bed#:  Encounter: [id number]      Presenting Complaint: At least stage IIIa poorly differentiated adenocarcinoma of the lung    History of Presenting Illness: Nahid Luis is seen for initial consultation 2/17/2025 at the referral of Shahana Mcwilliams MD   65-year-old who used to be a heavy smoker and heavy drinker quit in October 2024, anxiety, history of superficial bladder cancer with intravesical chemotherapy, colon polyp, COPD, depression, peripheral vascular disease with femoral bypass surgery with CTA of the chest abdomen and pelvis showed aneurysmal dilatation at the bifurcation of the right external and internal iliac arteries, 6 cm mass over the posterior bladder concerning for bladder cancer    PET scan on 11/29/2024 showed hypermetabolic left upper lobe mass with SUV of 18, hypermetabolic nodules in the left upper lobe, hypermetabolic left hilar node and right hilar nodes, CAT scan component showed spiculated mass in the left upper lobe measuring 4.5 x 4.8 cm and subcentimeter areas of nodularity in the left upper lobe    He had TURBT on 11/5/2024 for high-grade tumor negative for residual carcinoma no muscle invasion status post intravesical BCG          Review of Systems - As stated in the HPI otherwise the fourteen point review of systems was negative.    Past Medical  History:   Diagnosis Date   • Anemia    • Anxiety    • Bladder cancer (HCC)    • Cancer (HCC)    • Colon polyp    • COPD (chronic obstructive pulmonary disease) (HCC)    • Depression    • History of transfusion 10/2024   • Hyperlipidemia    • Hypertension    • Irregular heart beat     afib   • Mass of left lung    • No natural teeth    • Pneumonia    • Wears glasses        Social History     Socioeconomic History   • Marital status: Single     Spouse name: Not on file   • Number of children: Not on file   • Years of education: Not on file   • Highest education level: Not on file   Occupational History   • Not on file   Tobacco Use   • Smoking status: Former     Current packs/day: 0.00     Average packs/day: 1 pack/day for 49.8 years (49.8 ttl pk-yrs)     Types: Cigarettes     Start date:      Quit date: 10/30/2024     Years since quittin.3     Passive exposure: Current   • Smokeless tobacco: Never   Vaping Use   • Vaping status: Never Used   Substance and Sexual Activity   • Alcohol use: Not Currently     Alcohol/week: 6.0 standard drinks of alcohol     Types: 6 Cans of beer per week     Comment: daily 3-5 beers daily and shots last drank 10/30   • Drug use: No   • Sexual activity: Not Currently   Other Topics Concern   • Not on file   Social History Narrative   • Not on file     Social Drivers of Health     Financial Resource Strain: Not on file   Food Insecurity: No Food Insecurity (2025)    Nursing - Inadequate Food Risk Classification    • Worried About Running Out of Food in the Last Year: Not on file    • Ran Out of Food in the Last Year: Not on file    • Ran Out of Food in the Last Year: Never true   Transportation Needs: No Transportation Needs (2025)    Nursing - Transportation Risk Classification    • Lack of Transportation: Not on file    • Lack of Transportation: No   Physical Activity: Not on file   Stress: Not on file   Social Connections: Not on file   Intimate Partner Violence:  "Unknown (2025)    Nursing IPS    • Feels Physically and Emotionally Safe: Not on file    • Physically Hurt by Someone: Not on file    • Humiliated or Emotionally Abused by Someone: Not on file    • Physically Hurt by Someone: No    • Hurt or Threatened by Someone: No   Housing Stability: Unknown (2025)    Nursing: Inadequate Housing Risk Classification    • Has Housing: Not on file    • Worried About Losing Housing: Not on file    • Unable to Get Utilities: Not on file    • Unable to Pay for Housing in the Last Year: No    • Has Housin       Family History   Problem Relation Age of Onset   • Alcohol abuse Father    • Heart attack Father        No Known Allergies      Current Outpatient Medications:   •  albuterol (Proventil HFA) 90 mcg/act inhaler, Inhale 2 puffs every 6 (six) hours as needed for wheezing, Disp: 6.7 g, Rfl: 5  •  apixaban (Eliquis) 5 mg, Take 1 tablet (5 mg total) by mouth 2 (two) times a day, Disp: 180 tablet, Rfl: 1  •  atorvastatin (LIPITOR) 20 mg tablet, Take 1 tablet (20 mg total) by mouth daily, Disp: 100 tablet, Rfl: 1  •  Fluticasone-Salmeterol (Wixela Inhub) 100-50 mcg/dose inhaler, Inhale 1 puff if needed Rinse mouth after use., Disp: , Rfl:   •  ibuprofen (MOTRIN) 600 mg tablet, Take 1 tablet (600 mg total) by mouth every 6 (six) hours as needed for mild pain, Disp: 30 tablet, Rfl: 0  •  losartan (COZAAR) 50 mg tablet, Take 2 tablets (100 mg total) by mouth daily, Disp: 180 tablet, Rfl: 1  •  metoprolol succinate (TOPROL-XL) 50 mg 24 hr tablet, Take 1 tablet (50 mg total) by mouth daily, Disp: 100 tablet, Rfl: 1  •  phenazopyridine (PYRIDIUM) 200 mg tablet, Take 1 tablet (200 mg total) by mouth 3 (three) times a day with meals, Disp: 10 tablet, Rfl: 0      /72 (BP Location: Left arm, Patient Position: Sitting, Cuff Size: Adult)   Pulse 60   Temp (!) 96.4 °F (35.8 °C) (Temporal)   Resp 20   Ht 5' 8\" (1.727 m)   Wt 82.6 kg (182 lb)   SpO2 93%   BMI 27.67 kg/m² "       General Appearance:    Alert, oriented        Eyes:    PERRL   Ears:    Normal external ear canals, both ears   Nose:   Nares normal, septum midline   Throat:   Mucosa moist. Pharynx without injection.    Neck:   Supple       Lungs:     Clear to auscultation bilaterally   Chest Wall:    No tenderness or deformity    Heart:    Regular rate and rhythm       Abdomen:     Soft, non-tender, bowel sounds +, no organomegaly           Extremities:   Extremities no cyanosis or edema       Skin:   no rash or icterus.    Lymph nodes:   Cervical, supraclavicular, and axillary nodes normal   Neurologic:   CNII-XII intact, normal strength, sensation and reflexes     Throughout               No results found for this or any previous visit (from the past 48 hours).      VAS ARTERIAL DUPLEX- LOWER LIMB BILATERAL  Result Date: 2/14/2025  Narrative:  [facility] REPORT CLINICAL: Indications: Initial Post-op evaluation s/p revascularization with right common femoral to anterior tibial artery bypass graft. Patient reports resolution of symptoms with minimal residual right foot numbness. He reports his left leg is asymptomatic. Operative History: 2024-10-31 Right Common femoral to anterior tibial artery ipsilateral reverse GSV bypass graft (Dr. Bray) 2024-09-24 IR lower extremity angiogram Risk Factors The patient has history of HTN, Hyperlipidemia, PAD, CAD, AFIB, COPD, Malignancy (Current) and previous smoking (quit <1yr ago). Clinical Right Pressure: 146/ mm Hg, Left Pressure: 135/ mm Hg.  FINDINGS:  Right                  Waveform   PSV (cm/s)  Post. Tibial           Triphasic              Ant. Tibial            Triphasic              Inflow Anastomosis                       179  High Thigh (Graft)                       148  Mid Thigh (Graft)                         93  Low Thigh (Graft)                         83  Near Knee (Graft)                         73  High Calf (Graft)                         86  Outflow  Anastomosis                      110  Common Femoral Artery                    172  Dist Post Tibial                          78  Dist. Ant. Tibial                         65  Dist Peroneal                             50   Left                   Impression  Waveform    PSV (cm/s)  EDV  AP (cm)  Post. Tibial                       Monophasic                            Ant. Tibial                        Monophasic                            Common Femoral Artery  Aneurysm                       142           2.0  Prox Profunda          50-75%                         215                Prox SFA               Occluded                         0                Mid SFA                Occluded                         0                Dist SFA                                               65                Proximal Pop           >75%                           261   48           Distal Pop                                             32    8           Tibioperoneal                                          38    7           Dist Post Tibial                                       26    5           Dist. Ant. Tibial                                      26    5           Dist Peroneal                                          14    4              CONCLUSION:  Impression:  RIGHT LOWER LIMB: Evidence of a widely patent common femoral to anterior tibial artery bypass graft. Ankle/Brachial index: 0.90 which is in the mild disease category, (Prior: 0.28). PVR/ PPG tracings are normal. Triphasic waveforms noted at the ankle. Metatarsal pressure of 60 mm Hg, (Prior: Not obtained). Great toe pressure of 55 mm Hg, within the healing range, (Prior: Not obtained).  In comparison to the study on 8/17/2024 and s/p intervention 10/31/2024, the CFA to LINNETTE bypass graft is widely patent with significant improvement in the INOCENCIA, with obtainable metatarsal and great toe pressures.  LEFT LOWER LIMB: Evidence of high grade stenosis vs occlusion of the  proximal superficial femoral artery with reconstitution in the distal superficial femoral artery. There is 50-75% stenosis of the profunda femoral artery and >75% stenosis of the proximal popliteal artery. 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 Ankle/Brachial index: 0.55 which is in the severe disease category, (Prior: 0.58). PVR/ PPG tracings are dampened. Monophasic waveforms noted at the ankle. Metatarsal pressure of 35 mm Hg, (Prior: Not obtained). Great toe pressure of 10 mm Hg, below the healing range, (Prior: Not obtained).  In comparison to the study on 8/17/2024, there are new findings in the DFA and proximal popliteal artery, however metatarsal and great toe pressures were obtainable on this exam.  SIGNATURE: Electronically Signed by: EVY RYDER DO on 2025-02-14 01:56:35 PM    MRI brain w wo contrast  Result Date: 2/13/2025  Narrative: MRI BRAIN WITH AND WITHOUT CONTRAST INDICATION: C34.90: Malignant neoplasm of unspecified part of unspecified bronchus or lung. COMPARISON:  None. TECHNIQUE: Multiplanar, multisequence imaging of the brain was performed before and after gadolinium administration. IV Contrast:  8 mL of Gadobutrol injection IMAGE QUALITY:   Diagnostic. FINDINGS: BRAIN PARENCHYMA:  There is no discrete mass, mass effect or midline shift. There is no intracranial hemorrhage.  Normal posterior fossa.  Diffusion imaging is unremarkable. There are no white matter changes in the cerebral hemispheres. Postcontrast imaging of the brain demonstrates no abnormal enhancement. VENTRICLES:  Normal for the patient's age. SELLA AND PITUITARY GLAND:  Normal. ORBITS:  Normal. PARANASAL SINUSES:  Mucosal thickening of the sinuses with no air fluid levels. VASCULATURE:  Evaluation of the major intracranial vasculature demonstrates appropriate flow voids. CALVARIUM AND SKULL BASE:  Normal. EXTRACRANIAL SOFT TISSUES:  Normal.     Impression: No intracranial metastatic  disease. No acute intracranial process. Workstation performed: [id number]     IR biopsy lung  Result Date: 1/29/2025  Narrative: IMAGE-GUIDED BIOPSY Indication: FDG-avid left upper lobe lung mass Procedure: In the supine position, a suitable location for biopsy was identified with CT . The site and surrounding skin were prepped and draped in sterile fashion. 1% lidocaine was used for local anesthetic and conscious sedation was administered. Using  CT guidance, a 17 gauge introducer needle was advanced into the left upper lobe mass using a lateral approach.  Core biopsy was performed with 4 passes made coaxially with a 18 gauge Biopince biopsy device. Specimens were submitted to on-site cytology. The introducer needle was removed. The puncture site was cleansed and a dressing was applied. This examination, like all CT scans performed in the [facility], was performed utilizing techniques to minimize radiation dose exposure, including the use of iterative reconstruction and automated exposure control. Sedation time: 1 hour Comparison: CT chest 12/18/2024; PET/CT 11/29/2024 Findings: Left upper lobe mass sampled. 4 core passes submitted to pathology in formalin. No immediate post-biopsy complication.     Impression: Impression: Technically successful image-guided biopsy of left upper lobe lung mass. Attestation Signer name: SCOTT WALLACE I attest that I was present for the entire procedure. I reviewed the stored images and agree with the report as written. Workstation performed: [id number]     ECOG PS:0      Assessment and plan:  #1.  Adenocarcinoma of the left upper lobe of the lung with mass measuring about 4 x 5 cm, possible spiculated lesions in the left upper lobe, with mediastinal lymphadenopathy, the most recent CAT scan of the chest showed increase in the subcarinal lymphadenopathy, at least stage IIIa disease    MRI of the brain was reported normal    Molecular test showed PDL expression  more than 90% no other actionable mutations    The case discussed in the tumor conference    If the patient is candidate for surgical resection he would be treated with neoadjuvant pemetrexed/carboplatin/pembrolizumab    The patient has tingling and numbness in his lower extremities, he works on heavy machines  he has vascular disease as well that is why the choice of carboplatin instead of cisplatin    If the patient is not candidate for surgical resection I believe doing at least 3 induction therapy with Alimta/carboplatin/pembrolizumab followed by concurrent radiation therapy is a good idea as well    High-grade superficial bladder cancer status post TURBT on 11/2024 no muscle invasion    Status post BCG followed by urology    I will hold BCG at this time especially with immunotherapy and chemotherapy for lung cancer

## 2025-02-18 ENCOUNTER — RESULTS FOLLOW-UP (OUTPATIENT)
Dept: VASCULAR SURGERY | Facility: CLINIC | Age: 65
End: 2025-02-18

## 2025-02-18 ENCOUNTER — TELEPHONE (OUTPATIENT)
Dept: INFUSION CENTER | Facility: CLINIC | Age: 65
End: 2025-02-18

## 2025-02-18 ENCOUNTER — CONSULT (OUTPATIENT)
Dept: CARDIAC SURGERY | Facility: CLINIC | Age: 65
End: 2025-02-18
Payer: COMMERCIAL

## 2025-02-18 VITALS
OXYGEN SATURATION: 95 % | HEART RATE: 58 BPM | SYSTOLIC BLOOD PRESSURE: 128 MMHG | WEIGHT: 185 LBS | BODY MASS INDEX: 29.73 KG/M2 | DIASTOLIC BLOOD PRESSURE: 72 MMHG | HEIGHT: 66 IN

## 2025-02-18 DIAGNOSIS — C67.9 UROTHELIAL CARCINOMA OF BLADDER (HCC): ICD-10-CM

## 2025-02-18 DIAGNOSIS — Z98.890 S/P FEMORAL-TIBIAL BYPASS: ICD-10-CM

## 2025-02-18 DIAGNOSIS — C34.92 ADENOCARCINOMA OF LEFT LUNG (HCC): Primary | ICD-10-CM

## 2025-02-18 DIAGNOSIS — R91.8 LUNG MASS: ICD-10-CM

## 2025-02-18 DIAGNOSIS — Z72.0 TOBACCO ABUSE: ICD-10-CM

## 2025-02-18 PROCEDURE — 99205 OFFICE O/P NEW HI 60 MIN: CPT | Performed by: THORACIC SURGERY (CARDIOTHORACIC VASCULAR SURGERY)

## 2025-02-18 NOTE — TELEPHONE ENCOUNTER
NEW PATIENT PHONE CALL  S/w daughter dee, she is aware of policy and procedures here at infusion center.  She know that he will have to get blood work done prior to his chemo infusions.

## 2025-02-18 NOTE — PROGRESS NOTES
Name: Nahid Luis      : 1960      MRN: [id number]  Encounter Provider: Yen Knott MD  Encounter Date: 2025   Encounter department: [facility]  :  Assessment & Plan  Lung mass    Orders:    Ambulatory Referral to Thoracic Oncology    Adenocarcinoma of left lung (HCC)  Today during the visit, we had a long conversation regarding his newly diagnosed lung cancer.  At this time, he has not had pulmonary function testing yet.  This is scheduled for tomorrow.  Based upon the results of this, I will be able to determine surgical candidacy.  However, based upon his clinical history, he has a very active lifestyle and I believe that he will likely have adequate pulmonary function testing for surgical resection.  In that case, I will leave that induction chemo/immunotherapy is in his best interest and then to come back and see me in approximately 4 months with a restaging PET CT scan.  The patient understands and agrees with the plan.  He is already scheduled to start infusion with Dr. Macedo.  I will touch base with Dr. Macedo after the pulmonary function testing to make sure that everyone is on the same page         Urothelial carcinoma of bladder (HCC)  Managed and followed by urology as well as Dr. Macedo of medical oncology         Tobacco abuse  Has since quit.  I encouraged him to remain off of cigarettes         S/P femoral-tibial bypass  Doing well and is recovering without issue.  On Eliquis and tolerating without issue             Thoracic History       Oncology History   Adenocarcinoma of left lung (HCC)   2025 Initial Diagnosis    Adenocarcinoma of left lung (HCC)     2025 -  Cancer Staged    Staging form: Lung, AJCC V9  - Clinical: cT2, cN2, cM0 - Signed by Taqueria Macedo MD on 2025  Stage prefix: Initial diagnosis  Method of lymph node assessment: Clinical  Histologic grade (G): G3  Histologic grading system: 4 grade system        2/27/2025 -  Chemotherapy    cyanocobalamin, 1,000 mcg, Intramuscular, Once, 0 of 3 cycles  alteplase (CATHFLO), 2 mg, Intracatheter, Every 1 Minute as needed, 0 of 6 cycles  fosaprepitant (EMEND) IVPB, 150 mg, Intravenous, Once, 0 of 6 cycles  CARBOplatin (PARAPLATIN) IVPB (GOG AUC DOSING), , Intravenous, Once, 0 of 6 cycles  pemetrexed (ALIMTA) chemo infusion, 500 mg/m2 = 980 mg, Intravenous, Once, 0 of 6 cycles  pembrolizumab (KEYTRUDA) IVPB, 200 mg, Intravenous, Once, 0 of 6 cycles          History of Present Illness   HPI    Nahid Luis is a 65 y.o. male who presents to my office with a newly diagnosed adenocarcinoma of the left upper lobe of the lung.  I have personally reviewed his CT scan as well as PET CT scan in PACS.  This is an approximately 8 cm masslike opacity.  Some of this is likely atelectasis postobstructive.  This abuts the left hilum.  He also has enlarged subcarinal lymph node.  This was biopsied, and I personally reviewed these biopsies, the mediastinal lymph nodes were negative for malignancy.    Today in the office, the patient reports that he is in overall pretty good state of health.  He denies any significant shortness of breath or dyspnea on exertion.  He denies chest pain.  He was a previous smoker but he quit on October 31, 2024.  He denies any significant chronic cough.  He does report that he had a pneumonia approximately 2 months ago that he has recovered from.  He denies any changes in his vision, seizures or syncope.  He did undergo a brain MRI which was negative for any metastatic disease.    He has a history of a bladder cancer that has been treated.    I have personally reviewed all the most relevant notes in the chart including from Dr. Macedo of medical oncology    Review of Systems   Constitutional: Negative.  Negative for activity change, chills, fatigue, fever and unexpected weight change.   HENT:  Negative for sore throat, trouble swallowing and voice change.     Eyes: Negative.  Negative for visual disturbance.   Respiratory:  Negative for cough, chest tightness, shortness of breath, wheezing and stridor.    Cardiovascular:  Negative for chest pain and leg swelling.   Gastrointestinal: Negative.    Endocrine: Negative.    Genitourinary: Negative.    Musculoskeletal: Negative.    Skin: Negative.    Allergic/Immunologic: Negative.    Neurological:  Negative for seizures, syncope, speech difficulty, weakness, light-headedness and headaches.   Hematological:  Negative for adenopathy.   Psychiatric/Behavioral: Negative.        Medical History Reviewed by provider this encounter:     .  Past Medical History   Past Medical History:   Diagnosis Date    Anemia     Anxiety     Bladder cancer (HCC)     Cancer (HCC)     Colon polyp     COPD (chronic obstructive pulmonary disease) (HCC)     Depression     History of transfusion 10/2024    Hyperlipidemia     Hypertension     Irregular heart beat     afib    Mass of left lung     No natural teeth     Pneumonia     Wears glasses      Past Surgical History:   Procedure Laterality Date    APPENDECTOMY      COLONOSCOPY      CYSTOSCOPY      ENDOBRONCHIAL ULTRASOUND (EBUS)  01/08/2025    IR BIOPSY LUNG  1/29/2025    IR LOWER EXTREMITY ANGIOGRAM  09/24/2024    HI BYP FEM-ANT TIBL PST TIBL PRONEAL ART/OTH DSTL Right 10/31/2024    Procedure: right Common femoral artery to anterior tibial bypass with autologous vein;  Surgeon: aCrlos Bray DO;  Location: [facility];  Service: Vascular    HI CYSTO W/REMOVAL OF LESIONS SMALL N/A 1/21/2025    Procedure: TRANSURETHRAL RESECTION OF BLADDER TUMOR (TURBT);  Surgeon: Toni Real MD;  Location:  MAIN OR;  Service: Urology    TRANSURETHRAL RESECTION OF BLADDER TUMOR N/A 11/05/2024    Procedure: (TURBT);  Surgeon: Gilmer Duke MD;  Location: [facility];  Service: Urology    US GUIDED LYMPH NODE BIOPSY LEFT  01/02/2025     Family History   Problem Relation Age of Onset    Alcohol abuse Father      Heart attack Father       reports that he quit smoking about 3 months ago. His smoking use included cigarettes. He started smoking about 50 years ago. He has a 49.8 pack-year smoking history. He has been exposed to tobacco smoke. He has never used smokeless tobacco. He reports that he does not currently use alcohol after a past usage of about 6.0 standard drinks of alcohol per week. He reports that he does not use drugs.  Current Outpatient Medications   Medication Instructions    albuterol (Proventil HFA) 90 mcg/act inhaler 2 puffs, Inhalation, Every 6 hours PRN    apixaban (ELIQUIS) 5 mg, Oral, 2 times daily    atorvastatin (LIPITOR) 20 mg, Oral, Daily    dexamethasone (DECADRON) 4 mg, Oral, 2 times daily with meals, Take the day before and day after chemotherapy treatment.    Fluticasone-Salmeterol (Wixela Inhub) 100-50 mcg/dose inhaler 1 puff, As needed    ibuprofen (MOTRIN) 600 mg, Oral, Every 6 hours PRN    losartan (COZAAR) 100 mg, Oral, Daily    metoprolol succinate (TOPROL-XL) 50 mg, Oral, Daily    ondansetron (ZOFRAN) 4 mg, Oral, Every 8 hours PRN    phenazopyridine (PYRIDIUM) 200 mg, Oral, 3 times daily with meals   No Known Allergies   Current Outpatient Medications on File Prior to Visit   Medication Sig Dispense Refill    albuterol (Proventil HFA) 90 mcg/act inhaler Inhale 2 puffs every 6 (six) hours as needed for wheezing 6.7 g 5    apixaban (Eliquis) 5 mg Take 1 tablet (5 mg total) by mouth 2 (two) times a day 180 tablet 1    atorvastatin (LIPITOR) 20 mg tablet Take 1 tablet (20 mg total) by mouth daily 100 tablet 1    dexamethasone (DECADRON) 4 mg tablet Take 1 tablet (4 mg total) by mouth 2 (two) times a day with meals Take the day before and day after chemotherapy treatment. 16 tablet 0    Fluticasone-Salmeterol (Wixela Inhub) 100-50 mcg/dose inhaler Inhale 1 puff if needed Rinse mouth after use.      ibuprofen (MOTRIN) 600 mg tablet Take 1 tablet (600 mg total) by mouth every 6 (six) hours as  "needed for mild pain 30 tablet 0    losartan (COZAAR) 50 mg tablet Take 2 tablets (100 mg total) by mouth daily 180 tablet 1    metoprolol succinate (TOPROL-XL) 50 mg 24 hr tablet Take 1 tablet (50 mg total) by mouth daily 100 tablet 1    ondansetron (ZOFRAN) 4 mg tablet Take 1 tablet (4 mg total) by mouth every 8 (eight) hours as needed for nausea or vomiting 20 tablet 1    phenazopyridine (PYRIDIUM) 200 mg tablet Take 1 tablet (200 mg total) by mouth 3 (three) times a day with meals 10 tablet 0     No current facility-administered medications on file prior to visit.      Social History     Tobacco Use    Smoking status: Former     Current packs/day: 0.00     Average packs/day: 1 pack/day for 49.8 years (49.8 ttl pk-yrs)     Types: Cigarettes     Start date:      Quit date: 10/30/2024     Years since quittin.3     Passive exposure: Current    Smokeless tobacco: Never   Vaping Use    Vaping status: Never Used   Substance and Sexual Activity    Alcohol use: Not Currently     Alcohol/week: 6.0 standard drinks of alcohol     Types: 6 Cans of beer per week     Comment: daily 3-5 beers daily and shots last drank 10/30    Drug use: No    Sexual activity: Not Currently        Objective   /72 (BP Location: Left arm, Patient Position: Sitting)   Pulse 58   Ht 5' 6\" (1.676 m)   Wt 83.9 kg (185 lb)   SpO2 95%   BMI 29.86 kg/m²     Pain Screening:     ECOG    Physical Exam  Vitals and nursing note reviewed.   Constitutional:       Appearance: He is well-developed. He is not diaphoretic.   HENT:      Head: Normocephalic and atraumatic.      Nose: Nose normal. No congestion.      Mouth/Throat:      Mouth: Mucous membranes are moist.      Pharynx: Oropharynx is clear.   Eyes:      Extraocular Movements: Extraocular movements intact.      Pupils: Pupils are equal, round, and reactive to light.   Neck:      Trachea: No tracheal deviation.   Cardiovascular:      Rate and Rhythm: Normal rate and regular rhythm.      " Heart sounds: Normal heart sounds. No murmur heard.  Pulmonary:      Effort: Pulmonary effort is normal. No respiratory distress.      Breath sounds: Normal breath sounds. No stridor. No wheezing or rales.   Chest:      Chest wall: No tenderness.   Abdominal:      General: Bowel sounds are normal. There is no distension.      Palpations: Abdomen is soft.      Tenderness: There is no abdominal tenderness.   Musculoskeletal:         General: Normal range of motion.      Cervical back: Normal range of motion.   Lymphadenopathy:      Cervical: No cervical adenopathy.   Skin:     General: Skin is warm and dry.      Coloration: Skin is not pale.      Findings: No erythema or rash.   Neurological:      Mental Status: He is alert and oriented to person, place, and time.   Psychiatric:         Behavior: Behavior normal.         Thought Content: Thought content normal.         Judgment: Judgment normal.         Labs:   Results for orders placed or performed in visit on 02/10/25   POCT Measure PVR   Result Value Ref Range    POST-VOID RESIDUAL VOLUME, ML POC 53 mL        Radiology Results Review : I have reviewed images/report studies in PACS as described above (in the HPI).  Pathology: I have reviewed pathology reports described above.

## 2025-02-18 NOTE — ASSESSMENT & PLAN NOTE
Today during the visit, we had a long conversation regarding his newly diagnosed lung cancer.  At this time, he has not had pulmonary function testing yet.  This is scheduled for tomorrow.  Based upon the results of this, I will be able to determine surgical candidacy.  However, based upon his clinical history, he has a very active lifestyle and I believe that he will likely have adequate pulmonary function testing for surgical resection.  In that case, I will leave that induction chemo/immunotherapy is in his best interest and then to come back and see me in approximately 4 months with a restaging PET CT scan.  The patient understands and agrees with the plan.  He is already scheduled to start infusion with Dr. Macedo.  I will touch base with Dr. Macedo after the pulmonary function testing to make sure that everyone is on the same page

## 2025-02-18 NOTE — PROGRESS NOTES
Name: Nahid Luis      : 1960      MRN: [id number]  Encounter Provider: Carlos Bray DO  Encounter Date: 2025   Enc[facility]: [facility] [place]  :  Assessment & Plan  PAD (peripheral artery disease) (HCC)  65-year-old male well-known to me he had right lower extremity ischemic rest pain and had a right common femoral to anterior tibial bypass done with ipsilateral reverse great saphenous vein which was tunneled laterally this was done at the end of October of last year.  He is done very well since then his incisions healed without any issues his rest pain is almost completely resolved he has no claudication in the right leg he had his initial postoperative duplex done a few days ago which demonstrates an INOCENCIA of 0.9 with no significant stenosis in the vein graft.  He takes Eliquis for his atrial fibrillation I recommend that he continue a baby aspirin and statin daily.  Will plan for repeat lower extremity arterial duplex in 3 months per our standard surveillance protocol.  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  Will continue to follow the left leg as well and he will need an aortoiliac duplex for his iliac aneurysms in 1 year.  He has completely quit smoking since I initially saw him in the office it has been about 5 months since he smoked He is also undergoing treatment initially with chemotherapy for early stage lung cancer with potential surgery in the future.    Will also plan for carotid duplex in 3 months for screening.  I will see him back in the office in 1 year.    Orders:    VAS ARTERIAL DUPLEX- LOWER LIMB BILATERAL; Future    VAS carotid complete study; Future    VAS abdominal aorta/iliac duplex; Future    S/P femoral-tibial bypass             History of Present Illness  "    Patient presents today to review PRESTON done 2/13/25. He is s/p R common femoral endarterectomy to anterior tibial bypass on 10/31/24.     HPI  Nahid Luis is a 65 y.o. male  History obtained from: patient    Review of Systems   Constitutional: Negative.    HENT: Negative.     Eyes: Negative.    Respiratory: Negative.     Cardiovascular: Negative.    Gastrointestinal: Negative.    Endocrine: Negative.    Genitourinary: Negative.    Musculoskeletal: Negative.    Skin: Negative.    Allergic/Immunologic: Negative.    Neurological: Negative.    Hematological: Negative.    Psychiatric/Behavioral: Negative.       I have reviewed the ROS above and made changes as needed.         Objective   /64 (BP Location: Left arm, Patient Position: Sitting)   Pulse 70   Ht 5' 6\" (1.676 m)   Wt 82.6 kg (182 lb)   BMI 29.38 kg/m²      Physical Exam    General  Exam: alert, awake, oriented, no distress, consistent with stated age    Integumentary  Exam: warm, dry, no gross lesions, no bruises and normal color    Adbomen  Exam: soft, non-tender, non-distended, no pulsatile abdominal masses, no abdominal bruit    Peripheral Vascular  Exam: no clubbing of the digits of the upper extremity, no cyanosis, no edema, both feet are warm, radial pulses 2+ bilaterally, skin well perfused, without and no varicosities.      Lower Extremity:  Palpation: Femoral pulse- Bilateral 2+      2+ pulse in RLE vein graft   Right DP 2+   Left distal pulses non-palpable   No BLE wounds    Neurologic  Exam:alert, non-focal, oriented x 3, cranial nerves II-XII grossly intact      Administrative Statements   I have spent a total time of 30 minutes in caring for this patient on the day of the visit/encounter including Risk factor reductions, Counseling / Coordination of care, Documenting in the medical record, and Reviewing/placing orders in the medical record (including tests, medications, and/or procedures).  "

## 2025-02-19 ENCOUNTER — HOSPITAL ENCOUNTER (OUTPATIENT)
Dept: PULMONOLOGY | Facility: HOSPITAL | Age: 65
Discharge: HOME | End: 2025-02-19
Attending: INTERNAL MEDICINE
Payer: COMMERCIAL

## 2025-02-19 ENCOUNTER — OFFICE VISIT (OUTPATIENT)
Dept: VASCULAR SURGERY | Facility: CLINIC | Age: 65
End: 2025-02-19
Payer: COMMERCIAL

## 2025-02-19 VITALS
HEIGHT: 66 IN | WEIGHT: 182 LBS | BODY MASS INDEX: 29.25 KG/M2 | SYSTOLIC BLOOD PRESSURE: 104 MMHG | DIASTOLIC BLOOD PRESSURE: 64 MMHG | HEART RATE: 70 BPM

## 2025-02-19 DIAGNOSIS — J44.9 CHRONIC OBSTRUCTIVE PULMONARY DISEASE, UNSPECIFIED COPD TYPE (HCC): ICD-10-CM

## 2025-02-19 DIAGNOSIS — C34.90 MALIGNANT NEOPLASM OF LUNG, UNSPECIFIED LATERALITY, UNSPECIFIED PART OF LUNG (HCC): ICD-10-CM

## 2025-02-19 DIAGNOSIS — Z98.890 S/P FEMORAL-TIBIAL BYPASS: ICD-10-CM

## 2025-02-19 DIAGNOSIS — I73.9 PAD (PERIPHERAL ARTERY DISEASE) (HCC): Primary | ICD-10-CM

## 2025-02-19 PROCEDURE — 99214 OFFICE O/P EST MOD 30 MIN: CPT | Performed by: SURGERY

## 2025-02-19 PROCEDURE — 94060 EVALUATION OF WHEEZING: CPT | Performed by: INTERNAL MEDICINE

## 2025-02-19 PROCEDURE — 94729 DIFFUSING CAPACITY: CPT | Performed by: INTERNAL MEDICINE

## 2025-02-19 PROCEDURE — 94760 N-INVAS EAR/PLS OXIMETRY 1: CPT

## 2025-02-19 PROCEDURE — 94726 PLETHYSMOGRAPHY LUNG VOLUMES: CPT

## 2025-02-19 PROCEDURE — 94729 DIFFUSING CAPACITY: CPT

## 2025-02-19 PROCEDURE — 94060 EVALUATION OF WHEEZING: CPT

## 2025-02-19 PROCEDURE — 94726 PLETHYSMOGRAPHY LUNG VOLUMES: CPT | Performed by: INTERNAL MEDICINE

## 2025-02-19 RX ORDER — ALBUTEROL SULFATE 0.83 MG/ML
2.5 SOLUTION RESPIRATORY (INHALATION) ONCE
Status: COMPLETED | OUTPATIENT
Start: 2025-02-19 | End: 2025-02-19

## 2025-02-19 RX ADMIN — ALBUTEROL SULFATE 2.5 MG: 2.5 SOLUTION RESPIRATORY (INHALATION) at 07:14

## 2025-02-19 NOTE — PROGRESS NOTES
Cardiology and Heart Failure Clinic Note    Nahid Luis 65 y.o. male   MRN: [id number]  Encounter: [id number]        Assessment / Plan:    #  lung cancer  Recent dx 1-2025  Now seeing Dr Macedo  Will start neoadjuvant- pemetrexed/carboplatin/pembrolizumab   Considering surgery  Considering XRT    # Bladder cancer  Dx 2024  S/p TURBT.    # Afib - paroxysmal  History:  Had an admission in January 2025 when he presented for outpatient EBUS and was incidentally found to be in A-fib.  He was admitted.  He quickly converted on his own back to sinus.     Rate:  metoprolol  Rhythm:  none  Anticoag:  eliquis    Plan: Anticipate ordering ziopatch next visit    # HTN        Losartan 100 mg daily        Metoprolol 50 mg daily  BP well controlled    # HLD  Has PAD and coronary calcification so recommend aggressive LDL control  Lipitor 20 was started,   --> 73     # PAD  s/p R CFA-AT bypass with ipsilateral rGSV 10/31/24   No aspirin  (on anticoagulation)  On statin    # Coronary calcification  No aspirin  (on anticoagulation)  On statin  No chest pain    # TORRES  Echo  - without concerns  Nuclear stress - no ischemia  May be due to COPD    # COPD  Recommend smoking cessation.   Recently quit.    # Tobacco abuse  recently quit smoking    # heavy ETOH use  Recommend cutting back.   Recently quit.    # high risk medication  Will be starting neoadjuvant - pemetrexed/carboplatin/pembrolizumab   Discussed potential cardiac risks  Pemetrexed - edema, arrhythmias  Carboplatin - can cause HTN  Pembro - can cause myocarditis, pericarditis  Discussed all possible cardiac toxicities with patient and what to look out for.      Today's Plan Summary:  See above assessment/plan for full details of today's plan.  Briefly,     No med changes.  He is about to start chemotherapy for lung cancer and we reviewed all the potential cardiotoxicity's and what to monitor for.              Reason For Visit / Chief Complaint:  F/u - HTN, HLD,  afib    HPI:   Nahid Luis is a 65 y.o.  male with history as noted in the problem list and further detailed in the above assessment and plan.    Initial:   August 2024  Referred by Dr. Bray (vascular) for a new patient visit for HTN, HLD, PAD.  The patient recently presented to the ER with leg pain and was diagnosed with PAD.  He saw vascular in follow-up and was scheduled for an angiogram.  Because of his cardiac risk factors he was referred to cardiology.  Today, the patient reports -   no chest pain.   Does have TORRES.  Works with heavy .    .   3 children.  Smoking cigarettes.  Cutting back.   4 beers / day (used to drink a lot more).  No drugs.    Interval:    Last visit -->    Had an admission in early January when he presented for EBUS and was incidentally found to be in A-fib.  He was admitted and had a cardiology consult.  He quickly converted on his own back to sinus.  Started on beta-blocker and Eliquis.   needs preoperative clearance for bladder surgery.     Plan last visit -->   The patient is at an acceptable level of risk to proceed with the planned surgery without the need for further cardiac testing.    Had lung bx which was consistent with lung cancer.  Saw oncology.  Considering pemetrexed/carboplatin/pembrolizumab     Today  - feeling well.  No CP or SOB.  No edema.   No palpitations.  No syncope.              Cardiac Imaging personally reviewed:  EKG 8-17-24  Sinus rhythm  Septal infarct  Nonspecific T wave changes    1-20-25  Sinus bradycardia at 58 bpm         Holter or event monitor    Echo Echo - 08/29/24   EF 65%.  No WMA.    Echo - 1-8-25  EF 65%.        RUTH    Cardiac MRI    Stress testing Nuclear stress test - 10/23/24   No ischemia   Coronary CTA or [facility]    RHC    CPET              Patient Active Problem List    Diagnosis Date Noted   • Adenocarcinoma of left lung (HCC) 02/17/2025   • Urothelial carcinoma of bladder (HCC) 01/21/2025   • Paroxysmal A-fib (HCC)  01/20/2025   • Malignant neoplasm of lung, unspecified laterality, unspecified part of lung (HCC) 01/15/2025   • Atrial fibrillation with rapid ventricular response (HCC) 01/07/2025   • Urinary frequency 01/03/2025   • Lesion of parotid gland 12/10/2024   • Parotid mass 12/10/2024   • Coronary artery calcification 12/02/2024   • Tobacco abuse 12/02/2024   • S/P femoral-tibial bypass 11/12/2024   • Leukocytosis 11/05/2024   • ABLA (acute blood loss anemia) 11/01/2024   • Bladder mass 10/18/2024   • Mixed hyperlipidemia 08/26/2024   • PAD (peripheral artery disease) ([facility]) 08/21/2024   • Hypertension 09/08/2023   • Chronic obstructive pulmonary disease ([facility]) 09/08/2023   • Gross hematuria 09/08/2023       Past Medical History:   Diagnosis Date   • Anemia    • Anxiety    • Bladder cancer ([facility])    • Cancer (HCC)    • Colon polyp    • COPD (chronic obstructive pulmonary disease) ([facility])    • Depression    • History of transfusion 10/2024   • Hyperlipidemia    • Hypertension    • Irregular heart beat     afib   • Mass of left lung    • No natural teeth    • Pneumonia    • Wears glasses        No Known Allergies    Current Outpatient Medications   Medication Instructions   • albuterol (Proventil HFA) 90 mcg/act inhaler 2 puffs, Inhalation, Every 6 hours PRN   • apixaban (ELIQUIS) 5 mg, Oral, 2 times daily   • atorvastatin (LIPITOR) 20 mg, Oral, Daily   • dexamethasone (DECADRON) 4 mg, Oral, 2 times daily with meals, Take the day before and day after chemotherapy treatment.   • Fluticasone-Salmeterol (Wixela Inhub) 100-50 mcg/dose inhaler 1 puff, As needed   • ibuprofen (MOTRIN) 600 mg, Oral, Every 6 hours PRN   • losartan (COZAAR) 100 mg, Oral, Daily   • metoprolol succinate (TOPROL-XL) 50 mg, Oral, Daily   • ondansetron (ZOFRAN) 4 mg, Oral, Every 8 hours PRN   • phenazopyridine (PYRIDIUM) 200 mg, Oral, 3 times daily with meals       Social History     Socioeconomic History   • Marital status: Single     Spouse name: Not on  file   • Number of children: Not on file   • Years of education: Not on file   • Highest education level: Not on file   Occupational History   • Not on file   Tobacco Use   • Smoking status: Former     Current packs/day: 0.00     Average packs/day: 1 pack/day for 49.8 years (49.8 ttl pk-yrs)     Types: Cigarettes     Start date:      Quit date: 10/30/2024     Years since quittin.3     Passive exposure: Current   • Smokeless tobacco: Never   Vaping Use   • Vaping status: Never Used   Substance and Sexual Activity   • Alcohol use: Not Currently     Alcohol/week: 6.0 standard drinks of alcohol     Types: 6 Cans of beer per week     Comment: daily 3-5 beers daily and shots last drank 10/30   • Drug use: No   • Sexual activity: Not Currently   Other Topics Concern   • Not on file   Social History Narrative   • Not on file     Social Drivers of Health     Financial Resource Strain: Not on file   Food Insecurity: No Food Insecurity (2025)    Nursing - Inadequate Food Risk Classification    • Worried About Running Out of Food in the Last Year: Not on file    • Ran Out of Food in the Last Year: Not on file    • Ran Out of Food in the Last Year: Never true   Transportation Needs: No Transportation Needs (2025)    Nursing - Transportation Risk Classification    • Lack of Transportation: Not on file    • Lack of Transportation: No   Physical Activity: Not on file   Stress: Not on file   Social Connections: Not on file   Intimate Partner Violence: Unknown (2025)    Nursing IPS    • Feels Physically and Emotionally Safe: Not on file    • Physically Hurt by Someone: Not on file    • Humiliated or Emotionally Abused by Someone: Not on file    • Physically Hurt by Someone: No    • Hurt or Threatened by Someone: No   Housing Stability: Unknown (2025)    Nursing: Inadequate Housing Risk Classification    • Has Housing: Not on file    • Worried About Losing Housing: Not on file    • Unable to Get Utilities: Not  "on file    • Unable to Pay for Housing in the Last Year: No    • Has Housin       Family History   Problem Relation Age of Onset   • Alcohol abuse Father    • Heart attack Father        Physical Exam:  Blood pressure 118/60, pulse 64, height 5' 6\" (1.676 m), weight 83.5 kg (184 lb), SpO2 96%.  Body mass index is 29.7 kg/m².  Wt Readings from Last 3 Encounters:   25 83.5 kg (184 lb)   25 82.6 kg (182 lb)   25 83.9 kg (185 lb)     Physical Exam  Vitals reviewed.   Constitutional:       General: He is not in acute distress.     Appearance: He is not toxic-appearing.   Neck:      Comments: No JVP elevation  Cardiovascular:      Rate and Rhythm: Normal rate and regular rhythm.      Heart sounds: No murmur heard.     No friction rub. No gallop.      Comments: Distant heart sounds  Pulmonary:      Breath sounds: No wheezing, rhonchi or rales.      Comments: Prolonged expiratory phase  Rhonchi right base  Musculoskeletal:      Right lower leg: No edema.      Left lower leg: No edema.   Neurological:      Mental Status: He is alert.         Labs & Results:  Lab Results   Component Value Date    SODIUM 134 (L) 2025    K 4.6 2025     2025    CO2 25 2025    BUN 19 2025    CREATININE 1.12 2025    GLUC 130 2025    CALCIUM 9.6 2025       Thank you for the opportunity to participate in the care of this patient.    Toni Martinez MD, [facility]  Advanced Heart Failure and Transplant Cardiologist  Director of Cardio-Oncology  [facility]  "

## 2025-02-19 NOTE — ASSESSMENT & PLAN NOTE
65-year-old male well-known to me he had right lower extremity ischemic rest pain and had a right common femoral to anterior tibial bypass done with ipsilateral reverse great saphenous vein which was tunneled laterally this was done at the end of October of last year.  He is done very well since then his incisions healed without any issues his rest pain is almost completely resolved he has no claudication in the right leg he had his initial postoperative duplex done a few days ago which demonstrates an INOCENCIA of 0.9 with no significant stenosis in the vein graft.  He takes Eliquis for his atrial fibrillation I recommend that he continue a baby aspirin and statin daily.  Will plan for repeat lower extremity arterial duplex in 3 months per our standard surveillance protocol.  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  Will continue to follow the left leg as well and he will need an aortoiliac duplex for his iliac aneurysms in 1 year.  He has completely quit smoking since I initially saw him in the office it has been about 5 months since he smoked He is also undergoing treatment initially with chemotherapy for early stage lung cancer with potential surgery in the future.    Will also plan for carotid duplex in 3 months for screening.  I will see him back in the office in 1 year.    Orders:    VAS ARTERIAL DUPLEX- LOWER LIMB BILATERAL; Future    VAS carotid complete study; Future    VAS abdominal aorta/iliac duplex; Future

## 2025-02-20 ENCOUNTER — HOSPITAL ENCOUNTER (OUTPATIENT)
Dept: INFUSION CENTER | Facility: CLINIC | Age: 65
Discharge: HOME | End: 2025-02-20
Payer: COMMERCIAL

## 2025-02-20 ENCOUNTER — OFFICE VISIT (OUTPATIENT)
Dept: CARDIOLOGY CLINIC | Facility: CLINIC | Age: 65
End: 2025-02-20
Payer: COMMERCIAL

## 2025-02-20 VITALS
BODY MASS INDEX: 29.57 KG/M2 | HEART RATE: 64 BPM | HEIGHT: 66 IN | DIASTOLIC BLOOD PRESSURE: 60 MMHG | SYSTOLIC BLOOD PRESSURE: 118 MMHG | OXYGEN SATURATION: 96 % | WEIGHT: 184 LBS

## 2025-02-20 DIAGNOSIS — I73.9 PAD (PERIPHERAL ARTERY DISEASE) (HCC): ICD-10-CM

## 2025-02-20 DIAGNOSIS — I48.0 PAROXYSMAL A-FIB (HCC): Primary | ICD-10-CM

## 2025-02-20 DIAGNOSIS — E78.2 MIXED HYPERLIPIDEMIA: ICD-10-CM

## 2025-02-20 DIAGNOSIS — I10 PRIMARY HYPERTENSION: ICD-10-CM

## 2025-02-20 DIAGNOSIS — C34.92 ADENOCARCINOMA OF LEFT LUNG (HCC): Primary | ICD-10-CM

## 2025-02-20 DIAGNOSIS — I25.10 CORONARY ARTERY CALCIFICATION: ICD-10-CM

## 2025-02-20 PROCEDURE — 99214 OFFICE O/P EST MOD 30 MIN: CPT | Performed by: INTERNAL MEDICINE

## 2025-02-20 PROCEDURE — 96372 THER/PROPH/DIAG INJ SC/IM: CPT

## 2025-02-20 RX ORDER — CYANOCOBALAMIN 1000 UG/ML
1000 INJECTION, SOLUTION INTRAMUSCULAR; SUBCUTANEOUS ONCE
Status: COMPLETED | OUTPATIENT
Start: 2025-02-20 | End: 2025-02-20

## 2025-02-20 RX ADMIN — CYANOCOBALAMIN 1000 MCG: 1000 INJECTION INTRAMUSCULAR; SUBCUTANEOUS at 15:55

## 2025-02-20 NOTE — PROGRESS NOTES
Patient presents today for B12 injection offering no complaints. Patient tolerated injection well in the left arm. AVS declined, next appointment 2/27 at 1:30..

## 2025-02-24 ENCOUNTER — PATIENT OUTREACH (OUTPATIENT)
Dept: HEMATOLOGY ONCOLOGY | Facility: CLINIC | Age: 65
End: 2025-02-24

## 2025-02-24 ENCOUNTER — APPOINTMENT (OUTPATIENT)
Dept: LAB | Facility: MEDICAL CENTER | Age: 65
End: 2025-02-24
Payer: COMMERCIAL

## 2025-02-24 DIAGNOSIS — C34.92 ADENOCARCINOMA OF LEFT LUNG (HCC): Primary | ICD-10-CM

## 2025-02-24 DIAGNOSIS — Z12.5 SCREENING FOR PROSTATE CANCER: ICD-10-CM

## 2025-02-24 DIAGNOSIS — C34.92 ADENOCARCINOMA OF LEFT LUNG (HCC): ICD-10-CM

## 2025-02-24 LAB
ALBUMIN SERPL BCG-MCNC: 3.8 G/DL (ref 3.5–5)
ALP SERPL-CCNC: 80 U/L (ref 34–104)
ALT SERPL W P-5'-P-CCNC: 11 U/L (ref 7–52)
ANION GAP SERPL CALCULATED.3IONS-SCNC: 7 MMOL/L (ref 4–13)
AST SERPL W P-5'-P-CCNC: 14 U/L (ref 13–39)
BASOPHILS # BLD MANUAL: 0 THOUSAND/UL (ref 0–0.1)
BASOPHILS NFR MAR MANUAL: 0 % (ref 0–1)
BILIRUB SERPL-MCNC: 0.32 MG/DL (ref 0.2–1)
BUN SERPL-MCNC: 15 MG/DL (ref 5–25)
CALCIUM SERPL-MCNC: 9.7 MG/DL (ref 8.4–10.2)
CHLORIDE SERPL-SCNC: 101 MMOL/L (ref 96–108)
CO2 SERPL-SCNC: 28 MMOL/L (ref 21–32)
CREAT SERPL-MCNC: 1.06 MG/DL (ref 0.6–1.3)
EOSINOPHIL # BLD MANUAL: 0.35 THOUSAND/UL (ref 0–0.4)
EOSINOPHIL NFR BLD MANUAL: 3 % (ref 0–6)
ERYTHROCYTE [DISTWIDTH] IN BLOOD BY AUTOMATED COUNT: 17 % (ref 11.6–15.1)
GFR SERPL CREATININE-BSD FRML MDRD: 73 ML/MIN/1.73SQ M
GLUCOSE P FAST SERPL-MCNC: 171 MG/DL (ref 65–99)
HCT VFR BLD AUTO: 42.4 % (ref 36.5–49.3)
HGB BLD-MCNC: 13.3 G/DL (ref 12–17)
LYMPHOCYTES # BLD AUTO: 48 % (ref 14–44)
LYMPHOCYTES # BLD AUTO: 5.96 THOUSAND/UL (ref 0.6–4.47)
MCH RBC QN AUTO: 26.9 PG (ref 26.8–34.3)
MCHC RBC AUTO-ENTMCNC: 31.4 G/DL (ref 31.4–37.4)
MCV RBC AUTO: 86 FL (ref 82–98)
MONOCYTES # BLD AUTO: 0.35 THOUSAND/UL (ref 0–1.22)
MONOCYTES NFR BLD: 3 % (ref 4–12)
NEUTROPHILS # BLD MANUAL: 5.02 THOUSAND/UL (ref 1.85–7.62)
NEUTS BAND NFR BLD MANUAL: 2 % (ref 0–8)
NEUTS SEG NFR BLD AUTO: 41 % (ref 43–75)
PLATELET # BLD AUTO: 282 THOUSANDS/UL (ref 149–390)
PLATELET BLD QL SMEAR: ADEQUATE
PMV BLD AUTO: 9.6 FL (ref 8.9–12.7)
POLYCHROMASIA BLD QL SMEAR: PRESENT
POTASSIUM SERPL-SCNC: 5.1 MMOL/L (ref 3.5–5.3)
PROT SERPL-MCNC: 7.3 G/DL (ref 6.4–8.4)
PSA SERPL-MCNC: 3.54 NG/ML (ref 0–4)
RBC # BLD AUTO: 4.95 MILLION/UL (ref 3.88–5.62)
SMUDGE CELLS BLD QL SMEAR: PRESENT
SODIUM SERPL-SCNC: 136 MMOL/L (ref 135–147)
T3FREE SERPL-MCNC: 2.95 PG/ML (ref 2.5–3.9)
TSH SERPL DL<=0.05 MIU/L-ACNC: 3.44 UIU/ML (ref 0.45–4.5)
VARIANT LYMPHS # BLD AUTO: 3 %
WBC # BLD AUTO: 11.68 THOUSAND/UL (ref 4.31–10.16)

## 2025-02-24 PROCEDURE — 85007 BL SMEAR W/DIFF WBC COUNT: CPT

## 2025-02-24 PROCEDURE — 84153 ASSAY OF PSA TOTAL: CPT

## 2025-02-24 PROCEDURE — 84481 FREE ASSAY (FT-3): CPT

## 2025-02-24 PROCEDURE — 80053 COMPREHEN METABOLIC PANEL: CPT

## 2025-02-24 PROCEDURE — G0103 PSA SCREENING: HCPCS

## 2025-02-24 PROCEDURE — 85027 COMPLETE CBC AUTOMATED: CPT

## 2025-02-24 PROCEDURE — 84443 ASSAY THYROID STIM HORMONE: CPT

## 2025-02-24 PROCEDURE — 36415 COLL VENOUS BLD VENIPUNCTURE: CPT

## 2025-02-24 NOTE — PROGRESS NOTES
I reached out and spoke with Nahid, now that consults have been completed with the oncology teams. I introduced myself and explained my role as their Patient Navigator. I reviewed for any barriers to care and offered referrals to supportive services as needed. I reviewed and updated the members assigned to the care team in [facility]. He knows the members of the care team as well as how and when to contact them with any needs.     I also spoke with Nany his daughter and made sure she was aware of my direct line as well.    Distress Thermometer completed at this time. Patient scored 5/10. Referral to  placed.. He is very concerned with keeping up with the bills as he has not worked in 6 months.    He is currently able to drive and denies any transportation needs.  He will have someone drive him to his treatments.    He is already established with Palliative Care. He is scheduled with them 2/27/25.    He states that he is eating and drinking as per usual with no unintentional weight loss.   Completed MST and patient Does not meet criteria for referral to Oncology Dietician Services He even feels he has gained some weight.    Patient does not smoke. Patient does not drink.    He states he is well supported by family and friends.  Community support groups discussed including the [facility] Support [facility] of [facility]. Patient declined information at this time.     Patient lives with his son and daughter. Patients daughter Nany takes care of all his appointments.    He feels he has adequate insurance coverage and denies any financial concerns at this time.     He verbalizes managing the schedules well.   Future Appointments   Date Time Provider Department Center   2/27/2025  8:30 AM DO PHYLLIS Romero [facility] Practice-Hos   2/27/2025  1:30 PM MO INF CHAIR 14 MO Infusion MO MOB   3/17/2025 10:40 AM Taqueria Macedo MD FELICITA ONC [facility] Practice-Onc   3/20/2025 10:00 AM MO INF BED 13 MO Infusion MO MOB    3/28/2025  7:00 AM KURTIS Lutz  Practice-Nor   4/10/2025 12:00 PM MO INF CHAIR 5 MO Infusion MO MOB   4/23/2025  8:40 AM Toni Martinez MD CARD ONC 8TH Practice-a   6/16/2025  1:00 PM AN HV VASCULAR 1 AN HV Car NI AN HOSP MOB   6/16/2025  2:00 PM AN HV VASCULAR 1 AN HV Car NI AN HOSP MOB   6/17/2025 10:15 AM Yen Knott MD [facility]   8/14/2025  7:20 AM KURTIS Lutz  Practice-Nor   2/19/2026  9:00 AM AN HV VASCULAR 1 AN HV Car NI AN HOSP MOB        Based on individual needs I will follow up in about 4 weeks. I have provided my direct contact information and welcome them to contact me if needs as discussed above change. He was appreciative for the call.

## 2025-02-25 RX ORDER — SODIUM CHLORIDE 9 MG/ML
20 INJECTION, SOLUTION INTRAVENOUS ONCE
Status: CANCELLED | OUTPATIENT
Start: 2025-02-27

## 2025-02-26 ENCOUNTER — DOCUMENTATION (OUTPATIENT)
Dept: GENETICS | Facility: CLINIC | Age: 65
End: 2025-02-26

## 2025-02-26 NOTE — PROGRESS NOTES
The Genetics Team reviewed Nahid's CARIS result and does not recommend a referral for germline genetic testing.    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

## 2025-02-27 ENCOUNTER — PATIENT OUTREACH (OUTPATIENT)
Dept: CASE MANAGEMENT | Facility: HOSPITAL | Age: 65
End: 2025-02-27

## 2025-02-27 ENCOUNTER — TELEPHONE (OUTPATIENT)
Dept: SURGICAL ONCOLOGY | Facility: CLINIC | Age: 65
End: 2025-02-27

## 2025-02-27 ENCOUNTER — CONSULT (OUTPATIENT)
Dept: PALLIATIVE MEDICINE | Facility: CLINIC | Age: 65
End: 2025-02-27
Payer: COMMERCIAL

## 2025-02-27 ENCOUNTER — HOSPITAL ENCOUNTER (OUTPATIENT)
Dept: INFUSION CENTER | Facility: CLINIC | Age: 65
Discharge: HOME | End: 2025-02-27
Payer: COMMERCIAL

## 2025-02-27 VITALS
SYSTOLIC BLOOD PRESSURE: 136 MMHG | HEIGHT: 67 IN | DIASTOLIC BLOOD PRESSURE: 63 MMHG | RESPIRATION RATE: 18 BRPM | TEMPERATURE: 96.7 F | HEART RATE: 65 BPM | BODY MASS INDEX: 28.94 KG/M2 | WEIGHT: 184.4 LBS

## 2025-02-27 VITALS
HEART RATE: 68 BPM | TEMPERATURE: 97.8 F | SYSTOLIC BLOOD PRESSURE: 130 MMHG | RESPIRATION RATE: 16 BRPM | HEIGHT: 66 IN | WEIGHT: 185 LBS | DIASTOLIC BLOOD PRESSURE: 72 MMHG | OXYGEN SATURATION: 96 % | BODY MASS INDEX: 29.73 KG/M2

## 2025-02-27 DIAGNOSIS — C34.92 ADENOCARCINOMA OF LEFT LUNG (HCC): Primary | ICD-10-CM

## 2025-02-27 DIAGNOSIS — J44.9 CHRONIC OBSTRUCTIVE PULMONARY DISEASE, UNSPECIFIED COPD TYPE (HCC): ICD-10-CM

## 2025-02-27 DIAGNOSIS — C67.9 UROTHELIAL CARCINOMA OF BLADDER (HCC): ICD-10-CM

## 2025-02-27 DIAGNOSIS — Z51.5 PALLIATIVE CARE PATIENT: ICD-10-CM

## 2025-02-27 DIAGNOSIS — I73.9 PAD (PERIPHERAL ARTERY DISEASE) (HCC): ICD-10-CM

## 2025-02-27 DIAGNOSIS — Z71.89 COUNSELING REGARDING ADVANCE CARE PLANNING AND GOALS OF CARE: ICD-10-CM

## 2025-02-27 PROCEDURE — 96417 CHEMO IV INFUS EACH ADDL SEQ: CPT

## 2025-02-27 PROCEDURE — 96367 TX/PROPH/DG ADDL SEQ IV INF: CPT

## 2025-02-27 PROCEDURE — 96413 CHEMO IV INFUSION 1 HR: CPT

## 2025-02-27 PROCEDURE — 96411 CHEMO IV PUSH ADDL DRUG: CPT

## 2025-02-27 PROCEDURE — 99205 OFFICE O/P NEW HI 60 MIN: CPT | Performed by: STUDENT IN AN ORGANIZED HEALTH CARE EDUCATION/TRAINING PROGRAM

## 2025-02-27 RX ORDER — SODIUM CHLORIDE 9 MG/ML
20 INJECTION, SOLUTION INTRAVENOUS ONCE
Status: COMPLETED | OUTPATIENT
Start: 2025-02-27 | End: 2025-02-27

## 2025-02-27 RX ADMIN — CARBOPLATIN 489 MG: 600 INJECTION, SOLUTION INTRAVENOUS at 16:06

## 2025-02-27 RX ADMIN — PEMETREXED DISODIUM 980 MG: 500 INJECTION, POWDER, LYOPHILIZED, FOR SOLUTION INTRAVENOUS at 15:52

## 2025-02-27 RX ADMIN — FOSAPREPITANT 150 MG: 150 INJECTION, POWDER, LYOPHILIZED, FOR SOLUTION INTRAVENOUS at 14:15

## 2025-02-27 RX ADMIN — SODIUM CHLORIDE 200 MG: 9 INJECTION, SOLUTION INTRAVENOUS at 15:00

## 2025-02-27 RX ADMIN — SODIUM CHLORIDE 20 ML/HR: 0.9 INJECTION, SOLUTION INTRAVENOUS at 13:51

## 2025-02-27 RX ADMIN — DEXAMETHASONE SODIUM PHOSPHATE: 10 INJECTION, SOLUTION INTRAMUSCULAR; INTRAVENOUS at 13:53

## 2025-02-27 NOTE — ASSESSMENT & PLAN NOTE
Diagnosed 1/2025  Stage cT2, cN2, cM0    NM PET CT 11/29/2024 revealed hypermetabolic mass left upper lobe and hilar LN  MRI brain negative     Chemotherapy with pemetrexed/carboplatin/pembrolizumab first then see to see if he is surgical candidate  Orders:    Ambulatory referral to Palliative Care

## 2025-02-27 NOTE — PROGRESS NOTES
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  He tells me that he is overall doing well.  He did have some financial concerns but says that this is no longer the case as he got his tax return and has been able to pay his bills.  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  He notes that this is unusual and hasn't happened in many years, but the winter was slow this year.  He plans to go back to work next month.    Pt lives locally with his son, daughter, and 4 year old grandson.  He has a step son and another daughter locally as well and reports that they are all close and supportive to him.  He notes that his wife was treated here for breast cancer and passed away on hospice care 3 years ago.  He tells me that he is eating very well and sleep has always been an issue for him, he moreso \"cat naps\" for a few hours at a time.    At this point, pt denies any needs or concerns.  He thanked me for the time spend talking and the introduction, and agrees to reach " out as needed moving forward.  I will remain available to assist or support him as needed moving forward.

## 2025-02-27 NOTE — ASSESSMENT & PLAN NOTE
Palliative diagnosis: Adenocarcinoma of the left lung  PPS: 80-90%    He is well-prepared for chemotherapy ahead with dexamethasone and Zofran medications already being prescribed.  I also recommended OTC Imodium 2 mg that can be taken 4 times a day as needed for any kind of chemotherapy induced diarrhea.    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

## 2025-02-27 NOTE — PROGRESS NOTES
Name: Nahid Luis      : 1960      MRN: [id number]  Encounter Provider: Topher Jaime DO  Encounter Date: 2025   Encounter department: [facility] PALLIATIVE CARE [place]  :  Assessment & Plan  Adenocarcinoma of left lung (HCC)  Diagnosed 2025  Stage cT2, cN2, cM0    NM PET CT 2024 revealed hypermetabolic mass left upper lobe and hilar LN  MRI brain negative     Chemotherapy with pemetrexed/carboplatin/pembrolizumab first then see to see if he is surgical candidate  Orders:    Ambulatory referral to Palliative Care    Urothelial carcinoma of bladder (HCC)  Superficial, high grade, no muscle invasion  S/p intravesicular chemotherapy and TURBT       Chronic obstructive pulmonary disease, unspecified COPD type (HCC)  Complete PFT postbronchodilator 2025  Moderate obstructive airflow defect with positive postbronchodilator response; air trapping; mildly decreased diffusion capacity    FEV1 48% predicted  FEV1/FVC 55% predicted  % of predicted  DLCO 64% predicted       PAD (peripheral artery disease) (HCC)  S/p femoral bypass RLE  LLE reportedly 75% occluded       Counseling regarding advance care planning and goals of care  Continue disease directed cares without limits    Will educate and  on ACP documentation at a future visit       Palliative care patient  Palliative diagnosis: Adenocarcinoma of the left lung  PPS: 80-90%    He is well-prepared for chemotherapy ahead with dexamethasone and Zofran medications already being prescribed.  I also recommended OTC Imodium 2 mg that can be taken 4 times a day as needed for any kind of chemotherapy induced diarrhea.    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 If competence is lost, patient's substitute decision maker would default to adult children by [place] Act 169.   Advance Directive / Living Will / POLST: None on file     PDMP Review: I have reviewed the patient's controlled substance dispensing history in the Prescription Drug Monitoring Program in compliance with the [facility] regulations before prescribing any controlled substances.    History of Present Illness   Nahid Luis is a 65 y.o. male who presents for new patient consultation visit. He was referred by his medical oncologist Dr. Macedo for assessment/treatment, symptom management, and ACP counseling.  He was seen as a new consult with oncology on 2/17/2025.  Patient was found to have a left upper lobe lung mass 4 x 5 cm with spiculated lesions and mediastinal LN.  Biopsy revealed adenocarcinoma of the lung.  Current plan is for evaluation of surgical resection with neoadjuvant pemetrexed/carboplatin/pembrolizumab.  If not a candidate for surgical resection, plan is for induction therapy with Alimta/carboplatin/pembrolizumab with concurrent RT.    Physical domain: Physically doing well and he is feeling normal.  No symptomatic complaints.    Emotional/psychological domain: History of anxiety and depression. Hx of tobacco and alcohol dependence.  However, currently he is coping well and states \"it is what it is.\".  He feels prepared for the journey ahead and will adapt as time goes on.    Social domain: From the area and well supported by his 3 " adult children Kenny, Myrtle, and Nany.  He is .  His spouse  from stage IV cancer 3 years ago.  Nany lives in the [facility].  Patient lives in a 143 bedroom house with son Kenny, daughter Myrtle, and grandson.  He feels very well supported.  Only 3 steps to get into the home.  No issues with finances, insurance, or transportation.  He has already applied for SSD.  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  He has been with the same company since .  Work gives him a lot of meaning and he is very project and goal oriented.    Spiritual domain: No spiritual or Mormonism tradition.      Medical History Reviewed by provider this encounter:  Meds     .  Past Medical History   Past Medical History:   Diagnosis Date    Anemia     Anxiety     Bladder cancer (HCC)     Cancer (HCC)     Colon polyp     COPD (chronic obstructive pulmonary disease) (HCC)     Depression     History of transfusion 10/2024    Hyperlipidemia     Hypertension     Irregular heart beat     afib    Mass of left lung     No natural teeth     Pneumonia     Wears glasses      Past Surgical History:   Procedure Laterality Date    APPENDECTOMY      COLONOSCOPY      CYSTOSCOPY      ENDOBRONCHIAL ULTRASOUND (EBUS)  2025    IR BIOPSY LUNG  2025    IR LOWER EXTREMITY ANGIOGRAM  2024    ID BYP FEM-ANT TIBL PST TIBL PRONEAL ART/OTH DSTL Right 10/31/2024    Procedure: right Common femoral artery to anterior tibial bypass with autologous vein;  Surgeon: Carlos Bray DO;  Location: [facility];  Service: Vascular    ID CYSTO W/REMOVAL OF LESIONS SMALL N/A 2025    Procedure: TRANSURETHRAL RESECTION OF BLADDER TUMOR (TURBT);  Surgeon: Toni Real MD;  Location:  MAIN OR;  Service: Urology    TRANSURETHRAL RESECTION OF BLADDER TUMOR N/A 2024    Procedure: (TURBT);  Surgeon: Gilmer Duke MD;  Location: [facility];  Service: Urology    US GUIDED LYMPH NODE BIOPSY  LEFT  01/02/2025     Family History   Problem Relation Age of Onset    Alcohol abuse Father     Heart attack Father       reports that he quit smoking about 3 months ago. His smoking use included cigarettes. He started smoking about 50 years ago. He has a 49.8 pack-year smoking history. He has been exposed to tobacco smoke. He has never used smokeless tobacco. He reports that he does not currently use alcohol after a past usage of about 6.0 standard drinks of alcohol per week. He reports that he does not use drugs.  Current Outpatient Medications   Medication Instructions    albuterol (Proventil HFA) 90 mcg/act inhaler 2 puffs, Inhalation, Every 6 hours PRN    apixaban (ELIQUIS) 5 mg, Oral, 2 times daily    atorvastatin (LIPITOR) 20 mg, Oral, Daily    dexamethasone (DECADRON) 4 mg, Oral, 2 times daily with meals, Take the day before and day after chemotherapy treatment.    Fluticasone-Salmeterol (Wixela Inhub) 100-50 mcg/dose inhaler 1 puff, As needed    ibuprofen (MOTRIN) 600 mg, Oral, Every 6 hours PRN    losartan (COZAAR) 100 mg, Oral, Daily    metoprolol succinate (TOPROL-XL) 50 mg, Oral, Daily    ondansetron (ZOFRAN) 4 mg, Oral, Every 8 hours PRN    phenazopyridine (PYRIDIUM) 200 mg, Oral, 3 times daily with meals   No Known Allergies   Current Outpatient Medications on File Prior to Visit   Medication Sig Dispense Refill    albuterol (Proventil HFA) 90 mcg/act inhaler Inhale 2 puffs every 6 (six) hours as needed for wheezing 6.7 g 5    apixaban (Eliquis) 5 mg Take 1 tablet (5 mg total) by mouth 2 (two) times a day 180 tablet 1    atorvastatin (LIPITOR) 20 mg tablet Take 1 tablet (20 mg total) by mouth daily 100 tablet 1    dexamethasone (DECADRON) 4 mg tablet Take 1 tablet (4 mg total) by mouth 2 (two) times a day with meals Take the day before and day after chemotherapy treatment. 16 tablet 0    Fluticasone-Salmeterol (Wixela Inhub) 100-50 mcg/dose inhaler Inhale 1 puff if needed Rinse mouth after use.       "ibuprofen (MOTRIN) 600 mg tablet Take 1 tablet (600 mg total) by mouth every 6 (six) hours as needed for mild pain 30 tablet 0    losartan (COZAAR) 50 mg tablet Take 2 tablets (100 mg total) by mouth daily 180 tablet 1    metoprolol succinate (TOPROL-XL) 50 mg 24 hr tablet Take 1 tablet (50 mg total) by mouth daily 100 tablet 1    ondansetron (ZOFRAN) 4 mg tablet Take 1 tablet (4 mg total) by mouth every 8 (eight) hours as needed for nausea or vomiting 20 tablet 1    phenazopyridine (PYRIDIUM) 200 mg tablet Take 1 tablet (200 mg total) by mouth 3 (three) times a day with meals 10 tablet 0     No current facility-administered medications on file prior to visit.      Social History     Tobacco Use    Smoking status: Former     Current packs/day: 0.00     Average packs/day: 1 pack/day for 49.8 years (49.8 ttl pk-yrs)     Types: Cigarettes     Start date:      Quit date: 10/30/2024     Years since quittin.3     Passive exposure: Current    Smokeless tobacco: Never   Vaping Use    Vaping status: Never Used   Substance and Sexual Activity    Alcohol use: Not Currently     Alcohol/week: 6.0 standard drinks of alcohol     Types: 6 Cans of beer per week     Comment: daily 3-5 beers daily and shots last drank 10/30    Drug use: No    Sexual activity: Not Currently        Objective   /72 (BP Location: Right arm, Patient Position: Sitting, Cuff Size: Standard)   Pulse 68   Temp 97.8 °F (36.6 °C) (Tympanic)   Resp 16   Ht 5' 6\" (1.676 m)   Wt 83.9 kg (185 lb)   SpO2 96%   BMI 29.86 kg/m²     Physical Exam  Constitutional:       General: He is not in acute distress.     Appearance: He is not ill-appearing.   HENT:      Head: Normocephalic and atraumatic.      Right Ear: External ear normal.      Left Ear: External ear normal.      Nose: Nose normal.      Mouth/Throat:      Mouth: Mucous membranes are moist.      Pharynx: Oropharynx is clear.   Eyes:      General: No scleral icterus.     Conjunctiva/sclera: " Conjunctivae normal.   Cardiovascular:      Rate and Rhythm: Normal rate and regular rhythm.      Pulses: Normal pulses.   Pulmonary:      Effort: Pulmonary effort is normal. No respiratory distress.   Abdominal:      General: There is no distension.      Palpations: Abdomen is soft.      Tenderness: There is no abdominal tenderness.   Musculoskeletal:      Cervical back: No rigidity or tenderness.      Right lower leg: No edema.      Left lower leg: No edema.   Lymphadenopathy:      Cervical: No cervical adenopathy.   Skin:     General: Skin is warm.      Capillary Refill: Capillary refill takes less than 2 seconds.      Coloration: Skin is pale. Skin is not jaundiced.      Nails: There is clubbing.   Neurological:      General: No focal deficit present.      Mental Status: He is alert. Mental status is at baseline.   Psychiatric:         Mood and Affect: Mood normal.         Behavior: Behavior normal.         Thought Content: Thought content normal.         Judgment: Judgment normal.         Recent labs:  Lab Results   Component Value Date/Time    SODIUM 136 02/24/2025 08:57 AM    K 5.1 02/24/2025 08:57 AM    BUN 15 02/24/2025 08:57 AM    CREATININE 1.06 02/24/2025 08:57 AM    GLUC 130 01/16/2025 10:36 PM    CALCIUM 9.7 02/24/2025 08:57 AM    AST 14 02/24/2025 08:57 AM    ALT 11 02/24/2025 08:57 AM    ALB 3.8 02/24/2025 08:57 AM    TP 7.3 02/24/2025 08:57 AM    EGFR 73 02/24/2025 08:57 AM     Lab Results   Component Value Date/Time    HGB 13.3 02/24/2025 08:57 AM    WBC 11.68 (H) 02/24/2025 08:57 AM     02/24/2025 08:57 AM    INR 1.03 01/29/2025 07:20 AM     Lab Results   Component Value Date/Time    ILM0ATTWTLQO 3.439 02/24/2025 08:57 AM       Recent Imaging:  Procedure: MRI brain w wo contrast  Result Date: 2/13/2025  Impression: No intracranial metastatic disease. No acute intracranial process. Workstation performed: [id number]     Procedure: IR biopsy lung  Result Date: 1/29/2025  Impression:  Impression: Technically successful image-guided biopsy of left upper lobe lung mass. Attestation Signer name: SCOTT WALLACE I attest that I was present for the entire procedure. I reviewed the stored images and agree with the report as written. Workstation performed: [id number]     Procedure: Navigational bronchoscopy with EBUS  Result Date: 1/15/2025  Impression: All observed locations appeared normal, including the pharynx, larynx, vocal cords, trachea, main paxton, left lung and right lung. RECOMMENDATION: This procedure was cancelled. No bill     Procedure: Endobronchial ultrasound (EBUS)  Result Date: 1/8/2025  Impression: The trachea, main paxton, left lower lobar bronchus, right main stem, bronchus intermedius and RML appeared normal. Deformity in the JOHN, lingula and RUL Bronchoalveolar lavage was performed x6 and the fluid appeared cloudy Lymph nodes observed at subcarinal (7) and right lower paratracheal (4R) stations. Sampled subcarinal (7) and right lower paratracheal (4R) using TBNA. RECOMMENDATION: Await pathology results - Patient to return to hospital bed after appropriately recovered form anesthesia - rest of care per pulmonary note - May consider starting anticoagulation this evening  Yvon Park, DO Pulmonary & Critical Care, PGY V Attending attestation I was present for the entire procedure Patient signed consent Patient intubated and placed under general anesthesia Upon insertion of the bronchoscope all airways were normal with the exception of some extrinsic compression in the lingula BAL lingula BAL right lower lobe Cytology brush without fluoroscopy in the lingula EBUS was performed, we were unable to make our way over to the lingula which is opposite of what the CAT scan showed.  There was no tumor around the left mainstem. Our hope was to perform EBUS of the mass near the left mainstem or lingula but EBUS did not show any tumor in that location We opted for EBUS FNA of local lymph  nodes such as LN 7 from the left side and LN4R. LN 4L was too small to sample and we had a difficult time finding 10L and 11L. EBUS lymph node 7 adequate with no malignancy EBUS lymph node 4R nondiagnostic with scant lymphocytes Extubated Shahana Mcwilliams MD Pulmonary and Critical Care Medicine     Procedure: Portable Chest X-Ray  Result Date: 1/8/2025  Impression: No pneumothorax after EBUS. New opacity in the left perihilar region which may be due to postprocedural change. Redemonstration of left upper lobe mass and right lower lobe consolidation. Workstation performed: [id number]     Procedure: US guided lymph node biopsy left  Result Date: 1/2/2025  Impression: Status post successful ultrasound-guided left parotid mass biopsy. Procedure was performed by Cinthya MONTERO under the direct supervision of Dr. Raza Holman Workstation performed: [id number]     Procedure: XR chest pa and lateral  Result Date: 12/23/2024  Impression: Redemonstration of extensive opacity in the left upper lobe due to tumor. A component of postobstructive pneumonia not excluded. Persistent right lower lobe pneumonia. Workstation performed: [id number]     Procedure: CT chest without contrast  Result Date: 12/18/2024  Impression: Combination of left upper lobe neoplasm and surrounding atelectasis measuring up to 8.3 cm which can represent primary or metastatic neoplasm given history of urinary bladder neoplasm. Tissue sampling is recommended for further differentiation.. A few mildly enlarged mediastinal lymph nodes, increased in size since PET/CT 11/29/2024. Findings can be metastatic or reactive in etiology. Multifocal infectious/aspiration pneumonitis, involving bilateral lower lobes and dependent aspect of the right upper lobe Bilateral bronchial wall thickening, which can be reactive or secondary to infectious bronchitis. Moderate atherosclerotic coronary artery calcifications. The study was marked in EPIC for immediate notification.  Workstation performed: [id number]     Procedure: NM PET CT skull base to mid thigh  Result Date: 11/29/2024  Impression: 1. Hypermetabolic mass in the left upper lobe with surrounding infiltrate. Findings could reflect primary lung neoplasm or metastasis. Hypermetabolic hilar adenopathy and small nodules in the left upper lobe. 2. No hypermetabolic abdominal or pelvic adenopathy. 3. Hypermetabolic nodule in the left parotid gland. Evaluation with contrast-enhanced CT or parotid gland ultrasound recommended to assess for parotid neoplasm. 4. Small reticulonodular infiltrate in the left lower lobe and opacity in the left upper lobe surrounding the left upper lobe mass most likely reflecting postobstructive atelectasis. The study was marked in EPIC for immediate notification. Workstation performed: [id number]       Administrative Statements   I have spent a total time of 60 minutes in caring for this patient on the day of the visit/encounter including Instructions for management, Patient and family education, Impressions, Counseling / Coordination of care, Documenting in the medical record, Reviewing/placing orders in the medical record (including tests, medications, and/or procedures), and Obtaining or reviewing history  .   Topics discussed with the patient / family include symptom assessment and management, medication review, medication adjustment, psychosocial support, supportive listening, and anticipatory guidance.

## 2025-02-27 NOTE — TELEPHONE ENCOUNTER
Patient was on DARS report today and has commercial insurance, also a high self pay balance. When reviewing his chart I received Teams messages regarding the patient from Julia Person:      Julia Person 9:04 AM   Your welcome, He also stated about getting financial help with copays? I don't know if that is part of what you do too?     I saw that he has a rather large self pay balance and I've requested an application be mailed to the patient with the Hospital Financial Counselors for possible assistance through the hospital. He can set set up an appointment with them at the [facility] Monday thru Wednesday. Their number is [phone]. They can tell him what financial information is required.     Ok, I will give him a call and let him know thanks for the information. Have a great day.      An application request was sent via e mail today and I also submitted an application to [employer] for co pay assistance with his Keytruda waiting for determination from [employer].

## 2025-02-27 NOTE — PROGRESS NOTES
Pt to clinic for Keytruda, Alimta, Carboplatin, offers no complaints today, tolerated infusion without complications, aware of next appointment on 3/20/25 at 10am, PIV removed, avs declined.

## 2025-02-27 NOTE — ASSESSMENT & PLAN NOTE
Complete PFT postbronchodilator 2/19/2025  Moderate obstructive airflow defect with positive postbronchodilator response; air trapping; mildly decreased diffusion capacity    FEV1 48% predicted  FEV1/FVC 55% predicted  % of predicted  DLCO 64% predicted

## 2025-03-04 ENCOUNTER — RESULTS FOLLOW-UP (OUTPATIENT)
Dept: UROLOGY | Facility: MEDICAL CENTER | Age: 65
End: 2025-03-04

## 2025-03-17 ENCOUNTER — OFFICE VISIT (OUTPATIENT)
Dept: HEMATOLOGY ONCOLOGY | Facility: CLINIC | Age: 65
End: 2025-03-17
Payer: COMMERCIAL

## 2025-03-17 ENCOUNTER — APPOINTMENT (OUTPATIENT)
Dept: LAB | Facility: MEDICAL CENTER | Age: 65
End: 2025-03-17
Payer: COMMERCIAL

## 2025-03-17 DIAGNOSIS — C34.92 ADENOCARCINOMA OF LEFT LUNG (HCC): Primary | ICD-10-CM

## 2025-03-17 DIAGNOSIS — C34.92 ADENOCARCINOMA OF LEFT LUNG (HCC): ICD-10-CM

## 2025-03-17 LAB
ALBUMIN SERPL BCG-MCNC: 4.4 G/DL (ref 3.5–5)
ALP SERPL-CCNC: 88 U/L (ref 34–104)
ALT SERPL W P-5'-P-CCNC: 29 U/L (ref 7–52)
ANION GAP SERPL CALCULATED.3IONS-SCNC: 10 MMOL/L (ref 4–13)
AST SERPL W P-5'-P-CCNC: 24 U/L (ref 13–39)
BILIRUB SERPL-MCNC: 0.3 MG/DL (ref 0.2–1)
BUN SERPL-MCNC: 14 MG/DL (ref 5–25)
CALCIUM SERPL-MCNC: 10.3 MG/DL (ref 8.4–10.2)
CHLORIDE SERPL-SCNC: 97 MMOL/L (ref 96–108)
CO2 SERPL-SCNC: 29 MMOL/L (ref 21–32)
CREAT SERPL-MCNC: 0.95 MG/DL (ref 0.6–1.3)
GFR SERPL CREATININE-BSD FRML MDRD: 83 ML/MIN/1.73SQ M
GLUCOSE SERPL-MCNC: 124 MG/DL (ref 65–140)
POTASSIUM SERPL-SCNC: 4.4 MMOL/L (ref 3.5–5.3)
PROT SERPL-MCNC: 7.4 G/DL (ref 6.4–8.4)
SODIUM SERPL-SCNC: 136 MMOL/L (ref 135–147)
T3FREE SERPL-MCNC: 3.4 PG/ML (ref 2.5–3.9)
TSH SERPL DL<=0.05 MIU/L-ACNC: 3.15 UIU/ML (ref 0.45–4.5)

## 2025-03-17 PROCEDURE — 84481 FREE ASSAY (FT-3): CPT

## 2025-03-17 PROCEDURE — 85007 BL SMEAR W/DIFF WBC COUNT: CPT

## 2025-03-17 PROCEDURE — 99214 OFFICE O/P EST MOD 30 MIN: CPT | Performed by: INTERNAL MEDICINE

## 2025-03-17 PROCEDURE — 80053 COMPREHEN METABOLIC PANEL: CPT

## 2025-03-17 PROCEDURE — 84443 ASSAY THYROID STIM HORMONE: CPT

## 2025-03-17 PROCEDURE — 36415 COLL VENOUS BLD VENIPUNCTURE: CPT

## 2025-03-17 PROCEDURE — 85027 COMPLETE CBC AUTOMATED: CPT

## 2025-03-17 NOTE — ASSESSMENT & PLAN NOTE
Orders:    CT chest wo contrast; Future  Adenocarcinoma of the left upper lobe of the lung with mass measuring about 4 x 5 cm, possible spiculated lesions in the left upper lobe, with mediastinal lymphadenopathy, the most recent CAT scan of the chest showed increase in the subcarinal lymphadenopathy, at least stage IIIa disease     MRI of the brain was reported normal     Molecular test showed PDL expression more than 90% no other actionable mutations     The case discussed in the tumor conference     If the patient is candidate for surgical resection he would be treated with neoadjuvant pemetrexed/carboplatin/pembrolizumab

## 2025-03-17 NOTE — PROGRESS NOTES
Name: Nahid Luis      : 1960      MRN: [id number]  Encounter Provider: Taqueria Macedo MD  Encounter Date: 3/17/2025   Encounter department: [facility]  :  Assessment & Plan  Adenocarcinoma of left lung (HCC)    Orders:    CT chest wo contrast; Future  Adenocarcinoma of the left upper lobe of the lung with mass measuring about 4 x 5 cm, possible spiculated lesions in the left upper lobe, with mediastinal lymphadenopathy, the most recent CAT scan of the chest showed increase in the subcarinal lymphadenopathy, at least stage IIIa disease     MRI of the brain was reported normal     Molecular test showed PDL expression more than 90% no other actionable mutations     The case discussed in the tumor conference     If the patient is candidate for surgical resection he would be treated with neoadjuvant pemetrexed/carboplatin/pembrolizumab  High-grade superficial bladder cancer status post TURBT on 2024 no muscle invasion     Status post BCG followed by urology     I will hold BCG at this time especially with immunotherapy and chemotherapy for lung cancer    History of Present Illness   65-year-old who used to be a heavy smoker and heavy drinker quit in 2024, anxiety, history of superficial bladder cancer with intravesical chemotherapy, colon polyp, COPD, depression, peripheral vascular disease with femoral bypass surgery with CTA of the chest abdomen and pelvis showed aneurysmal dilatation at the bifurcation of the right external and internal iliac arteries, 6 cm mass over the posterior bladder concerning for bladder cancer     PET scan on 2024 showed hypermetabolic left upper lobe mass with SUV of 18, hypermetabolic nodules in the left upper lobe, hypermetabolic left hilar node and right hilar nodes, CAT scan component showed spiculated mass in the left upper lobe measuring 4.5 x 4.8 cm and subcentimeter areas of nodularity in the left upper lobe      He had TURBT on 11/5/2024 for high-grade tumor negative for residual carcinoma no muscle invasion status post intravesical BCG     MRI of the brain was reported normal     Molecular test showed PDL expression more than 90% no other actionable mutations     Nahid Luis is a 65 y.o. male who presents for follow-up lung cancer      Review of Systems       Objective   There were no vitals taken for this visit.     Physical Exam

## 2025-03-18 LAB
ANISOCYTOSIS BLD QL SMEAR: PRESENT
BASOPHILS # BLD MANUAL: 0 THOUSAND/UL (ref 0–0.1)
BASOPHILS NFR MAR MANUAL: 0 % (ref 0–1)
BURR CELLS BLD QL SMEAR: PRESENT
DIFFERENTIAL COMMENT: ABNORMAL
EOSINOPHIL # BLD MANUAL: 0.09 THOUSAND/UL (ref 0–0.4)
EOSINOPHIL NFR BLD MANUAL: 1 % (ref 0–6)
ERYTHROCYTE [DISTWIDTH] IN BLOOD BY AUTOMATED COUNT: 18 % (ref 11.6–15.1)
HCT VFR BLD AUTO: 42.8 % (ref 36.5–49.3)
HGB BLD-MCNC: 13.4 G/DL (ref 12–17)
LYMPHOCYTES # BLD AUTO: 4.44 THOUSAND/UL (ref 0.6–4.47)
LYMPHOCYTES # BLD AUTO: 47 % (ref 14–44)
MACROCYTES BLD QL AUTO: PRESENT
MCH RBC QN AUTO: 27 PG (ref 26.8–34.3)
MCHC RBC AUTO-ENTMCNC: 31.3 G/DL (ref 31.4–37.4)
MCV RBC AUTO: 86 FL (ref 82–98)
MONOCYTES # BLD AUTO: 1.83 THOUSAND/UL (ref 0–1.22)
MONOCYTES NFR BLD: 21 % (ref 4–12)
NEUTROPHILS # BLD MANUAL: 2.35 THOUSAND/UL (ref 1.85–7.62)
NEUTS BAND NFR BLD MANUAL: 1 % (ref 0–8)
NEUTS SEG NFR BLD AUTO: 26 % (ref 43–75)
OVALOCYTES BLD QL SMEAR: PRESENT
PLATELET # BLD AUTO: 283 THOUSANDS/UL (ref 149–390)
PLATELET BLD QL SMEAR: ADEQUATE
PMV BLD AUTO: 9.3 FL (ref 8.9–12.7)
POIKILOCYTOSIS BLD QL SMEAR: PRESENT
POLYCHROMASIA BLD QL SMEAR: PRESENT
RBC # BLD AUTO: 4.97 MILLION/UL (ref 3.88–5.62)
RBC MORPH BLD: PRESENT
SMUDGE CELLS BLD QL SMEAR: PRESENT
VARIANT LYMPHS # BLD AUTO: 4 %
WBC # BLD AUTO: 8.71 THOUSAND/UL (ref 4.31–10.16)

## 2025-03-18 RX ORDER — SODIUM CHLORIDE 9 MG/ML
20 INJECTION, SOLUTION INTRAVENOUS ONCE
Status: CANCELLED | OUTPATIENT
Start: 2025-03-20

## 2025-03-20 ENCOUNTER — HOSPITAL ENCOUNTER (OUTPATIENT)
Dept: INFUSION CENTER | Facility: CLINIC | Age: 65
Discharge: HOME | End: 2025-03-20
Payer: COMMERCIAL

## 2025-03-20 VITALS
BODY MASS INDEX: 29.47 KG/M2 | RESPIRATION RATE: 18 BRPM | HEART RATE: 78 BPM | WEIGHT: 187.8 LBS | DIASTOLIC BLOOD PRESSURE: 67 MMHG | HEIGHT: 67 IN | SYSTOLIC BLOOD PRESSURE: 155 MMHG | TEMPERATURE: 97.5 F

## 2025-03-20 DIAGNOSIS — C34.92 ADENOCARCINOMA OF LEFT LUNG (HCC): Primary | ICD-10-CM

## 2025-03-20 PROCEDURE — 96367 TX/PROPH/DG ADDL SEQ IV INF: CPT

## 2025-03-20 PROCEDURE — 96413 CHEMO IV INFUSION 1 HR: CPT

## 2025-03-20 PROCEDURE — 96417 CHEMO IV INFUS EACH ADDL SEQ: CPT

## 2025-03-20 PROCEDURE — 96411 CHEMO IV PUSH ADDL DRUG: CPT

## 2025-03-20 RX ORDER — SODIUM CHLORIDE 9 MG/ML
20 INJECTION, SOLUTION INTRAVENOUS ONCE
Status: COMPLETED | OUTPATIENT
Start: 2025-03-20 | End: 2025-03-20

## 2025-03-20 RX ADMIN — CARBOPLATIN 531.5 MG: 10 INJECTION INTRAVENOUS at 12:09

## 2025-03-20 RX ADMIN — FOSAPREPITANT 150 MG: 150 INJECTION, POWDER, LYOPHILIZED, FOR SOLUTION INTRAVENOUS at 10:22

## 2025-03-20 RX ADMIN — DEXAMETHASONE SODIUM PHOSPHATE: 10 INJECTION, SOLUTION INTRAMUSCULAR; INTRAVENOUS at 09:58

## 2025-03-20 RX ADMIN — PEMETREXED DISODIUM 980 MG: 500 INJECTION, POWDER, LYOPHILIZED, FOR SOLUTION INTRAVENOUS at 11:56

## 2025-03-20 RX ADMIN — SODIUM CHLORIDE 200 MG: 9 INJECTION, SOLUTION INTRAVENOUS at 11:05

## 2025-03-20 RX ADMIN — SODIUM CHLORIDE 20 ML/HR: 9 INJECTION, SOLUTION INTRAVENOUS at 09:59

## 2025-03-20 NOTE — PROGRESS NOTES
Pt to clinic for Carboplatin, Alimta, and Keytruda. Pt offers no complaints today. Tolerated infusion without complications. Aware of next appointment on 4/10/25 at 1200. AVS declined. PIV removed.

## 2025-03-25 ENCOUNTER — PATIENT OUTREACH (OUTPATIENT)
Dept: HEMATOLOGY ONCOLOGY | Facility: CLINIC | Age: 65
End: 2025-03-25

## 2025-03-25 NOTE — PROGRESS NOTES
I reached out and spoke with Alexei to follow up and to review for any new changes in barriers to care and offer supportive services as needed.     Are you having any side effects from your treatment?No    Are you eating and drinking normally? yes    Have you been experiencing any uncontrolled pain related to your cancer diagnosis? No    Do you have a good support system? Yes     Are you interested in any support groups? no    How are you doing with transportation to your appointments? drive    Do you have any questions or concerns regarding your treatment plan? No    Any new financial concerns for your household or medical bills? No    Do you know when your upcoming appointments are? yes  Future Appointments   Date Time Provider Department Center   3/28/2025  7:00 AM KURTIS Lutz  [facility]   4/10/2025 12:00 PM MO INF CHAIR 1 MO Infusion [facility]   4/16/2025  9:30 AM AN CT WG 1 AN WG CT AN [facility]   4/17/2025 10:00 AM Topher Jaime DO PALL STR [facility]   4/23/2025  8:40 AM Toni Martinez MD CARD ONC 8TH [facility]   4/29/2025 10:20 AM Taqueria Macedo MD FELICITA ONC EAS [facility]   6/16/2025  1:00 PM AN HV VASCULAR 1 AN HV Car NI AN HOSP [facility]   6/16/2025  2:00 PM AN HV VASCULAR 1 AN HV Car NI AN HOSP [facility]   6/17/2025 10:15 AM Yen Knott MD [facility]   8/14/2025  7:20 AM KURTIS Lutz  P[facility]   2/19/2026  9:00 AM AN HV VASCULAR 1 AN HV Car NI AN HOSP [facility]     Alexei wants to get back to work, he has been out of work for 6 months and is ready to get back.     Based on individual needs I will follow up with them in 5 weeks. I have provided my direct contact information and welcome them to contact me if their needs as discussed above change. He was appreciative for the call.

## 2025-03-28 ENCOUNTER — OFFICE VISIT (OUTPATIENT)
Dept: FAMILY MEDICINE CLINIC | Facility: CLINIC | Age: 65
End: 2025-03-28
Payer: COMMERCIAL

## 2025-03-28 VITALS
SYSTOLIC BLOOD PRESSURE: 122 MMHG | WEIGHT: 186 LBS | OXYGEN SATURATION: 94 % | TEMPERATURE: 95.9 F | DIASTOLIC BLOOD PRESSURE: 74 MMHG | BODY MASS INDEX: 29.19 KG/M2 | HEART RATE: 63 BPM | HEIGHT: 67 IN

## 2025-03-28 DIAGNOSIS — Z00.00 ANNUAL PHYSICAL EXAM: ICD-10-CM

## 2025-03-28 DIAGNOSIS — Z86.0109 PERSONAL HISTORY OF OTHER COLON POLYPS: ICD-10-CM

## 2025-03-28 DIAGNOSIS — I73.9 PAD (PERIPHERAL ARTERY DISEASE) (HCC): ICD-10-CM

## 2025-03-28 DIAGNOSIS — I48.91 ATRIAL FIBRILLATION WITH RAPID VENTRICULAR RESPONSE (HCC): ICD-10-CM

## 2025-03-28 DIAGNOSIS — J44.9 CHRONIC OBSTRUCTIVE PULMONARY DISEASE, UNSPECIFIED COPD TYPE (HCC): ICD-10-CM

## 2025-03-28 DIAGNOSIS — I25.10 CORONARY ARTERY CALCIFICATION: ICD-10-CM

## 2025-03-28 DIAGNOSIS — I10 HYPERTENSION, UNSPECIFIED TYPE: Primary | ICD-10-CM

## 2025-03-28 DIAGNOSIS — E78.2 MIXED HYPERLIPIDEMIA: ICD-10-CM

## 2025-03-28 DIAGNOSIS — C34.92 ADENOCARCINOMA OF LEFT LUNG (HCC): ICD-10-CM

## 2025-03-28 DIAGNOSIS — Z51.5 PALLIATIVE CARE PATIENT: ICD-10-CM

## 2025-03-28 DIAGNOSIS — C67.9 UROTHELIAL CARCINOMA OF BLADDER (HCC): ICD-10-CM

## 2025-03-28 PROCEDURE — 99214 OFFICE O/P EST MOD 30 MIN: CPT | Performed by: PHYSICIAN ASSISTANT

## 2025-03-28 PROCEDURE — 99397 PER PM REEVAL EST PAT 65+ YR: CPT | Performed by: PHYSICIAN ASSISTANT

## 2025-03-28 RX ORDER — FLUTICASONE PROPIONATE AND SALMETEROL 100; 50 UG/1; UG/1
1 POWDER RESPIRATORY (INHALATION) 2 TIMES DAILY
Qty: 200 BLISTER | Refills: 1 | Status: SHIPPED | OUTPATIENT
Start: 2025-03-28 | End: 2025-10-14

## 2025-03-28 RX ORDER — ALBUTEROL SULFATE 90 UG/1
2 INHALANT RESPIRATORY (INHALATION) EVERY 6 HOURS PRN
Qty: 6.7 G | Refills: 5 | Status: SHIPPED | OUTPATIENT
Start: 2025-03-28

## 2025-03-28 NOTE — PROGRESS NOTES
Adult Annual Physical  Name: Nahid Luis      : 1960      MRN: [id number]  Encounter Provider: Denys Moncada PA-C  Encounter Date: 3/28/2025   Encounter department: [facility]    Assessment & Plan  Chronic obstructive pulmonary disease, unspecified COPD type (HCC)  No respiratory sx  No wheezing on exam  Continue wixela, prn albuterol  Congratulated smoking cessation  Orders:  •  albuterol (Proventil HFA) 90 mcg/act inhaler; Inhale 2 puffs every 6 (six) hours as needed for wheezing  •  Fluticasone-Salmeterol (Wixela Inhub) 100-50 mcg/dose inhaler; Inhale 1 puff 2 (two) times a day Rinse mouth after use.    Personal history of other colon polyps  Discussed importance of follow up colonoscopy  Orders:  •  Ambulatory Referral to Gastroenterology; Future    Hypertension, unspecified type  At goal continue same regimen       PAD (peripheral artery disease) (HCC)  No sx  Continue statin       Coronary artery calcification  Continue medical therapy, no cardiac complaints       Atrial fibrillation with rapid ventricular response (HCC)  Has been maintaining NSR  AC on eliquis, rate control with metoprolol  Continue with cardiology       Adenocarcinoma of left lung (HCC)  Continues immunotherapy/chemotherapy  Tolerating well       Urothelial carcinoma of bladder (HCC)  Continue with urology  No hematuria or LUTS       Palliative care patient         Mixed hyperlipidemia  Continue statin       Annual physical exam  Unremarkable exam  Screenings/guidance as below        Preventive Screenings:  - Diabetes Screening: screening up-to-date  - Cholesterol Screening: screening not indicated and has hyperlipidemia   - Hepatitis C screening: screening up-to-date   - HIV screening: screening not indicated   - Colon cancer screening: screening up-to-date   - Lung cancer screening: screening not indicated and has history of lung cancer   - Prostate cancer screening: screening up-to-date   - AAA  screening: screening not indicated     Counseling/Anticipatory Guidance:  - Alcohol: discussed moderation in alcohol intake and recommendations for healthy alcohol use.   - Drug use: discussed harms of illicit drug use and how it can negatively impact mental/physical health.   - Tobacco use: discussed harms of tobacco use and management options for quitting.   - Diet: discussed recommendations for a healthy/well-balanced diet.   - Exercise: the importance of regular exercise/physical activity was discussed. Recommend exercise 3-5 times per week for at least 30 minutes.          History of Present Illness     Adult Annual Physical:  Patient presents for annual physical. Pt presents for physical/follow up    Hx of lung ca/prostate ca following with oncology currently on immunotherapy/chemotherapy and BCG therapy is paused. Follows with urology. Tolerating tx well. He has no physical concerns today.   He stopped smoking and drinking all together  In the meantime he has established with cardiology and palliative care  No cardiac complaints  /74.     Diet and Physical Activity:  - Diet/Nutrition: no special diet.  - Exercise: no formal exercise.    General Health:  - Sleep: 4-6 hours of sleep on average and sleeps poorly. Pt wakes up every 1h  - Hearing: normal hearing bilateral ears.  - Vision: no vision problems, wears glasses and most recent eye exam > 1 year ago.  - Dental: regular dental visits.    Review of Systems   Constitutional:  Negative for chills and fever.   HENT:  Negative for ear pain and sore throat.    Eyes:  Negative for pain and visual disturbance.   Respiratory:  Negative for cough and shortness of breath.    Cardiovascular:  Negative for chest pain and palpitations.   Gastrointestinal:  Negative for abdominal pain and vomiting.   Genitourinary:  Negative for dysuria and hematuria.   Musculoskeletal:  Negative for arthralgias and back pain.   Skin:  Negative for color change and rash.  "  Neurological:  Negative for seizures and syncope.   All other systems reviewed and are negative.        Objective   /74 (BP Location: Left arm, Patient Position: Sitting, Cuff Size: Large)   Pulse 63   Temp (!) 95.9 °F (35.5 °C)   Ht 5' 7.4\" (1.712 m)   Wt 84.4 kg (186 lb)   SpO2 94%   BMI 28.79 kg/m²     Physical Exam  Vitals and nursing note reviewed.   Constitutional:       General: He is not in acute distress.     Appearance: He is well-developed.   HENT:      Head: Normocephalic and atraumatic.   Eyes:      Conjunctiva/sclera: Conjunctivae normal.   Cardiovascular:      Rate and Rhythm: Normal rate and regular rhythm.      Heart sounds: No murmur heard.  Pulmonary:      Effort: Pulmonary effort is normal. No respiratory distress.      Breath sounds: Normal breath sounds. No wheezing, rhonchi or rales.   Abdominal:      Palpations: Abdomen is soft.      Tenderness: There is no abdominal tenderness.   Musculoskeletal:         General: No swelling.      Cervical back: Neck supple.      Right lower leg: No edema.      Left lower leg: No edema.   Skin:     General: Skin is warm and dry.      Capillary Refill: Capillary refill takes less than 2 seconds.   Neurological:      Mental Status: He is alert.   Psychiatric:         Mood and Affect: Mood normal.         "

## 2025-03-28 NOTE — ASSESSMENT & PLAN NOTE
No respiratory sx  No wheezing on exam  Continue wixela, prn albuterol  Congratulated smoking cessation  Orders:  •  albuterol (Proventil HFA) 90 mcg/act inhaler; Inhale 2 puffs every 6 (six) hours as needed for wheezing  •  Fluticasone-Salmeterol (Wixela Inhub) 100-50 mcg/dose inhaler; Inhale 1 puff 2 (two) times a day Rinse mouth after use.

## 2025-03-28 NOTE — ASSESSMENT & PLAN NOTE
Has been maintaining NSR  AC on eliquis, rate control with metoprolol  Continue with cardiology

## 2025-03-28 NOTE — PATIENT INSTRUCTIONS
"Patient Education     Routine physical for adults   The Basics   Written by the doctors and editors at [facility]   What is a physical? -- A physical is a routine visit, or \"check-up,\" with your doctor. You might also hear it called a \"wellness visit\" or \"preventive visit.\"  During each visit, the doctor will:   Ask about your physical and mental health   Ask about your habits, behaviors, and lifestyle   Do an exam   Give you vaccines if needed   Talk to you about any medicines you take   Give advice about your health   Answer your questions  Getting regular check-ups is an important part of taking care of your health. It can help your doctor find and treat any problems you have. But it's also important for preventing health problems.  A routine physical is different from a \"sick visit.\" A sick visit is when you see a doctor because of a health concern or problem. Since physicals are scheduled ahead of time, you can think about what you want to ask the doctor.  How often should I get a physical? -- It depends on your age and health. In general, for people age 21 years and older:   If you are younger than 50 years, you might be able to get a physical every 3 years.   If you are 50 years or older, your doctor might recommend a physical every year.  If you have an ongoing health condition, like diabetes or high blood pressure, your doctor will probably want to see you more often.  What happens during a physical? -- In general, each visit will include:   Physical exam - The doctor or nurse will check your height, weight, heart rate, and blood pressure. They will also look at your eyes and ears. They will ask about how you are feeling and whether you have any symptoms that bother you.   Medicines - It's a good idea to bring a list of all the medicines you take to each doctor visit. Your doctor will talk to you about your medicines and answer any questions. Tell them if you are having any side effects that bother you. You " "should also tell them if you are having trouble paying for any of your medicines.   Habits and behaviors - This includes:   Your diet   Your exercise habits   Whether you smoke, drink alcohol, or use drugs   Whether you are sexually active   Whether you feel safe at home  Your doctor will talk to you about things you can do to improve your health and lower your risk of health problems. They will also offer help and support. For example, if you want to quit smoking, they can give you advice and might prescribe medicines. If you want to improve your diet or get more physical activity, they can help you with this, too.   Lab tests, if needed - The tests you get will depend on your age and situation. For example, your doctor might want to check your:   Cholesterol   Blood sugar   Iron level   Vaccines - The recommended vaccines will depend on your age, health, and what vaccines you already had. Vaccines are very important because they can prevent certain serious or deadly infections.   Discussion of screening - \"Screening\" means checking for diseases or other health problems before they cause symptoms. Your doctor can recommend screening based on your age, risk, and preferences. This might include tests to check for:   Cancer, such as breast, prostate, cervical, ovarian, colorectal, prostate, lung, or skin cancer   Sexually transmitted infections, such as chlamydia and gonorrhea   Mental health conditions like depression and anxiety  Your doctor will talk to you about the different types of screening tests. They can help you decide which screenings to have. They can also explain what the results might mean.   Answering questions - The physical is a good time to ask the doctor or nurse questions about your health. If needed, they can refer you to other doctors or specialists, too.  Adults older than 65 years often need other care, too. As you get older, your doctor will talk to you about:   How to prevent falling at " home   Hearing or vision tests   Memory testing   How to take your medicines safely   Making sure that you have the help and support you need at home  All topics are updated as new evidence becomes available and our peer review process is complete.  This topic retrieved from [employer] on: May 02, 2024.  Topic 668991 Version 1.0  Release: 32.4.3 - C32.122  © 2024 UpToDate, Inc. and/or its affiliates. All rights reserved.  Consumer Information Use and Disclaimer   Disclaimer: This generalized information is a limited summary of diagnosis, treatment, and/or medication information. It is not meant to be comprehensive and should be used as a tool to help the user understand and/or assess potential diagnostic and treatment options. It does NOT include all information about conditions, treatments, medications, side effects, or risks that may apply to a specific patient. It is not intended to be medical advice or a substitute for the medical advice, diagnosis, or treatment of a health care provider based on the health care provider's examination and assessment of a patient's specific and unique circumstances. Patients must speak with a health care provider for complete information about their health, medical questions, and treatment options, including any risks or benefits regarding use of medications. This information does not endorse any treatments or medications as safe, effective, or approved for treating a specific patient. UpToDate, Inc. and its affiliates disclaim any warranty or liability relating to this information or the use thereof.The use of this information is governed by the Terms of Use, available at https://www.woltersMcPhyuwer.com/en/know/clinical-effectiveness-terms. 2024© UpToDate, Inc. and its affiliates and/or licensors. All rights reserved.  Copyright   © 2024 UpToDate, Inc. and/or its affiliates. All rights reserved.

## 2025-04-05 DIAGNOSIS — I10 HYPERTENSION, UNSPECIFIED TYPE: ICD-10-CM

## 2025-04-07 ENCOUNTER — APPOINTMENT (OUTPATIENT)
Dept: LAB | Facility: MEDICAL CENTER | Age: 65
End: 2025-04-07
Payer: COMMERCIAL

## 2025-04-07 DIAGNOSIS — C34.92 ADENOCARCINOMA OF LEFT LUNG (HCC): ICD-10-CM

## 2025-04-07 LAB
ALBUMIN SERPL BCG-MCNC: 4.3 G/DL (ref 3.5–5)
ALP SERPL-CCNC: 90 U/L (ref 34–104)
ALT SERPL W P-5'-P-CCNC: 54 U/L (ref 7–52)
ANION GAP SERPL CALCULATED.3IONS-SCNC: 9 MMOL/L (ref 4–13)
AST SERPL W P-5'-P-CCNC: 39 U/L (ref 13–39)
BASOPHILS # BLD AUTO: 0.02 THOUSANDS/ÂΜL (ref 0–0.1)
BASOPHILS NFR BLD AUTO: 0 % (ref 0–1)
BILIRUB SERPL-MCNC: 0.29 MG/DL (ref 0.2–1)
BUN SERPL-MCNC: 10 MG/DL (ref 5–25)
CALCIUM SERPL-MCNC: 9.9 MG/DL (ref 8.4–10.2)
CHLORIDE SERPL-SCNC: 100 MMOL/L (ref 96–108)
CO2 SERPL-SCNC: 30 MMOL/L (ref 21–32)
CREAT SERPL-MCNC: 1.12 MG/DL (ref 0.6–1.3)
EOSINOPHIL # BLD AUTO: 0.09 THOUSAND/ÂΜL (ref 0–0.61)
EOSINOPHIL NFR BLD AUTO: 1 % (ref 0–6)
ERYTHROCYTE [DISTWIDTH] IN BLOOD BY AUTOMATED COUNT: 18.6 % (ref 11.6–15.1)
GFR SERPL CREATININE-BSD FRML MDRD: 68 ML/MIN/1.73SQ M
GLUCOSE P FAST SERPL-MCNC: 144 MG/DL (ref 65–99)
HCT VFR BLD AUTO: 41 % (ref 36.5–49.3)
HGB BLD-MCNC: 12.8 G/DL (ref 12–17)
IMM GRANULOCYTES # BLD AUTO: 0.06 THOUSAND/UL (ref 0–0.2)
IMM GRANULOCYTES NFR BLD AUTO: 1 % (ref 0–2)
LYMPHOCYTES # BLD AUTO: 4.35 THOUSANDS/ÂΜL (ref 0.6–4.47)
LYMPHOCYTES NFR BLD AUTO: 65 % (ref 14–44)
MCH RBC QN AUTO: 27.6 PG (ref 26.8–34.3)
MCHC RBC AUTO-ENTMCNC: 31.2 G/DL (ref 31.4–37.4)
MCV RBC AUTO: 88 FL (ref 82–98)
MONOCYTES # BLD AUTO: 0.78 THOUSAND/ÂΜL (ref 0.17–1.22)
MONOCYTES NFR BLD AUTO: 12 % (ref 4–12)
NEUTROPHILS # BLD AUTO: 1.37 THOUSANDS/ÂΜL (ref 1.85–7.62)
NEUTS SEG NFR BLD AUTO: 21 % (ref 43–75)
NRBC BLD AUTO-RTO: 0 /100 WBCS
PLATELET # BLD AUTO: 230 THOUSANDS/UL (ref 149–390)
PMV BLD AUTO: 10.5 FL (ref 8.9–12.7)
POTASSIUM SERPL-SCNC: 4.5 MMOL/L (ref 3.5–5.3)
PROT SERPL-MCNC: 7.5 G/DL (ref 6.4–8.4)
RBC # BLD AUTO: 4.64 MILLION/UL (ref 3.88–5.62)
SODIUM SERPL-SCNC: 139 MMOL/L (ref 135–147)
T3FREE SERPL-MCNC: 3.09 PG/ML (ref 2.5–3.9)
TSH SERPL DL<=0.05 MIU/L-ACNC: 4.33 UIU/ML (ref 0.45–4.5)
WBC # BLD AUTO: 6.67 THOUSAND/UL (ref 4.31–10.16)

## 2025-04-07 PROCEDURE — 84481 FREE ASSAY (FT-3): CPT

## 2025-04-07 PROCEDURE — 84443 ASSAY THYROID STIM HORMONE: CPT

## 2025-04-07 PROCEDURE — 80053 COMPREHEN METABOLIC PANEL: CPT

## 2025-04-07 PROCEDURE — 36415 COLL VENOUS BLD VENIPUNCTURE: CPT

## 2025-04-07 PROCEDURE — 85025 COMPLETE CBC W/AUTO DIFF WBC: CPT

## 2025-04-07 RX ORDER — LOSARTAN POTASSIUM 50 MG/1
100 TABLET ORAL DAILY
Qty: 180 TABLET | Refills: 1 | Status: SHIPPED | OUTPATIENT
Start: 2025-04-07

## 2025-04-08 RX ORDER — CYANOCOBALAMIN 1000 UG/ML
1000 INJECTION, SOLUTION INTRAMUSCULAR; SUBCUTANEOUS ONCE
Status: CANCELLED | OUTPATIENT
Start: 2025-04-10 | End: 2025-04-10

## 2025-04-08 RX ORDER — SODIUM CHLORIDE 9 MG/ML
20 INJECTION, SOLUTION INTRAVENOUS ONCE
Status: CANCELLED | OUTPATIENT
Start: 2025-04-10

## 2025-04-10 ENCOUNTER — HOSPITAL ENCOUNTER (OUTPATIENT)
Dept: INFUSION CENTER | Facility: CLINIC | Age: 65
Discharge: HOME | End: 2025-04-10
Payer: COMMERCIAL

## 2025-04-10 VITALS
RESPIRATION RATE: 18 BRPM | BODY MASS INDEX: 29.44 KG/M2 | HEART RATE: 75 BPM | HEIGHT: 67 IN | TEMPERATURE: 97.8 F | SYSTOLIC BLOOD PRESSURE: 140 MMHG | DIASTOLIC BLOOD PRESSURE: 62 MMHG | WEIGHT: 187.6 LBS

## 2025-04-10 DIAGNOSIS — C34.92 ADENOCARCINOMA OF LEFT LUNG (HCC): Primary | ICD-10-CM

## 2025-04-10 PROCEDURE — 96411 CHEMO IV PUSH ADDL DRUG: CPT

## 2025-04-10 PROCEDURE — 96417 CHEMO IV INFUS EACH ADDL SEQ: CPT

## 2025-04-10 PROCEDURE — 96413 CHEMO IV INFUSION 1 HR: CPT

## 2025-04-10 PROCEDURE — 96367 TX/PROPH/DG ADDL SEQ IV INF: CPT

## 2025-04-10 RX ORDER — SODIUM CHLORIDE 9 MG/ML
20 INJECTION, SOLUTION INTRAVENOUS ONCE
Status: COMPLETED | OUTPATIENT
Start: 2025-04-10 | End: 2025-04-10

## 2025-04-10 RX ADMIN — CARBOPLATIN 469.5 MG: 10 INJECTION INTRAVENOUS at 14:52

## 2025-04-10 RX ADMIN — SODIUM CHLORIDE 200 MG: 9 INJECTION, SOLUTION INTRAVENOUS at 13:44

## 2025-04-10 RX ADMIN — DEXAMETHASONE SODIUM PHOSPHATE: 10 INJECTION, SOLUTION INTRAMUSCULAR; INTRAVENOUS at 12:29

## 2025-04-10 RX ADMIN — FOSAPREPITANT 150 MG: 150 INJECTION, POWDER, LYOPHILIZED, FOR SOLUTION INTRAVENOUS at 12:59

## 2025-04-10 RX ADMIN — PEMETREXED DISODIUM 980 MG: 500 INJECTION, POWDER, LYOPHILIZED, FOR SOLUTION INTRAVENOUS at 14:33

## 2025-04-10 RX ADMIN — SODIUM CHLORIDE 20 ML/HR: 0.9 INJECTION, SOLUTION INTRAVENOUS at 12:20

## 2025-04-10 NOTE — PROGRESS NOTES
Patient here for chemotherapy, keytruda,alimta and carboplatin. Offers no complaints, denies antibiotics. PIV established in his LFA, with positive blood return. Tolerated entire treatment. PIV removed and bandaid placed.   No further appointments, patient has CT scheduled and will be determined at his next appointment   Patient walked out of clinic with no incident.

## 2025-04-16 ENCOUNTER — HOSPITAL ENCOUNTER (OUTPATIENT)
Dept: RADIOLOGY | Facility: MEDICAL CENTER | Age: 65
Discharge: HOME | End: 2025-04-16
Payer: COMMERCIAL

## 2025-04-16 DIAGNOSIS — C34.92 ADENOCARCINOMA OF LEFT LUNG (HCC): ICD-10-CM

## 2025-04-16 PROCEDURE — 71250 CT THORAX DX C-: CPT

## 2025-04-18 ENCOUNTER — PREP FOR PROCEDURE (OUTPATIENT)
Dept: GASTROENTEROLOGY | Facility: CLINIC | Age: 65
End: 2025-04-18

## 2025-04-18 DIAGNOSIS — Z86.0100 HISTORY OF COLON POLYPS: Primary | ICD-10-CM

## 2025-04-21 NOTE — PROGRESS NOTES
Cardiology and Heart Failure Clinic Note    Nahid Luis 65 y.o. male   MRN: [id number]  Encounter: [id number]        Assessment / Plan:    #  Lung cancer  Dx 1-2025  S/p neoadjuvant- pemetrexed/carboplatin/pembrolizumab   Considering surgery  Considering XRT    # Bladder cancer  Dx 2024  S/p TURBT.    # Afib - paroxysmal  History:  Had an admission in January 2025 when he presented for outpatient EBUS and was incidentally found to be in A-fib.  He was admitted.  He quickly converted on his own back to sinus.     Rate:  metoprolol  Rhythm:  none  Anticoag:  eliquis    Plan:  rec ziopatch.  Pt wants to hold off on this for now given active cancer treatment    # HTN    Losartan 100 mg daily    Metoprolol 50 mg daily  BP above goal.   Add norvasc 5.    # HLD  Has PAD and coronary calcification so recommend aggressive LDL control  Lipitor 20 was started,   --> 73     # PAD  s/p R CFA-AT bypass with ipsilateral rGSV 10/31/24   No aspirin  (on anticoagulation)  On statin    # Coronary calcification  No aspirin  (on anticoagulation)  On statin  No chest pain    # TORRES  Echo  - without concerns  Nuclear stress - no ischemia  May be due to COPD    # COPD  Recommend smoking cessation.   Recently quit.    # Tobacco abuse  recently quit smoking    # heavy ETOH use  Recommend cutting back.   Recently quit.    # high risk medication  Getting neoadjuvant - pemetrexed/carboplatin/pembrolizumab   Discussed potential cardiac risks  Pemetrexed - edema, arrhythmias  Carboplatin - can cause HTN  Pembro - can cause myocarditis, pericarditis  Discussed all possible cardiac toxicities with patient and what to look out for.      Today's Plan Summary:  See above assessment/plan for full details of today's plan.  Briefly,     BP above goal, add Norvasc 5            Reason For Visit / Chief Complaint:  F/u - HTN, HLD, afib    HPI:   Nahid Luis is a 65 y.o.  male with history as noted in the problem list and further  detailed in the above assessment and plan.    Initial:   August 2024  Referred by Dr. Bray (vascular) for a new patient visit for HTN, HLD, PAD.  The patient recently presented to the ER with leg pain and was diagnosed with PAD.  He saw vascular in follow-up and was scheduled for an angiogram.  Because of his cardiac risk factors he was referred to cardiology.  Today, the patient reports -   no chest pain.   Does have TORRES.  Works with heavy .    .   3 children.  Smoking cigarettes.  Cutting back.   4 beers / day (used to drink a lot more).  No drugs.    Interval:    Last visit -->    Had lung bx which was consistent with lung cancer.  Saw oncology.  Considering pemetrexed/carboplatin/pembrolizumab     Plan last visit -->   No med changes.  He is about to start chemotherapy for lung cancer and we reviewed all the potential cardiotoxicity's and what to monitor for.    Reports had chemotherapy -  pemetrexed/carboplatin/pembrolizumab     Labs 4-7-25 reviewed - renal function stable.    Today - feeling well. No CP.  No SOB.  No dizziness or syncope.  No edema.  Not checking home BP.             Cardiac Imaging personally reviewed:  EKG 8-17-24  Sinus rhythm  Septal infarct  Nonspecific T wave changes    1-20-25  Sinus bradycardia at 58 bpm         Holter or event monitor    Echo Echo - 08/29/24   EF 65%.  No WMA.    Echo - 1-8-25  EF 65%.        RUTH    Cardiac MRI    Stress testing Nuclear stress test - 10/23/24   No ischemia   Coronary CTA or [facility]    RHC    CPET              Patient Active Problem List    Diagnosis Date Noted   • Palliative care patient 02/27/2025   • Adenocarcinoma of left lung (HCC) 02/17/2025   • Urothelial carcinoma of bladder (HCC) 01/21/2025   • Paroxysmal A-fib (HCC) 01/20/2025   • Malignant neoplasm of lung, unspecified laterality, unspecified part of lung (HCC) 01/15/2025   • Atrial fibrillation with rapid ventricular response (HCC) 01/07/2025   • Urinary frequency  01/03/2025   • Lesion of parotid gland 12/10/2024   • Parotid mass 12/10/2024   • Coronary artery calcification 12/02/2024   • Tobacco abuse 12/02/2024   • S/P femoral-tibial bypass 11/12/2024   • Leukocytosis 11/05/2024   • ABLA (acute blood loss anemia) 11/01/2024   • Bladder mass 10/18/2024   • Mixed hyperlipidemia 08/26/2024   • PAD (peripheral artery disease) (Former[facility]) 08/21/2024   • Hypertension 09/08/2023   • Chronic obstructive pulmonary disease (HCC) 09/08/2023   • Gross hematuria 09/08/2023       Past Medical History:   Diagnosis Date   • Anemia    • Anxiety    • Bladder cancer (Formerly [facility])    • Cancer (HCC)    • Colon polyp    • COPD (chronic obstructive pulmonary disease) (Former[facility])    • Depression    • History of transfusion 10/2024   • Hyperlipidemia    • Hypertension    • Irregular heart beat     afib   • Mass of left lung    • No natural teeth    • Pneumonia    • Wears glasses        No Known Allergies    Current Outpatient Medications   Medication Instructions   • albuterol (Proventil HFA) 90 mcg/act inhaler 2 puffs, Inhalation, Every 6 hours PRN   • amLODIPine (NORVASC) 5 mg, Oral, Daily   • apixaban (ELIQUIS) 5 mg, Oral, 2 times daily   • atorvastatin (LIPITOR) 20 mg, Oral, Daily   • dexamethasone (DECADRON) 4 mg, Oral, 2 times daily with meals, Take the day before and day after chemotherapy treatment.   • Fluticasone-Salmeterol (Wixela Inhub) 100-50 mcg/dose inhaler 1 puff, Inhalation, 2 times daily, Rinse mouth after use.   • ibuprofen (MOTRIN) 600 mg, Oral, Every 6 hours PRN   • losartan (COZAAR) 100 mg, Oral, Daily   • metoprolol succinate (TOPROL-XL) 50 mg, Oral, Daily   • ondansetron (ZOFRAN) 4 mg, Oral, Every 8 hours PRN   • phenazopyridine (PYRIDIUM) 200 mg, Oral, 3 times daily with meals       Social History     Socioeconomic History   • Marital status: Single     Spouse name: Not on file   • Number of children: Not on file   • Years of education: Not on file   • Highest education level: Not on file    Occupational History   • Not on file   Tobacco Use   • Smoking status: Former     Current packs/day: 0.00     Average packs/day: 1 pack/day for 49.8 years (49.8 ttl pk-yrs)     Types: Cigarettes     Start date:      Quit date: 10/30/2024     Years since quittin.4     Passive exposure: Current   • Smokeless tobacco: Never   Vaping Use   • Vaping status: Never Used   Substance and Sexual Activity   • Alcohol use: Not Currently     Alcohol/week: 6.0 standard drinks of alcohol     Types: 6 Cans of beer per week     Comment: daily 3-5 beers daily and shots last drank 10/30   • Drug use: No   • Sexual activity: Not Currently   Other Topics Concern   • Not on file   Social History Narrative   • Not on file     Social Drivers of Health     Financial Resource Strain: Not on file   Food Insecurity: No Food Insecurity (2025)    Nursing - Inadequate Food Risk Classification    • Worried About Running Out of Food in the Last Year: Not on file    • Ran Out of Food in the Last Year: Not on file    • Ran Out of Food in the Last Year: Never true   Transportation Needs: No Transportation Needs (2025)    Nursing - Transportation Risk Classification    • Lack of Transportation: Not on file    • Lack of Transportation: No   Physical Activity: Not on file   Stress: Not on file   Social Connections: Not on file   Intimate Partner Violence: Unknown (2025)    Nursing IPS    • Feels Physically and Emotionally Safe: Not on file    • Physically Hurt by Someone: Not on file    • Humiliated or Emotionally Abused by Someone: Not on file    • Physically Hurt by Someone: No    • Hurt or Threatened by Someone: No   Housing Stability: Unknown (2025)    Nursing: Inadequate Housing Risk Classification    • Has Housing: Not on file    • Worried About Losing Housing: Not on file    • Unable to Get Utilities: Not on file    • Unable to Pay for Housing in the Last Year: No    • Has Housin       Family History   Problem  "Relation Age of Onset   • Alcohol abuse Father    • Heart attack Father        Physical Exam:  Blood pressure 158/70, pulse 68, height 5' 7.4\" (1.712 m), weight 85.1 kg (187 lb 11.2 oz), SpO2 99%.  Body mass index is 29.05 kg/m².  Wt Readings from Last 3 Encounters:   04/23/25 85.1 kg (187 lb 11.2 oz)   04/10/25 85.1 kg (187 lb 9.6 oz)   03/28/25 84.4 kg (186 lb)     Physical Exam  Vitals reviewed.   Constitutional:       General: He is not in acute distress.     Appearance: He is not toxic-appearing.   Neck:      Comments: No JVP elevation  Cardiovascular:      Rate and Rhythm: Normal rate and regular rhythm.      Heart sounds: No murmur heard.     No friction rub. No gallop.      Comments: Distant heart sounds  Pulmonary:      Breath sounds: No wheezing, rhonchi or rales.      Comments: Prolonged expiratory phase  Musculoskeletal:      Right lower leg: No edema.      Left lower leg: No edema.   Neurological:      Mental Status: He is alert.         Labs & Results:  Lab Results   Component Value Date    SODIUM 139 04/07/2025    K 4.5 04/07/2025     04/07/2025    CO2 30 04/07/2025    BUN 10 04/07/2025    CREATININE 1.12 04/07/2025    GLUC 124 03/17/2025    CALCIUM 9.9 04/07/2025         Thank you for the opportunity to participate in the care of this patient.    Toni Martinez MD, [facility]  Advanced Heart Failure and Transplant Cardiologist  Director of Cardio-Oncology  [facility]  "

## 2025-04-23 ENCOUNTER — OFFICE VISIT (OUTPATIENT)
Age: 65
End: 2025-04-23
Payer: COMMERCIAL

## 2025-04-23 VITALS
HEIGHT: 67 IN | SYSTOLIC BLOOD PRESSURE: 158 MMHG | OXYGEN SATURATION: 99 % | BODY MASS INDEX: 29.46 KG/M2 | WEIGHT: 187.7 LBS | HEART RATE: 68 BPM | DIASTOLIC BLOOD PRESSURE: 70 MMHG

## 2025-04-23 DIAGNOSIS — I73.9 PAD (PERIPHERAL ARTERY DISEASE) (HCC): ICD-10-CM

## 2025-04-23 DIAGNOSIS — E78.2 MIXED HYPERLIPIDEMIA: ICD-10-CM

## 2025-04-23 DIAGNOSIS — C34.90 MALIGNANT NEOPLASM OF LUNG, UNSPECIFIED LATERALITY, UNSPECIFIED PART OF LUNG (HCC): ICD-10-CM

## 2025-04-23 DIAGNOSIS — Z79.899 ENCOUNTER FOR MONITORING CARDIOTOXIC DRUG THERAPY: ICD-10-CM

## 2025-04-23 DIAGNOSIS — I48.0 PAROXYSMAL A-FIB (HCC): ICD-10-CM

## 2025-04-23 DIAGNOSIS — I25.10 CORONARY ARTERY CALCIFICATION: ICD-10-CM

## 2025-04-23 DIAGNOSIS — Z51.81 ENCOUNTER FOR MONITORING CARDIOTOXIC DRUG THERAPY: ICD-10-CM

## 2025-04-23 DIAGNOSIS — Z79.899 HIGH RISK MEDICATION USE: ICD-10-CM

## 2025-04-23 DIAGNOSIS — R06.09 DOE (DYSPNEA ON EXERTION): ICD-10-CM

## 2025-04-23 DIAGNOSIS — I10 PRIMARY HYPERTENSION: Primary | ICD-10-CM

## 2025-04-23 PROCEDURE — 99214 OFFICE O/P EST MOD 30 MIN: CPT | Performed by: INTERNAL MEDICINE

## 2025-04-23 RX ORDER — AMLODIPINE BESYLATE 5 MG/1
5 TABLET ORAL DAILY
Qty: 30 TABLET | Refills: 11 | Status: SHIPPED | OUTPATIENT
Start: 2025-04-23

## 2025-04-29 ENCOUNTER — OFFICE VISIT (OUTPATIENT)
Dept: HEMATOLOGY ONCOLOGY | Facility: CLINIC | Age: 65
End: 2025-04-29
Payer: COMMERCIAL

## 2025-04-29 VITALS
TEMPERATURE: 97.8 F | HEIGHT: 67 IN | BODY MASS INDEX: 29.05 KG/M2 | DIASTOLIC BLOOD PRESSURE: 75 MMHG | SYSTOLIC BLOOD PRESSURE: 112 MMHG | RESPIRATION RATE: 17 BRPM

## 2025-04-29 DIAGNOSIS — C34.92 ADENOCARCINOMA OF LEFT LUNG (HCC): Primary | ICD-10-CM

## 2025-04-29 DIAGNOSIS — D70.1 CHEMOTHERAPY INDUCED NEUTROPENIA (HCC): ICD-10-CM

## 2025-04-29 DIAGNOSIS — Z85.51 HISTORY OF BLADDER CANCER: ICD-10-CM

## 2025-04-29 DIAGNOSIS — T45.1X5A CHEMOTHERAPY INDUCED NEUTROPENIA (HCC): ICD-10-CM

## 2025-04-29 DIAGNOSIS — K11.9 LESION OF PAROTID GLAND: ICD-10-CM

## 2025-04-29 DIAGNOSIS — Z79.899 HIGH RISK MEDICATION USE: ICD-10-CM

## 2025-04-29 PROCEDURE — 99214 OFFICE O/P EST MOD 30 MIN: CPT | Performed by: INTERNAL MEDICINE

## 2025-04-29 NOTE — ASSESSMENT & PLAN NOTE
11/29/24 NM PET  Hypermetabolic mass in the left upper lobe (SUV 18) with surrounding infiltrate. Hypermetabolic hilar adenopathy (SUV 16) and small nodules in the left upper lobe (SUV 3.9)      1/8/25 EBUS lavage, LN levels L7, 4R: negative for malignancy     1/29/25  Lung, left bx: Adenocarcinoma. cT2, cN2, cM0; CARIS:' PDL > 90% no other actionable mutations    2/11/25 MRI Brain: No intracranial metastatic disease. No acute intracranial process.     2/26/25 started Q 21 days Neoadjuvant Carboplatin AUC 5, Pemetrexed 500 mg/m2, Pembrolizumab 200 mg ; cycle 3 was given on 4/10/25     4/16/25 CT Chest (after 3 cycles):   1.  Interval decrease in size of masslike consolidation within the left upper lobe as compared to CT chest of 2/7/2024. 5.2 x 1.7 x 4.7 cm, previously 5.0 x 4.9 x 9.9 cm. No new or enlarging pulmonary nodules are identified. No evidence of discrete tracheal or endobronchial lesion.   2.  Near complete resolution of right upper lobe and right lower lobe airspace opacities as compared to CT of December 2024.  3.  Mildly enlarged mediastinal lymph nodes. Several of these lymph nodes appear slightly decreased in size from CT of December 2024.       PLAN   - completed Neoadjuvant Carbo/Alimta/Pembrolizumab on 4/10/25 as per [id number]  - have mediastinal lymphadenopathy however they appears smaller than 12/2024 and when he had EBUS on 1/8/25 LNs level 7 and 4R were negative for malignancy.  - will obtain NM PET scan and will request sooner appointment with Dr. Knott if possible to revaluate for resection if appropriate. If not, will consider Rad Oncology evaluation.

## 2025-04-29 NOTE — ASSESSMENT & PLAN NOTE
11/29/24 NM PET : Hypermetabolic nodule in the left parotid gland (1 cm)     1/02/25  Parotid gland, Left, FNA Neoplasm: Benign (Gamal category 4A) Warthin tumor.    No further w/u is indicated

## 2025-04-29 NOTE — LETTER
2025     Yen Knott MD  [address]  [address]  [facility] [address]    Patient: Nahid Luis   YOB: 1960   Date of Visit: 2025       Dear Dr. Knott,     We saw this mutual patient, Nahid Luis - with my attending Dr. Macedo, today in the office.    He is now post 3 cycles of neoadjuvant carbo/Alimta/Pembrolizumab and his CT scan showed good response.    His scheduled appointment with you is ion 25 ; so we asked the patient to get PET scan and to try to see you sooner if possible to revaluate if surgical intervention is possible.     Of note, his EBUS from December LNs level 4 and 10 were negative and now mediastinal LNs looks smaller on the CT scan.     Appreciate your help.     If you have questions, please do not hesitate to call me. I look forward to following your patient along with you.       Sincerely,  Felipe Walker DO  Hematology and Medical Oncology - PGY V  [facility]         Taqueria Macedo MD        CC: MD Felipe Moe DO  2025 10:47 AM  Incomplete  Name: Nahid Luis      : 1960      MRN: [id number]  Encounter Provider: Taqueria Macedo MD  Encounter Date: 2025   Encounter department: [facility]  :  Assessment & Plan  Adenocarcinoma of left lung (HCC)    24 NM PET  Hypermetabolic mass in the left upper lobe (SUV 18) with surrounding infiltrate. Hypermetabolic hilar adenopathy (SUV 16) and small nodules in the left upper lobe (SUV 3.9)      25 EBUS lavage, LN levels L7, 4R: negative for malignancy     25  Lung, left bx: Adenocarcinoma. cT2, cN2, cM0; CARIS:' PDL > 90% no other actionable mutations    25 MRI Brain: No intracranial metastatic disease. No acute intracranial process.     25 started Q 21 days Neoadjuvant Carboplatin AUC 5, Pemetrexed 500 mg/m2, Pembrolizumab 200 mg ; cycle 3 was given on 4/10/25      4/16/25 CT Chest (after 3 cycles):   1.  Interval decrease in size of masslike consolidation within the left upper lobe as compared to CT chest of 2/7/2024. 5.2 x 1.7 x 4.7 cm, previously 5.0 x 4.9 x 9.9 cm. No new or enlarging pulmonary nodules are identified. No evidence of discrete tracheal or endobronchial lesion.   2.  Near complete resolution of right upper lobe and right lower lobe airspace opacities as compared to CT of December 2024.  3.  Mildly enlarged mediastinal lymph nodes. Several of these lymph nodes appear slightly decreased in size from CT of December 2024.       PLAN   - completed Neoadjuvant Carbo/Alimta/Pembrolizumab on 4/10/25 as per [id number]  - have mediastinal lymphadenopathy however they appears smaller than 12/2024 and when he had EBUS on 1/8/25 LNs level 7 and 4R were negative for malignancy.  - will obtain NM PET scan and will request sooner appointment with Dr. Knott if possible to revaluate for resection if appropriate. If not, will consider Rad Oncology evaluation.            History of bladder cancer  10/09/2024 CT CAP showed 6 cm mass over the posterior bladder concerning for bladder cancer    11/05/24 TURBT for high rade tumor, negative for residual carcinoma, no muscle invasion, s/p intravesical BCG     1/21/25 Urinary bladder, posterior wall, transurethral resection: previous surgical site changes/ Reactive urothelial mucosa. Muscularis propria (detrusor) present, negative for carcinoma    PLAN  To continue follow with urology; no evidence to suggest recurrence          Lesion of parotid gland  11/29/24 NM PET : Hypermetabolic nodule in the left parotid gland (1 cm)     1/02/25  Parotid gland, Left, FNA Neoplasm: Benign (Gamal category 4A) Warthin tumor.    No further w/u is indicated        Chemotherapy induced neutropenia (HCC)   Ref 03/17/25 11:08 04/07/25 09:06   WBC 4.31 - 10.16 Thousand/uL 8.71 6.67   Absolute Neutrophils 1.85 - 7.62 Thousands/µL 2.35 1.37 (L)      Suspected due to the carboplatin /Alimta; now post chemo is expected to revover   Mild neutropenia ; trend         High risk medication use  On Pembrolizumab   TSH WNL ; has mild AST elevation noted     Continue with repeate labs and revaluate            No follow-ups on file.    History of Present Illness    65-year-old male who used to be a heavy smoker and heavy drinker quit in October 2024, anxiety, history of superficial bladder cancer with intravesical chemotherapy, colon polyp (tubular adenoma, 3/25/24), COPD, depression, peripheral vascular disease with femoral bypass surgery     10/09/2024 CT CAP showed aneurysmal dilatation at the bifurcation of the right external and internal iliac arteries, 6 cm mass over the posterior bladder concerning for bladder cancer    11/05/24 TURBT for high rade tumor, negative for residual carcinoma, no muscle invasion, s/p intravesical BCG      11/29/24 NM PET for the known bladder wall neoplasm, remarkable for: Hypermetabolic mass in the left upper lobe (SUV 18) with surrounding infiltrate. Hypermetabolic hilar adenopathy (SUV 16) and small nodules in the left upper lobe (SUV 3.9)  Hypermetabolic nodule in the left parotid gland (1 cm)     1/02/25  Parotid gland, Left, FNA Neoplasm: Benign (Gamal category 4A) Warthin tumor.    1/8/25 EBUS lavage, LN levels L7, 4R: negative for malignancy     1/21/25 Urinary bladder, posterior wall, transurethral resection: previous surgical site changes/ Reactive urothelial mucosa. Muscularis propria (detrusor) present, negative for carcinoma    1/29/25  Lung, left bx: Adenocarcinoma. cT2, cN2, cM0; CARIS:' PDL > 90% no other actionable mutations    2/11/25 MRI Brain: No intracranial metastatic disease. No acute intracranial process.     The case discussed in the tumor conference. If the patient is candidate for surgical resection he would be treated with neoadjuvant pemetrexed/carboplatin/pembrolizumab    2/18/25 evaluated by [facility]  - Dr. Knott: likely he is a surgical candidate . PFT: Moderate obstructive airflow defect on spirometry. Positive post bronchodilator response.    2/26/25 started Q 21 days Neoadjuvant Carboplatin AUC 5, Pemetrexed 500 mg/m2, Pembrolizumab 200 mg    4/16/25 CT Chest (after 3 cycles):   1.  Interval decrease in size of masslike consolidation within the left upper lobe as compared to CT chest of 2/7/2024. 5.2 x 1.7 x 4.7 cm, previously 5.0 x 4.9 x 9.9 cm. No new or enlarging pulmonary nodules are identified. No evidence of discrete tracheal or endobronchial lesion.   2.  Near complete resolution of right upper lobe and right lower lobe airspace opacities as compared to CT of December 2024.  3.  Mildly enlarged mediastinal lymph nodes. Several of these lymph nodes appear slightly decreased in size from CT of December 2024.     Has f/u with Dr. Knott on 6/17/25                 Oncology History  Cancer Staging   Adenocarcinoma of left lung (HCC)  Staging form: Lung, AJCC V9  - Clinical: cT2, cN2, cM0 - Signed by Taqueria Macedo MD on 2/17/2025  Stage prefix: Initial diagnosis  Method of lymph node assessment: Clinical  Histologic grade (G): G3  Histologic grading system: 4 grade system  Oncology History   Adenocarcinoma of left lung (HCC)   2/17/2025 Initial Diagnosis    Adenocarcinoma of left lung (HCC)     2/17/2025 -  Cancer Staged    Staging form: Lung, AJCC V9  - Clinical: cT2, cN2, cM0 - Signed by Taqueria Macedo MD on 2/17/2025  Stage prefix: Initial diagnosis  Method of lymph node assessment: Clinical  Histologic grade (G): G3  Histologic grading system: 4 grade system       2/27/2025 -  Chemotherapy    CARBOplatin (PARAPLATIN) IVPB (GOG AUC DOSING), 489 mg, 3 of 6 cycles  Administration: 489 mg (2/27/2025), 469.5 mg (4/10/2025), 531.5 mg (3/20/2025)  pemetrexed (ALIMTA) chemo infusion, 500 mg/m2 = 980 mg, 3 of 6 cycles  Administration: 980 mg (2/27/2025), 980 mg (4/10/2025), 980 mg (3/20/2025)  pembrolizumab  "(KEYTRUDA) IVPB, 200 mg, 3 of 6 cycles  Administration: 200 mg (2/27/2025), 200 mg (4/10/2025), 200 mg (3/20/2025)        Pertinent Medical History    04/29/25: presenting to follow up ; ROS x 12 negative ; doing well ; tolerated the chemo immuno very well . Above A&P is explained   Review of Systems  - GENERAL: Negative for any nausea, vomiting, fevers, chills, or weight loss.  - HEENT: Negative for any head/Neck trauma, pain, double/blurry vision, sinusitis, rhinitis, nose bleeding.  - CARDIAC: Negative for any chest pain, palpitation, Dyspnea on exertion, peripheral edema.  - PULMONARY: Negative for any SOB, cough, wheezing.   - GASTROINTESTINAL: Negative for any abdominal pain, N/V/D/C, blood in stool.   - GENITOURINARY: Negative for any dysuria, hematuria, incontinence.  - NEUROLOGIC: Negative for any muscle weakness, numbness/tingling, memory changes.    - MUSCULOSKELETAL: Negative for any joint pains/swelling, limited ROM.   - INTEGUMENTARY: Negative for any rashes, cuts/ lesions.  - HEMATOLOGIC: Negative for any abnormal bruising, frequent infections or bleeding.        Objective  /75 (BP Location: Left arm, Patient Position: Sitting, Cuff Size: Adult)   Temp 97.8 °F (36.6 °C) (Temporal)   Resp 17   Ht 5' 7.4\" (1.712 m)   BMI 29.05 kg/m²     Pain Screening:     ECOG   0  Physical Exam  - GEN: Appears well, alert and oriented x 3, pleasant and cooperative, in no acute distress  - HEENT: Anicteric, mucous membranes moist, PERRL and EOMI   - NECK: No lymphadenopathy, JVD or carotid bruits   - HEART: RRR, normal S1 and S2, no murmurs, clicks, gallops or rubs   - LUNGS: Clear to auscultation bilaterally; no wheezes, rales, or rhonchi  - ABDOMEN: Normal bowel sounds, soft, no tenderness, no distention, no organomegaly or masses felt on exam.   - EXTREMITIES: Peripheral pulses normal; no clubbing, cyanosis, or edema  - NEURO: No focal findings, CN II-XII are grossly intact.   - Musculoskeletal: 5/5 " strength, normal ROM, no swollen or erythematous joints.   - SKIN: Normal without suspicious lesions on exposed skin    Labs: I have reviewed the following labs:  Lab Results   Component Value Date/Time    WBC 6.67 04/07/2025 09:06 AM    RBC 4.64 04/07/2025 09:06 AM    Hemoglobin 12.8 04/07/2025 09:06 AM    Hematocrit 41.0 04/07/2025 09:06 AM    MCV 88 04/07/2025 09:06 AM    MCH 27.6 04/07/2025 09:06 AM    RDW 18.6 (H) 04/07/2025 09:06 AM    Platelets 230 04/07/2025 09:06 AM    Segmented % 21 (L) 04/07/2025 09:06 AM    Lymphocytes % 65 (H) 04/07/2025 09:06 AM    Monocytes % 12 04/07/2025 09:06 AM    Eosinophils Relative 1 04/07/2025 09:06 AM    Basophils Relative 0 04/07/2025 09:06 AM    Immature Grans % 1 04/07/2025 09:06 AM    Absolute Neutrophils 1.37 (L) 04/07/2025 09:06 AM     Lab Results   Component Value Date/Time    Potassium 4.5 04/07/2025 09:06 AM    Chloride 100 04/07/2025 09:06 AM    CO2 30 04/07/2025 09:06 AM    BUN 10 04/07/2025 09:06 AM    Creatinine 1.12 04/07/2025 09:06 AM    Glucose, Fasting 144 (H) 04/07/2025 09:06 AM    Calcium 9.9 04/07/2025 09:06 AM    Corrected Calcium 9.3 11/04/2024 04:35 AM    AST 39 04/07/2025 09:06 AM    ALT 54 (H) 04/07/2025 09:06 AM    Alkaline Phosphatase 90 04/07/2025 09:06 AM    Total Protein 7.5 04/07/2025 09:06 AM    Albumin 4.3 04/07/2025 09:06 AM    Total Bilirubin 0.29 04/07/2025 09:06 AM    eGFR 68 04/07/2025 09:06 AM           Administrative Statements  I have spent a total time of 45 minutes in caring for this patient on the day of the visit/encounter including Diagnostic results, Prognosis, Risks and benefits of tx options, Instructions for management, Patient and family education, Importance of tx compliance, Risk factor reductions, Impressions, Counseling / Coordination of care, Documenting in the medical record, Reviewing/placing orders in the medical record (including tests, medications, and/or procedures), and Obtaining or reviewing history  .

## 2025-04-29 NOTE — PROGRESS NOTES
Name: Nahid Luis      : 1960      MRN: [id number]  Encounter Provider: Taqueria Macedo MD  Encounter Date: 2025   Encounter department: [facility]  :  Assessment & Plan  Adenocarcinoma of left lung (HCC)    24 NM PET  Hypermetabolic mass in the left upper lobe (SUV 18) with surrounding infiltrate. Hypermetabolic hilar adenopathy (SUV 16) and small nodules in the left upper lobe (SUV 3.9)      25 EBUS lavage, LN levels L7, 4R: negative for malignancy     25  Lung, left bx: Adenocarcinoma. cT2, cN2, cM0; CARIS:' PDL > 90% no other actionable mutations    25 MRI Brain: No intracranial metastatic disease. No acute intracranial process.     25 started Q 21 days Neoadjuvant Carboplatin AUC 5, Pemetrexed 500 mg/m2, Pembrolizumab 200 mg ; cycle 3 was given on 4/10/25     4/16/25 CT Chest (after 3 cycles):   1.  Interval decrease in size of masslike consolidation within the left upper lobe as compared to CT chest of 2024. 5.2 x 1.7 x 4.7 cm, previously 5.0 x 4.9 x 9.9 cm. No new or enlarging pulmonary nodules are identified. No evidence of discrete tracheal or endobronchial lesion.   2.  Near complete resolution of right upper lobe and right lower lobe airspace opacities as compared to CT of 2024.  3.  Mildly enlarged mediastinal lymph nodes. Several of these lymph nodes appear slightly decreased in size from CT of 2024.       PLAN   - completed Neoadjuvant Carbo/Alimta/Pembrolizumab on 4/10/25 as per [id number]  - have mediastinal lymphadenopathy however they appears smaller than 2024 and when he had EBUS on 25 LNs level 7 and 4R were negative for malignancy.  - will obtain NM PET scan and will request sooner appointment with Dr. Knott if possible to revaluate for resection if appropriate. If not, will consider Rad Oncology evaluation.            History of bladder cancer  10/09/2024 CT CAP showed 6 cm mass  over the posterior bladder concerning for bladder cancer    11/05/24 TURBT for high rade tumor, negative for residual carcinoma, no muscle invasion, s/p intravesical BCG     1/21/25 Urinary bladder, posterior wall, transurethral resection: previous surgical site changes/ Reactive urothelial mucosa. Muscularis propria (detrusor) present, negative for carcinoma    PLAN  To continue follow with urology; no evidence to suggest recurrence          Lesion of parotid gland  11/29/24 NM PET : Hypermetabolic nodule in the left parotid gland (1 cm)     1/02/25  Parotid gland, Left, FNA Neoplasm: Benign (Bernard category 4A) Warthin tumor.    No further w/u is indicated        Chemotherapy induced neutropenia (HCC)   Ref 03/17/25 11:08 04/07/25 09:06   WBC 4.31 - 10.16 Thousand/uL 8.71 6.67   Absolute Neutrophils 1.85 - 7.62 Thousands/µL 2.35 1.37 (L)     Suspected due to the carboplatin /Alimta; now post chemo is expected to revover   Mild neutropenia ; trend         High risk medication use  On Pembrolizumab   TSH WNL ; has mild AST elevation noted     Continue with repeate labs and revaluate            No follow-ups on file.    History of Present Illness     65-year-old male who used to be a heavy smoker and heavy drinker quit in October 2024, anxiety, history of superficial bladder cancer with intravesical chemotherapy, colon polyp (tubular adenoma, 3/25/24), COPD, depression, peripheral vascular disease with femoral bypass surgery     10/09/2024 CT CAP showed aneurysmal dilatation at the bifurcation of the right external and internal iliac arteries, 6 cm mass over the posterior bladder concerning for bladder cancer    11/05/24 TURBT for high rade tumor, negative for residual carcinoma, no muscle invasion, s/p intravesical BCG      11/29/24 NM PET for the known bladder wall neoplasm, remarkable for: Hypermetabolic mass in the left upper lobe (SUV 18) with surrounding infiltrate. Hypermetabolic hilar adenopathy (SUV 16) and  small nodules in the left upper lobe (SUV 3.9)  Hypermetabolic nodule in the left parotid gland (1 cm)     1/02/25  Parotid gland, Left, FNA Neoplasm: Benign (Left Hand category 4A) Warthin tumor.    1/8/25 EBUS lavage, LN levels L7, 4R: negative for malignancy     1/21/25 Urinary bladder, posterior wall, transurethral resection: previous surgical site changes/ Reactive urothelial mucosa. Muscularis propria (detrusor) present, negative for carcinoma    1/29/25  Lung, left bx: Adenocarcinoma. cT2, cN2, cM0; CARIS:' PDL > 90% no other actionable mutations    2/11/25 MRI Brain: No intracranial metastatic disease. No acute intracranial process.     The case discussed in the tumor conference. If the patient is candidate for surgical resection he would be treated with neoadjuvant pemetrexed/carboplatin/pembrolizumab    2/18/25 evaluated by [facility] - Dr. Knott: likely he is a surgical candidate . PFT: Moderate obstructive airflow defect on spirometry. Positive post bronchodilator response.    2/26/25 started Q 21 days Neoadjuvant Carboplatin AUC 5, Pemetrexed 500 mg/m2, Pembrolizumab 200 mg    4/16/25 CT Chest (after 3 cycles):   1.  Interval decrease in size of masslike consolidation within the left upper lobe as compared to CT chest of 2/7/2024. 5.2 x 1.7 x 4.7 cm, previously 5.0 x 4.9 x 9.9 cm. No new or enlarging pulmonary nodules are identified. No evidence of discrete tracheal or endobronchial lesion.   2.  Near complete resolution of right upper lobe and right lower lobe airspace opacities as compared to CT of December 2024.  3.  Mildly enlarged mediastinal lymph nodes. Several of these lymph nodes appear slightly decreased in size from CT of December 2024.     Has f/u with Dr. Knott, currently scheduled on 6/17/25 , see above                 Oncology History   Cancer Staging   Adenocarcinoma of left lung (HCC)  Staging form: Lung, AJCC V9  - Clinical: cT2, cN2, cM0 - Signed by Taqueria Macedo MD on  2/17/2025  Stage prefix: Initial diagnosis  Method of lymph node assessment: Clinical  Histologic grade (G): G3  Histologic grading system: 4 grade system  Oncology History   Adenocarcinoma of left lung (HCC)   2/17/2025 Initial Diagnosis    Adenocarcinoma of left lung (HCC)     2/17/2025 -  Cancer Staged    Staging form: Lung, AJCC V9  - Clinical: cT2, cN2, cM0 - Signed by Taqueria Macedo MD on 2/17/2025  Stage prefix: Initial diagnosis  Method of lymph node assessment: Clinical  Histologic grade (G): G3  Histologic grading system: 4 grade system       2/27/2025 -  Chemotherapy    CARBOplatin (PARAPLATIN) IVPB (GOG AUC DOSING), 489 mg, 3 of 6 cycles  Administration: 489 mg (2/27/2025), 469.5 mg (4/10/2025), 531.5 mg (3/20/2025)  pemetrexed (ALIMTA) chemo infusion, 500 mg/m2 = 980 mg, 3 of 6 cycles  Administration: 980 mg (2/27/2025), 980 mg (4/10/2025), 980 mg (3/20/2025)  pembrolizumab (KEYTRUDA) IVPB, 200 mg, 3 of 6 cycles  Administration: 200 mg (2/27/2025), 200 mg (4/10/2025), 200 mg (3/20/2025)        Pertinent Medical History     04/29/25: presenting to follow up ; ROS x 12 negative ; doing well ; tolerated the chemo immuno very well . Above A&P is explained   Review of Systems  - GENERAL: Negative for any nausea, vomiting, fevers, chills, or weight loss.  - HEENT: Negative for any head/Neck trauma, pain, double/blurry vision, sinusitis, rhinitis, nose bleeding.  - CARDIAC: Negative for any chest pain, palpitation, Dyspnea on exertion, peripheral edema.  - PULMONARY: Negative for any SOB, cough, wheezing.   - GASTROINTESTINAL: Negative for any abdominal pain, N/V/D/C, blood in stool.   - GENITOURINARY: Negative for any dysuria, hematuria, incontinence.  - NEUROLOGIC: Negative for any muscle weakness, numbness/tingling, memory changes.    - MUSCULOSKELETAL: Negative for any joint pains/swelling, limited ROM.   - INTEGUMENTARY: Negative for any rashes, cuts/ lesions.  - HEMATOLOGIC: Negative for any abnormal  "bruising, frequent infections or bleeding.        Objective   /75 (BP Location: Left arm, Patient Position: Sitting, Cuff Size: Adult)   Temp 97.8 °F (36.6 °C) (Temporal)   Resp 17   Ht 5' 7.4\" (1.712 m)   BMI 29.05 kg/m²     Pain Screening:     ECOG   0  Physical Exam  - GEN: Appears well, alert and oriented x 3, pleasant and cooperative, in no acute distress  - HEENT: Anicteric, mucous membranes moist, PERRL and EOMI   - NECK: No lymphadenopathy, JVD or carotid bruits   - HEART: RRR, normal S1 and S2, no murmurs, clicks, gallops or rubs   - LUNGS: Clear to auscultation bilaterally; no wheezes, rales, or rhonchi  - ABDOMEN: Normal bowel sounds, soft, no tenderness, no distention, no organomegaly or masses felt on exam.   - EXTREMITIES: Peripheral pulses normal; no clubbing, cyanosis, or edema  - NEURO: No focal findings, CN II-XII are grossly intact.   - Musculoskeletal: 5/5 strength, normal ROM, no swollen or erythematous joints.   - SKIN: Normal without suspicious lesions on exposed skin    Labs: I have reviewed the following labs:  Lab Results   Component Value Date/Time    WBC 6.67 04/07/2025 09:06 AM    RBC 4.64 04/07/2025 09:06 AM    Hemoglobin 12.8 04/07/2025 09:06 AM    Hematocrit 41.0 04/07/2025 09:06 AM    MCV 88 04/07/2025 09:06 AM    MCH 27.6 04/07/2025 09:06 AM    RDW 18.6 (H) 04/07/2025 09:06 AM    Platelets 230 04/07/2025 09:06 AM    Segmented % 21 (L) 04/07/2025 09:06 AM    Lymphocytes % 65 (H) 04/07/2025 09:06 AM    Monocytes % 12 04/07/2025 09:06 AM    Eosinophils Relative 1 04/07/2025 09:06 AM    Basophils Relative 0 04/07/2025 09:06 AM    Immature Grans % 1 04/07/2025 09:06 AM    Absolute Neutrophils 1.37 (L) 04/07/2025 09:06 AM     Lab Results   Component Value Date/Time    Potassium 4.5 04/07/2025 09:06 AM    Chloride 100 04/07/2025 09:06 AM    CO2 30 04/07/2025 09:06 AM    BUN 10 04/07/2025 09:06 AM    Creatinine 1.12 04/07/2025 09:06 AM    Glucose, Fasting 144 (H) 04/07/2025 09:06 " AM    Calcium 9.9 04/07/2025 09:06 AM    Corrected Calcium 9.3 11/04/2024 04:35 AM    AST 39 04/07/2025 09:06 AM    ALT 54 (H) 04/07/2025 09:06 AM    Alkaline Phosphatase 90 04/07/2025 09:06 AM    Total Protein 7.5 04/07/2025 09:06 AM    Albumin 4.3 04/07/2025 09:06 AM    Total Bilirubin 0.29 04/07/2025 09:06 AM    eGFR 68 04/07/2025 09:06 AM           Administrative Statements   I have spent a total time of 45 minutes in caring for this patient on the day of the visit/encounter including Diagnostic results, Prognosis, Risks and benefits of tx options, Instructions for management, Patient and family education, Importance of tx compliance, Risk factor reductions, Impressions, Counseling / Coordination of care, Documenting in the medical record, Reviewing/placing orders in the medical record (including tests, medications, and/or procedures), and Obtaining or reviewing history  .

## 2025-05-01 ENCOUNTER — TELEPHONE (OUTPATIENT)
Dept: PALLIATIVE MEDICINE | Facility: CLINIC | Age: 65
End: 2025-05-01

## 2025-05-12 ENCOUNTER — HOSPITAL ENCOUNTER (OUTPATIENT)
Dept: NUCLEAR MEDICINE | Facility: HOSPITAL | Age: 65
Discharge: HOME | End: 2025-05-12
Attending: INTERNAL MEDICINE
Payer: MEDICARE

## 2025-05-12 DIAGNOSIS — C34.92 ADENOCARCINOMA OF LEFT LUNG (HCC): ICD-10-CM

## 2025-05-12 LAB — GLUCOSE SERPL-MCNC: 112 MG/DL (ref 65–140)

## 2025-05-12 PROCEDURE — 78815 PET IMAGE W/CT SKULL-THIGH: CPT

## 2025-05-12 PROCEDURE — A9552 F18 FDG: HCPCS

## 2025-05-12 PROCEDURE — 82948 REAGENT STRIP/BLOOD GLUCOSE: CPT

## 2025-05-14 ENCOUNTER — TELEPHONE (OUTPATIENT)
Dept: FAMILY MEDICINE CLINIC | Facility: CLINIC | Age: 65
End: 2025-05-14

## 2025-05-14 DIAGNOSIS — I10 HYPERTENSION, UNSPECIFIED TYPE: ICD-10-CM

## 2025-05-14 RX ORDER — LOSARTAN POTASSIUM 100 MG/1
100 TABLET ORAL DAILY
Qty: 100 TABLET | Refills: 1 | Status: SHIPPED | OUTPATIENT
Start: 2025-05-14 | End: 2025-11-30

## 2025-05-15 ENCOUNTER — PREP FOR PROCEDURE (OUTPATIENT)
Dept: CARDIAC SURGERY | Facility: CLINIC | Age: 65
End: 2025-05-15

## 2025-05-15 ENCOUNTER — OFFICE VISIT (OUTPATIENT)
Dept: CARDIAC SURGERY | Facility: CLINIC | Age: 65
End: 2025-05-15
Payer: MEDICARE

## 2025-05-15 VITALS
WEIGHT: 186.95 LBS | HEART RATE: 70 BPM | BODY MASS INDEX: 29.34 KG/M2 | HEIGHT: 67 IN | TEMPERATURE: 98.4 F | SYSTOLIC BLOOD PRESSURE: 132 MMHG | OXYGEN SATURATION: 99 % | DIASTOLIC BLOOD PRESSURE: 82 MMHG | RESPIRATION RATE: 16 BRPM

## 2025-05-15 DIAGNOSIS — C34.90 MALIGNANT NEOPLASM OF LUNG, UNSPECIFIED LATERALITY, UNSPECIFIED PART OF LUNG (HCC): ICD-10-CM

## 2025-05-15 DIAGNOSIS — C34.92 ADENOCARCINOMA OF LEFT LUNG (HCC): ICD-10-CM

## 2025-05-15 DIAGNOSIS — C34.12 ADENOCARCINOMA OF UPPER LOBE OF LEFT LUNG (HCC): Primary | ICD-10-CM

## 2025-05-15 DIAGNOSIS — I48.0 PAROXYSMAL A-FIB (HCC): ICD-10-CM

## 2025-05-15 PROCEDURE — 99214 OFFICE O/P EST MOD 30 MIN: CPT | Performed by: THORACIC SURGERY (CARDIOTHORACIC VASCULAR SURGERY)

## 2025-05-15 RX ORDER — HEPARIN SODIUM 5000 [USP'U]/ML
5000 INJECTION, SOLUTION INTRAVENOUS; SUBCUTANEOUS
OUTPATIENT
Start: 2025-05-16 | End: 2025-05-17

## 2025-05-15 RX ORDER — CEFAZOLIN SODIUM 2 G/50ML
2000 SOLUTION INTRAVENOUS ONCE
OUTPATIENT
Start: 2025-05-15 | End: 2025-05-15

## 2025-05-15 RX ORDER — GABAPENTIN 300 MG/1
300 CAPSULE ORAL ONCE
OUTPATIENT
Start: 2025-05-15 | End: 2025-05-15

## 2025-05-15 RX ORDER — ACETAMINOPHEN 325 MG/1
975 TABLET ORAL ONCE
OUTPATIENT
Start: 2025-05-15 | End: 2025-05-15

## 2025-05-15 NOTE — PROGRESS NOTES
Name: Nahid Luis      : 1960      MRN: [id number]  Encounter Provider: Yen Knott MD  Encounter Date: 5/15/2025   Encounter department: [facility] [place]  :  Assessment & Plan  Adenocarcinoma of upper lobe of left lung (HCC)  Today in the office, we discussed his restaging PET CT scan.  It appears that he had an excellent response to his induction chemotherapy.  At this time, I do believe that he is a candidate for a post induction lobectomy.  We discussed the risks and benefits of proceeding with a robotic versus VATS left upper lobectomy, mediastinal lymph node dissection.  The specific risks that were discussed include pneumonia, prolonged airleak, conversion to open.  After discussing the risks and benefits, the patient wishes to proceed.  Of note, on his most recent PET CT scan, which was 3 days ago, it appears that he has a right upper lobe pneumonia.  I would like to schedule his surgery for a few weeks from now to give that pneumonia time to recover.  He is on Eliquis for his atrial fibrillation.  We will stop this prior to the procedure  Orders:    Case request operating room: LOBECTOMY LUNG THORACOSCOPIC W/ ROBOTICS VS. VIDEO ASSISTED THORACOSCOPIC, MINIMALLY INVASIVE WITH MEDIASTINAL LYMPH NODE DISSECTION, BRONCHOSCOPY FLEXIBLE; Standing    Type and screen; Future    Basic metabolic panel; Future    CBC and Platelet; Future    Malignant neoplasm of lung, unspecified laterality, unspecified part of lung (HCC)         Adenocarcinoma of left lung (HCC)         Paroxysmal A-fib (HCC)  Will need to hold Eliquis for surgery             Thoracic History     Oncology History   Adenocarcinoma of left lung (HCC)   2025 Initial Diagnosis    Adenocarcinoma of left lung (HCC)     2025 -  Cancer Staged    Staging form: Lung, AJCC V9  - Clinical: cT2, cN2, cM0 - Signed by Taqueria Macedo MD on 2025  Stage prefix: Initial diagnosis  Method of lymph  node assessment: Clinical  Histologic grade (G): G3  Histologic grading system: 4 grade system       2/27/2025 -  Chemotherapy    CARBOplatin (PARAPLATIN) IVPB (GOG AUC DOSING), 489 mg, 3 of 6 cycles  Administration: 489 mg (2/27/2025), 469.5 mg (4/10/2025), 531.5 mg (3/20/2025)  pemetrexed (ALIMTA) chemo infusion, 500 mg/m2 = 980 mg, 3 of 6 cycles  Administration: 980 mg (2/27/2025), 980 mg (4/10/2025), 980 mg (3/20/2025)  pembrolizumab (KEYTRUDA) IVPB, 200 mg, 3 of 6 cycles  Administration: 200 mg (2/27/2025), 200 mg (4/10/2025), 200 mg (3/20/2025)          History of Present Illness   HPI preop ECOG 0  Nahid D Janelle is a 65 y.o. male who is known to me.  He has an adenocarcinoma that was treated with induction chemotherapy.  He underwent a restaging PET CT scan which I personally reviewed in PACS.  This demonstrates an excellent response to the induction chemotherapy.  His tumor significantly decreased in size as well as avidity.  He does still have a posterior left hilar FDG avid focus, likely consistent with a lymph node.  Because of this, he is at a higher risk of conversion to open during a post induction lobectomy since this appears to be sitting on his pulmonary artery.    Today in the office, the patient is doing well.  He reports that he did not have any significant side effects associated with his chemotherapy.  He has recovered well.  He is back to work now.    I have reviewed all the most relevant notes in the chart including from medical oncology    Review of Systems   Constitutional: Negative.  Negative for activity change, chills, fatigue, fever and unexpected weight change.   HENT:  Negative for sore throat, trouble swallowing and voice change.    Eyes: Negative.  Negative for visual disturbance.   Respiratory:  Negative for cough, chest tightness, shortness of breath, wheezing and stridor.    Cardiovascular:  Negative for chest pain and leg swelling.   Gastrointestinal: Negative.     Endocrine: Negative.    Genitourinary: Negative.    Musculoskeletal: Negative.    Skin: Negative.    Allergic/Immunologic: Negative.    Neurological:  Negative for seizures, syncope, speech difficulty, weakness, light-headedness and headaches.   Hematological:  Negative for adenopathy.   Psychiatric/Behavioral: Negative.        Medical History Reviewed by provider this encounter:     .  Past Medical History   Past Medical History:   Diagnosis Date    Anemia     Anxiety     Bladder cancer (HCC)     Cancer (HCC)     Colon polyp     COPD (chronic obstructive pulmonary disease) (HCC)     Depression     History of transfusion 10/2024    Hyperlipidemia     Hypertension     Irregular heart beat     afib    Mass of left lung     No natural teeth     Pneumonia     Wears glasses      Past Surgical History:   Procedure Laterality Date    APPENDECTOMY      COLONOSCOPY      CYSTOSCOPY      ENDOBRONCHIAL ULTRASOUND (EBUS)  01/08/2025    IR BIOPSY LUNG  1/29/2025    IR LOWER EXTREMITY ANGIOGRAM  09/24/2024    [place] BYP FEM-ANT TIBL PST TIBL PRONEAL ART/OTH DSTL Right 10/31/2024    Procedure: right Common femoral artery to anterior tibial bypass with autologous vein;  Surgeon: Carlos Bray DO;  Location: [facility];  Service: Vascular    OH CYSTO W/REMOVAL OF LESIONS SMALL N/A 1/21/2025    Procedure: TRANSURETHRAL RESECTION OF BLADDER TUMOR (TURBT);  Surgeon: Toni Real MD;  Location:  MAIN OR;  Service: Urology    TRANSURETHRAL RESECTION OF BLADDER TUMOR N/A 11/05/2024    Procedure: (TURBT);  Surgeon: Gilmer Duke MD;  Location: [facility];  Service: Urology    US GUIDED LYMPH NODE BIOPSY LEFT  01/02/2025     Family History   Problem Relation Age of Onset    Alcohol abuse Father     Heart attack Father       reports that he quit smoking about 6 months ago. His smoking use included cigarettes. He started smoking about 50 years ago. He has a 49.8 pack-year smoking history. He has been exposed to tobacco smoke. He has  "never used smokeless tobacco. He reports that he does not currently use alcohol after a past usage of about 6.0 standard drinks of alcohol per week. He reports that he does not use drugs.  Current Outpatient Medications   Medication Instructions    albuterol (Proventil HFA) 90 mcg/act inhaler 2 puffs, Inhalation, Every 6 hours PRN    amLODIPine (NORVASC) 5 mg, Oral, Daily    apixaban (ELIQUIS) 5 mg, Oral, 2 times daily    atorvastatin (LIPITOR) 20 mg, Oral, Daily    dexamethasone (DECADRON) 4 mg, Oral, 2 times daily with meals, Take the day before and day after chemotherapy treatment.    Fluticasone-Salmeterol (Wixela Inhub) 100-50 mcg/dose inhaler 1 puff, Inhalation, 2 times daily, Rinse mouth after use.    ibuprofen (MOTRIN) 600 mg, Oral, Every 6 hours PRN    losartan (COZAAR) 100 mg, Oral, Daily    metoprolol succinate (TOPROL-XL) 50 mg, Oral, Daily    ondansetron (ZOFRAN) 4 mg, Oral, Every 8 hours PRN    phenazopyridine (PYRIDIUM) 200 mg, Oral, 3 times daily with meals   Allergies[1]   Medications Ordered Prior to Encounter[2]   Social History     Tobacco Use    Smoking status: Former     Current packs/day: 0.00     Average packs/day: 1 pack/day for 49.8 years (49.8 ttl pk-yrs)     Types: Cigarettes     Start date:      Quit date: 10/30/2024     Years since quittin.5     Passive exposure: Current    Smokeless tobacco: Never   Vaping Use    Vaping status: Never Used   Substance and Sexual Activity    Alcohol use: Not Currently     Alcohol/week: 6.0 standard drinks of alcohol     Types: 6 Cans of beer per week     Comment: daily 3-5 beers daily and shots last drank 10/30    Drug use: No    Sexual activity: Not Currently        Objective   /82 (BP Location: Left arm, Patient Position: Sitting, Cuff Size: Standard)   Pulse 70   Temp 98.4 °F (36.9 °C) (Temporal)   Resp 16   Ht 5' 7.4\" (1.712 m)   Wt 84.8 kg (186 lb 15.2 oz)   SpO2 99%   BMI 28.93 kg/m²     Pain Screening:  Pain Score: 0-No " pain  ECOG    Physical Exam  Vitals and nursing note reviewed.   Constitutional:       Appearance: He is well-developed. He is not diaphoretic.   HENT:      Head: Normocephalic and atraumatic.      Nose: Nose normal. No congestion.      Mouth/Throat:      Mouth: Mucous membranes are moist.      Pharynx: Oropharynx is clear.     Eyes:      Extraocular Movements: Extraocular movements intact.      Pupils: Pupils are equal, round, and reactive to light.     Neck:      Trachea: No tracheal deviation.     Cardiovascular:      Rate and Rhythm: Normal rate and regular rhythm.      Heart sounds: Normal heart sounds. No murmur heard.  Pulmonary:      Effort: Pulmonary effort is normal. No respiratory distress.      Breath sounds: Normal breath sounds. No stridor. No wheezing or rales.   Chest:      Chest wall: No tenderness.   Abdominal:      General: Bowel sounds are normal. There is no distension.      Palpations: Abdomen is soft.      Tenderness: There is no abdominal tenderness.     Musculoskeletal:         General: Normal range of motion.      Cervical back: Normal range of motion.   Lymphadenopathy:      Cervical: No cervical adenopathy.     Skin:     General: Skin is warm and dry.      Coloration: Skin is not pale.      Findings: No erythema or rash.     Neurological:      Mental Status: He is alert and oriented to person, place, and time.     Psychiatric:         Behavior: Behavior normal.         Thought Content: Thought content normal.         Judgment: Judgment normal.         Labs:   Results for orders placed or performed during the hospital encounter of 05/12/25   Fingerstick Glucose (POCT)   Result Value Ref Range    POC Glucose 112 65 - 140 mg/dl        Radiology Results Review : I have reviewed images/report studies in PACS as described above (in the HPI).  Pathology: I have reviewed pathology reports described above.           [1] No Known Allergies  [2]   Current Outpatient Medications on File Prior to  Visit   Medication Sig Dispense Refill    albuterol (Proventil HFA) 90 mcg/act inhaler Inhale 2 puffs every 6 (six) hours as needed for wheezing 6.7 g 5    amLODIPine (NORVASC) 5 mg tablet Take 1 tablet (5 mg total) by mouth daily 30 tablet 11    apixaban (Eliquis) 5 mg Take 1 tablet (5 mg total) by mouth 2 (two) times a day 180 tablet 1    atorvastatin (LIPITOR) 20 mg tablet Take 1 tablet (20 mg total) by mouth daily 100 tablet 1    dexamethasone (DECADRON) 4 mg tablet Take 1 tablet (4 mg total) by mouth 2 (two) times a day with meals Take the day before and day after chemotherapy treatment. 16 tablet 0    Fluticasone-Salmeterol (Wixela Inhub) 100-50 mcg/dose inhaler Inhale 1 puff 2 (two) times a day Rinse mouth after use. 200 blister 1    ibuprofen (MOTRIN) 600 mg tablet Take 1 tablet (600 mg total) by mouth every 6 (six) hours as needed for mild pain 30 tablet 0    losartan (COZAAR) 100 MG tablet Take 1 tablet (100 mg total) by mouth daily 100 tablet 1    metoprolol succinate (TOPROL-XL) 50 mg 24 hr tablet Take 1 tablet (50 mg total) by mouth daily 100 tablet 1    ondansetron (ZOFRAN) 4 mg tablet Take 1 tablet (4 mg total) by mouth every 8 (eight) hours as needed for nausea or vomiting 20 tablet 1    phenazopyridine (PYRIDIUM) 200 mg tablet Take 1 tablet (200 mg total) by mouth 3 (three) times a day with meals 10 tablet 0     No current facility-administered medications on file prior to visit.

## 2025-05-21 ENCOUNTER — PATIENT OUTREACH (OUTPATIENT)
Dept: HEMATOLOGY ONCOLOGY | Facility: CLINIC | Age: 65
End: 2025-05-21

## 2025-05-21 ENCOUNTER — ANESTHESIA EVENT (OUTPATIENT)
Dept: GASTROENTEROLOGY | Facility: HOSPITAL | Age: 65
End: 2025-05-21
Payer: MEDICARE

## 2025-05-21 ENCOUNTER — TELEPHONE (OUTPATIENT)
Dept: CARDIAC SURGERY | Facility: CLINIC | Age: 65
End: 2025-05-21

## 2025-05-21 ENCOUNTER — ANESTHESIA (OUTPATIENT)
Dept: GASTROENTEROLOGY | Facility: HOSPITAL | Age: 65
End: 2025-05-21
Payer: MEDICARE

## 2025-05-21 ENCOUNTER — HOSPITAL ENCOUNTER (OUTPATIENT)
Dept: GASTROENTEROLOGY | Facility: HOSPITAL | Age: 65
Setting detail: OUTPATIENT SURGERY
Discharge: HOME | End: 2025-05-21
Attending: INTERNAL MEDICINE
Payer: MEDICARE

## 2025-05-21 VITALS
SYSTOLIC BLOOD PRESSURE: 140 MMHG | OXYGEN SATURATION: 96 % | DIASTOLIC BLOOD PRESSURE: 66 MMHG | WEIGHT: 182.76 LBS | BODY MASS INDEX: 28.69 KG/M2 | TEMPERATURE: 97.5 F | RESPIRATION RATE: 20 BRPM | HEART RATE: 63 BPM | HEIGHT: 67 IN

## 2025-05-21 DIAGNOSIS — Z86.0100 HISTORY OF COLON POLYPS: ICD-10-CM

## 2025-05-21 PROCEDURE — 88305 TISSUE EXAM BY PATHOLOGIST: CPT | Performed by: PATHOLOGY

## 2025-05-21 PROCEDURE — 45380 COLONOSCOPY AND BIOPSY: CPT | Performed by: INTERNAL MEDICINE

## 2025-05-21 RX ORDER — PROPOFOL 10 MG/ML
INJECTION, EMULSION INTRAVENOUS AS NEEDED
Status: DISCONTINUED | OUTPATIENT
Start: 2025-05-21 | End: 2025-05-21

## 2025-05-21 RX ORDER — LIDOCAINE HYDROCHLORIDE 10 MG/ML
INJECTION, SOLUTION EPIDURAL; INFILTRATION; INTRACAUDAL; PERINEURAL AS NEEDED
Status: DISCONTINUED | OUTPATIENT
Start: 2025-05-21 | End: 2025-05-21

## 2025-05-21 RX ORDER — SODIUM CHLORIDE, SODIUM LACTATE, POTASSIUM CHLORIDE, CALCIUM CHLORIDE 600; 310; 30; 20 MG/100ML; MG/100ML; MG/100ML; MG/100ML
INJECTION, SOLUTION INTRAVENOUS CONTINUOUS PRN
Status: DISCONTINUED | OUTPATIENT
Start: 2025-05-21 | End: 2025-05-21

## 2025-05-21 RX ADMIN — PROPOFOL 30 MG: 10 INJECTION, EMULSION INTRAVENOUS at 11:44

## 2025-05-21 RX ADMIN — PROPOFOL 50 MG: 10 INJECTION, EMULSION INTRAVENOUS at 11:41

## 2025-05-21 RX ADMIN — PROPOFOL 20 MG: 10 INJECTION, EMULSION INTRAVENOUS at 11:47

## 2025-05-21 RX ADMIN — PROPOFOL 100 MG: 10 INJECTION, EMULSION INTRAVENOUS at 11:39

## 2025-05-21 RX ADMIN — SODIUM CHLORIDE, SODIUM LACTATE, POTASSIUM CHLORIDE, CALCIUM CHLORIDE: 600; 310; 30; 20 INJECTION, SOLUTION INTRAVENOUS at 11:39

## 2025-05-21 RX ADMIN — LIDOCAINE HYDROCHLORIDE 50 MG: 10 INJECTION, SOLUTION EPIDURAL; INFILTRATION; INTRACAUDAL; PERINEURAL at 11:39

## 2025-05-21 NOTE — H&P
History and Physical - [facility]  Nahid Luis 65 y.o. male MRN: [id number]                  HPI: Nahid Luis is a 65 y.o. year old male who presents for colonoscopy status post piecemeal polypectomy removal polyps      REVIEW OF SYSTEMS: Per the HPI, and otherwise unremarkable.    Historical Information   Past Medical History:   Diagnosis Date    Anemia     Anxiety     Bladder cancer (HCC)     Cancer (HCC)     Colon polyp     COPD (chronic obstructive pulmonary disease) (HCC)     Depression     History of transfusion 10/2024    Hyperlipidemia     Hypertension     Irregular heart beat     afib    Mass of left lung     No natural teeth     Pneumonia     Wears glasses      Past Surgical History:   Procedure Laterality Date    APPENDECTOMY      COLONOSCOPY      CYSTOSCOPY      ENDOBRONCHIAL ULTRASOUND (EBUS)  01/08/2025    IR BIOPSY LUNG  1/29/2025    IR LOWER EXTREMITY ANGIOGRAM  09/24/2024    SC BYP FEM-ANT TIBL PST TIBL PRONEAL ART/OTH DSTL Right 10/31/2024    Procedure: right Common femoral artery to anterior tibial bypass with autologous vein;  Surgeon: Carlos Bray DO;  Location: [facility];  Service: Vascular    SC CYSTO W/REMOVAL OF LESIONS SMALL N/A 1/21/2025    Procedure: TRANSURETHRAL RESECTION OF BLADDER TUMOR (TURBT);  Surgeon: Toni Real MD;  Location:  MAIN OR;  Service: Urology    TRANSURETHRAL RESECTION OF BLADDER TUMOR N/A 11/05/2024    Procedure: (TURBT);  Surgeon: Gilmer Duke MD;  Location: [facility];  Service: Urology    US GUIDED LYMPH NODE BIOPSY LEFT  01/02/2025     Social History   Social History     Substance and Sexual Activity   Alcohol Use Not Currently    Alcohol/week: 6.0 standard drinks of alcohol    Types: 6 Cans of beer per week    Comment: daily 3-5 beers daily and shots last drank 10/30     Social History     Substance and Sexual Activity   Drug Use No     Tobacco Use History[1]  Family History   Problem Relation Age of Onset     "Alcohol abuse Father     Heart attack Father        Meds/Allergies     Not in a hospital admission.    Allergies[2]    Objective     Blood pressure 143/63, pulse 69, temperature 97.5 °F (36.4 °C), temperature source Temporal, resp. rate 15, height 5' 7\" (1.702 m), weight 82.9 kg (182 lb 12.2 oz), SpO2 96%.      PHYSICAL EXAM    /63   Pulse 69   Temp 97.5 °F (36.4 °C) (Temporal)   Resp 15   Ht 5' 7\" (1.702 m)   Wt 82.9 kg (182 lb 12.2 oz)   SpO2 96%   BMI 28.62 kg/m²       Gen: NAD  CV: RRR  CHEST: Clear  ABD: soft, NT/ND  EXT: no edema      ASSESSMENT/PLAN:  This is a 65 y.o. year old male here for colonoscopy, and he is stable and optimized for his procedure.             [1]   Social History  Tobacco Use   Smoking Status Former    Current packs/day: 0.00    Average packs/day: 1 pack/day for 49.8 years (49.8 ttl pk-yrs)    Types: Cigarettes    Start date:     Quit date: 10/30/2024    Years since quittin.5    Passive exposure: Current   Smokeless Tobacco Never   [2] No Known Allergies    "

## 2025-05-21 NOTE — ANESTHESIA POSTPROCEDURE EVALUATION
Post-Op Assessment Note    CV Status:  Stable  Pain Score: 0    Pain management: adequate       Mental Status:  Alert and awake   Hydration Status:  Euvolemic   PONV Controlled:  Controlled   Airway Patency:  Patent  Two or more mitigation strategies used for obstructive sleep apnea   Post Op Vitals Reviewed: Yes    No anethesia notable event occurred.    Staff: CRNA           Last Filed PACU Vitals:  Vitals Value Taken Time   Temp 97.5 °F (36.4 °C) 05/21/25 11:51   Pulse 71 05/21/25 11:51   BP 95/55    Resp 18 05/21/25 11:51   SpO2 98 % 05/21/25 11:51       Modified Aracelis:     Vitals Value Taken Time   Activity 2 05/21/25 11:51   Respiration 2 05/21/25 11:51   Circulation 2 05/21/25 11:51   Consciousness 1 05/21/25 11:51   Oxygen Saturation 2 05/21/25 11:51     Modified Aracelis Score: 9

## 2025-05-21 NOTE — TELEPHONE ENCOUNTER
Called patient and daughter to informed them that we are solidifying surgery for Tuesday, 6/24/25, but I was unable to leave voice messages, due to mailboxes being full. I will keep trying. Patient communicated to provider that he prefer to have surgery late June.

## 2025-05-21 NOTE — ANESTHESIA POSTPROCEDURE EVALUATION
Post-Op Assessment Note    CV Status:  Stable  Pain Score: 0    Pain management: adequate       Mental Status:  Alert and awake   Hydration Status:  Euvolemic   PONV Controlled:  Controlled   Airway Patency:  Patent     Post Op Vitals Reviewed: Yes    No anethesia notable event occurred.    Staff: CRNA           Last Filed PACU Vitals:  Vitals Value Taken Time   Temp 97.5 °F (36.4 °C) 05/21/25 11:51   Pulse 71 05/21/25 11:51   BP     Resp 18 05/21/25 11:51   SpO2 98 % 05/21/25 11:51       Modified Aracelis:     Vitals Value Taken Time   Activity 2 05/21/25 11:51   Respiration 2 05/21/25 11:51   Circulation 2 05/21/25 11:51   Consciousness 1 05/21/25 11:51   Oxygen Saturation 2 05/21/25 11:51     Modified Aracelis Score: 9

## 2025-05-21 NOTE — PROGRESS NOTES
I reached out to Alexei now that he is on Tx to reassess for any barriers to care and offer any supportive services that may be needed. I left  with reason for my call including my direct phone number [phone]

## 2025-05-21 NOTE — ANESTHESIA PREPROCEDURE EVALUATION
Procedure:  COLONOSCOPY    Relevant Problems   CARDIO   (+) Atrial fibrillation with rapid ventricular response (HCC)   (+) Coronary artery calcification   (+) Hypertension   (+) Mixed hyperlipidemia   (+) PAD (peripheral artery disease) (HCC)   (+) Paroxysmal A-fib (HCC)      HEMATOLOGY   (+) ABLA (acute blood loss anemia)      PULMONARY   (+) Chronic obstructive pulmonary disease (HCC)        Physical Exam    Airway     Mallampati score: II  TM Distance: >3 FB  Neck ROM: full      Cardiovascular      Dental       Pulmonary      Neurological      Other Findings        Anesthesia Plan  ASA Score- 3     Anesthesia Type- IV sedation with anesthesia with ASA Monitors.         Additional Monitors:     Airway Plan:            Plan Factors-Exercise tolerance (METS): >4 METS.    Chart reviewed.   Existing labs reviewed. Patient summary reviewed.                  Induction-     Postoperative Plan- .   Monitoring Plan - Monitoring plan - standard ASA monitoring          Informed Consent- Anesthetic plan and risks discussed with patient.  I personally reviewed this patient with the CRNA. Discussed and agreed on the Anesthesia Plan with the CRNA..      NPO Status:  Vitals Value Taken Time   Date of last liquid 05/20/25 05/21/25 10:14   Time of last liquid     Date of last solid 05/20/25 05/21/25 10:14   Time of last solid 1200 05/21/25 10:14

## 2025-05-22 ENCOUNTER — TELEPHONE (OUTPATIENT)
Dept: CARDIAC SURGERY | Facility: CLINIC | Age: 65
End: 2025-05-22

## 2025-05-22 NOTE — TELEPHONE ENCOUNTER
Spoke with patient and solidified surgery date for Tuesday, 6/24/2025 with Dr. Knott. Post op appt has been scheduled for Thursday, 7/10/2025 at 1:20pm. Further discussed the surgical process with patient. Communicated to patient that he will need to hold Eliquis 3 days prior to surgery. Patient verbalized his understanding on next steps. Provided my direct phone number should he have any further questions.

## 2025-05-28 ENCOUNTER — RESULTS FOLLOW-UP (OUTPATIENT)
Dept: GASTROENTEROLOGY | Facility: CLINIC | Age: 65
End: 2025-05-28

## 2025-05-28 PROCEDURE — 88305 TISSUE EXAM BY PATHOLOGIST: CPT | Performed by: PATHOLOGY

## 2025-06-02 ENCOUNTER — TELEPHONE (OUTPATIENT)
Dept: CARDIAC SURGERY | Facility: CLINIC | Age: 65
End: 2025-06-02

## 2025-06-02 NOTE — TELEPHONE ENCOUNTER
"Received VM this morning transcribed as, \"Good morning. This is Nahid's, Nahid Luis's daughter. And he was wondering what time his consultation is for next Tuesday, I believe for his surgery. He doesn't know if you're going to be calling him or so you could give me a call back. My number is [phone]. Thank you and have a good day.\"   "

## 2025-06-05 ENCOUNTER — PATIENT OUTREACH (OUTPATIENT)
Dept: HEMATOLOGY ONCOLOGY | Facility: CLINIC | Age: 65
End: 2025-06-05

## 2025-06-12 ENCOUNTER — ANESTHESIA EVENT (OUTPATIENT)
Dept: PERIOP | Facility: HOSPITAL | Age: 65
DRG: 163 | End: 2025-06-12
Payer: MEDICARE

## 2025-06-13 ENCOUNTER — TELEPHONE (OUTPATIENT)
Dept: CARDIAC SURGERY | Facility: CLINIC | Age: 65
End: 2025-06-13

## 2025-06-13 NOTE — TELEPHONE ENCOUNTER
Spoke with daughter Myrtle ([phone]) to friendly remind patient that labs needs to be completed prior to surgery on 6/24/25 with Dr. Plata. Myrtle communicated to me that patient will be going on Saturday, 6/14/2025 first thing in the morning to complete. Daughter and patient are aware that they will need to hold Eliquis 3 days prior to surgery (last dose will be on Friday, 6/20/2025). She is also clear on instructions regarding the ERA's process. Myrtle has my direct phone number should she or patient have any further questions leading up to surgery.

## 2025-06-14 ENCOUNTER — APPOINTMENT (OUTPATIENT)
Dept: LAB | Facility: MEDICAL CENTER | Age: 65
End: 2025-06-14
Payer: MEDICARE

## 2025-06-14 ENCOUNTER — LAB REQUISITION (OUTPATIENT)
Dept: LAB | Facility: HOSPITAL | Age: 65
End: 2025-06-14
Payer: MEDICARE

## 2025-06-14 DIAGNOSIS — C34.12 ADENOCARCINOMA OF UPPER LOBE OF LEFT LUNG (HCC): ICD-10-CM

## 2025-06-14 DIAGNOSIS — C34.12 MALIGNANT NEOPLASM OF UPPER LOBE, LEFT BRONCHUS OR LUNG (HCC): ICD-10-CM

## 2025-06-14 LAB
ABO GROUP BLD: NORMAL
ANION GAP SERPL CALCULATED.3IONS-SCNC: 8 MMOL/L (ref 4–13)
BLD GP AB SCN SERPL QL: NEGATIVE
BUN SERPL-MCNC: 11 MG/DL (ref 5–25)
CALCIUM SERPL-MCNC: 9.4 MG/DL (ref 8.4–10.2)
CHLORIDE SERPL-SCNC: 101 MMOL/L (ref 96–108)
CO2 SERPL-SCNC: 28 MMOL/L (ref 21–32)
CREAT SERPL-MCNC: 0.97 MG/DL (ref 0.6–1.3)
ERYTHROCYTE [DISTWIDTH] IN BLOOD BY AUTOMATED COUNT: 16.6 % (ref 11.6–15.1)
GFR SERPL CREATININE-BSD FRML MDRD: 81 ML/MIN/1.73SQ M
GLUCOSE SERPL-MCNC: 116 MG/DL (ref 65–140)
HCT VFR BLD AUTO: 34.8 % (ref 36.5–49.3)
HGB BLD-MCNC: 10.9 G/DL (ref 12–17)
MCH RBC QN AUTO: 30.4 PG (ref 26.8–34.3)
MCHC RBC AUTO-ENTMCNC: 31.3 G/DL (ref 31.4–37.4)
MCV RBC AUTO: 97 FL (ref 82–98)
PLATELET # BLD AUTO: 299 THOUSANDS/UL (ref 149–390)
PMV BLD AUTO: 9.4 FL (ref 8.9–12.7)
POTASSIUM SERPL-SCNC: 4.4 MMOL/L (ref 3.5–5.3)
RBC # BLD AUTO: 3.58 MILLION/UL (ref 3.88–5.62)
RH BLD: POSITIVE
SODIUM SERPL-SCNC: 137 MMOL/L (ref 135–147)
SPECIMEN EXPIRATION DATE: NORMAL
WBC # BLD AUTO: 9.38 THOUSAND/UL (ref 4.31–10.16)

## 2025-06-14 PROCEDURE — 36415 COLL VENOUS BLD VENIPUNCTURE: CPT

## 2025-06-14 PROCEDURE — 86901 BLOOD TYPING SEROLOGIC RH(D): CPT | Performed by: THORACIC SURGERY (CARDIOTHORACIC VASCULAR SURGERY)

## 2025-06-14 PROCEDURE — 86900 BLOOD TYPING SEROLOGIC ABO: CPT | Performed by: THORACIC SURGERY (CARDIOTHORACIC VASCULAR SURGERY)

## 2025-06-14 PROCEDURE — 80048 BASIC METABOLIC PNL TOTAL CA: CPT

## 2025-06-14 PROCEDURE — 85027 COMPLETE CBC AUTOMATED: CPT

## 2025-06-14 PROCEDURE — 86850 RBC ANTIBODY SCREEN: CPT | Performed by: THORACIC SURGERY (CARDIOTHORACIC VASCULAR SURGERY)

## 2025-06-16 ENCOUNTER — HOSPITAL ENCOUNTER (OUTPATIENT)
Dept: NON INVASIVE DIAGNOSTICS | Facility: CLINIC | Age: 65
Discharge: HOME | End: 2025-06-16
Payer: MEDICARE

## 2025-06-16 DIAGNOSIS — I73.9 PAD (PERIPHERAL ARTERY DISEASE) (HCC): ICD-10-CM

## 2025-06-16 PROCEDURE — NC001 PR NO CHARGE: Performed by: SURGERY

## 2025-06-16 PROCEDURE — 93880 EXTRACRANIAL BILAT STUDY: CPT

## 2025-06-16 PROCEDURE — 93880 EXTRACRANIAL BILAT STUDY: CPT | Performed by: SURGERY

## 2025-06-16 PROCEDURE — 93925 LOWER EXTREMITY STUDY: CPT

## 2025-06-16 PROCEDURE — 93923 UPR/LXTR ART STDY 3+ LVLS: CPT

## 2025-06-16 PROCEDURE — 93925 LOWER EXTREMITY STUDY: CPT | Performed by: SURGERY

## 2025-06-17 ENCOUNTER — TRANSCRIBE ORDERS (OUTPATIENT)
Dept: VASCULAR SURGERY | Facility: CLINIC | Age: 65
End: 2025-06-17

## 2025-06-17 DIAGNOSIS — I73.9 PAD (PERIPHERAL ARTERY DISEASE) (HCC): ICD-10-CM

## 2025-06-17 DIAGNOSIS — I73.9 PAD (PERIPHERAL ARTERY DISEASE) (HCC): Primary | ICD-10-CM

## 2025-06-17 DIAGNOSIS — I67.2 CAS (CEREBRAL ATHEROSCLEROSIS): Primary | ICD-10-CM

## 2025-06-20 NOTE — PRE-PROCEDURE INSTRUCTIONS
Pre-Surgery Instructions:   Medication Instructions    albuterol (Proventil HFA) 90 mcg/act inhaler Take day of surgery.    amLODIPine (NORVASC) 5 mg tablet Take day of surgery.    apixaban (Eliquis) 5 mg Stop taking 3 days prior to surgery.    atorvastatin (LIPITOR) 20 mg tablet Take day of surgery.    dexamethasone (DECADRON) 4 mg tablet Instructions provided by MD/chemotheraphy    Fluticasone-Salmeterol (Wixela Inhub) 100-50 mcg/dose inhaler Take day of surgery. Pt ran out per daughter    losartan (COZAAR) 100 MG tablet Hold day of surgery.    metoprolol succinate (TOPROL-XL) 50 mg 24 hr tablet Take day of surgery.    ondansetron (ZOFRAN) 4 mg tablet Uses PRN- OK to take day of surgery   Medication instructions for day of surgery reviewed. Please take all instructed medications with only a sip of water. Please do not take any over the counter (non-prescribed) vitamins or supplements for one week prior to date of surgery.      You will receive a call one business day prior to surgery with an arrival time and hospital directions. If your surgery is scheduled on a Monday, the hospital will be calling you on the Friday prior to your surgery. If you have not heard from anyone by 8pm, please call the hospital supervisor through the hospital  at [phone]. ([place] [phone] or [place] [phone]).    Do not eat or drink anything after midnight the night before your surgery, including candy, mints, lifesavers, or chewing gum. Do not drink alcohol 24hrs before your surgery. Try not to smoke at least 24hrs before your surgery.       Follow the pre surgery showering instructions as listed in the “My Surgical Experience Booklet” or otherwise provided by your surgeon's office. Do not use a blade to shave the surgical area 1 week before surgery. It is okay to use a clean electric clippers up to 24 hours before surgery. Do not apply any lotions, creams, including makeup, cologne, deodorant, or perfumes after  showering on the day of your surgery. Do not use dry shampoo, hair spray, hair gel, or any type of hair products.     No contact lenses, eye make-up, or artificial eyelashes. Remove nail polish, including gel polish, and any artificial, gel, or acrylic nails if possible. Remove all jewelry including rings and body piercing jewelry.     Wear causal clothing that is easy to take on and off. Consider your type of surgery.    Keep any valuables, jewelry, piercings at home. Please bring any specially ordered equipment (sling, braces) if indicated.    Arrange for a responsible person to drive you to and from the hospital on the day of your surgery. Please confirm the visitor policy for the day of your procedure when you receive your phone call with an arrival time.     Call the surgeon's office with any new illnesses, exposures, or additional questions prior to surgery.    Please reference your “My Surgical Experience Booklet” for additional information to prepare for your upcoming surgery.

## 2025-06-23 NOTE — ANESTHESIA PREPROCEDURE EVALUATION
Procedure:  LOBECTOMY LUNG THORACOSCOPIC W/ ROBOTICS VS. VIDEO ASSISTED THORACOSCOPIC, MINIMALLY INVASIVE WITH MEDIASTINAL LYMPH NODE DISSECTION (Left: Chest)  BRONCHOSCOPY FLEXIBLE ([place])    Relevant Problems   CARDIO   (+) Atrial fibrillation with rapid ventricular response (HCC)   (+) Coronary artery calcification   (+) Hypertension   (+) Mixed hyperlipidemia   (+) PAD (peripheral artery disease) (HCC)   (+) Paroxysmal A-fib (HCC)      HEMATOLOGY   (+) ABLA (acute blood loss anemia)      PULMONARY   (+) Chronic obstructive pulmonary disease (HCC)        Left Ventricle: Left ventricular cavity size is normal. Wall thickness is normal. The left ventricular ejection fraction is 65%. Systolic function is normal. Although no diagnostic regional wall motion abnormality was identified, this possibility cannot be completely excluded on the basis of this study. Diastolic function is normal.    Right Ventricle: Right ventricular cavity size is normal. Systolic function is normal.    Mitral Valve: There is trace regurgitation.  Physical Exam    Airway     Mallampati score: II  TM Distance: >3 FB  Neck ROM: full      Cardiovascular      Dental       Pulmonary      Neurological      Other Findings        Anesthesia Plan  ASA Score- 3     Anesthesia Type- general with ASA Monitors.         Additional Monitors: arterial line.    Airway Plan: Oral ETT.           Plan Factors-    Chart reviewed.              Obstructive sleep apnea risk education given perioperatively.        Induction- intravenous.    Postoperative Plan- .   Monitoring Plan - Monitoring plan - standard ASA monitoring and arterial line kit          Informed Consent- Anesthetic plan and risks discussed with patient.  I personally reviewed this patient with the CRNA. Discussed and agreed on the Anesthesia Plan with the CRNA..      NPO Status:  No vitals data found for the desired time range.

## 2025-06-24 ENCOUNTER — APPOINTMENT (INPATIENT)
Dept: RADIOLOGY | Facility: HOSPITAL | Age: 65
DRG: 163 | End: 2025-06-24
Payer: MEDICARE

## 2025-06-24 ENCOUNTER — HOSPITAL ENCOUNTER (INPATIENT)
Facility: HOSPITAL | Age: 65
LOS: 4 days | Discharge: HOME | DRG: 163 | End: 2025-06-28
Attending: THORACIC SURGERY (CARDIOTHORACIC VASCULAR SURGERY) | Admitting: THORACIC SURGERY (CARDIOTHORACIC VASCULAR SURGERY)
Payer: MEDICARE

## 2025-06-24 ENCOUNTER — ANESTHESIA (OUTPATIENT)
Dept: PERIOP | Facility: HOSPITAL | Age: 65
DRG: 163 | End: 2025-06-24
Payer: MEDICARE

## 2025-06-24 DIAGNOSIS — C34.12 ADENOCARCINOMA OF UPPER LOBE OF LEFT LUNG (HCC): ICD-10-CM

## 2025-06-24 LAB
ANION GAP SERPL CALCULATED.3IONS-SCNC: 6 MMOL/L (ref 4–13)
BASE EXCESS BLDA CALC-SCNC: -3 MMOL/L (ref -2–3)
BASE EXCESS BLDA CALC-SCNC: -4 MMOL/L (ref -2–3)
BUN SERPL-MCNC: 15 MG/DL (ref 5–25)
CA-I BLD-SCNC: 1.18 MMOL/L (ref 1.12–1.32)
CA-I BLD-SCNC: 1.25 MMOL/L (ref 1.12–1.32)
CALCIUM SERPL-MCNC: 8.5 MG/DL (ref 8.4–10.2)
CHLORIDE SERPL-SCNC: 107 MMOL/L (ref 96–108)
CO2 SERPL-SCNC: 24 MMOL/L (ref 21–32)
CREAT SERPL-MCNC: 1.1 MG/DL (ref 0.6–1.3)
ERYTHROCYTE [DISTWIDTH] IN BLOOD BY AUTOMATED COUNT: 15.4 % (ref 11.6–15.1)
GFR SERPL CREATININE-BSD FRML MDRD: 70 ML/MIN/1.73SQ M
GLUCOSE SERPL-MCNC: 166 MG/DL (ref 65–140)
GLUCOSE SERPL-MCNC: 178 MG/DL (ref 65–140)
GLUCOSE SERPL-MCNC: 179 MG/DL (ref 65–140)
HCO3 BLDA-SCNC: 22.7 MMOL/L (ref 22–28)
HCO3 BLDA-SCNC: 25.3 MMOL/L (ref 22–28)
HCT VFR BLD AUTO: 32.5 % (ref 36.5–49.3)
HCT VFR BLD CALC: 31 % (ref 36.5–49.3)
HCT VFR BLD CALC: 32 % (ref 36.5–49.3)
HGB BLD-MCNC: 10.5 G/DL (ref 12–17)
HGB BLDA-MCNC: 10.5 G/DL (ref 12–17)
HGB BLDA-MCNC: 10.9 G/DL (ref 12–17)
MAGNESIUM SERPL-MCNC: 1.7 MG/DL (ref 1.9–2.7)
MCH RBC QN AUTO: 29.8 PG (ref 26.8–34.3)
MCHC RBC AUTO-ENTMCNC: 32.3 G/DL (ref 31.4–37.4)
MCV RBC AUTO: 92 FL (ref 82–98)
PCO2 BLD: 24 MMOL/L (ref 21–32)
PCO2 BLD: 27 MMOL/L (ref 21–32)
PCO2 BLD: 48.6 MM HG (ref 36–44)
PCO2 BLD: 59.8 MM HG (ref 36–44)
PH BLD: 7.23 [PH] (ref 7.35–7.45)
PH BLD: 7.28 [PH] (ref 7.35–7.45)
PLATELET # BLD AUTO: 266 THOUSANDS/UL (ref 149–390)
PMV BLD AUTO: 8.6 FL (ref 8.9–12.7)
PO2 BLD: 128 MM HG (ref 75–129)
PO2 BLD: 320 MM HG (ref 75–129)
POTASSIUM BLD-SCNC: 4.8 MMOL/L (ref 3.5–5.3)
POTASSIUM BLD-SCNC: 4.8 MMOL/L (ref 3.5–5.3)
POTASSIUM SERPL-SCNC: 5.1 MMOL/L (ref 3.5–5.3)
RBC # BLD AUTO: 3.52 MILLION/UL (ref 3.88–5.62)
SAO2 % BLD FROM PO2: 100 % (ref 60–85)
SAO2 % BLD FROM PO2: 98 % (ref 60–85)
SODIUM BLD-SCNC: 136 MMOL/L (ref 136–145)
SODIUM BLD-SCNC: 140 MMOL/L (ref 136–145)
SODIUM SERPL-SCNC: 137 MMOL/L (ref 135–147)
SPECIMEN SOURCE: ABNORMAL
SPECIMEN SOURCE: ABNORMAL
WBC # BLD AUTO: 12.81 THOUSAND/UL (ref 4.31–10.16)

## 2025-06-24 PROCEDURE — NC001 PR NO CHARGE: Performed by: THORACIC SURGERY (CARDIOTHORACIC VASCULAR SURGERY)

## 2025-06-24 PROCEDURE — 38746 REMOVE THORACIC LYMPH NODES: CPT | Performed by: SURGERY

## 2025-06-24 PROCEDURE — 82330 ASSAY OF CALCIUM: CPT

## 2025-06-24 PROCEDURE — 0BTG0ZZ RESECTION OF LEFT UPPER LUNG LOBE, OPEN APPROACH: ICD-10-PCS | Performed by: THORACIC SURGERY (CARDIOTHORACIC VASCULAR SURGERY)

## 2025-06-24 PROCEDURE — 8E0W4CZ ROBOTIC ASSISTED PROCEDURE OF TRUNK REGION, PERCUTANEOUS ENDOSCOPIC APPROACH: ICD-10-PCS | Performed by: THORACIC SURGERY (CARDIOTHORACIC VASCULAR SURGERY)

## 2025-06-24 PROCEDURE — 0WJB4ZZ INSPECTION OF LEFT PLEURAL CAVITY, PERCUTANEOUS ENDOSCOPIC APPROACH: ICD-10-PCS | Performed by: THORACIC SURGERY (CARDIOTHORACIC VASCULAR SURGERY)

## 2025-06-24 PROCEDURE — C2618 PROBE/NEEDLE, CRYO: HCPCS | Performed by: THORACIC SURGERY (CARDIOTHORACIC VASCULAR SURGERY)

## 2025-06-24 PROCEDURE — 71045 X-RAY EXAM CHEST 1 VIEW: CPT

## 2025-06-24 PROCEDURE — 88341 IMHCHEM/IMCYTCHM EA ADD ANTB: CPT | Performed by: PATHOLOGY

## 2025-06-24 PROCEDURE — 0BJ08ZZ INSPECTION OF TRACHEOBRONCHIAL TREE, VIA NATURAL OR ARTIFICIAL OPENING ENDOSCOPIC: ICD-10-PCS | Performed by: THORACIC SURGERY (CARDIOTHORACIC VASCULAR SURGERY)

## 2025-06-24 PROCEDURE — 82947 ASSAY GLUCOSE BLOOD QUANT: CPT

## 2025-06-24 PROCEDURE — 32480 PARTIAL REMOVAL OF LUNG: CPT | Performed by: THORACIC SURGERY (CARDIOTHORACIC VASCULAR SURGERY)

## 2025-06-24 PROCEDURE — 0BNP0ZZ RELEASE LEFT PLEURA, OPEN APPROACH: ICD-10-PCS | Performed by: THORACIC SURGERY (CARDIOTHORACIC VASCULAR SURGERY)

## 2025-06-24 PROCEDURE — 38746 REMOVE THORACIC LYMPH NODES: CPT | Performed by: THORACIC SURGERY (CARDIOTHORACIC VASCULAR SURGERY)

## 2025-06-24 PROCEDURE — 82803 BLOOD GASES ANY COMBINATION: CPT

## 2025-06-24 PROCEDURE — 85027 COMPLETE CBC AUTOMATED: CPT | Performed by: PHYSICIAN ASSISTANT

## 2025-06-24 PROCEDURE — 07B70ZZ EXCISION OF THORAX LYMPHATIC, OPEN APPROACH: ICD-10-PCS | Performed by: THORACIC SURGERY (CARDIOTHORACIC VASCULAR SURGERY)

## 2025-06-24 PROCEDURE — 32480 PARTIAL REMOVAL OF LUNG: CPT | Performed by: SURGERY

## 2025-06-24 PROCEDURE — 88305 TISSUE EXAM BY PATHOLOGIST: CPT | Performed by: PATHOLOGY

## 2025-06-24 PROCEDURE — 84295 ASSAY OF SERUM SODIUM: CPT

## 2025-06-24 PROCEDURE — 88309 TISSUE EXAM BY PATHOLOGIST: CPT | Performed by: PATHOLOGY

## 2025-06-24 PROCEDURE — 84132 ASSAY OF SERUM POTASSIUM: CPT

## 2025-06-24 PROCEDURE — 86920 COMPATIBILITY TEST SPIN: CPT

## 2025-06-24 PROCEDURE — 88342 IMHCHEM/IMCYTCHM 1ST ANTB: CPT | Performed by: PATHOLOGY

## 2025-06-24 PROCEDURE — 88312 SPECIAL STAINS GROUP 1: CPT | Performed by: PATHOLOGY

## 2025-06-24 PROCEDURE — 85014 HEMATOCRIT: CPT

## 2025-06-24 PROCEDURE — 80048 BASIC METABOLIC PNL TOTAL CA: CPT | Performed by: PHYSICIAN ASSISTANT

## 2025-06-24 PROCEDURE — C2615 SEALANT, PULMONARY, LIQUID: HCPCS | Performed by: THORACIC SURGERY (CARDIOTHORACIC VASCULAR SURGERY)

## 2025-06-24 PROCEDURE — 83735 ASSAY OF MAGNESIUM: CPT | Performed by: PHYSICIAN ASSISTANT

## 2025-06-24 RX ORDER — HYDROMORPHONE HCL/PF 1 MG/ML
0.2 SYRINGE (ML) INJECTION
Status: DISCONTINUED | OUTPATIENT
Start: 2025-06-24 | End: 2025-06-24

## 2025-06-24 RX ORDER — FENTANYL CITRATE 50 UG/ML
INJECTION, SOLUTION INTRAMUSCULAR; INTRAVENOUS AS NEEDED
Status: DISCONTINUED | OUTPATIENT
Start: 2025-06-24 | End: 2025-06-24

## 2025-06-24 RX ORDER — HYDROMORPHONE HCL/PF 1 MG/ML
0.5 SYRINGE (ML) INJECTION
Status: DISCONTINUED | OUTPATIENT
Start: 2025-06-24 | End: 2025-06-24 | Stop reason: HOSPADM

## 2025-06-24 RX ORDER — EPHEDRINE SULFATE 50 MG/ML
INJECTION INTRAVENOUS AS NEEDED
Status: DISCONTINUED | OUTPATIENT
Start: 2025-06-24 | End: 2025-06-24

## 2025-06-24 RX ORDER — GABAPENTIN 300 MG/1
300 CAPSULE ORAL ONCE
Status: COMPLETED | OUTPATIENT
Start: 2025-06-24 | End: 2025-06-24

## 2025-06-24 RX ORDER — ALBUTEROL SULFATE 90 UG/1
2 INHALANT RESPIRATORY (INHALATION) EVERY 6 HOURS PRN
Status: DISCONTINUED | OUTPATIENT
Start: 2025-06-24 | End: 2025-06-28 | Stop reason: HOSPADM

## 2025-06-24 RX ORDER — SODIUM CHLORIDE 9 MG/ML
INJECTION, SOLUTION INTRAVENOUS CONTINUOUS PRN
Status: DISCONTINUED | OUTPATIENT
Start: 2025-06-24 | End: 2025-06-24

## 2025-06-24 RX ORDER — ATORVASTATIN CALCIUM 20 MG/1
20 TABLET, FILM COATED ORAL DAILY
Status: DISCONTINUED | OUTPATIENT
Start: 2025-06-25 | End: 2025-06-28 | Stop reason: HOSPADM

## 2025-06-24 RX ORDER — ROCURONIUM BROMIDE 10 MG/ML
INJECTION, SOLUTION INTRAVENOUS AS NEEDED
Status: DISCONTINUED | OUTPATIENT
Start: 2025-06-24 | End: 2025-06-24

## 2025-06-24 RX ORDER — ONDANSETRON 2 MG/ML
4 INJECTION INTRAMUSCULAR; INTRAVENOUS ONCE AS NEEDED
Status: DISCONTINUED | OUTPATIENT
Start: 2025-06-24 | End: 2025-06-24 | Stop reason: HOSPADM

## 2025-06-24 RX ORDER — SODIUM CHLORIDE, SODIUM LACTATE, POTASSIUM CHLORIDE, CALCIUM CHLORIDE 600; 310; 30; 20 MG/100ML; MG/100ML; MG/100ML; MG/100ML
125 INJECTION, SOLUTION INTRAVENOUS CONTINUOUS
Status: DISCONTINUED | OUTPATIENT
Start: 2025-06-24 | End: 2025-06-24

## 2025-06-24 RX ORDER — ONDANSETRON 2 MG/ML
INJECTION INTRAMUSCULAR; INTRAVENOUS AS NEEDED
Status: DISCONTINUED | OUTPATIENT
Start: 2025-06-24 | End: 2025-06-24

## 2025-06-24 RX ORDER — DEXAMETHASONE SODIUM PHOSPHATE 10 MG/ML
INJECTION, SOLUTION INTRAMUSCULAR; INTRAVENOUS AS NEEDED
Status: DISCONTINUED | OUTPATIENT
Start: 2025-06-24 | End: 2025-06-24

## 2025-06-24 RX ORDER — ACETAMINOPHEN 325 MG/1
975 TABLET ORAL EVERY 6 HOURS
Status: DISCONTINUED | OUTPATIENT
Start: 2025-06-24 | End: 2025-06-28 | Stop reason: HOSPADM

## 2025-06-24 RX ORDER — OXYCODONE HYDROCHLORIDE 5 MG/1
5 TABLET ORAL EVERY 4 HOURS PRN
Refills: 0 | Status: DISCONTINUED | OUTPATIENT
Start: 2025-06-24 | End: 2025-06-24

## 2025-06-24 RX ORDER — CELECOXIB 100 MG/1
100 CAPSULE ORAL 2 TIMES DAILY
Status: DISCONTINUED | OUTPATIENT
Start: 2025-06-24 | End: 2025-06-28 | Stop reason: HOSPADM

## 2025-06-24 RX ORDER — POLYETHYLENE GLYCOL 3350 17 G/17G
17 POWDER, FOR SOLUTION ORAL DAILY PRN
Status: DISCONTINUED | OUTPATIENT
Start: 2025-06-24 | End: 2025-06-28 | Stop reason: HOSPADM

## 2025-06-24 RX ORDER — FLUTICASONE FUROATE AND VILANTEROL 100; 25 UG/1; UG/1
1 POWDER RESPIRATORY (INHALATION)
Status: DISCONTINUED | OUTPATIENT
Start: 2025-06-25 | End: 2025-06-28 | Stop reason: HOSPADM

## 2025-06-24 RX ORDER — NALOXONE HYDROCHLORIDE 0.4 MG/ML
0.1 INJECTION, SOLUTION INTRAMUSCULAR; INTRAVENOUS; SUBCUTANEOUS
Status: DISCONTINUED | OUTPATIENT
Start: 2025-06-24 | End: 2025-06-28 | Stop reason: HOSPADM

## 2025-06-24 RX ORDER — PROPOFOL 10 MG/ML
INJECTION, EMULSION INTRAVENOUS AS NEEDED
Status: DISCONTINUED | OUTPATIENT
Start: 2025-06-24 | End: 2025-06-24

## 2025-06-24 RX ORDER — CEFAZOLIN SODIUM 2 G/50ML
2000 SOLUTION INTRAVENOUS ONCE
Status: COMPLETED | OUTPATIENT
Start: 2025-06-24 | End: 2025-06-24

## 2025-06-24 RX ORDER — HYDROMORPHONE HCL/PF 1 MG/ML
SYRINGE (ML) INJECTION AS NEEDED
Status: DISCONTINUED | OUTPATIENT
Start: 2025-06-24 | End: 2025-06-24

## 2025-06-24 RX ORDER — SENNOSIDES 8.6 MG
1 TABLET ORAL DAILY
Status: DISCONTINUED | OUTPATIENT
Start: 2025-06-24 | End: 2025-06-28 | Stop reason: HOSPADM

## 2025-06-24 RX ORDER — ALBUMIN HUMAN 50 G/1000ML
12.5 SOLUTION INTRAVENOUS ONCE
Status: COMPLETED | OUTPATIENT
Start: 2025-06-24 | End: 2025-06-24

## 2025-06-24 RX ORDER — METOCLOPRAMIDE HYDROCHLORIDE 5 MG/ML
10 INJECTION INTRAMUSCULAR; INTRAVENOUS ONCE AS NEEDED
Status: DISCONTINUED | OUTPATIENT
Start: 2025-06-24 | End: 2025-06-24 | Stop reason: HOSPADM

## 2025-06-24 RX ORDER — LIDOCAINE HYDROCHLORIDE 10 MG/ML
INJECTION, SOLUTION EPIDURAL; INFILTRATION; INTRACAUDAL; PERINEURAL AS NEEDED
Status: DISCONTINUED | OUTPATIENT
Start: 2025-06-24 | End: 2025-06-24

## 2025-06-24 RX ORDER — ONDANSETRON 2 MG/ML
4 INJECTION INTRAMUSCULAR; INTRAVENOUS EVERY 6 HOURS PRN
Status: DISCONTINUED | OUTPATIENT
Start: 2025-06-24 | End: 2025-06-28 | Stop reason: HOSPADM

## 2025-06-24 RX ORDER — ACETAMINOPHEN 325 MG/1
975 TABLET ORAL ONCE
Status: COMPLETED | OUTPATIENT
Start: 2025-06-24 | End: 2025-06-24

## 2025-06-24 RX ORDER — METOPROLOL SUCCINATE 50 MG/1
50 TABLET, EXTENDED RELEASE ORAL DAILY
Status: DISCONTINUED | OUTPATIENT
Start: 2025-06-25 | End: 2025-06-25

## 2025-06-24 RX ORDER — MAGNESIUM SULFATE HEPTAHYDRATE 40 MG/ML
4 INJECTION, SOLUTION INTRAVENOUS ONCE
Status: COMPLETED | OUTPATIENT
Start: 2025-06-24 | End: 2025-06-25

## 2025-06-24 RX ORDER — MIDAZOLAM HYDROCHLORIDE 2 MG/2ML
INJECTION, SOLUTION INTRAMUSCULAR; INTRAVENOUS AS NEEDED
Status: DISCONTINUED | OUTPATIENT
Start: 2025-06-24 | End: 2025-06-24

## 2025-06-24 RX ORDER — AMLODIPINE BESYLATE 5 MG/1
5 TABLET ORAL DAILY
Status: DISCONTINUED | OUTPATIENT
Start: 2025-06-25 | End: 2025-06-28 | Stop reason: HOSPADM

## 2025-06-24 RX ORDER — SODIUM CHLORIDE, SODIUM LACTATE, POTASSIUM CHLORIDE, CALCIUM CHLORIDE 600; 310; 30; 20 MG/100ML; MG/100ML; MG/100ML; MG/100ML
60 INJECTION, SOLUTION INTRAVENOUS CONTINUOUS
Status: DISCONTINUED | OUTPATIENT
Start: 2025-06-24 | End: 2025-06-25

## 2025-06-24 RX ORDER — ENOXAPARIN SODIUM 100 MG/ML
40 INJECTION SUBCUTANEOUS DAILY
Status: DISCONTINUED | OUTPATIENT
Start: 2025-06-25 | End: 2025-06-28 | Stop reason: HOSPADM

## 2025-06-24 RX ORDER — KETAMINE HCL IN NACL, ISO-OSM 100MG/10ML
SYRINGE (ML) INJECTION AS NEEDED
Status: DISCONTINUED | OUTPATIENT
Start: 2025-06-24 | End: 2025-06-24

## 2025-06-24 RX ORDER — HEPARIN SODIUM 1000 [USP'U]/ML
INJECTION, SOLUTION INTRAVENOUS; SUBCUTANEOUS AS NEEDED
Status: DISCONTINUED | OUTPATIENT
Start: 2025-06-24 | End: 2025-06-24

## 2025-06-24 RX ORDER — DOCUSATE SODIUM 100 MG/1
100 CAPSULE, LIQUID FILLED ORAL 2 TIMES DAILY
Status: DISCONTINUED | OUTPATIENT
Start: 2025-06-24 | End: 2025-06-28 | Stop reason: HOSPADM

## 2025-06-24 RX ORDER — GABAPENTIN 300 MG/1
300 CAPSULE ORAL 3 TIMES DAILY
Status: DISCONTINUED | OUTPATIENT
Start: 2025-06-24 | End: 2025-06-28 | Stop reason: HOSPADM

## 2025-06-24 RX ORDER — HEPARIN SODIUM 5000 [USP'U]/ML
5000 INJECTION, SOLUTION INTRAVENOUS; SUBCUTANEOUS
Status: COMPLETED | OUTPATIENT
Start: 2025-06-24 | End: 2025-06-24

## 2025-06-24 RX ORDER — OXYCODONE HYDROCHLORIDE 5 MG/1
2.5 TABLET ORAL EVERY 4 HOURS PRN
Refills: 0 | Status: DISCONTINUED | OUTPATIENT
Start: 2025-06-24 | End: 2025-06-24

## 2025-06-24 RX ADMIN — SENNOSIDES 8.6 MG: 8.6 TABLET, FILM COATED ORAL at 17:28

## 2025-06-24 RX ADMIN — ACETAMINOPHEN 975 MG: 325 TABLET ORAL at 06:11

## 2025-06-24 RX ADMIN — ONDANSETRON 4 MG: 2 INJECTION INTRAMUSCULAR; INTRAVENOUS at 14:12

## 2025-06-24 RX ADMIN — DOCUSATE SODIUM 100 MG: 100 CAPSULE, LIQUID FILLED ORAL at 17:28

## 2025-06-24 RX ADMIN — GABAPENTIN 300 MG: 300 CAPSULE ORAL at 06:11

## 2025-06-24 RX ADMIN — DEXAMETHASONE SODIUM PHOSPHATE 10 MG: 10 INJECTION, SOLUTION INTRAMUSCULAR; INTRAVENOUS at 08:15

## 2025-06-24 RX ADMIN — EPHEDRINE SULFATE 10 MG: 50 INJECTION, SOLUTION INTRAVENOUS at 07:49

## 2025-06-24 RX ADMIN — PROPOFOL 140 MG: 10 INJECTION, EMULSION INTRAVENOUS at 07:38

## 2025-06-24 RX ADMIN — ROCURONIUM BROMIDE 20 MG: 10 INJECTION, SOLUTION INTRAVENOUS at 11:30

## 2025-06-24 RX ADMIN — Medication: at 15:39

## 2025-06-24 RX ADMIN — FENTANYL CITRATE 50 MCG: 50 INJECTION INTRAMUSCULAR; INTRAVENOUS at 08:15

## 2025-06-24 RX ADMIN — Medication 20 MG: at 08:35

## 2025-06-24 RX ADMIN — EPHEDRINE SULFATE 10 MG: 50 INJECTION, SOLUTION INTRAVENOUS at 15:11

## 2025-06-24 RX ADMIN — HEPARIN SODIUM 5000 UNITS: 1000 INJECTION INTRAVENOUS; SUBCUTANEOUS at 13:09

## 2025-06-24 RX ADMIN — EPHEDRINE SULFATE 10 MG: 50 INJECTION, SOLUTION INTRAVENOUS at 12:08

## 2025-06-24 RX ADMIN — ROCURONIUM BROMIDE 10 MG: 10 INJECTION, SOLUTION INTRAVENOUS at 08:59

## 2025-06-24 RX ADMIN — SODIUM CHLORIDE: 0.9 INJECTION, SOLUTION INTRAVENOUS at 07:43

## 2025-06-24 RX ADMIN — HEPARIN SODIUM 5000 UNITS: 5000 INJECTION, SOLUTION INTRAVENOUS; SUBCUTANEOUS at 06:57

## 2025-06-24 RX ADMIN — ALBUMIN (HUMAN) 12.5 G: 12.5 INJECTION, SOLUTION INTRAVENOUS at 17:42

## 2025-06-24 RX ADMIN — ROCURONIUM BROMIDE 20 MG: 10 INJECTION, SOLUTION INTRAVENOUS at 12:53

## 2025-06-24 RX ADMIN — MIDAZOLAM 2 MG: 1 INJECTION INTRAMUSCULAR; INTRAVENOUS at 07:28

## 2025-06-24 RX ADMIN — HYDROMORPHONE HYDROCHLORIDE 0.5 MG: 1 INJECTION, SOLUTION INTRAMUSCULAR; INTRAVENOUS; SUBCUTANEOUS at 09:41

## 2025-06-24 RX ADMIN — CEFAZOLIN SODIUM 2000 MG: 2 SOLUTION INTRAVENOUS at 12:08

## 2025-06-24 RX ADMIN — Medication 10 MG: at 09:34

## 2025-06-24 RX ADMIN — Medication 10 MG: at 10:59

## 2025-06-24 RX ADMIN — LIDOCAINE HYDROCHLORIDE 40 MG: 10 INJECTION, SOLUTION EPIDURAL; INFILTRATION; INTRACAUDAL; PERINEURAL at 07:37

## 2025-06-24 RX ADMIN — SUGAMMADEX 200 MG: 100 INJECTION, SOLUTION INTRAVENOUS at 14:58

## 2025-06-24 RX ADMIN — GABAPENTIN 300 MG: 300 CAPSULE ORAL at 17:28

## 2025-06-24 RX ADMIN — GABAPENTIN 300 MG: 300 CAPSULE ORAL at 21:28

## 2025-06-24 RX ADMIN — ACETAMINOPHEN 975 MG: 325 TABLET, FILM COATED ORAL at 17:28

## 2025-06-24 RX ADMIN — EPHEDRINE SULFATE 5 MG: 50 INJECTION, SOLUTION INTRAVENOUS at 08:48

## 2025-06-24 RX ADMIN — Medication 10 MG: at 11:48

## 2025-06-24 RX ADMIN — MAGNESIUM SULFATE HEPTAHYDRATE 4 G: 40 INJECTION, SOLUTION INTRAVENOUS at 17:49

## 2025-06-24 RX ADMIN — EPHEDRINE SULFATE 15 MG: 50 INJECTION, SOLUTION INTRAVENOUS at 09:21

## 2025-06-24 RX ADMIN — SODIUM CHLORIDE: 0.9 INJECTION, SOLUTION INTRAVENOUS at 13:23

## 2025-06-24 RX ADMIN — DEXMEDETOMIDINE HYDROCHLORIDE 0.3 MCG/KG/HR: 100 INJECTION, SOLUTION INTRAVENOUS at 08:09

## 2025-06-24 RX ADMIN — SODIUM CHLORIDE, SODIUM LACTATE, POTASSIUM CHLORIDE, AND CALCIUM CHLORIDE: .6; .31; .03; .02 INJECTION, SOLUTION INTRAVENOUS at 12:00

## 2025-06-24 RX ADMIN — HYDROMORPHONE HYDROCHLORIDE 0.5 MG: 1 INJECTION, SOLUTION INTRAMUSCULAR; INTRAVENOUS; SUBCUTANEOUS at 14:11

## 2025-06-24 RX ADMIN — PHENYLEPHRINE HYDROCHLORIDE 30 MCG/MIN: 50 INJECTION INTRAVENOUS at 07:47

## 2025-06-24 RX ADMIN — ROCURONIUM BROMIDE 40 MG: 10 INJECTION, SOLUTION INTRAVENOUS at 09:20

## 2025-06-24 RX ADMIN — CEFAZOLIN SODIUM 2000 MG: 2 SOLUTION INTRAVENOUS at 08:08

## 2025-06-24 RX ADMIN — ROCURONIUM BROMIDE 50 MG: 10 INJECTION, SOLUTION INTRAVENOUS at 07:38

## 2025-06-24 RX ADMIN — CELECOXIB 100 MG: 100 CAPSULE ORAL at 21:28

## 2025-06-24 RX ADMIN — SODIUM CHLORIDE, SODIUM LACTATE, POTASSIUM CHLORIDE, AND CALCIUM CHLORIDE 125 ML/HR: .6; .31; .03; .02 INJECTION, SOLUTION INTRAVENOUS at 06:30

## 2025-06-24 RX ADMIN — FENTANYL CITRATE 50 MCG: 50 INJECTION INTRAMUSCULAR; INTRAVENOUS at 07:37

## 2025-06-24 RX ADMIN — EPHEDRINE SULFATE 10 MG: 50 INJECTION, SOLUTION INTRAVENOUS at 08:25

## 2025-06-24 NOTE — H&P
H&P - Thoracic    Name: Nahid Luis 65 y.o. male I MRN: [id number]  Unit/Bed#: OR POOL I Date of Admission: 6/24/2025   Date of Service: 6/24/2025 I Hospital Day: 0     Assessment & Plan    Safe to proceed. Robotic possible open LULobectomy, MLND    History of Present Illness   Nahid Luis is a 65 y.o. male who presents with a JOHN lung cancer. He is s/p induction. Doing well. No changes in his health since last visit.    Review of Systems   Constitutional: Negative.  Negative for activity change, chills, fatigue, fever and unexpected weight change.   HENT:  Negative for sore throat, trouble swallowing and voice change.    Eyes: Negative.  Negative for visual disturbance.   Respiratory:  Negative for cough, chest tightness, shortness of breath, wheezing and stridor.    Cardiovascular:  Negative for chest pain and leg swelling.   Gastrointestinal: Negative.    Endocrine: Negative.    Genitourinary: Negative.    Musculoskeletal: Negative.    Skin: Negative.    Allergic/Immunologic: Negative.    Neurological:  Negative for seizures, syncope, speech difficulty, weakness, light-headedness and headaches.   Hematological:  Negative for adenopathy.   Psychiatric/Behavioral: Negative.       Historical Information   Past Medical History[1]  Past Surgical History[2]  Social History[3]  E-Cigarette/Vaping    E-Cigarette Use Never User      E-Cigarette/Vaping Substances    Nicotine No     THC No     CBD No     Flavoring No     Other No     Unknown No      Family history non-contributory  Social History[4]    Current Facility-Administered Medications:     bupivacaine liposomal (EXPAREL) 1.3 % injection 20 mL, Once    ceFAZolin (ANCEF) IVPB (premix in dextrose) 2,000 mg 50 mL, Once    lactated ringers infusion, Continuous, Last Rate: 125 mL/hr (06/24/25 0630)  Prior to Admission Medications   Prescriptions Last Dose Informant Patient Reported? Taking?   Fluticasone-Salmeterol (Wixela Inhub) 100-50 mcg/dose inhaler  6/24/2025 at  3:30 AM Self No Yes   Sig: Inhale 1 puff 2 (two) times a day Rinse mouth after use.   albuterol (Proventil HFA) 90 mcg/act inhaler 6/23/2025 Noon Self No Yes   Sig: Inhale 2 puffs every 6 (six) hours as needed for wheezing   amLODIPine (NORVASC) 5 mg tablet 6/24/2025 at  3:30 AM  No Yes   Sig: Take 1 tablet (5 mg total) by mouth daily   apixaban (Eliquis) 5 mg 6/20/2025 Self No Yes   Sig: Take 1 tablet (5 mg total) by mouth 2 (two) times a day   atorvastatin (LIPITOR) 20 mg tablet 6/24/2025 at  3:30 AM Self No Yes   Sig: Take 1 tablet (20 mg total) by mouth daily   dexamethasone (DECADRON) 4 mg tablet Past Month Self No Yes   Sig: Take 1 tablet (4 mg total) by mouth 2 (two) times a day with meals Take the day before and day after chemotherapy treatment.   ibuprofen (MOTRIN) 600 mg tablet Past Week Self No Yes   Sig: Take 1 tablet (600 mg total) by mouth every 6 (six) hours as needed for mild pain   losartan (COZAAR) 100 MG tablet 6/20/2025  No Yes   Sig: Take 1 tablet (100 mg total) by mouth daily   metoprolol succinate (TOPROL-XL) 50 mg 24 hr tablet 6/24/2025 at  3:30 AM Self No Yes   Sig: Take 1 tablet (50 mg total) by mouth daily   ondansetron (ZOFRAN) 4 mg tablet Unknown Self No No   Sig: Take 1 tablet (4 mg total) by mouth every 8 (eight) hours as needed for nausea or vomiting   phenazopyridine (PYRIDIUM) 200 mg tablet  Self No Yes   Sig: Take 1 tablet (200 mg total) by mouth 3 (three) times a day with meals      Facility-Administered Medications: None     Patient has no known allergies.    Objective :  Temp:  [97.1 °F (36.2 °C)] 97.1 °F (36.2 °C)  HR:  [70] 70  BP: (124)/(74) 124/74  Resp:  [18] 18  SpO2:  [94 %] 94 %  O2 Device: None (Room air)    I/O       None            Physical Exam  Vitals and nursing note reviewed.   Constitutional:       Appearance: He is well-developed. He is not diaphoretic.   HENT:      Head: Normocephalic and atraumatic.   Neck:      Trachea: No tracheal deviation.  "    Cardiovascular:      Rate and Rhythm: Normal rate and regular rhythm.      Heart sounds: Normal heart sounds. No murmur heard.  Pulmonary:      Effort: Pulmonary effort is normal. No respiratory distress.      Breath sounds: Normal breath sounds. No stridor. No wheezing or rales.   Chest:      Chest wall: No tenderness.   Abdominal:      General: Bowel sounds are normal. There is no distension.      Palpations: Abdomen is soft.      Tenderness: There is no abdominal tenderness.     Musculoskeletal:         General: Normal range of motion.      Cervical back: Normal range of motion.   Lymphadenopathy:      Cervical: No cervical adenopathy.     Skin:     General: Skin is warm and dry.      Coloration: Skin is not pale.      Findings: No erythema or rash.     Neurological:      Mental Status: He is alert and oriented to person, place, and time.     Psychiatric:         Behavior: Behavior normal.         Thought Content: Thought content normal.         Judgment: Judgment normal.            Lab Results: I have reviewed the following results:  No results for input(s): \"WBC\", \"HGB\", \"HCT\", \"PLT\", \"BANDSPCT\", \"SODIUM\", \"K\", \"CL\", \"CO2\", \"BUN\", \"CREATININE\", \"GLUC\", \"CAIONIZED\", \"MG\", \"PHOS\", \"AST\", \"ALT\", \"ALB\", \"TBILI\", \"DBILI\", \"ALKPHOS\", \"PTT\", \"INR\", \"HSTNI0\", \"HSTNI2\", \"BNP\", \"LACTICACID\" in the last 72 hours.    Imaging Results Review: I personally reviewed the following image studies/reports in PACS and discussed pertinent findings with Radiology: CT chest. My interpretation of the radiology images/reports is:  .  Other Study Results Review: No additional pertinent studies reviewed.       [1]   Past Medical History:  Diagnosis Date    Anemia     Anxiety     Bladder cancer (HCC)     Cancer (HCC)     Colon polyp     COPD (chronic obstructive pulmonary disease) (HCC)     Depression     History of transfusion 10/2024    Hyperlipidemia     Hypertension     Irregular heart beat     afib    Mass of left lung     No " natural teeth     Pneumonia     Wears glasses    [2]   Past Surgical History:  Procedure Laterality Date    APPENDECTOMY      COLONOSCOPY      CYSTOSCOPY      ENDOBRONCHIAL ULTRASOUND (EBUS)  2025    IR BIOPSY LUNG  2025    IR LOWER EXTREMITY ANGIOGRAM  2024    UT BYP FEM-ANT TIBL PST TIBL PRONEAL ART/OTH DSTL Right 10/31/2024    Procedure: right Common femoral artery to anterior tibial bypass with autologous vein;  Surgeon: Carlos Bray DO;  Location: [facility];  Service: Vascular    UT CYSTO W/REMOVAL OF LESIONS SMALL N/A 2025    Procedure: TRANSURETHRAL RESECTION OF BLADDER TUMOR (TURBT);  Surgeon: Toni Real MD;  Location: [facility];  Service: Urology    TRANSURETHRAL RESECTION OF BLADDER TUMOR N/A 2024    Procedure: (TURBT);  Surgeon: Gilmer Duke MD;  Location: [facility];  Service: Urology    US GUIDED LYMPH NODE BIOPSY LEFT  2025   [3]   Social History  Tobacco Use    Smoking status: Former     Current packs/day: 0.00     Average packs/day: 1 pack/day for 49.8 years (49.8 ttl pk-yrs)     Types: Cigarettes     Start date:      Quit date: 10/30/2024     Years since quittin.6     Passive exposure: Current    Smokeless tobacco: Never   Vaping Use    Vaping status: Never Used   Substance and Sexual Activity    Alcohol use: Not Currently     Alcohol/week: 6.0 standard drinks of alcohol     Types: 6 Cans of beer per week     Comment: daily 3-5 beers daily and shots last drank 10/30    Drug use: No    Sexual activity: Not Currently   [4]   Social History  Tobacco Use    Smoking status: Former     Current packs/day: 0.00     Average packs/day: 1 pack/day for 49.8 years (49.8 ttl pk-yrs)     Types: Cigarettes     Start date:      Quit date: 10/30/2024     Years since quittin.6     Passive exposure: Current    Smokeless tobacco: Never   Vaping Use    Vaping status: Never Used   Substance and Sexual Activity    Alcohol use: Not Currently     Alcohol/week: 6.0  standard drinks of alcohol     Types: 6 Cans of beer per week     Comment: daily 3-5 beers daily and shots last drank 10/30    Drug use: No    Sexual activity: Not Currently

## 2025-06-24 NOTE — OP NOTE
OPERATIVE REPORT  PATIENT NAME: Nahid Luis    :  1960  MRN: [id number]  Pt Location: [facility] OR ROOM 15    SURGERY DATE: 2025    Surgeons and Role:     * Yen Knott MD - Primary     * Cecilio Plata DO - Assisting     * Nila Dodson PA-C - Assisting     * Gabriela Dutta MD - Observing     * Amylyn Jena Mortimer, PA-C - Assisting    Preop Diagnosis:  Adenocarcinoma of upper lobe of left lung (HCC) [C34.12]    Post-Op Diagnosis Codes:     * Adenocarcinoma of upper lobe of left lung (HCC) [C34.12]    Procedure(s):  Left - LOBECTOMY LUNG THORACOSCOPIC W/ ROBOTICS CONVERTED TO OPEN; MEDIASTINAL LYMPH NODE DISSECTION; PLEURAL LYSIS OF ADHESIONS  BRONCHOSCOPY FLEXIBLE    Specimen(s):  ID Type Source Tests Collected by Time Destination   1 : LEVEL 6 Tissue Lymph Node TISSUE EXAM Yen Knott MD 2025 1035    2 : LEVEL 11 Tissue Lymph Node TISSUE EXAM Yen Knott MD 2025 1214    3 :  Tissue Lung, Left Upper Lobe TISSUE EXAM Yen Knott MD 2025 1337    4 : LEVEL 5 Tissue Lymph Node TISSUE EXAM Yen Knott MD 2025 1444    5 : LVELE 5 #2 Tissue Lymph Node TISSUE EXAM Yen Knott MD 2025 1444        Estimated Blood Loss:   300 mL    Drains:  Chest Tube 1 Left Pleural 28 Fr. (Active)   Number of days: 0       Urethral Catheter Latex 16 Fr. (Active)   Collection Container Standard drainage bag 25 0745   Number of days: 0       Anesthesia Type:   General    Operative Indications:  Adenocarcinoma of upper lobe of left lung (HCC) [C34.12]    Operative Findings:  Large tumor in JOHN, adherent to the chest wall. Chest wall pleura and muscle taken with specimen       Complications:   None    Procedure and Technique:  DETAILS OF PROCEDURE:  The patient was correctly identified by name and medical record number in the holding area and brought to the operative suite, where she was placed supine on the operative table.       After satisfactory induction of general endotracheal anesthesia, a flexible bronchoscope was passed through the endotracheal tube visualizing the distal trachea, paxton, right and left main stem bronchi, including all of the primary and secondary divisions. No evidence of any endobronchial tumor was noted.  No suspicion or identified risk for TB or other airborne infectious disease; bronchoscopy procedure being performed for diagnostic purposes. Flexible bronchoscopy was then terminated and the scope was withdrawn.      The patient was positioned in the right lateral decubitus position, prepped and draped in the usual fashion. A time-out was performed to confirm procedure and laterality. A 1cm incision was made in the 8th intercostal space, in the intercostal line. A second 8mm port was placed in the 8th intercostal space, the most posterior. A 12mm port was then placed in the 9th ICS between the two 8mm ports. A second 12mm port was placed in the most anterior location, the 7th intercostal space. And the assistant port was placed in the 8th, between the camera and anterior port. An intercostal nerve block was then applied using Exparel into rib spaces 3-10.    The robot was then docked coming in from the right. Once docked, a tip-up was placed in 4. Caudiere in port 1. And a maryland bipolar was placed in arm 3. I then broke scrub and sat at the console.      The entire lung was then mobilized using a combination of electrocautery and sharp dissection to divide adhesions to the mediastinum.  The left upper lobe was adherent to the chest wall all the way up to the apex.  In order to mobilize the lung, bipolar electrocautery was used.  This took approximately 60 minutes of lysis of adhesions in order to freely mobilize the entire lung.    After the lung was entirely mobilized, the inferior pulmonary ligament was taken down using electrocautery.  No level 9 lymph nodes were identified. The overlying pleura was  incised up the posterior hilum.  No level 7 lymph nodes were identified.  The entire posterior mediastinum was opened exposing the pulmonary artery and the some of the apicoposterior branches. The lung was then retracted inferiorly and posteriorly, exposing the superior mediastinal structures. Level 5 and 6 lymph nodes were identified and sent for pathology. The superior pulmonary vein was then identified along with the lingula branches.  Multiple attempts were made to fully circumferentially dissect the superior pulmonary vein.  There was a thick inflammatory fibrous casing that was difficult to fully mobilize.  Attention was turned to the pulmonary artery instead.  The tissue around the pulmonary artery was found to be densely adherent to the pulmonary artery.  At this time, it was decided to proceed with a thoracotomy in order to safely perform this operation.    A thoracotomy incision was made in the fifth intercostal space.  The latissimus was taken.  The serratus was spared.  The ribs were opened in the fifth intercostal space.  The sixth rib was notched in order to facilitate exposure.      After the rib retractors were placed, attention was turned back to the superior pulmonary vein. The superior pulmonary vein was circumferentially dissected using blunt dissection. Using a white load stapler, the superior pulmonary vein was divided. After division of the superior pulmonary vein, the apicoposterior branches were identified on the pulmonary artery.  These were densely adherent.  These were difficult to circumferentially dissect.  Because of this, the decision was made to get proximal control on the left main pulmonary artery.  Using a Minnie, the main left pulmonary artery was circumferentially dissected and a Tone was placed around this.  Additionally, dissection of the inferior pulmonary vein was completed and a Tone was subsequently placed around the inferior pulmonary vein.     After a proximal  control was obtained, further dissection of the apicoposterior branches was completed.  Once these were then circumferentially dissected, they were taken with a vascular load stapler.  After these were taken, attention was turned to the left upper lobe bronchus. Level 11 lymph node around the bronchus was taken. The upper lobe bronchus was then identified and divided using a green load stapler.     After the bronchus was taken, there were 2 very small arterial branches that remained.  1 of these branches was able to be circumferentially dissected.  However, this was very small and it was felt that passing a stapler would potentially tear the pulmonary artery branch.  Instead, this was tied with a 2-0 silk suture and the distal end was cut.  After this, there was 1 small pulmonary artery branch remaining.  The tissue was densely adherent and it was very difficult to dissect this out.  Because of this, the decision was made to clamp the proximal pulmonary artery as well as the inferior pulmonary vein.  5000 units of IV heparin was given and after 3 minutes of circulation, the Tone was applied to both the left main pulmonary artery as well as the inferior pulmonary vein.  The remaining pulmonary artery branch was then able to be dissected and stapled using a vascular load stapler.  After this, it appeared that there were no additional pulmonary artery branches.  Attention was turned to the fissure.  Multiple firings of both purple and black loads were used until the lobectomy was complete.    The resection bed and all dissected areas were examined and hemostasis was deemed as adequate. Hemostasis was obtained by placing Surgicel into the lymph node beds.  Pro gel was applied over the staple line.  Surgiflo was applied into and around the hilum.  A posterior apical 28F chest tube was placed and secured to the skin with 0 Prolene sutures.  Using #1 Vicryl sutures, the thoracotomy space was closed with 4 interrupted  figure-of-eight stitches.  The lung was then reexpanded under direct visualization.  The chest was then closed.  The remaining thoracotomy incision as well as all of the other port sites were then closed in serial layers of Vicryl and Monocryl.        At the end of the procedure, the instrument, sponge and needle counts were confirmed to be correct x2.      The patient tolerated the procedure well and was delivered to the PACU.     I was present for the entire procedure and A qualified resident physician was not available.  Because of the complexity of this case, because this was a post induction case and was difficult and I did not have a qualified resident, for the safety of the patient, Dr. Miguel Plata scrubbed and assisted with this surgery.  Additionally, because of the above, I am using a modifier 22 for this case.  This surgery would normally take 3 hours and this one took 7-1/2 hours.    Patient Disposition:  PACU  and extubated and stable         SIGNATURE: Yen Knott MD  DATE: June 24, 2025  TIME: 3:19 PM

## 2025-06-24 NOTE — ANESTHESIA POSTPROCEDURE EVALUATION
Post-Op Assessment Note    CV Status:  Stable    Pain management: adequate       Mental Status:  Awake, arousable and sleepy   Hydration Status:  Euvolemic   PONV Controlled:  Controlled   Airway Patency:  Patent  Two or more mitigation strategies used for obstructive sleep apnea   Post Op Vitals Reviewed: Yes    No anethesia notable event occurred.    Staff: CRNA           Last Filed PACU Vitals:  Vitals Value Taken Time   Temp 97.3 °F (36.3 °C) 06/24/25 15:09   Pulse 54 06/24/25 15:15   /58 06/24/25 15:11   Resp 14 06/24/25 15:15   SpO2 98 % 06/24/25 15:15   Vitals shown include unfiled device data.

## 2025-06-24 NOTE — RESPIRATORY THERAPY NOTE
"RT Protocol Note  Nahid Luis 65 y.o. male MRN: [id number]  Unit/Bed#: [facility] 509-01 Encounter: [id number]    Assessment    Principal Problem:    Malignant neoplasm of lung, unspecified laterality, unspecified part of lung (HCC)      Home Pulmonary Medications     Albuterol MDI Q4PRN    Past Medical History[1]  Social History[2]    Subjective         Objective    Physical Exam:   Assessment Type: (P) Assess only  General Appearance: (P) Alert, Awake  Respiratory Pattern: (P) Normal  Chest Assessment: (P) Chest expansion symmetrical  Bilateral Breath Sounds: (P) Diminished    Vitals:  Blood pressure 114/56, pulse 60, temperature 97.5 °F (36.4 °C), temperature source Oral, resp. rate 17, height 5' 7.4\" (1.712 m), weight 84.4 kg (186 lb), SpO2 95%.          Imaging and other studies:           Plan    Respiratory Plan: (P) Discontinue Protocol, Home Bronchodilator Patient pathway  Airway Clearance Plan: (P) Discontinue Protocol     Resp Comments: (P) post lobectomy pt who takes albuterol MDI at home, pt is  in no  resp distress at this time,   he is still a bit drowsy from anethesia. pt will be ordered albuterol mdi Q4 PRN resp protocol will be D/C'd        [1]   Past Medical History:  Diagnosis Date    Anemia     Anxiety     Bladder cancer (HCC)     Cancer (HCC)     Colon polyp     COPD (chronic obstructive pulmonary disease) (HCC)     Depression     History of transfusion 10/2024    Hyperlipidemia     Hypertension     Irregular heart beat     afib    Mass of left lung     No natural teeth     Pneumonia     Wears glasses    [2]   Social History  Socioeconomic History    Marital status: Single   Tobacco Use    Smoking status: Former     Current packs/day: 0.00     Average packs/day: 1 pack/day for 49.8 years (49.8 ttl pk-yrs)     Types: Cigarettes     Start date:      Quit date: 10/30/2024     Years since quittin.6     Passive exposure: Current    Smokeless tobacco: Never   Vaping Use    Vaping status: " Never Used   Substance and Sexual Activity    Alcohol use: Not Currently     Alcohol/week: 6.0 standard drinks of alcohol     Types: 6 Cans of beer per week     Comment: daily 3-5 beers daily and shots last drank 10/30    Drug use: No    Sexual activity: Not Currently     Social Drivers of Health     Food Insecurity: Patient Unable To Answer (6/24/2025)    Nursing - Inadequate Food Risk Classification     Ran Out of Food in the Last Year: Patient unable to answer   Transportation Needs: Patient Unable To Answer (6/24/2025)    Nursing - Transportation Risk Classification     Lack of Transportation: Patient unable to answer   Intimate Partner Violence: Patient Unable To Answer (6/24/2025)    Nursing IPS     Physically Hurt by Someone: Patient unable to answer     Hurt or Threatened by Someone: Patient unable to answer   Housing Stability: Patient Unable To Answer (6/24/2025)    Nursing: Inadequate Housing Risk Classification     Unable to Pay for Housing in the Last Year: Patient unable to answer     Has Housing: Patient unable to answer

## 2025-06-24 NOTE — ANESTHESIA PROCEDURE NOTES
"Arterial Line Insertion    Performed by: Wai Marshall MD  Authorized by: Wai Marshall MD  Consent: Written consent obtained  Risks and benefits: risks, benefits and alternatives were discussed  Consent given by: patient  Required items: required blood products, implants, devices, and special equipment available  Patient identity confirmed: hospital-assigned identification number  Time out: Immediately prior to procedure a \"time out\" was called to verify the correct patient, procedure, equipment, support staff and site/side marked as required.  Preparation: Patient was prepped and draped in the usual sterile fashion.  Indications: hemodynamic monitoring  Orientation:  Right  Location: radial artery  Procedure Details:      Line Type: Arterial Line  Needle gauge: 20  Placement technique:  Anatomical landmarks and Seldinger  Number of attempts: 1    Post-procedure:  Post-procedure: dressing applied  Waveform: good waveform  Patient tolerance: patient tolerated the procedure well with no immediate complications          "

## 2025-06-24 NOTE — QUICK NOTE
Thoracic Surgery Postop Check:    Procedure: 6/24 robotic converted to open LULobectomy, MLND, pleural lysis of adhesions, flex bronch     Subjective:  No acute distress.  Resting comfortably in bed.  Endorsing left chest/back soreness.  Has not used dPCA or attempted PO intake yet.  Patient denies nausea, vomiting, fever, chills.  Difficulty with deep breathing.  No substernal chest pain.  Malone in place, making urine.     Left Chest Tube  Serosanguinous output  Thopaz, -2, +AL 80 at rest, up to 130 with cough     Objective:  Vitals:    06/24/25 1600 06/24/25 1630 06/24/25 1700 06/24/25 1720   BP: 97/55 94/53 112/52 114/57   BP Location: Left arm Left arm Left arm    Pulse: 67 67 69 74   Resp: 12 (!) 11 16 17   Temp:       TempSrc:       SpO2: 96% 98% 99% 95%   Weight:       Height:           GENERAL: No acute distress  CV: Regular rate  LUNGS: Nonlabored respirations on 4L nc  EXTREMITIES: No edema bilateral lower extremities     ---  Gabriela Dutta MD  General Surgery PGY-II

## 2025-06-25 LAB
ANION GAP SERPL CALCULATED.3IONS-SCNC: 7 MMOL/L (ref 4–13)
ATRIAL RATE: 66 BPM
ATRIAL RATE: 76 BPM
BUN SERPL-MCNC: 16 MG/DL (ref 5–25)
CALCIUM SERPL-MCNC: 8.6 MG/DL (ref 8.4–10.2)
CHLORIDE SERPL-SCNC: 102 MMOL/L (ref 96–108)
CO2 SERPL-SCNC: 25 MMOL/L (ref 21–32)
CREAT SERPL-MCNC: 0.95 MG/DL (ref 0.6–1.3)
ERYTHROCYTE [DISTWIDTH] IN BLOOD BY AUTOMATED COUNT: 15.4 % (ref 11.6–15.1)
GFR SERPL CREATININE-BSD FRML MDRD: 83 ML/MIN/1.73SQ M
GLUCOSE SERPL-MCNC: 147 MG/DL (ref 65–140)
HCT VFR BLD AUTO: 26.1 % (ref 36.5–49.3)
HGB BLD-MCNC: 8.5 G/DL (ref 12–17)
MAGNESIUM SERPL-MCNC: 2.5 MG/DL (ref 1.9–2.7)
MCH RBC QN AUTO: 30.1 PG (ref 26.8–34.3)
MCHC RBC AUTO-ENTMCNC: 32.6 G/DL (ref 31.4–37.4)
MCV RBC AUTO: 93 FL (ref 82–98)
P AXIS: 85 DEGREES
P AXIS: 88 DEGREES
PLATELET # BLD AUTO: 228 THOUSANDS/UL (ref 149–390)
PMV BLD AUTO: 8.6 FL (ref 8.9–12.7)
POTASSIUM SERPL-SCNC: 4.7 MMOL/L (ref 3.5–5.3)
PR INTERVAL: 166 MS
QRS AXIS: 41 DEGREES
QRS AXIS: 56 DEGREES
QRSD INTERVAL: 76 MS
QRSD INTERVAL: 78 MS
QT INTERVAL: 406 MS
QT INTERVAL: 436 MS
QTC INTERVAL: 456 MS
QTC INTERVAL: 457 MS
RBC # BLD AUTO: 2.82 MILLION/UL (ref 3.88–5.62)
SODIUM SERPL-SCNC: 134 MMOL/L (ref 135–147)
T WAVE AXIS: 82 DEGREES
T WAVE AXIS: 86 DEGREES
VENTRICULAR RATE: 66 BPM
VENTRICULAR RATE: 76 BPM
WBC # BLD AUTO: 9.31 THOUSAND/UL (ref 4.31–10.16)

## 2025-06-25 PROCEDURE — 80048 BASIC METABOLIC PNL TOTAL CA: CPT | Performed by: PHYSICIAN ASSISTANT

## 2025-06-25 PROCEDURE — 93005 ELECTROCARDIOGRAM TRACING: CPT

## 2025-06-25 PROCEDURE — 97163 PT EVAL HIGH COMPLEX 45 MIN: CPT

## 2025-06-25 PROCEDURE — 85027 COMPLETE CBC AUTOMATED: CPT | Performed by: PHYSICIAN ASSISTANT

## 2025-06-25 PROCEDURE — 97167 OT EVAL HIGH COMPLEX 60 MIN: CPT

## 2025-06-25 PROCEDURE — 83735 ASSAY OF MAGNESIUM: CPT | Performed by: PHYSICIAN ASSISTANT

## 2025-06-25 PROCEDURE — 99024 POSTOP FOLLOW-UP VISIT: CPT | Performed by: THORACIC SURGERY (CARDIOTHORACIC VASCULAR SURGERY)

## 2025-06-25 PROCEDURE — 93010 ELECTROCARDIOGRAM REPORT: CPT | Performed by: INTERNAL MEDICINE

## 2025-06-25 RX ORDER — IBUPROFEN 600 MG/1
600 TABLET, FILM COATED ORAL EVERY 6 HOURS SCHEDULED
Qty: 28 TABLET | Refills: 0 | Status: SHIPPED | OUTPATIENT
Start: 2025-06-25 | End: 2025-07-02

## 2025-06-25 RX ORDER — GABAPENTIN 300 MG/1
300 CAPSULE ORAL 3 TIMES DAILY
Qty: 63 CAPSULE | Refills: 0 | Status: SHIPPED | OUTPATIENT
Start: 2025-06-25 | End: 2025-07-16

## 2025-06-25 RX ORDER — OXYCODONE HYDROCHLORIDE 5 MG/1
5 TABLET ORAL EVERY 6 HOURS PRN
Qty: 20 TABLET | Refills: 0 | Status: SHIPPED | OUTPATIENT
Start: 2025-06-25 | End: 2025-07-05

## 2025-06-25 RX ORDER — METOPROLOL TARTRATE 25 MG/1
25 TABLET, FILM COATED ORAL EVERY 12 HOURS SCHEDULED
Status: DISCONTINUED | OUTPATIENT
Start: 2025-06-25 | End: 2025-06-26

## 2025-06-25 RX ORDER — SENNOSIDES 8.6 MG
650 CAPSULE ORAL EVERY 6 HOURS
Qty: 28 TABLET | Refills: 0 | Status: SHIPPED | OUTPATIENT
Start: 2025-06-25 | End: 2025-07-02

## 2025-06-25 RX ADMIN — ACETAMINOPHEN 975 MG: 325 TABLET, FILM COATED ORAL at 17:03

## 2025-06-25 RX ADMIN — ACETAMINOPHEN 975 MG: 325 TABLET, FILM COATED ORAL at 05:16

## 2025-06-25 RX ADMIN — GABAPENTIN 300 MG: 300 CAPSULE ORAL at 08:20

## 2025-06-25 RX ADMIN — ATORVASTATIN CALCIUM 20 MG: 20 TABLET, FILM COATED ORAL at 08:19

## 2025-06-25 RX ADMIN — ACETAMINOPHEN 975 MG: 325 TABLET, FILM COATED ORAL at 12:09

## 2025-06-25 RX ADMIN — CELECOXIB 100 MG: 100 CAPSULE ORAL at 19:19

## 2025-06-25 RX ADMIN — METOPROLOL SUCCINATE 50 MG: 50 TABLET, EXTENDED RELEASE ORAL at 08:20

## 2025-06-25 RX ADMIN — SENNOSIDES 8.6 MG: 8.6 TABLET, FILM COATED ORAL at 08:20

## 2025-06-25 RX ADMIN — GABAPENTIN 300 MG: 300 CAPSULE ORAL at 16:03

## 2025-06-25 RX ADMIN — DOCUSATE SODIUM 100 MG: 100 CAPSULE, LIQUID FILLED ORAL at 08:20

## 2025-06-25 RX ADMIN — GABAPENTIN 300 MG: 300 CAPSULE ORAL at 20:38

## 2025-06-25 RX ADMIN — CELECOXIB 100 MG: 100 CAPSULE ORAL at 08:23

## 2025-06-25 RX ADMIN — ALBUTEROL SULFATE 2 PUFF: 90 AEROSOL, METERED RESPIRATORY (INHALATION) at 20:39

## 2025-06-25 RX ADMIN — FLUTICASONE FUROATE AND VILANTEROL TRIFENATATE 1 PUFF: 100; 25 POWDER RESPIRATORY (INHALATION) at 08:31

## 2025-06-25 RX ADMIN — AMLODIPINE BESYLATE 5 MG: 5 TABLET ORAL at 08:20

## 2025-06-25 RX ADMIN — ENOXAPARIN SODIUM 40 MG: 40 INJECTION SUBCUTANEOUS at 08:19

## 2025-06-25 NOTE — ASSESSMENT & PLAN NOTE
S/p Left upper lobe lobectomy, robot converted to open. Recovering well.    Slight hypotensive to 96/55 (map 74) and bradypnea to 9 overnight. Otherwise vitals within normal limits this AM.     Left Chest Tube: set at -2mmH2O, outputted 375mL serosanguinous fluid within last 24 hrs, air leak present ranging from  mL/min.    Labs this AM: hgb 8.5 (10.5), wbc 9.31 (12.81), creatinine 0.95 (1.1)    Plan:  - Diet as tolerated  - Wean oxygen requirement  - Continue to monitor chest tube, will reassess possible removal tomorrow.  - Encourage ambulation/out of bed, 3 times daily  - Encourage incentive spirometer use, 10 times per hour  - PT/OT   - Continue to monitor vitals and labs  - Consider transition PCA to oral pain control.

## 2025-06-25 NOTE — ASSESSMENT & PLAN NOTE
S/p robotic converted to open LULobectomy, MLND, pleural lysis of adhesions, and flex bronchoscopy on 6/24 for JOHN adenocarcinoma    Left chest tube: -2, intermittent airleak up to 90, 370 cc serosanguineous output    -Regular diet  -Dc IVF  -Dc a line  -Dc brambila  -Monitor chest tube o/p and air leak on Thopaz  -Downgrade level of care  -Continue to hold Eliquis for A. fib  -Pain and nausea control as needed  -PCA for pain control  -DVT prophylaxis  -Encourage ambulation and incentive spirometry

## 2025-06-25 NOTE — PHYSICAL THERAPY NOTE
Physical Therapy Evaluation     Patient's Name: Nahid Luis    Adenocarcinoma of upper lobe of left lung (HCC) [C34.12]    Problem List[1]    Past Medical History[2]    Past Surgical History[3]         06/25/25 1020   PT Last Visit   PT Visit Date 06/25/25   Note Type   Note type Evaluation   Pain Assessment   Pain Assessment Tool 0-10   Pain Score No Pain   Restrictions/Precautions   Weight Bearing Precautions Per Order No   Other Precautions Fall Risk;Bed Alarm;Chair Alarm   Home Living   Type of Home House   Home Layout One level;Access;Stairs to enter with rails  (4STE)   Bathroom Shower/Tub Walk-in shower   Bathroom Toilet Standard   Bathroom Equipment Grab bars in shower;Built-in shower seat   Bathroom Accessibility Accessible   Home Equipment Cane;Walker  (not used PTA)   Prior Function   Level of [place] Independent with ADLs;Independent with functional mobility;Independent with IADLS   Lives With Family   Receives Help From Family   IADLs Independent with driving;Independent with meal prep;Independent with medication management   Falls in the last 6 months 0   Vocational Full time employment   General   Family/Caregiver Present No   Cognition   Overall Cognitive Status WFL   Arousal/Participation Alert   Orientation Level Oriented X4   Memory Within functional limits   Following Commands Follows all commands and directions without difficulty   Comments Patient pleasant and cooperative   Subjective   Subjective Patient agreeable to PT eval   RUE Assessment   RUE Assessment WFL   LUE Assessment   LUE Assessment WFL   RLE Assessment   RLE Assessment X   Strength RLE   RLE Overall Strength 4-/5   LLE Assessment   LLE Assessment X   Strength LLE   LLE Overall Strength 4-/5   Bed Mobility   Supine to Sit 5  Supervision   Additional items Verbal cues   Transfers   Sit to Stand 5  Supervision   Additional items Verbal cues   Stand to Sit 5  Supervision   Additional items Verbal cues   Additional  Comments no AD   Ambulation/Elevation   Gait pattern Short stride;Excessively slow   Gait Assistance 5  Supervision   Additional items Verbal cues   Assistive Device None   Distance 3'   Ambulation/Elevation Additional Comments declines further amb at this time   Balance   Static Sitting Good   Dynamic Sitting Fair +   Static Standing Fair   Dynamic Standing Fair -   Ambulatory Fair -   Endurance Deficit   Endurance Deficit Yes   Endurance Deficit Description fatigue   Activity Tolerance   Activity Tolerance Patient limited by fatigue   Medical Staff Made Aware OT, OTS   Nurse Made Aware RN cleared   Assessment   Prognosis Good   Problem List Decreased strength;Decreased endurance;Impaired balance;Decreased mobility;Pain   Assessment Pt is a 65 y.o. male seen for a high complexity PT evaluation due to Ongoing medical management for primary dx, Increased reliance on more restrictive AD compared to baseline, Decreased activity tolerance compared to baseline, Fall risk, Increased assistance needed from caregiver at current time, Ongoing telemetry monitoring, s/p surgical intervention. Patient is s/p admit [facility] on [facility]24/[facility]25 for Adenocarcinoma of upper lobe of left lung (HCC) (C34.12). Patient  has a past medical history of Anemia, Anxiety, Bladder cancer (HCC), Cancer (HCC), Colon polyp, COPD (chronic obstructive pulmonary disease) (HCC), Depression, History of transfusion, Hyperlipidemia, Hypertension, Irregular heart beat, Mass of left lung, No natural teeth, Pneumonia, and Wears glasses..     PT now consulted to assess functional mobility and needs for safe d/c planning. Prior to admission, pt independent with functional mobility, independent ADLs, and independent IADLs. Personal factors affecting status include fall risk, steps to enter home, and medical status     Currently pt requires supervision for bed mobility, supervision for functional transfers with no AD ; supervision for ambulation with  no AD. Pt presents functioning below baseline and w/ overall mobility deficits 2* to: decreased strength, decreased endurance, decreased mobility, impaired balance. These impairments place pt at risk for falls.     Pt will continue to benefit from skilled PT interventions to address stated impairments; to maximize functional potential; for ongoing pt/family education; and DME needs. The patient's AM-PAC Basic Mobility Inpatient Short Form Raw Score Is 17. PT is currently recommending no post acute rehab needs on d/c from hospital. Will continue to follow as able.   Goals   Patient Goals to go home   STG Expiration Date 07/09/25   Short Term Goal #1 In 14 days, patient will 1) increase strength in BUE/BLE by 1/2 to 1 full grade for increased strength and stability needed for functional mobility 2) improve bed mobility to MI for improved mobility and decreased need for assist 3) sit EOB x30' with MI to facilitate trunk stability and safety for completion of ADL tasks 4) increase functional transfers to MI for improved safety and functional mobility 5) ambulate 250ft with MI using no AD for increased endurance and safety ambulating home and community environments 6) improve balance by 1 grade for improved safety and stability and decreased risk for falls. 7) ascend/descend at least 4 stairs using HR with MI in order to safely access home environment   PT Treatment Day 0   Plan   Treatment/Interventions ADL retraining;Functional transfer training;LE strengthening/ROM;Therapeutic exercise;Endurance training;Patient/family training;Equipment eval/education;Bed mobility;Gait training;Spoke to nursing;Spoke to case management;OT;Elevations   PT Frequency 3-5x/wk   Discharge Recommendation   Rehab Resource Intensity Level, PT No post-acute rehabilitation needs   AM-PAC Basic Mobility Inpatient   Turning in Flat Bed Without Bedrails 3   Lying on Back to Sitting on Edge of Flat Bed Without Bedrails 3   Moving Bed to Chair 3    Standing Up From Chair Using Arms 3   Walk in Room 3   Climb 3-5 Stairs With Railing 2   Basic Mobility Inpatient Raw Score 17   Basic Mobility Standardized Score 39.67   [facility] Highest Level Of Mobility   -HLM Goal 5: Stand one or more mins   -HLM Achieved 5: Stand (1 or more minutes)   Modified [place] Scale   Modified [place] Scale 4   End of Consult   Patient Position at End of Consult All needs within reach;Bedside chair       Glo Chun, PT, DPT         [1]   Patient Active Problem List  Diagnosis    Hypertension    Chronic obstructive pulmonary disease (HCC)    Gross hematuria    PAD (peripheral artery disease) (HCC)    Mixed hyperlipidemia    Bladder mass    ABLA (acute blood loss anemia)    Leukocytosis    S/P femoral-tibial bypass    Coronary artery calcification    Tobacco abuse    Lesion of parotid gland    Parotid mass    Urinary frequency    Atrial fibrillation with rapid ventricular response (HCC)    Malignant neoplasm of lung, unspecified laterality, unspecified part of lung (HCC)    Paroxysmal A-fib (HCC)    Urothelial carcinoma of bladder (HCC)    Adenocarcinoma of left lung (HCC)    Palliative care patient   [2]   Past Medical History:  Diagnosis Date    Anemia     Anxiety     Bladder cancer (HCC)     Cancer (HCC)     Colon polyp     COPD (chronic obstructive pulmonary disease) (HCC)     Depression     History of transfusion 10/2024    Hyperlipidemia     Hypertension     Irregular heart beat     afib    Mass of left lung     No natural teeth     Pneumonia     Wears glasses    [3]   Past Surgical History:  Procedure Laterality Date    APPENDECTOMY      COLONOSCOPY      CYSTOSCOPY      ENDOBRONCHIAL ULTRASOUND (EBUS)  01/08/2025    IR BIOPSY LUNG  1/29/2025    IR LOWER EXTREMITY ANGIOGRAM  09/24/2024    IN BRNCC INCL FLUOR GDNCE DX W/CELL WASHG SPX N/A 6/24/2025    Procedure: BRONCHOSCOPY FLEXIBLE;  Surgeon: Yen Knott MD;  [facility]: [facility];  Service: Thoracic    IN  BYP FEM-ANT TIBL PST TIBL PRONEAL ART/OTH DSTL Right 10/31/2024    Procedure: right Common femoral artery to anterior tibial bypass with autologous vein;  Surgeon: Carlos Bray DO;  Location: [facility];  Service: Vascular    ID CYSTO W/REMOVAL OF LESIONS SMALL N/A 1/21/2025    Procedure: TRANSURETHRAL RESECTION OF BLADDER TUMOR (TURBT);  Surgeon: Toni Real MD;  Location: [facility];  Service: Urology    ID THORACOSCOPY W/LOBECTOMY SINGLE LOBE Left 6/24/2025    Procedure: LOBECTOMY LUNG THORACOSCOPIC W/ ROBOTICS CONVERTED TO OPEN; MEDIASTINAL LYMPH NODE DISSECTION; PLEURAL LYSIS OF ADHESIONS;  Surgeon: Yen Knott MD;  Location: [facility];  Service: Thoracic    TRANSURETHRAL RESECTION OF BLADDER TUMOR N/A 11/05/2024    Procedure: (TURBT);  Surgeon: Gilmer Duke MD;  [facility];  Service: Urology    US GUIDED LYMPH NODE BIOPSY LEFT  01/02/2025

## 2025-06-25 NOTE — DISCHARGE INSTR - AVS FIRST PAGE
Lobectomy (done through thoracotomy)   Incision Care:  - Your incisions were closed with stitches underneath of the skin which will dissolve. There is purple surgical glue on top of the incisions. The surgical glue will flake off over the next few weeks. There is a non-dissolvable stitch at your chest tube site which will be removed in the office.   - After your chest tube is removed there will be gauze placed over the incision. This gauze can be removed in 24 hours. After this time you may place a bandaid over your chest tube site if it is leaking some fluid.  - You do not need dressings over your other incisions.  Do not apply any cream or ointments to the incisions unless instructed by your surgeon.  - You may shower daily - no soaking in a tub bath. Wash your incisions gently with soap and water and pat dry. Do not scrub the incisions.  - Bruising at your incisions is normal. This will resolve over the next few weeks.     Activity  - No lifting greater than 10lbs until you are evaluated in the office.   - Walking and light activity is encouraged. Recommend you are active during the day and using your Incentive Spirometer when you are sitting for a prolonged period.   - No driving while you are using narcotic pain medications. If you are no longer taking narcotics and considering driving, you must make sure you can quickly react to changes on the road. This can be challenging in the first few weeks after surgery.     Pain:  - Some degree of pain or discomfort after surgery is expected. This pain may become more noticeable after discharge as you start moving around more.   - Your pain regiment includes the following:   - Tylenol 650 mg tablet. Take 1 tablet, every 6 hours for 1 week.    - Gabapentin 300 mg tablet. Take 1 tablet, 3x a day for 3 weeks.   - Ibuprofen 600 mg tablet, every 6 hours for 1 week.    - Oxycodone (Narcotic) 5mg tablet. Take 1 tablet, every 4-6 hours as needed for pain.     Medications:  -  Continue on your home medications including any blood thinner and aspirin, as instructed.     Diet:  - You should continue on your normal diet.     Bowel Regiment:  - Constipation after surgery while on narcotic pain medications is normal.  - You should continue taking a bowel regiment while on a narcotic pain medication to keep your bowel movements soft. Your discharge bowel regiment:   - Docusate (Colace) 100mg tablet. Take 1 tablet, twice a day.  (Purchase over the counter)   - Senna 8.6mg tablet. Take 1 tablet daily. (Purchase over the counter)   - Miralax as needed daily if you are still constipated despite taking Colace and Senna   - If your bowel movements become lose, stop taking the bowel regiment above.     Follow-up:  - You have a scheduled follow-up appointment on: 7/10/2025 at 1:20pm  - Obtain your Chest X-ray prior to your scheduled post-operative appointment.   - A couple of days after discharge our office will call you to check in with how you are recovering at home.     Concerns:  - Call the office for any questions or concerns. Many postoperative issues can be sorted out over the phone.   - Call the office or go to the Emergency Department if you develop a fever greater than 101, persistent chills, persistent nausea/vomiting, worsening or uncontrolled pain, and/or increasing redness or foul smelling drainage from an incision.     Thoracic Surgery Office: [phone]    Dr. William Burfeind, MD Rachael Hart, PA-C Dr. Meredith Harrison, MD Amylyn Mortimer, PA-C Dr. Dustin Manchester, MD Dr. Stephen Dingley, DO

## 2025-06-25 NOTE — PLAN OF CARE
Problem: Prexisting or High Potential for Compromised Skin Integrity  Goal: Skin integrity is maintained or improved  Description: INTERVENTIONS:  - Identify patients at risk for skin breakdown  - Assess and monitor skin integrity including under and around medical devices   - Assess and monitor nutrition and hydration status  - Monitor labs  - Assess for incontinence   - Turn and reposition patient  - Assist with mobility/ambulation  - Relieve pressure over marta prominences   - Avoid friction and shearing  - Provide appropriate hygiene as needed including keeping skin clean and dry  - Evaluate need for skin moisturizer/barrier cream  - Collaborate with interdisciplinary team  - Patient/family teaching  - Consider wound care consult    Assess:  - Review Kulwinder scale daily  - Clean and moisturize skin every   - Inspect skin when repositioning, toileting, and assisting with ADLS  - Assess under medical devices such as  every   - Assess extremities for adequate circulation and sensation     Bed Management:  - Have minimal linens on bed & keep smooth, unwrinkled  - Change linens as needed when moist or perspiring  - Avoid sitting or lying in one position for more than  hours while in bed?Keep HOB at degrees   - Toileting:  - Offer bedside commode  - Assess for incontinence every   - Use incontinent care products after each incontinent episode such as     Activity:  - Mobilize patient  times a day  - Encourage activity and walks on unit  - Encourage or provide ROM exercises   - Turn and reposition patient every  Hours  - Use appropriate equipment to lift or move patient in bed  - Instruct/ Assist with weight shifting every  when out of bed in chair  - Consider limitation of chair time  hour intervals    Skin Care:  - Avoid use of baby powder, tape, friction and shearing, hot water or constrictive clothing  - Relieve pressure over bony prominences using  - Do not massage red bony areas    Next Steps:  - Teach patient  strategies to minimize risks such as   - Consider consults to  interdisciplinary teams such as   Outcome: Progressing     Problem: RESPIRATORY - ADULT  Goal: Achieves optimal ventilation and oxygenation  Description: INTERVENTIONS:  - Assess for changes in respiratory status  - Assess for changes in mentation and behavior  - Position to facilitate oxygenation and minimize respiratory effort  - Oxygen administered by appropriate delivery if ordered  - Initiate smoking cessation education as indicated  - Encourage broncho-pulmonary hygiene including cough, deep breathe, Incentive Spirometry  - Assess the need for suctioning and aspirate as needed  - Assess and instruct to report SOB or any respiratory difficulty  - Respiratory Therapy support as indicated  Outcome: Progressing     Problem: PAIN - ADULT  Goal: Verbalizes/displays adequate comfort level or baseline comfort level  Description: Interventions:  - Encourage patient to monitor pain and request assistance  - Assess pain using appropriate pain scale  - Administer analgesics as ordered based on type and severity of pain and evaluate response  - Implement non-pharmacological measures as appropriate and evaluate response  - Consider cultural and social influences on pain and pain management  - Notify physician/advanced practitioner if interventions unsuccessful or patient reports new pain  - Educate patient/family on pain management process including their role and importance of  reporting pain   - Provide non-pharmacologic/complimentary pain relief interventions  Outcome: Progressing     Problem: INFECTION - ADULT  Goal: Absence or prevention of progression during hospitalization  Description: INTERVENTIONS:  - Assess and monitor for signs and symptoms of infection  - Monitor lab/diagnostic results  - Monitor all insertion sites, i.e. indwelling lines, tubes, and drains  - Monitor endotracheal if appropriate and nasal secretions for changes in amount and  color  - [place] appropriate cooling/warming therapies per order  - Administer medications as ordered  - Instruct and encourage patient and family to use good hand hygiene technique  - Identify and instruct in appropriate isolation precautions for identified infection/condition  Outcome: Progressing  Goal: Absence of fever/infection during neutropenic period  Description: INTERVENTIONS:  - Monitor WBC  - Perform strict hand hygiene  - Limit to healthy visitors only  - No plants, dried, fresh or silk flowers with joshi in patient room  Outcome: Progressing     Problem: DISCHARGE PLANNING  Goal: Discharge to home or other facility with appropriate resources  Description: INTERVENTIONS:  - Identify barriers to discharge w/patient and caregiver  - Arrange for needed discharge resources and transportation as appropriate  - Identify discharge learning needs (meds, wound care, etc.)  - Arrange for interpretive services to assist at discharge as needed  - Refer to Case Management Department for coordinating discharge planning if the patient needs post-hospital services based on physician/advanced practitioner order or complex needs related to functional status, cognitive ability, or social support system  Outcome: Progressing     Problem: Knowledge Deficit  Goal: Patient/family/caregiver demonstrates understanding of disease process, treatment plan, medications, and discharge instructions  Description: Complete learning assessment and assess knowledge base.  Interventions:  - Provide teaching at level of understanding  - Provide teaching via preferred learning methods  Outcome: Progressing

## 2025-06-25 NOTE — OCCUPATIONAL THERAPY NOTE
Occupational Therapy Evaluation     Patient Name: Nahid Luis  Today's Date: 6/25/2025  Problem List  Principal Problem:    Malignant neoplasm of lung, unspecified laterality, unspecified part of lung (HCC)    Past Medical History  Past Medical History[1]  Past Surgical History  Past Surgical History[2]        06/25/25 1019   OT Last Visit   OT Visit Date 06/25/25   Note Type   Note type Evaluation   Pain Assessment   Pain Assessment Tool 0-10   Pain Score No Pain   Restrictions/Precautions   Weight Bearing Precautions Per Order No   Other Precautions Chair Alarm;Bed Alarm;Multiple lines;Telemetry;Fall Risk;Pain  (Chest tube)   Home Living   Type of Home House   Home Layout One level;Performs ADLs on one level;Other (Comment)  (3 TRAY)   Bathroom Shower/Tub Walk-in shower   Bathroom Toilet Standard   Bathroom Equipment Grab bars in shower;Built-in shower seat   Home Equipment Cane;Walker  (Pt only owns, did not use DME PTA)   Additional Comments Pt lives w/ his son, daughter, and grandchild in a 1SH w/ 3 TRAY   Prior Function   Level of [place] Independent with ADLs;Independent with functional mobility;Independent with IADLS   Lives With Family   Receives Help From Family   IADLs Independent with meal prep;Independent with driving;Independent with medication management   Falls in the last 6 months 0   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)   Comments PTA, pt I w/ ADLs and IADLs, however, reports his daughter usually completes most IADLs; (+) ; works full-time as an excavator   Lifestyle   Autonomy I w/ ADLs, I w/ IADLs, I w/ functional mobility and transfers   Reciprocal Relationships Family   Service to Others Full-time employment   Intrinsic Gratification Spending time w/ family   ADL   Eating Assistance 5  Supervision/Setup   Grooming Assistance 5  Supervision/Setup   UB Bathing Assistance 5  Supervision/Setup   LB Bathing Assistance 5  Supervision/Setup   UB Dressing  Assistance 5  Supervision/Setup   LB Dressing Assistance 5  Supervision/Setup   Toileting Assistance  5  Supervision/Setup   Functional Assistance 5  Supervision/Setup   Additional Comments Pt S w/ all ADLs   Bed Mobility   Supine to Sit 5  Supervision   Sit to Supine Unable to assess   Additional Comments Pt left OOB w/ chair alarm activated and all needs within reach at EOS   Transfers   Sit to Stand 5  Supervision   Additional items Verbal cues   Stand to Sit 5  Supervision   Additional items Verbal cues   Functional Mobility   Functional Mobility 5  Supervision   Additional Comments Pt able to take several steps from EOB > bedside chair   Balance   Static Sitting Good   Dynamic Sitting Fair +   Static Standing Fair   Dynamic Standing Fair -   Ambulatory Fair -   Activity Tolerance   Activity Tolerance Patient limited by fatigue   Medical Staff Made Aware RN; PTGlo   Nurse Made Aware RN cleared   RUE Assessment   RUE Assessment WFL   LUE Assessment   LUE Assessment WFL   Hand Function   Gross Motor Coordination Functional   Fine Motor Coordination Functional   Sensation   Light Touch No apparent deficits   Vision-Basic Assessment   Current Vision No visual deficits   Cognition   Overall Cognitive Status WFL   Arousal/Participation Alert;Responsive;Cooperative   Attention Within functional limits   Orientation Level Oriented X4   Memory Within functional limits   Following Commands Follows all commands and directions without difficulty   Comments Pt pleasant and cooperative   Assessment   Prognosis Fair   Assessment Pt is a 64 y/o M who presents to [facility] for surgery, dx'd w/ malignant neoplasm of lung. Pt s/p L lobectomy, mediastinal lymph node dissection, pleural lysis of adhesions, and bronchoscopy 6/24.Pt has a past medical history of Anemia, Anxiety, Bladder cancer (HCC), Cancer (HCC), Colon polyp, COPD (chronic obstructive pulmonary disease) (HCC), Depression, History of transfusion, Hyperlipidemia,  Hypertension, Irregular heart beat, Mass of left lung, No natural teeth, Pneumonia, and Wears glasses. At baseline, pt lives w/ family in a 1SH w/ 3 TRAY. Pt previously I w/ ADLs and IADLs and I w/ functional mobility and transfers. Pt drives. Pt has active OT consult and OOB orders. Upon OT entry, pt found supine in bed and agreeable to participate in therapy. Pt is currently performing at S for UB ADLs, S for LB ADLs, and S for functional mobility and transfers. Upon OT evaluation, pt is functioning close to baseline performance and does not demonstrate need for OT services. Pt w/ no questions or concerns regarding d/c. OT to recommend d/c home w/ increased family support once medically stable. OT available for re-consult pending changes in medical or functional status. D/c OT 6/25/25.   Goals   Patient Goals To return home   Discharge Recommendation   Rehab Resource Intensity Level, OT No post-acute rehabilitation needs   Additional Comments  Pt seen as a co-session due to the patient's co-morbidities, clinically unstable presentation, and present impairments which are a regression from the patient's baseline.   Additional Comments 2 The patient's raw score on the AM-PAC Daily Activity Inpatient Short Form is 21. A raw score of greater than or equal to 19 suggests the patient may benefit from discharge to home. Please refer to the recommendation of the Occupational Therapist for safe discharge planning.   AM-PAC Daily Activity Inpatient   Lower Body Dressing 3   Bathing 3   Toileting 3   Upper Body Dressing 4   Grooming 4   Eating 4   Daily Activity Raw Score 21   Daily Activity Standardized Score (Calc for Raw Score >=11) 44.27   AM-PAC Applied Cognition Inpatient   Following a Speech/Presentation 4   Understanding Ordinary Conversation 4   Taking Medications 4   Remembering Where Things Are Placed or Put Away 4   Remembering List of 4-5 Errands 4   Taking Care of Complicated Tasks 3   Applied Cognition Raw Score  23   Applied Cognition Standardized Score 53.08   End of Consult   Education Provided Yes   Patient Position at End of Consult Bedside chair;Bed/Chair alarm activated;All needs within reach   Nurse Communication Nurse aware of consult     RONAN Ernst            [1]   Past Medical History:  Diagnosis Date    Anemia     Anxiety     Bladder cancer (HCC)     Cancer (HCC)     Colon polyp     COPD (chronic obstructive pulmonary disease) (HCC)     Depression     History of transfusion 10/2024    Hyperlipidemia     Hypertension     Irregular heart beat     afib    Mass of left lung     No natural teeth     Pneumonia     Wears glasses    [2]   Past Surgical History:  Procedure Laterality Date    APPENDECTOMY      COLONOSCOPY      CYSTOSCOPY      ENDOBRONCHIAL ULTRASOUND (EBUS)  01/08/2025    IR BIOPSY LUNG  1/29/2025    IR LOWER EXTREMITY ANGIOGRAM  09/24/2024    LA BRNCHSC INCL FLUOR GDNCE DX W/CELL WASHG SPX N/A 6/24/2025    Procedure: BRONCHOSCOPY FLEXIBLE;  Surgeon: Yen Knott MD;  [facility];  Service: Thoracic    LA BYP FEM-ANT TIBL PST TIBL PRONEAL ART/OTH DSTL Right 10/31/2024    Procedure: right Common femoral artery to anterior tibial bypass with autologous vein;  Surgeon: Carlos Bray DO;  Location[facility];  Service: Vascular    LA CYSTO W/REMOVAL OF LESIONS SMALL N/A 1/21/2025    Procedure: TRANSURETHRAL RESECTION OF BLADDER TUMOR (TURBT);  Surgeon: Toni Real MD;  Location:  MAIN OR;  Service: Urology    LA THORACOSCOPY W/LOBECTOMY SINGLE LOBE Left 6/24/2025    Procedure: LOBECTOMY LUNG THORACOSCOPIC W/ ROBOTICS CONVERTED TO OPEN; MEDIASTINAL LYMPH NODE DISSECTION; PLEURAL LYSIS OF ADHESIONS;  Surgeon: Yen Knott MD;  [facility];  Service: Thoracic    TRANSURETHRAL RESECTION OF BLADDER TUMOR N/A 11/05/2024    Procedure: (TURBT);  Surgeon: Gilmer Duke MD;  [facility];  Service: Urology    US GUIDED LYMPH NODE BIOPSY LEFT  01/02/2025

## 2025-06-25 NOTE — PLAN OF CARE
Problem: Prexisting or High Potential for Compromised Skin Integrity  Goal: Skin integrity is maintained or improved  Description: INTERVENTIONS:  - Identify patients at risk for skin breakdown  - Assess and monitor skin integrity including under and around medical devices   - Assess and monitor nutrition and hydration status  - Monitor labs  - Assess for incontinence   - Turn and reposition patient  - Assist with mobility/ambulation  - Relieve pressure over marta prominences   - Avoid friction and shearing  - Provide appropriate hygiene as needed including keeping skin clean and dry  - Evaluate need for skin moisturizer/barrier cream  - Collaborate with interdisciplinary team  - Patient/family teaching  - Consider wound care consult    Assess:  - Review Kulwinder scale daily  - Clean and moisturize skin every   - Inspect skin when repositioning, toileting, and assisting with ADLS  - Assess under medical devices such as  every   - Assess extremities for adequate circulation and sensation     Bed Management:  - Have minimal linens on bed & keep smooth, unwrinkled  - Change linens as needed when moist or perspiring  - Avoid sitting or lying in one position for more than  hours while in bed?Keep HOB at degrees   - Toileting:  - Offer bedside commode  - Assess for incontinence every   - Use incontinent care products after each incontinent episode such as     Activity:  - Mobilize patient  times a day  - Encourage activity and walks on unit  - Encourage or provide ROM exercises   - Turn and reposition patient every  Hours  - Use appropriate equipment to lift or move patient in bed  - Instruct/ Assist with weight shifting every  when out of bed in chair  - Consider limitation of chair time  hour intervals    Skin Care:  - Avoid use of baby powder, tape, friction and shearing, hot water or constrictive clothing  - Relieve pressure over bony prominences using  - Do not massage red bony areas    Next Steps:  - Teach patient  strategies to minimize risks such as   - Consider consults to  interdisciplinary teams such as   Outcome: Progressing     Problem: RESPIRATORY - ADULT  Goal: Achieves optimal ventilation and oxygenation  Description: INTERVENTIONS:  - Assess for changes in respiratory status  - Assess for changes in mentation and behavior  - Position to facilitate oxygenation and minimize respiratory effort  - Oxygen administered by appropriate delivery if ordered  - Initiate smoking cessation education as indicated  - Encourage broncho-pulmonary hygiene including cough, deep breathe, Incentive Spirometry  - Assess the need for suctioning and aspirate as needed  - Assess and instruct to report SOB or any respiratory difficulty  - Respiratory Therapy support as indicated  Outcome: Progressing     Problem: PAIN - ADULT  Goal: Verbalizes/displays adequate comfort level or baseline comfort level  Description: Interventions:  - Encourage patient to monitor pain and request assistance  - Assess pain using appropriate pain scale  - Administer analgesics as ordered based on type and severity of pain and evaluate response  - Implement non-pharmacological measures as appropriate and evaluate response  - Consider cultural and social influences on pain and pain management  - Notify physician/advanced practitioner if interventions unsuccessful or patient reports new pain  - Educate patient/family on pain management process including their role and importance of  reporting pain   - Provide non-pharmacologic/complimentary pain relief interventions  Outcome: Progressing     Problem: INFECTION - ADULT  Goal: Absence or prevention of progression during hospitalization  Description: INTERVENTIONS:  - Assess and monitor for signs and symptoms of infection  - Monitor lab/diagnostic results  - Monitor all insertion sites, i.e. indwelling lines, tubes, and drains  - Monitor endotracheal if appropriate and nasal secretions for changes in amount and  color  - [place] appropriate cooling/warming therapies per order  - Administer medications as ordered  - Instruct and encourage patient and family to use good hand hygiene technique  - Identify and instruct in appropriate isolation precautions for identified infection/condition  Outcome: Progressing  Goal: Absence of fever/infection during neutropenic period  Description: INTERVENTIONS:  - Monitor WBC  - Perform strict hand hygiene  - Limit to healthy visitors only  - No plants, dried, fresh or silk flowers with joshi in patient room  Outcome: Progressing     Problem: DISCHARGE PLANNING  Goal: Discharge to home or other facility with appropriate resources  Description: INTERVENTIONS:  - Identify barriers to discharge w/patient and caregiver  - Arrange for needed discharge resources and transportation as appropriate  - Identify discharge learning needs (meds, wound care, etc.)  - Arrange for interpretive services to assist at discharge as needed  - Refer to Case Management Department for coordinating discharge planning if the patient needs post-hospital services based on physician/advanced practitioner order or complex needs related to functional status, cognitive ability, or social support system  Outcome: Progressing     Problem: Knowledge Deficit  Goal: Patient/family/caregiver demonstrates understanding of disease process, treatment plan, medications, and discharge instructions  Description: Complete learning assessment and assess knowledge base.  Interventions:  - Provide teaching at level of understanding  - Provide teaching via preferred learning methods  Outcome: Progressing

## 2025-06-25 NOTE — PROGRESS NOTES
Progress Note - Surgery-General   Name: Nahid Luis 65 y.o. male I MRN: [id number]  Unit/Bed#: [facility] 515-01 I Date of Admission: 6/24/2025   Date of Service: 6/25/2025 I Hospital Day: 1    Assessment & Plan  Malignant neoplasm of lung, unspecified laterality, unspecified part of lung (HCC)  S/p robotic converted to open LULobectomy, MLND, pleural lysis of adhesions, and flex bronchoscopy on 6/24 for JOHN adenocarcinoma    Left chest tube: -2, intermittent airleak up to 90, 370 cc serosanguineous output    -Regular diet  -Dc IVF  -Dc a line  -Dc brambila  -Monitor chest tube o/p and air leak on Thopaz  -Downgrade level of care  -Continue to hold Eliquis for A. fib  -Pain and nausea control as needed  -PCA for pain control  -DVT prophylaxis  -Encourage ambulation and incentive spirometry          Subjective   Patient is doing well.  Pain is well-controlled.  Pulling 1000 on I-S.  No acute events overnight.  Postop tolerated diet.  Has not yet ambulated    Objective :  Temp:  [97.3 °F (36.3 °C)-97.7 °F (36.5 °C)] 97.7 °F (36.5 °C)  HR:  [55-74] 58  BP: ()/(45-62) 118/55  Resp:  [9-22] 18  SpO2:  [92 %-100 %] 96 %  O2 Device: Nasal cannula  Nasal Cannula O2 Flow Rate (L/min):  [2 L/min-4 L/min] 4 L/min    I/O         06/23 0701  06/24 0700 06/24 0701  06/25 0700    P.O.  700    I.V. (mL/kg)  2724.2 (32.3)    IV Piggyback  300    Total Intake(mL/kg)  3724.2 (44.1)    Urine (mL/kg/hr)  1855 (0.9)    Blood  300    Chest Tube  300    Total Output  2455    Net  +1269.2                Lines/Drains/Airways       Active Status       Name Placement date Placement time Site Days    Arterial Line 06/24/25 Right Radial 06/24/25  0744  Radial  less than 1    Chest Tube 1 Left Pleural 28 Fr. 06/24/25  1430  Pleural  less than 1    Urethral Catheter Latex 16 Fr. 06/24/25  0745  Latex  less than 1                  Physical Exam  General: NAD  Skin: Warm, dry, anicteric  HEENT: Normocephalic, atraumatic  CV: Not  tachycardic  Chest: Left chest tube in place as above; incision clean dry and intact  Pulm:  Nonlabored breathing on room air  Abd: Soft, ND/NT  MSK: Symmetric, no edema, no tenderness, no deformity  Neuro: AOx3, GCS 15       Lab Results: I have reviewed the following results:  Recent Labs     06/24/25  1438 06/24/25  1532 06/25/25  0518   WBC  --    < > 9.31   HGB 10.9*   < > 8.5*   HCT 32*   < > 26.1*   PLT  --    < > 228   SODIUM  --    < > 134*   K  --    < > 4.7   CL  --    < > 102   CO2 24   < > 25   BUN  --    < > 16   CREATININE  --    < > 0.95   GLUC  --    < > 147*   CAIONIZED 1.18  --   --    MG  --    < > 2.5    < > = values in this interval not displayed.

## 2025-06-25 NOTE — CASE MANAGEMENT
Case Management Assessment & Discharge Planning Note    Patient name Nahid Luis  Location [facility]-01 MRN [id number]  : 1960 Date 2025       Current Admission Date: 2025  Current Admission Diagnosis:Malignant neoplasm of lung, unspecified laterality, unspecified part of lung (HCC)   Patient Active Problem List    Diagnosis Date Noted    Palliative care patient 2025    Adenocarcinoma of left lung (HCC) 2025    Urothelial carcinoma of bladder (HCC) 2025    Paroxysmal A-fib (HCC) 2025    Malignant neoplasm of lung, unspecified laterality, unspecified part of lung (HCC) 01/15/2025    Atrial fibrillation with rapid ventricular response (HCC) 2025    Urinary frequency 2025    Lesion of parotid gland 12/10/2024    Parotid mass 12/10/2024    Coronary artery calcification 2024    Tobacco abuse 2024    S/P femoral-tibial bypass 2024    Leukocytosis 2024    ABLA (acute blood loss anemia) 2024    Bladder mass 10/18/2024    Mixed hyperlipidemia 2024    PAD (peripheral artery disease) (HCC) 2024    Hypertension 2023    Chronic obstructive pulmonary disease (HCC) 2023    Gross hematuria 2023      LOS (days): 1  Geometric Mean LOS (GMLOS) (days): 6.8  Days to GMLOS:5.8     OBJECTIVE:    Risk of Unplanned Readmission Score: 11.9         Current admission status: Inpatient       Preferred Pharmacy:   [id number]  [place] - [address] [facility].  [address] [place] [address]  [place] [address]  Phone: [phone] Fax: [phone]    [facility]  [place] - [address]  [facility],  [address]  [facility],  [facility] [place] [address]  Phone: [phone] Fax: [phone]    [facility] [place] - [address]  [address]  [place] [address]  Phone: [phone] Fax: [phone]    Primary Care Provider: KURTIS Ceron  Insurance: MEDICARE  Secondary Insurance:     ASSESSMENT:  Active Health Care Proxies       Nany Huff [facility] Representative - Daughter   Primary Phone: [phone] (Mobile)                 Advance Directives  Does patient have a Health Care POA?: No  Was patient offered paperwork?: Yes  Does patient currently have a Health Care decision maker?: Yes, please see Health Care Proxy section  Does patient have Advance Directives?: No  Was patient offered paperwork?: Yes  Primary Contact: daughterNany         Readmission Root Cause  30 Day Readmission: No    Patient Information  Admitted from:: Home  Mental Status: Alert  During Assessment patient was accompanied by: Not accompanied during assessment  Assessment information provided by:: Patient  Primary Caregiver: Self  Support Systems: Self, Daughter, Son, Children  County of Residence: [place]  What city do you live in?: [place]  Home entry access options. Select all that apply.: Stairs  Number of steps to enter home.: 4  Type of Current Residence: [facility]  Living Arrangements: Lives w/ Children, Lives w/ Family members  Is patient a ?: No    Activities of Daily Living Prior to Admission  Functional Status: Independent  Completes ADLs independently?: Yes  Ambulates independently?: Yes  Does patient use assisted devices?: No  Does patient currently own DME?: Yes  What DME does the patient currently own?: Walker  Does patient have a history of Outpatient Therapy (PT/OT)?: No  Does the patient have a history of Short-Term Rehab?: No  Does patient have a history of HHC?: No         Patient Information Continued  Income Source: Employed  Does patient have prescription coverage?: No  Can the patient afford their medications and any related supplies (such as glucometers or test strips)?: Yes (pt just switched to [facility] and is working on Rx plan)  Does patient have a history of substance abuse?: No  Does patient have a history of Mental Health  Diagnosis?: No         Means of [facility]      DISCHARGE DETAILS:    Discharge planning discussed with:: patient        CM contacted family/caregiver?: No- see comments (declined)        Contacts  Reason/Outcome: Discharge Planning, Emergency Contact, Continuity of Care         Additional Comments: Met with pt to introduce CM role, obtain baseline assessment information and discuss DCP. Pt lives w/ dtr, son and grandson in single home w/ 4 TRAY. IPTA. ambulates ind. Has walker but not using. Works and drives. No prior HHC or STR. Denies h/o MH or SA treatment. Per therapy no post acute d/c needs. CM will follow

## 2025-06-25 NOTE — PLAN OF CARE
Problem: PHYSICAL THERAPY ADULT  Goal: Performs mobility at highest level of function for planned discharge setting.  See evaluation for individualized goals.  Description: Treatment/Interventions: ADL retraining, Functional transfer training, LE strengthening/ROM, Therapeutic exercise, Endurance training, Patient/family training, Equipment eval/education, Bed mobility, Gait training, Spoke to nursing, Spoke to case management, OT, Elevations          See flowsheet documentation for full assessment, interventions and recommendations.  Outcome: Progressing  Note: Prognosis: Good  Problem List: Decreased strength, Decreased endurance, Impaired balance, Decreased mobility, Pain  Assessment: Pt is a 65 y.o. male seen for a high complexity PT evaluation due to Ongoing medical management for primary dx, Increased reliance on more restrictive AD compared to baseline, Decreased activity tolerance compared to baseline, Fall risk, Increased assistance needed from caregiver at current time, Ongoing telemetry monitoring, s/p surgical intervention. Patient is s/p admit to [facility] on 6/24/2025 for Adenocarcinoma of upper lobe of left lung (HCC) (C34.12). Patient  has a past medical history of Anemia, Anxiety, Bladder cancer (HCC), Cancer (HCC), Colon polyp, COPD (chronic obstructive pulmonary disease) (HCC), Depression, History of transfusion, Hyperlipidemia, Hypertension, Irregular heart beat, Mass of left lung, No natural teeth, Pneumonia, and Wears glasses..     PT now consulted to assess functional mobility and needs for safe d/c planning. Prior to admission, pt independent with functional mobility, independent ADLs, and independent IADLs. Personal factors affecting status include fall risk, steps to enter home, and medical status     Currently pt requires supervision for bed mobility, supervision for functional transfers with no AD ; supervision for ambulation with no AD. Pt presents functioning below baseline  and w/ overall mobility deficits 2* to: decreased strength, decreased endurance, decreased mobility, impaired balance. These impairments place pt at risk for falls.     Pt will continue to benefit from skilled PT interventions to address stated impairments; to maximize functional potential; for ongoing pt/family education; and DME needs. The patient's AM-PAC Basic Mobility Inpatient Short Form Raw Score Is 17. PT is currently recommending no post acute rehab needs on d/c from hospital. Will continue to follow as able.        Rehab Resource Intensity Level, PT: No post-acute rehabilitation needs    See flowsheet documentation for full assessment.

## 2025-06-26 LAB
ABO GROUP BLD BPU: NORMAL
ABO GROUP BLD BPU: NORMAL
ANION GAP SERPL CALCULATED.3IONS-SCNC: 3 MMOL/L (ref 4–13)
BASOPHILS # BLD AUTO: 0.03 THOUSANDS/ÂΜL (ref 0–0.1)
BASOPHILS NFR BLD AUTO: 0 % (ref 0–1)
BPU ID: NORMAL
BPU ID: NORMAL
BUN SERPL-MCNC: 22 MG/DL (ref 5–25)
CALCIUM SERPL-MCNC: 8.8 MG/DL (ref 8.4–10.2)
CHLORIDE SERPL-SCNC: 105 MMOL/L (ref 96–108)
CO2 SERPL-SCNC: 29 MMOL/L (ref 21–32)
CREAT SERPL-MCNC: 1.19 MG/DL (ref 0.6–1.3)
CROSSMATCH: NORMAL
CROSSMATCH: NORMAL
EOSINOPHIL # BLD AUTO: 0.22 THOUSAND/ÂΜL (ref 0–0.61)
EOSINOPHIL NFR BLD AUTO: 3 % (ref 0–6)
ERYTHROCYTE [DISTWIDTH] IN BLOOD BY AUTOMATED COUNT: 15.4 % (ref 11.6–15.1)
GFR SERPL CREATININE-BSD FRML MDRD: 63 ML/MIN/1.73SQ M
GLUCOSE SERPL-MCNC: 121 MG/DL (ref 65–140)
HCT VFR BLD AUTO: 30.3 % (ref 36.5–49.3)
HGB BLD-MCNC: 9.3 G/DL (ref 12–17)
IMM GRANULOCYTES # BLD AUTO: 0.05 THOUSAND/UL (ref 0–0.2)
IMM GRANULOCYTES NFR BLD AUTO: 1 % (ref 0–2)
LYMPHOCYTES # BLD AUTO: 2.7 THOUSANDS/ÂΜL (ref 0.6–4.47)
LYMPHOCYTES NFR BLD AUTO: 33 % (ref 14–44)
MCH RBC QN AUTO: 29.8 PG (ref 26.8–34.3)
MCHC RBC AUTO-ENTMCNC: 30.7 G/DL (ref 31.4–37.4)
MCV RBC AUTO: 97 FL (ref 82–98)
MONOCYTES # BLD AUTO: 0.82 THOUSAND/ÂΜL (ref 0.17–1.22)
MONOCYTES NFR BLD AUTO: 10 % (ref 4–12)
NEUTROPHILS # BLD AUTO: 4.44 THOUSANDS/ÂΜL (ref 1.85–7.62)
NEUTS SEG NFR BLD AUTO: 53 % (ref 43–75)
NRBC BLD AUTO-RTO: 0 /100 WBCS
PLATELET # BLD AUTO: 257 THOUSANDS/UL (ref 149–390)
PMV BLD AUTO: 8.9 FL (ref 8.9–12.7)
POTASSIUM SERPL-SCNC: 5.2 MMOL/L (ref 3.5–5.3)
QRS AXIS: 59 DEGREES
QRSD INTERVAL: 82 MS
QT INTERVAL: 342 MS
QTC INTERVAL: 457 MS
RBC # BLD AUTO: 3.12 MILLION/UL (ref 3.88–5.62)
SODIUM SERPL-SCNC: 137 MMOL/L (ref 135–147)
T WAVE AXIS: 68 DEGREES
UNIT DISPENSE STATUS: NORMAL
UNIT DISPENSE STATUS: NORMAL
UNIT PRODUCT CODE: NORMAL
UNIT PRODUCT CODE: NORMAL
UNIT PRODUCT VOLUME: 300 ML
UNIT PRODUCT VOLUME: 300 ML
UNIT RH: NORMAL
UNIT RH: NORMAL
VENTRICULAR RATE: 107 BPM
WBC # BLD AUTO: 8.26 THOUSAND/UL (ref 4.31–10.16)

## 2025-06-26 PROCEDURE — 85025 COMPLETE CBC W/AUTO DIFF WBC: CPT

## 2025-06-26 PROCEDURE — 99024 POSTOP FOLLOW-UP VISIT: CPT | Performed by: THORACIC SURGERY (CARDIOTHORACIC VASCULAR SURGERY)

## 2025-06-26 PROCEDURE — 80048 BASIC METABOLIC PNL TOTAL CA: CPT

## 2025-06-26 PROCEDURE — 97116 GAIT TRAINING THERAPY: CPT

## 2025-06-26 PROCEDURE — 93010 ELECTROCARDIOGRAM REPORT: CPT | Performed by: INTERNAL MEDICINE

## 2025-06-26 RX ORDER — OXYCODONE HYDROCHLORIDE 5 MG/1
5 TABLET ORAL EVERY 4 HOURS PRN
Refills: 0 | Status: DISCONTINUED | OUTPATIENT
Start: 2025-06-26 | End: 2025-06-28 | Stop reason: HOSPADM

## 2025-06-26 RX ORDER — METOPROLOL SUCCINATE 50 MG/1
50 TABLET, EXTENDED RELEASE ORAL DAILY
Status: DISCONTINUED | OUTPATIENT
Start: 2025-06-26 | End: 2025-06-28 | Stop reason: HOSPADM

## 2025-06-26 RX ORDER — HYDROMORPHONE HCL IN WATER/PF 6 MG/30 ML
0.2 PATIENT CONTROLLED ANALGESIA SYRINGE INTRAVENOUS EVERY 4 HOURS PRN
Refills: 0 | Status: DISCONTINUED | OUTPATIENT
Start: 2025-06-26 | End: 2025-06-28 | Stop reason: HOSPADM

## 2025-06-26 RX ORDER — OXYCODONE HYDROCHLORIDE 10 MG/1
10 TABLET ORAL EVERY 4 HOURS PRN
Refills: 0 | Status: DISCONTINUED | OUTPATIENT
Start: 2025-06-26 | End: 2025-06-28 | Stop reason: HOSPADM

## 2025-06-26 RX ADMIN — ATORVASTATIN CALCIUM 20 MG: 20 TABLET, FILM COATED ORAL at 08:18

## 2025-06-26 RX ADMIN — GABAPENTIN 300 MG: 300 CAPSULE ORAL at 08:18

## 2025-06-26 RX ADMIN — ALBUTEROL SULFATE 2 PUFF: 90 AEROSOL, METERED RESPIRATORY (INHALATION) at 16:48

## 2025-06-26 RX ADMIN — ENOXAPARIN SODIUM 40 MG: 40 INJECTION SUBCUTANEOUS at 08:24

## 2025-06-26 RX ADMIN — CELECOXIB 100 MG: 100 CAPSULE ORAL at 08:20

## 2025-06-26 RX ADMIN — ACETAMINOPHEN 975 MG: 325 TABLET, FILM COATED ORAL at 17:16

## 2025-06-26 RX ADMIN — AMLODIPINE BESYLATE 5 MG: 5 TABLET ORAL at 08:18

## 2025-06-26 RX ADMIN — OXYCODONE HYDROCHLORIDE 5 MG: 5 TABLET ORAL at 17:15

## 2025-06-26 RX ADMIN — OXYCODONE HYDROCHLORIDE 5 MG: 5 TABLET ORAL at 21:20

## 2025-06-26 RX ADMIN — ACETAMINOPHEN 975 MG: 325 TABLET, FILM COATED ORAL at 05:06

## 2025-06-26 RX ADMIN — ACETAMINOPHEN 975 MG: 325 TABLET, FILM COATED ORAL at 00:42

## 2025-06-26 RX ADMIN — DOCUSATE SODIUM 100 MG: 100 CAPSULE, LIQUID FILLED ORAL at 08:24

## 2025-06-26 RX ADMIN — FLUTICASONE FUROATE AND VILANTEROL TRIFENATATE 1 PUFF: 100; 25 POWDER RESPIRATORY (INHALATION) at 08:20

## 2025-06-26 RX ADMIN — GABAPENTIN 300 MG: 300 CAPSULE ORAL at 20:34

## 2025-06-26 RX ADMIN — GABAPENTIN 300 MG: 300 CAPSULE ORAL at 16:45

## 2025-06-26 RX ADMIN — ALBUTEROL SULFATE 2 PUFF: 90 AEROSOL, METERED RESPIRATORY (INHALATION) at 04:50

## 2025-06-26 RX ADMIN — CELECOXIB 100 MG: 100 CAPSULE ORAL at 17:16

## 2025-06-26 RX ADMIN — ACETAMINOPHEN 975 MG: 325 TABLET, FILM COATED ORAL at 23:27

## 2025-06-26 RX ADMIN — METOPROLOL SUCCINATE 50 MG: 50 TABLET, EXTENDED RELEASE ORAL at 08:18

## 2025-06-26 NOTE — PHYSICAL THERAPY NOTE
"                                                                                  PHYSICAL THERAPY NOTE          Patient Name: Nahid Luis  Today's Date: 6/26/2025 06/26/25 1240   PT Last Visit   PT Visit Date 06/26/25   Pain Assessment   Pain Assessment Tool 0-10   Pain Score   (\"soreness\" unrated)   Pain Onset/Description Onset: Ongoing;Descriptor: Discomfort;Descriptor: Sore;Frequency: Intermittent   Effect of Pain on Daily Activities limits comfort and mobility   Patient's Stated Pain Goal No pain   Hospital Pain Intervention(s) Repositioned;Ambulation/increased activity;Emotional support   Restrictions/Precautions   Weight Bearing Precautions Per Order No   Other Precautions Fall Risk  (CT)   General   Family/Caregiver Present No   Cognition   Overall Cognitive Status WFL   Attention Within functional limits   Orientation Level Oriented X4   Memory Within functional limits   Following Commands Follows all commands and directions without difficulty   Comments Patient is pleasant and cooperative   Subjective   Subjective Patient agreeable to PT tx   Bed Mobility   Additional Comments OOB in chair on arrival   Transfers   Sit to Stand 5  Supervision   Stand to Sit 5  Supervision   Additional Comments RW   Ambulation/Elevation   Gait pattern Inconsistent brad   Gait Assistance 5  Supervision   Assistive Device Rolling walker   Distance 200'   Ambulation/Elevation Additional Comments patient ambulating on RA, denies SOB -- patient aung not reading at rest or with activity -- ambulatory pulse ox tested, but unable to obtain reading on any finger.  Patient continues to deny SOB with mobility.   Balance   Static Sitting Good   Dynamic Sitting Fair +   Static Standing Fair   Dynamic Standing Fair   Ambulatory Fair   Endurance Deficit   Endurance Deficit Yes   Endurance Deficit Description fatigue   Activity Tolerance   Activity Tolerance Patient tolerated treatment well;Patient limited by fatigue "   Nurse Made Aware RN cleared   Assessment   Prognosis Good   Problem List Decreased strength;Decreased endurance;Impaired balance;Decreased mobility;Pain   Assessment Patient received in bedside chair. Patient agreeable to therapy. Patient performs functional transfers with supervision using rolling walker. Patient performs ambulation with supervision using rolling walker, 200'. Patient appears steady throughout, likely would be steady without AD, however patient has RW at home and feels comfortable using it at this time. Patient denies SOB or dyspnea at rest and with activity on RA. Unable to obtain reading with aung or ambulatory pulse ox.  Patient left in bedside chair with all needs met and call bell/personal items within reach. The patient's AM-PAC Basic Mobility Inpatient Short Form Raw Score is 20 showing further need for skilled PT services in order to improve functional mobility, decrease need for assistance, and return to PLOF. PT is recommending no post acute rehab needs on d/c from hospital.  Will continue to follow as able.   Goals   Patient Goals to go home   PT Treatment Day 1   Plan   Treatment/Interventions Functional transfer training;ADL retraining;LE strengthening/ROM;Elevations;Therapeutic exercise;Endurance training;Patient/family training;Equipment eval/education;Bed mobility;Gait training;Spoke to nursing;Spoke to case management;OT   PT Frequency 3-5x/wk   Discharge Recommendation   Rehab Resource Intensity Level, PT No post-acute rehabilitation needs   AM-PAC Basic Mobility Inpatient   Turning in Flat Bed Without Bedrails 4   Lying on Back to Sitting on Edge of Flat Bed Without Bedrails 4   Moving Bed to Chair 3   Standing Up From Chair Using Arms 3   Walk in Room 3   Climb 3-5 Stairs With Railing 3   Basic Mobility Inpatient Raw Score 20   Basic Mobility Standardized Score 43.99   [facility] Highest Level Of Mobility   -HLM Goal 6: Walk 10 steps or more   -HLM Achieved 7: Walk 25  feet or more   End of Consult   Patient Position at End of Consult All needs within reach;Bedside chair       Glo Chun, PT, DPT

## 2025-06-26 NOTE — PLAN OF CARE
Problem: Prexisting or High Potential for Compromised Skin Integrity  Goal: Skin integrity is maintained or improved  Description: INTERVENTIONS:  - Identify patients at risk for skin breakdown  - Assess and monitor skin integrity including under and around medical devices   - Assess and monitor nutrition and hydration status  - Monitor labs  - Assess for incontinence   - Turn and reposition patient  - Assist with mobility/ambulation  - Relieve pressure over marta prominences   - Avoid friction and shearing  - Provide appropriate hygiene as needed including keeping skin clean and dry  - Evaluate need for skin moisturizer/barrier cream  - Collaborate with interdisciplinary team  - Patient/family teaching  - Consider wound care consult    Assess:  - Review Kulwinder scale daily  - Clean and moisturize skin every   - Inspect skin when repositioning, toileting, and assisting with ADLS  - Assess under medical devices such as  every   - Assess extremities for adequate circulation and sensation     Bed Management:  - Have minimal linens on bed & keep smooth, unwrinkled  - Change linens as needed when moist or perspiring  - Avoid sitting or lying in one position for more than  hours while in bed?Keep HOB at degrees   - Toileting:  - Offer bedside commode  - Assess for incontinence every   - Use incontinent care products after each incontinent episode such as     Activity:  - Mobilize patient  times a day  - Encourage activity and walks on unit  - Encourage or provide ROM exercises   - Turn and reposition patient every  Hours  - Use appropriate equipment to lift or move patient in bed  - Instruct/ Assist with weight shifting every  when out of bed in chair  - Consider limitation of chair time  hour intervals    Skin Care:  - Avoid use of baby powder, tape, friction and shearing, hot water or constrictive clothing  - Relieve pressure over bony prominences using  - Do not massage red bony areas    Next Steps:  - Teach patient  strategies to minimize risks such as   - Consider consults to  interdisciplinary teams such as   Outcome: Progressing     Problem: PAIN - ADULT  Goal: Verbalizes/displays adequate comfort level or baseline comfort level  Description: Interventions:  - Encourage patient to monitor pain and request assistance  - Assess pain using appropriate pain scale  - Administer analgesics as ordered based on type and severity of pain and evaluate response  - Implement non-pharmacological measures as appropriate and evaluate response  - Consider cultural and social influences on pain and pain management  - Notify physician/advanced practitioner if interventions unsuccessful or patient reports new pain  - Educate patient/family on pain management process including their role and importance of  reporting pain   - Provide non-pharmacologic/complimentary pain relief interventions  Outcome: Progressing

## 2025-06-26 NOTE — PROGRESS NOTES
Progress Note - Thoracic    Name: Nahid Luis 65 y.o. male I MRN: [id number]  Unit/Bed#: [facility] 512-01 I Date of Admission: 6/24/2025   Date of Service: 6/26/2025 I Hospital Day: 2     Assessment & Plan  Malignant neoplasm of lung, unspecified laterality, unspecified part of lung (HCC)  S/p robotic converted to open LULobectomy, MLND, pleural lysis of adhesions, and flex bronchoscopy on 6/24 for JOHN adenocarcinoma    Intermittent tachycardia, otherwise vital signs stable and within normal limits on 2 L nasal cannula    Urine output 1300  Left-sided chest tube 265 cc serosanguineous, waterseal, airleak currently 0 with spikes to 40 intermittently over the past 8 hours    A.m. labs pending    Plan  -Regular diet  -Wean O2 as tolerated  -Monitor waterseal on Topaz, will will consider chest tube removal later on today  -Monitor and replete electrolytes as needed  -Continue to hold Eliquis for A. fib  -Pain and nausea control as needed  -PCA for pain control  -DVT prophylaxis  -Encourage ambulation and incentive spirometry        Subjective   Patient seen and examined at bedside.  Patient expressed no acute concerns.  Pain controlled.  No shortness of breath on 2 L.  Patient ambulating out of bed.  Patient urinating.  Patient tolerating diet.  Patient is doing 1250 cc on incentive spirometer    Objective :  Temp:  [97.4 °F (36.3 °C)-98.1 °F (36.7 °C)] 97.8 °F (36.6 °C)  HR:  [] 64  BP: ()/(54-65) 95/54  Resp:  [18-25] 20  SpO2:  [91 %-96 %] 92 %  O2 Device: None (Room air)  Nasal Cannula O2 Flow Rate (L/min):  [4 L/min] 4 L/min    I/O         06/24 0701  06/25 0700 06/25 0701  06/26 0700    P.O. 700 1160    I.V. (mL/kg) 2724.2 (32.3) 4.4 (0.1)    IV Piggyback 300     Total Intake(mL/kg) 3724.2 (44.1) 1164.4 (13.8)    Urine (mL/kg/hr) 1855 (0.9) 1300 (0.6)    Blood 300     Chest Tube 300 350    Total Output 2455 1650    Net +1269.2 -485.6                Lines/Drains/Airways       Active Status       Name  Placement date Placement time Site Days    Chest Tube 1 Left Pleural 28 Fr. 06/24/25  1430  Pleural  1                  Physical Exam  Vitals and nursing note reviewed.   Constitutional:       Appearance: Normal appearance.   HENT:      Head: Normocephalic and atraumatic.     Eyes:      General: No scleral icterus.      Cardiovascular:      Rate and Rhythm: Normal rate.   Pulmonary:      Effort: Pulmonary effort is normal.   Abdominal:      General: Abdomen is flat. There is no distension.      Palpations: Abdomen is soft.      Tenderness: There is no abdominal tenderness.     Musculoskeletal:      Right lower leg: No edema.      Left lower leg: No edema.     Skin:     General: Skin is warm.      Capillary Refill: Capillary refill takes less than 2 seconds.     Neurological:      Mental Status: He is alert.     Psychiatric:         Mood and Affect: Mood normal.         Behavior: Behavior normal.         Lab Results: I have reviewed the following results:  Recent Labs     06/24/25  1438 06/24/25  1532 06/25/25  0518   WBC  --    < > 9.31   HGB 10.9*   < > 8.5*   HCT 32*   < > 26.1*   PLT  --    < > 228   SODIUM  --    < > 134*   K  --    < > 4.7   CL  --    < > 102   CO2 24   < > 25   BUN  --    < > 16   CREATININE  --    < > 0.95   GLUC  --    < > 147*   CAIONIZED 1.18  --   --    MG  --    < > 2.5    < > = values in this interval not displayed.       Imaging Results Review: No pertinent imaging studies reviewed.  Other Study Results Review: No additional pertinent studies reviewed.    VTE Pharmacologic Prophylaxis: VTE covered by:  enoxaparin, Subcutaneous, 40 mg at 06/25/25 0819     VTE Mechanical Prophylaxis: sequential compression device

## 2025-06-26 NOTE — PLAN OF CARE
Problem: PHYSICAL THERAPY ADULT  Goal: Performs mobility at highest level of function for planned discharge setting.  See evaluation for individualized goals.  Description: Treatment/Interventions: ADL retraining, Functional transfer training, LE strengthening/ROM, Therapeutic exercise, Endurance training, Patient/family training, Equipment eval/education, Bed mobility, Gait training, Spoke to nursing, Spoke to case management, OT, Elevations          See flowsheet documentation for full assessment, interventions and recommendations.  Outcome: Progressing  Note: Prognosis: Good  Problem List: Decreased strength, Decreased endurance, Impaired balance, Decreased mobility, Pain  Assessment: Patient received in bedside chair. Patient agreeable to therapy. Patient performs functional transfers with supervision using rolling walker. Patient performs ambulation with supervision using rolling walker, 200'. Patient appears steady throughout, likely would be steady without AD, however patient has RW at home and feels comfortable using it at this time. Patient denies SOB or dyspnea at rest and with activity on RA. Unable to obtain reading with aung or ambulatory pulse ox.  Patient left in bedside chair with all needs met and call bell/personal items within reach. The patient's AM-PAC Basic Mobility Inpatient Short Form Raw Score is 20 showing further need for skilled PT services in order to improve functional mobility, decrease need for assistance, and return to PLOF. PT is recommending no post acute rehab needs on d/c from hospital.  Will continue to follow as able.        Rehab Resource Intensity Level, PT: No post-acute rehabilitation needs    See flowsheet documentation for full assessment.

## 2025-06-26 NOTE — PROGRESS NOTES
Progress Note - Thoracic    Name: Nahid Luis 65 y.o. male I MRN: [id number]  Unit/Bed#: [facility] 512-01 I Date of Admission: 6/24/2025   Date of Service: 6/26/2025 I Hospital Day: 2     Assessment & Plan  Malignant neoplasm of lung, unspecified laterality, unspecified part of lung (HCC)  S/p robotic converted to open LULobectomy, MLND, pleural lysis of adhesions, and flex bronchoscopy on 6/24 for JOHN adenocarcinoma    Intermittent tachycardia, otherwise vital signs stable and within normal limits on 2 L nasal cannula    Urine output 1300  Left-sided chest tube 265 cc serosanguineous, waterseal, airleak currently 0 with spikes to 40 intermittently over the past 8 hours    A.m. labs pending    Plan  -Regular diet  -Wean O2 as tolerated  -Monitor waterseal on Topaz, will will consider chest tube removal later on today  -Monitor and replete electrolytes as needed  -Continue to hold Eliquis for A. fib  -Pain and nausea control as needed  -PCA for pain control  -DVT prophylaxis  -Encourage ambulation and incentive spirometry       bThe patient does not have acute blood loss anemia

## 2025-06-26 NOTE — ANESTHESIA POSTPROCEDURE EVALUATION
Post-Op Assessment Note    CV Status:  Stable    Pain management: adequate       Mental Status:  Awake, arousable and sleepy   Hydration Status:  Euvolemic   PONV Controlled:  Controlled   Airway Patency:  Patent  Two or more mitigation strategies used for obstructive sleep apnea   Post Op Vitals Reviewed: Yes    No anethesia notable event occurred.    Staff: CRNA, Anesthesiologist           Last Filed PACU Vitals:  Vitals Value Taken Time   Temp 97.3 °F (36.3 °C) 06/24/25 15:09   Pulse 54 06/24/25 15:15   /58 06/24/25 15:11   Resp 14 06/24/25 15:15   SpO2 98 % 06/24/25 15:15   Vitals shown include unfiled device data.    Modified Aracelis:     Vitals Value Taken Time   Activity 2 06/24/25 17:00   Respiration 2 06/24/25 17:00   Circulation 2 06/24/25 17:00   Consciousness 1 06/24/25 17:00   Oxygen Saturation 1 06/24/25 17:00     Modified Aracelis Score: 8

## 2025-06-26 NOTE — PLAN OF CARE
Problem: Prexisting or High Potential for Compromised Skin Integrity  Goal: Skin integrity is maintained or improved  Description: INTERVENTIONS:  - Identify patients at risk for skin breakdown  - Assess and monitor skin integrity including under and around medical devices   - Assess and monitor nutrition and hydration status  - Monitor labs  - Assess for incontinence   - Turn and reposition patient  - Assist with mobility/ambulation  - Relieve pressure over marta prominences   - Avoid friction and shearing  - Provide appropriate hygiene as needed including keeping skin clean and dry  - Evaluate need for skin moisturizer/barrier cream  - Collaborate with interdisciplinary team  - Patient/family teaching  - Consider wound care consult    Assess:  - Review Kulwinder scale daily  - Clean and moisturize skin every   - Inspect skin when repositioning, toileting, and assisting with ADLS  - Assess under medical devices such as  every   - Assess extremities for adequate circulation and sensation     Bed Management:  - Have minimal linens on bed & keep smooth, unwrinkled  - Change linens as needed when moist or perspiring  - Avoid sitting or lying in one position for more than  hours while in bed?Keep HOB at degrees   - Toileting:  - Offer bedside commode  - Assess for incontinence every   - Use incontinent care products after each incontinent episode such as     Activity:  - Mobilize patient  times a day  - Encourage activity and walks on unit  - Encourage or provide ROM exercises   - Turn and reposition patient every  Hours  - Use appropriate equipment to lift or move patient in bed  - Instruct/ Assist with weight shifting every  when out of bed in chair  - Consider limitation of chair time  hour intervals    Skin Care:  - Avoid use of baby powder, tape, friction and shearing, hot water or constrictive clothing  - Relieve pressure over bony prominences using  - Do not massage red bony areas    Next Steps:  - Teach patient  strategies to minimize risks such as   - Consider consults to  interdisciplinary teams such as   Outcome: Progressing     Problem: RESPIRATORY - ADULT  Goal: Achieves optimal ventilation and oxygenation  Description: INTERVENTIONS:  - Assess for changes in respiratory status  - Assess for changes in mentation and behavior  - Position to facilitate oxygenation and minimize respiratory effort  - Oxygen administered by appropriate delivery if ordered  - Initiate smoking cessation education as indicated  - Encourage broncho-pulmonary hygiene including cough, deep breathe, Incentive Spirometry  - Assess the need for suctioning and aspirate as needed  - Assess and instruct to report SOB or any respiratory difficulty  - Respiratory Therapy support as indicated  Outcome: Progressing     Problem: PAIN - ADULT  Goal: Verbalizes/displays adequate comfort level or baseline comfort level  Description: Interventions:  - Encourage patient to monitor pain and request assistance  - Assess pain using appropriate pain scale  - Administer analgesics as ordered based on type and severity of pain and evaluate response  - Implement non-pharmacological measures as appropriate and evaluate response  - Consider cultural and social influences on pain and pain management  - Notify physician/advanced practitioner if interventions unsuccessful or patient reports new pain  - Educate patient/family on pain management process including their role and importance of  reporting pain   - Provide non-pharmacologic/complimentary pain relief interventions  Outcome: Progressing     Problem: INFECTION - ADULT  Goal: Absence or prevention of progression during hospitalization  Description: INTERVENTIONS:  - Assess and monitor for signs and symptoms of infection  - Monitor lab/diagnostic results  - Monitor all insertion sites, i.e. indwelling lines, tubes, and drains  - Monitor endotracheal if appropriate and nasal secretions for changes in amount and  color  - [place] appropriate cooling/warming therapies per order  - Administer medications as ordered  - Instruct and encourage patient and family to use good hand hygiene technique  - Identify and instruct in appropriate isolation precautions for identified infection/condition  Outcome: Progressing  Goal: Absence of fever/infection during neutropenic period  Description: INTERVENTIONS:  - Monitor WBC  - Perform strict hand hygiene  - Limit to healthy visitors only  - No plants, dried, fresh or silk flowers with joshi in patient room  Outcome: Progressing     Problem: DISCHARGE PLANNING  Goal: Discharge to home or other facility with appropriate resources  Description: INTERVENTIONS:  - Identify barriers to discharge w/patient and caregiver  - Arrange for needed discharge resources and transportation as appropriate  - Identify discharge learning needs (meds, wound care, etc.)  - Arrange for interpretive services to assist at discharge as needed  - Refer to Case Management Department for coordinating discharge planning if the patient needs post-hospital services based on physician/advanced practitioner order or complex needs related to functional status, cognitive ability, or social support system  Outcome: Progressing     Problem: Knowledge Deficit  Goal: Patient/family/caregiver demonstrates understanding of disease process, treatment plan, medications, and discharge instructions  Description: Complete learning assessment and assess knowledge base.  Interventions:  - Provide teaching at level of understanding  - Provide teaching via preferred learning methods  Outcome: Progressing

## 2025-06-26 NOTE — ASSESSMENT & PLAN NOTE
S/p robotic converted to open LULobectomy, MLND, pleural lysis of adhesions, and flex bronchoscopy on 6/24 for JOHN adenocarcinoma    Intermittent tachycardia, otherwise vital signs stable and within normal limits on 2 L nasal cannula    Urine output 1300  Left-sided chest tube 265 cc serosanguineous, waterseal, airleak currently 0 with spikes to 40 intermittently over the past 8 hours    A.m. labs pending    Plan  -Regular diet  -Wean O2 as tolerated  -Monitor waterseal on Topaz, will will consider chest tube removal later on today  -Monitor and replete electrolytes as needed  -Continue to hold Eliquis for A. fib  -Pain and nausea control as needed  -PCA for pain control  -DVT prophylaxis  -Encourage ambulation and incentive spirometry

## 2025-06-27 ENCOUNTER — APPOINTMENT (INPATIENT)
Dept: RADIOLOGY | Facility: HOSPITAL | Age: 65
DRG: 163 | End: 2025-06-27
Attending: THORACIC SURGERY (CARDIOTHORACIC VASCULAR SURGERY)
Payer: MEDICARE

## 2025-06-27 PROCEDURE — 99024 POSTOP FOLLOW-UP VISIT: CPT | Performed by: THORACIC SURGERY (CARDIOTHORACIC VASCULAR SURGERY)

## 2025-06-27 PROCEDURE — 71046 X-RAY EXAM CHEST 2 VIEWS: CPT

## 2025-06-27 RX ADMIN — ENOXAPARIN SODIUM 40 MG: 40 INJECTION SUBCUTANEOUS at 08:39

## 2025-06-27 RX ADMIN — CELECOXIB 100 MG: 100 CAPSULE ORAL at 17:01

## 2025-06-27 RX ADMIN — AMLODIPINE BESYLATE 5 MG: 5 TABLET ORAL at 08:39

## 2025-06-27 RX ADMIN — METOPROLOL SUCCINATE 50 MG: 50 TABLET, EXTENDED RELEASE ORAL at 08:39

## 2025-06-27 RX ADMIN — GABAPENTIN 300 MG: 300 CAPSULE ORAL at 19:48

## 2025-06-27 RX ADMIN — OXYCODONE HYDROCHLORIDE 10 MG: 10 TABLET ORAL at 19:48

## 2025-06-27 RX ADMIN — ATORVASTATIN CALCIUM 20 MG: 20 TABLET, FILM COATED ORAL at 08:39

## 2025-06-27 RX ADMIN — FLUTICASONE FUROATE AND VILANTEROL TRIFENATATE 1 PUFF: 100; 25 POWDER RESPIRATORY (INHALATION) at 08:42

## 2025-06-27 RX ADMIN — SENNOSIDES 8.6 MG: 8.6 TABLET, FILM COATED ORAL at 08:40

## 2025-06-27 RX ADMIN — HYDROMORPHONE HYDROCHLORIDE 0.2 MG: 0.2 INJECTION, SOLUTION INTRAMUSCULAR; INTRAVENOUS; SUBCUTANEOUS at 23:30

## 2025-06-27 RX ADMIN — ACETAMINOPHEN 975 MG: 325 TABLET, FILM COATED ORAL at 05:02

## 2025-06-27 RX ADMIN — ALBUTEROL SULFATE 2 PUFF: 90 AEROSOL, METERED RESPIRATORY (INHALATION) at 16:55

## 2025-06-27 RX ADMIN — GABAPENTIN 300 MG: 300 CAPSULE ORAL at 15:25

## 2025-06-27 RX ADMIN — OXYCODONE HYDROCHLORIDE 10 MG: 10 TABLET ORAL at 04:17

## 2025-06-27 RX ADMIN — DOCUSATE SODIUM 100 MG: 100 CAPSULE, LIQUID FILLED ORAL at 08:40

## 2025-06-27 RX ADMIN — ACETAMINOPHEN 975 MG: 325 TABLET, FILM COATED ORAL at 23:30

## 2025-06-27 RX ADMIN — GABAPENTIN 300 MG: 300 CAPSULE ORAL at 08:40

## 2025-06-27 RX ADMIN — OXYCODONE HYDROCHLORIDE 10 MG: 10 TABLET ORAL at 15:25

## 2025-06-27 RX ADMIN — CELECOXIB 100 MG: 100 CAPSULE ORAL at 08:42

## 2025-06-27 RX ADMIN — DOCUSATE SODIUM 100 MG: 100 CAPSULE, LIQUID FILLED ORAL at 17:01

## 2025-06-27 RX ADMIN — ACETAMINOPHEN 975 MG: 325 TABLET, FILM COATED ORAL at 17:01

## 2025-06-27 NOTE — PLAN OF CARE
Problem: Prexisting or High Potential for Compromised Skin Integrity  Goal: Skin integrity is maintained or improved  Description: INTERVENTIONS:  - Identify patients at risk for skin breakdown  - Assess and monitor skin integrity including under and around medical devices   - Assess and monitor nutrition and hydration status  - Monitor labs  - Assess for incontinence   - Turn and reposition patient  - Assist with mobility/ambulation  - Relieve pressure over marta prominences   - Avoid friction and shearing  - Provide appropriate hygiene as needed including keeping skin clean and dry  - Evaluate need for skin moisturizer/barrier cream  - Collaborate with interdisciplinary team  - Patient/family teaching  - Consider wound care consult    Assess:  - Review Kulwinder scale daily  - Clean and moisturize skin every   - Inspect skin when repositioning, toileting, and assisting with ADLS  - Assess under medical devices such as  every   - Assess extremities for adequate circulation and sensation     Bed Management:  - Have minimal linens on bed & keep smooth, unwrinkled  - Change linens as needed when moist or perspiring  - Avoid sitting or lying in one position for more than  hours while in bed?Keep HOB at degrees   - Toileting:  - Offer bedside commode  - Assess for incontinence every   - Use incontinent care products after each incontinent episode such as     Activity:  - Mobilize patient  times a day  - Encourage activity and walks on unit  - Encourage or provide ROM exercises   - Turn and reposition patient every  Hours  - Use appropriate equipment to lift or move patient in bed  - Instruct/ Assist with weight shifting every  when out of bed in chair  - Consider limitation of chair time  hour intervals    Skin Care:  - Avoid use of baby powder, tape, friction and shearing, hot water or constrictive clothing  - Relieve pressure over bony prominences using  - Do not massage red bony areas    Next Steps:  - Teach patient  strategies to minimize risks such as   - Consider consults to  interdisciplinary teams such as   Outcome: Progressing     Problem: RESPIRATORY - ADULT  Goal: Achieves optimal ventilation and oxygenation  Description: INTERVENTIONS:  - Assess for changes in respiratory status  - Assess for changes in mentation and behavior  - Position to facilitate oxygenation and minimize respiratory effort  - Oxygen administered by appropriate delivery if ordered  - Initiate smoking cessation education as indicated  - Encourage broncho-pulmonary hygiene including cough, deep breathe, Incentive Spirometry  - Assess the need for suctioning and aspirate as needed  - Assess and instruct to report SOB or any respiratory difficulty  - Respiratory Therapy support as indicated  Outcome: Progressing     Problem: PAIN - ADULT  Goal: Verbalizes/displays adequate comfort level or baseline comfort level  Description: Interventions:  - Encourage patient to monitor pain and request assistance  - Assess pain using appropriate pain scale  - Administer analgesics as ordered based on type and severity of pain and evaluate response  - Implement non-pharmacological measures as appropriate and evaluate response  - Consider cultural and social influences on pain and pain management  - Notify physician/advanced practitioner if interventions unsuccessful or patient reports new pain  - Educate patient/family on pain management process including their role and importance of  reporting pain   - Provide non-pharmacologic/complimentary pain relief interventions  Outcome: Progressing

## 2025-06-27 NOTE — PROGRESS NOTES
Progress Note - Thoracic    Name: Nahid Luis 65 y.o. male I MRN: [id number]  Unit/Bed#: [facility] 512-01 I Date of Admission: 6/24/2025   Date of Service: 6/27/2025 I Hospital Day: 3     Assessment & Plan  Malignant neoplasm of lung, unspecified laterality, unspecified part of lung (HCC)  S/p robotic converted to open LULobectomy, MLND, pleural lysis of adhesions, and flex bronchoscopy on 6/24 for JOHN adenocarcinoma    Vital signs stable and within normal limits on RA    Urine output 1550  Left-sided chest tube 125 cc serosanguineous, waterseal, airleak currently 0 with spikes to 20 intermittently over the past 8 hours    Plan  -Regular diet  -Monitor waterseal on Topaz, will consider chest tube removal later on today + post pull chest xray  -Monitor and replete electrolytes as needed  -Continue to hold Eliquis for A. fib  -Pain and nausea control as needed  -DVT prophylaxis  -Encourage ambulation and incentive spirometry  - Discharge planning, outpatient f/u in 2 weeks.        24 Hour Events : none  Subjective :   Patient is overall doing well. Pain is well controlled.  Denies new chest pain, SOB, n/v, fevers or chill.  Tolerating diet. No issues with voiding or BM.  Endorses that they ambulated twice yesterday without issues.    Objective :  Temp:  [98.1 °F (36.7 °C)-98.5 °F (36.9 °C)] 98.1 °F (36.7 °C)  HR:  [50-89] 59  BP: (102-114)/(52-69) 102/57  Resp:  [16-20] 18  SpO2:  [90 %-97 %] 92 %  O2 Device: Nasal cannula  Nasal Cannula O2 Flow Rate (L/min):  [2 L/min] 2 L/min --> currently on RA, Sp02 94%    I/O         06/25 0701  06/26 0700 06/26 0701 06/27 0700    P.O. 1160     I.V. (mL/kg) 4.4 (0.1) 1 (0)    Total Intake(mL/kg) 1164.4 (13.8) 1 (0)    Urine (mL/kg/hr) 1300 (0.6) 1550 (0.8)    Stool  0    Chest Tube 350 75    Total Output 1650 1625    Net -485.6 -1624          Unmeasured Stool Occurrence  1 x          Lines/Drains/Airways       Active Status       Name Placement date Placement time Site Days     Chest Tube 1 Left Pleural 28 Fr. 06/24/25  1430  Pleural  2                  Physical Exam  Constitutional:       General: He is not in acute distress.     Appearance: Normal appearance.   HENT:      Head: Normocephalic and atraumatic.   Pulmonary:      Effort: No respiratory distress.   Abdominal:      Palpations: Abdomen is soft.      Tenderness: There is no abdominal tenderness.     Musculoskeletal:         General: No swelling.     Neurological:      Mental Status: Mental status is at baseline.     Psychiatric:         Mood and Affect: Mood normal.         Behavior: Behavior normal.     IS 1250cc  Chest tube: set at -2mmH2O suction, 125cc serosanguinous output within last 24hrs, currently no airleak at rest or with cough or valsalva. Occasional 30mL/min air leak spikes when patient moves to his side. Topaz recorded intermittent airleaks ON to the 20s.  Incisions: c/d/i    Lab Results: I have reviewed the following results:  Recent Labs     06/24/25  1438 06/24/25  1532 06/25/25  0518 06/26/25  0536   WBC  --    < > 9.31 8.26   HGB 10.9*   < > 8.5* 9.3*   HCT 32*   < > 26.1* 30.3*   PLT  --    < > 228 257   SODIUM  --    < > 134* 137   K  --    < > 4.7 5.2   CL  --    < > 102 105   CO2 24   < > 25 29   BUN  --    < > 16 22   CREATININE  --    < > 0.95 1.19   GLUC  --    < > 147* 121   CAIONIZED 1.18  --   --   --    MG  --    < > 2.5  --     < > = values in this interval not displayed.             VTE Pharmacologic Prophylaxis: Enoxaparin (Lovenox)  VTE Mechanical Prophylaxis: sequential compression device

## 2025-06-27 NOTE — ASSESSMENT & PLAN NOTE
S/p robotic converted to open LULobectomy, MLND, pleural lysis of adhesions, and flex bronchoscopy on 6/24 for JOHN adenocarcinoma    Vital signs stable and within normal limits on RA    Urine output 1550  Left-sided chest tube 125 cc serosanguineous, waterseal, airleak currently 0 with spikes to 20 intermittently over the past 8 hours    Plan  -Regular diet  -Monitor waterseal on Topaz, will consider chest tube removal later on today + post pull chest xray  -Monitor and replete electrolytes as needed  -Continue to hold Eliquis for A. fib  -Pain and nausea control as needed  -DVT prophylaxis  -Encourage ambulation and incentive spirometry  - Discharge planning, outpatient f/u in 2 weeks.

## 2025-06-28 VITALS
SYSTOLIC BLOOD PRESSURE: 154 MMHG | HEART RATE: 58 BPM | BODY MASS INDEX: 29.19 KG/M2 | DIASTOLIC BLOOD PRESSURE: 68 MMHG | RESPIRATION RATE: 18 BRPM | OXYGEN SATURATION: 87 % | WEIGHT: 186 LBS | TEMPERATURE: 97.7 F | HEIGHT: 67 IN

## 2025-06-28 PROCEDURE — 99024 POSTOP FOLLOW-UP VISIT: CPT

## 2025-06-28 PROCEDURE — 94761 N-INVAS EAR/PLS OXIMETRY MLT: CPT

## 2025-06-28 PROCEDURE — 99024 POSTOP FOLLOW-UP VISIT: CPT | Performed by: SURGERY

## 2025-06-28 RX ADMIN — GABAPENTIN 300 MG: 300 CAPSULE ORAL at 08:49

## 2025-06-28 RX ADMIN — ATORVASTATIN CALCIUM 20 MG: 20 TABLET, FILM COATED ORAL at 08:49

## 2025-06-28 RX ADMIN — DOCUSATE SODIUM 100 MG: 100 CAPSULE, LIQUID FILLED ORAL at 08:49

## 2025-06-28 RX ADMIN — SENNOSIDES 8.6 MG: 8.6 TABLET, FILM COATED ORAL at 08:49

## 2025-06-28 RX ADMIN — AMLODIPINE BESYLATE 5 MG: 5 TABLET ORAL at 08:49

## 2025-06-28 RX ADMIN — METOPROLOL SUCCINATE 50 MG: 50 TABLET, EXTENDED RELEASE ORAL at 08:49

## 2025-06-28 RX ADMIN — FLUTICASONE FUROATE AND VILANTEROL TRIFENATATE 1 PUFF: 100; 25 POWDER RESPIRATORY (INHALATION) at 08:50

## 2025-06-28 RX ADMIN — OXYCODONE HYDROCHLORIDE 10 MG: 10 TABLET ORAL at 00:52

## 2025-06-28 RX ADMIN — ACETAMINOPHEN 975 MG: 325 TABLET, FILM COATED ORAL at 05:53

## 2025-06-28 RX ADMIN — CELECOXIB 100 MG: 100 CAPSULE ORAL at 08:50

## 2025-06-28 NOTE — RESPIRATORY THERAPY NOTE
Home Oxygen Qualifying Test     Patient name: Nahid Luis        : 1960   Date of Test:  2025  Diagnosis:    Home Oxygen Test:    **Medicare Guidelines require item(s) 1-5 on all ambulatory patients or 1 and 2 on non-ambulatory patients.    1. Baseline SPO2 on Room Air at rest 96 %   If <= 88% on Room Air add O2 via NC to obtain SpO2 >=88%. If LPM needed, document LPM  needed to reach =>88%    SPO2 during exertion on Room Air 90  %  During exertion monitor SPO2. If SPO2 increases >=89%, do not add supplemental oxygen    SPO2 on Oxygen at Rest  % at  LPM    SPO2 during exertion on Oxygen  % at  LPM    Test performed during exertion activity.      []  Supplemental Home Oxygen is indicated.    [x]  Client does not qualify for home oxygen.    Respiratory Additional Notes- Pt placed on room sir. SpO2 at rest was 96%. Pt was walked on room air over 500 ft SpO2 decreased to 90%. Pt does not qualify for home O2 at this time    Maude Stevens, RT

## 2025-06-28 NOTE — PROGRESS NOTES
Progress Note - Thoracic    Name: Nahid Luis 65 y.o. male I MRN: [id number]  Unit/Bed#: [facility] 512-01 I Date of Admission: 6/24/2025   Date of Service: 6/28/2025 I Hospital Day: 4     Assessment & Plan  Malignant neoplasm of lung, unspecified laterality, unspecified part of lung (HCC)  S/p robotic converted to open LULobectomy, MLND, pleural lysis of adhesions, and flex bronchoscopy on 6/24 for JOHN adenocarcinoma    Chest tube removed on 6/27 with post pull x-ray revealing small apical pneumothorax which was stable    Vital signs stable and within normal limits on RA    Plan  -Regular diet  -Monitor and replete electrolytes as needed  -Continue to hold Eliquis for A. fib  -Pain and nausea control as needed  -DVT prophylaxis  -Encourage ambulation and incentive spirometry  - Plan for discharge home today        24 Hour Events : none  Subjective :   Patient is overall doing well. Pain is well controlled.  Denies new chest pain, SOB, n/v, fevers or chill.  Tolerating diet. No issues with voiding or BM.  Wants to go home today.    Objective :  Temp:  [97.7 °F (36.5 °C)-98.3 °F (36.8 °C)] 97.7 °F (36.5 °C)  HR:  [44-92] 57  BP: (111-121)/(46-68) 114/63  Resp:  [16-18] 18  SpO2:  [90 %-95 %] 90 %  O2 Device: Nasal cannula --> currently on RA, Sp02 94%    I/O         06/25 0701  06/26 0700 06/26 0701  06/27 0700    P.O. 1160     I.V. (mL/kg) 4.4 (0.1) 1 (0)    Total Intake(mL/kg) 1164.4 (13.8) 1 (0)    Urine (mL/kg/hr) 1300 (0.6) 1550 (0.8)    Stool  0    Chest Tube 350 75    Total Output 1650 1625    Net -485.6 -1624          Unmeasured Stool Occurrence  1 x          Lines/Drains/Airways       Active Status       Name Placement date Placement time Site Days    Chest Tube 1 Left Pleural 28 Fr. 06/24/25  1430  Pleural  3                  Physical Exam:  General: NAD  Skin: Warm, dry, anicteric  HEENT: Normocephalic, atraumatic  CV: RRR  Pulm:  Nonlabored breathing on room air; incisions clean dry and intact  Abd:  Soft, ND/NT  MSK: Symmetric, no edema, no tenderness, no deformity  Neuro: AOx3, GCS 15     Lab Results: I have reviewed the following results:  Recent Labs     06/26/25  0536   WBC 8.26   HGB 9.3*   HCT 30.3*      SODIUM 137   K 5.2      CO2 29   BUN 22   CREATININE 1.19   GLUC 121             VTE Pharmacologic Prophylaxis: Enoxaparin (Lovenox)  VTE Mechanical Prophylaxis: sequential compression device

## 2025-06-28 NOTE — ASSESSMENT & PLAN NOTE
S/p robotic converted to open LULobectomy, MLND, pleural lysis of adhesions, and flex bronchoscopy on 6/24 for JOHN adenocarcinoma    Chest tube removed on 6/27 with post pull x-ray revealing small apical pneumothorax which was stable    Vital signs stable and within normal limits on RA    Plan  -Regular diet  -Monitor and replete electrolytes as needed  -Continue to hold Eliquis for A. fib  -Pain and nausea control as needed  -DVT prophylaxis  -Encourage ambulation and incentive spirometry  - Plan for discharge home today

## 2025-06-30 ENCOUNTER — DOCUMENTATION (OUTPATIENT)
Dept: CARDIOLOGY CLINIC | Facility: CLINIC | Age: 65
End: 2025-06-30

## 2025-06-30 ENCOUNTER — TRANSITIONAL CARE MANAGEMENT (OUTPATIENT)
Dept: FAMILY MEDICINE CLINIC | Facility: CLINIC | Age: 65
End: 2025-06-30

## 2025-06-30 ENCOUNTER — TELEPHONE (OUTPATIENT)
Dept: HEMATOLOGY ONCOLOGY | Facility: CLINIC | Age: 65
End: 2025-06-30

## 2025-06-30 ENCOUNTER — TELEPHONE (OUTPATIENT)
Dept: CARDIOLOGY CLINIC | Facility: CLINIC | Age: 65
End: 2025-06-30

## 2025-06-30 NOTE — TELEPHONE ENCOUNTER
Patient was called to schedule an cardio oncology followup appointment (Cardio Onc [facility]) with no answer. Voicemail was full therefore unable to leave a message. Will try again at another time.

## 2025-06-30 NOTE — DISCHARGE SUMMARY
Discharge Summary - Thoracic    Name: Nahid Luis 65 y.o. 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 Yen Knott MD    Consulting Physician(s): None    Procedures Performed: 6/24/2025 robotic assisted thoracoscopic converted to thoracotomy for left upper lobectomy, mediastinal lymph node dissection, pleurolysis, flexible bronchoscopy    Pathology: Final pathology pending    Hospital Course: Mr. 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  He presented to the hospital as planned for left upper lobectomy.  Surgery began minimally invasively via robotic approach and was converted to open thoracotomy given densely adherent tissue to pulmonary artery.  Patient was transitioned to the floor postoperatively with chest tube in place.  On postop day 1 his pain was well-controlled with PCA pump. Chest tube had a small air leak on waterseal and was left in place.  His arterial line and Malone were removed.  He was encouraged to ambulate and work with physical therapy.  PT determined patient had no rehabilitation needs for home.  On postoperative day 2, patient was transition to oral pain medications.  His chest tube continued to have a small volume airleak and remained in place.  Patient remained on 2 L of oxygen via nasal cannula that was attempted to  be weaned.  On postoperative day 3, patient's chest tube showed resolution of air leak and was subsequently removed. Post pull CXR showed small left sided pneumothorax.  Patient's O2 was weaned. He was on 1 to 2 L overnight.  On postoperative day 4, respiratory therapy did an ambulatory home oxygen evaluation and patient did not qualify her for home oxygen.  His pain was controlled.  He was urinating without difficulty.  He was ambulating walker which he also does at home.  We discussed that patient had postponed getting a Zio patch with cardiology as an outpatient as he wanted to complete his cancer treatment first.  Recommend that patient return to his cardiologist to have this completed.  All discharge instructions reviewed.  Follow-up appointment with Dr. Knott is scheduled.    Condition at Discharge: stable     Discharge instructions/Information to patient and family:   See after visit summary for information provided to patient and family.      Provisions for Follow-Up Care:  See after visit summary for information related to follow-up care and any pertinent home health orders.      Disposition: Home          Planned Readmission: No    Discharge Statement:  I have spent a total time of 25 minutes in caring for this patient on the day of the visit/encounter.    Discharge Medications:  See after visit summary for reconciled discharge medications provided to patient and family.

## 2025-06-30 NOTE — PROGRESS NOTES
Chart update:    Note from thoracic surgery:     He was bradycardic/borderline bradycardia during a fair amount of his hospitalization (lung cancer surgery) so wanted to make sure his has continued follow up with cardio-oncology.    Plan:  He missed his last appointment because he was in the hospital for the lung cancer surgery.  We will get him back into clinic and discuss the previously recommended Zio patch.

## 2025-07-03 ENCOUNTER — TELEPHONE (OUTPATIENT)
Dept: CARDIAC SURGERY | Facility: CLINIC | Age: 65
End: 2025-07-03

## 2025-07-03 PROCEDURE — 88305 TISSUE EXAM BY PATHOLOGIST: CPT | Performed by: PATHOLOGY

## 2025-07-03 PROCEDURE — 88309 TISSUE EXAM BY PATHOLOGIST: CPT | Performed by: PATHOLOGY

## 2025-07-03 PROCEDURE — 88342 IMHCHEM/IMCYTCHM 1ST ANTB: CPT | Performed by: PATHOLOGY

## 2025-07-03 PROCEDURE — 88307 TISSUE EXAM BY PATHOLOGIST: CPT | Performed by: PATHOLOGY

## 2025-07-03 NOTE — TELEPHONE ENCOUNTER
"Attempted to call patient with both his home/mobile phone and \"other phone\" numbers. Both led me to a voicemail and was unable to leave a message due to the fact that the voicebox was full. Called his son who is listed as an emergency contact. The son answered. Wanted to complete the post-operative questions, especially to make sure pt's pain was controlled and did not need any supplemental medication over the holiday weekend. The son reported that the pt had only take \"3 Oxycodone total\" and would be good for the weekend. The son did state that he would call his father to make sure that information was correct and would call back with us in case he needed anything re-filled. I also reminded the son to make sure the pt's incisions were kept open to air, no band-aids on top, and to make sure no ointments or creams were applied to the incisions. I told the son to make sure the pt calls if his incisions at any time felt warm to touch or if he noticed any discharge. I also informed the son to make sure the pt stays active and uses his incentive spirometer. Son understood and acknowledged information.   "

## 2025-07-08 ENCOUNTER — TELEPHONE (OUTPATIENT)
Dept: CARDIAC SURGERY | Facility: CLINIC | Age: 65
End: 2025-07-08

## 2025-07-08 DIAGNOSIS — C34.12 ADENOCARCINOMA OF UPPER LOBE OF LEFT LUNG (HCC): Primary | ICD-10-CM

## 2025-07-08 RX ORDER — OXYCODONE HYDROCHLORIDE 5 MG/1
5 TABLET ORAL EVERY 4 HOURS PRN
Qty: 20 TABLET | Refills: 0 | Status: SHIPPED | OUTPATIENT
Start: 2025-07-08 | End: 2025-07-22 | Stop reason: SDUPTHER

## 2025-07-08 NOTE — TELEPHONE ENCOUNTER
Attempted to reach  daughter Gemma emergency contact regarding whether or not patient needs requested refill of oxy. Mailbox is full.  Will attempt to contact again.

## 2025-07-08 NOTE — TELEPHONE ENCOUNTER
Pt requesting refill of oxyCODONE (ROXICODONE) IR tablet 5 mg   [[id number]]    , not on med list please advise, please send to   [facility]/pharmacy #[id number] - [place] - [address]

## 2025-07-08 NOTE — TELEPHONE ENCOUNTER
Returned call to daughter Myrtle to follow up. Patient was with daughter during discussion.  Patient notes that he has been taking Tylenol 650 mg q6, Gabapentin 100 mg TID, Ibuprofen 600 mg q8 and Oxycodone 5 mg q6.  Patient notes that he he increased pain since he ran out of oxycodone.  Pain is in and around incision area. Patient reports needing refill of oxycodone and does not currently need refill of any other medications.  Confirmed pharmacy as [address]  Advised patient to also try heat/ice to help alleviate pain.  Advised that he can also use Lidocaine patches to help alleviate pain but to ensure that he does not place over incisional area. Patient acknowledged understanding and was appreciative of return call.

## 2025-07-08 NOTE — TELEPHONE ENCOUNTER
Patients daughter reports pt is having a lot of pain and does need the medication, please send to [facility] [place].      Daughters number is : [phone]

## 2025-07-10 ENCOUNTER — OFFICE VISIT (OUTPATIENT)
Dept: CARDIAC SURGERY | Facility: CLINIC | Age: 65
End: 2025-07-10

## 2025-07-10 VITALS
DIASTOLIC BLOOD PRESSURE: 70 MMHG | HEART RATE: 133 BPM | WEIGHT: 175.04 LBS | RESPIRATION RATE: 15 BRPM | OXYGEN SATURATION: 99 % | BODY MASS INDEX: 27.47 KG/M2 | TEMPERATURE: 98.1 F | HEIGHT: 67 IN | SYSTOLIC BLOOD PRESSURE: 119 MMHG

## 2025-07-10 DIAGNOSIS — C34.92 ADENOCARCINOMA OF LEFT LUNG (HCC): Primary | ICD-10-CM

## 2025-07-10 PROCEDURE — 99024 POSTOP FOLLOW-UP VISIT: CPT | Performed by: THORACIC SURGERY (CARDIOTHORACIC VASCULAR SURGERY)

## 2025-07-10 NOTE — PROGRESS NOTES
Name: Nahid Luis      : 1960      MRN: [id number]  Encounter Provider: Yen Knott MD  Encounter Date: 7/10/2025   Encounter department: [facility] [place]  :  Assessment & Plan  Adenocarcinoma of left lung (HCC)  Today in the office, I am overall happy with how he is doing.  I did advise him to start to wean off the oxycodone.  I recommended Tylenol 1000 mg every 8 hours around-the-clock as well as ibuprofen 600 mg every 8 hours around-the-clock.    We discussed his pathology in detail.  He had a complete pathologic response.  This is excellent news!  I will refer him back to Dr. Macedo of medical oncology to discuss additional immunotherapy treatment.    At this time, he will come back to see me in another 4 weeks to ensure continued progress           Thoracic History     Oncology History   Adenocarcinoma of left lung (HCC)   2025 Initial Diagnosis    Adenocarcinoma of left lung (HCC)     2025 -  Cancer Staged    Staging form: Lung, AJCC V9  - Clinical: cT2, cN2, cM0 - Signed by Taqueria Macedo MD on 2025  Stage prefix: Initial diagnosis  Method of lymph node assessment: Clinical  Histologic grade (G): G3  Histologic grading system: 4 grade system       2025 -  Chemotherapy    CARBOplatin (PARAPLATIN) IVPB (GOG AUC DOSING), 489 mg, 3 of 6 cycles  Administration: 489 mg (2025), 469.5 mg (4/10/2025), 531.5 mg (3/20/2025)  pemetrexed (ALIMTA) chemo infusion, 500 mg/m2 = 980 mg, 3 of 6 cycles  Administration: 980 mg (2025), 980 mg (4/10/2025), 980 mg (3/20/2025)  pembrolizumab (KEYTRUDA) IVPB, 200 mg, 3 of 6 cycles  Administration: 200 mg (2025), 200 mg (4/10/2025), 200 mg (3/20/2025)     2025 -  Cancer Staged    Staging form: Lung, AJCC V9  - Pathologic stage from 2025: ypT0, ypN0, cM0 - Signed by Yen Knott MD on 7/10/2025  Histopathologic type: Adenocarcinoma, NOS  Stage prefix: Post-therapy  Response to  neoadjuvant therapy: Complete response  Method of lymph node assessment: Lymph node dissection  Residual tumor (R): R0            History of Present Illness   HPI preop ECOG 0    Nahid Luis is a 65 y.o. male who is known to me.  He underwent a left upper lobectomy via thoracotomy on 6/24/2025.  He presents today for his first postoperative visit.    Today in the office, he reports that he is still having some pain but feels like he is getting better.  He has been active at home.  He denies any shortness of breath.  He is anxious to get back to work.    He underwent a chest x-ray, which I personally reviewed in PACS.  This demonstrates a well-expanded lung without a pleural effusion.  He does have an apical space but this is a normal finding after left upper lobectomy.    Review of Systems   Constitutional: Negative.  Negative for activity change, chills, fatigue, fever and unexpected weight change.   HENT:  Negative for sore throat, trouble swallowing and voice change.    Eyes: Negative.  Negative for visual disturbance.   Respiratory:  Negative for cough, chest tightness, shortness of breath, wheezing and stridor.    Cardiovascular:  Negative for chest pain and leg swelling.   Gastrointestinal: Negative.    Endocrine: Negative.    Genitourinary: Negative.    Musculoskeletal: Negative.    Skin: Negative.    Allergic/Immunologic: Negative.    Neurological:  Negative for seizures, syncope, speech difficulty, weakness, light-headedness and headaches.   Hematological:  Negative for adenopathy.   Psychiatric/Behavioral: Negative.        Medical History Reviewed by provider this encounter:     .  Past Medical History   Past Medical History[1]  Past Surgical History[2]  Family History[3]   reports that he quit smoking about 8 months ago. His smoking use included cigarettes. He started smoking about 50 years ago. He has a 49.8 pack-year smoking history. He has been exposed to tobacco smoke. He has never used  "smokeless tobacco. He reports that he does not currently use alcohol after a past usage of about 6.0 standard drinks of alcohol per week. He reports that he does not use drugs.  Current Outpatient Medications   Medication Instructions    albuterol (Proventil HFA) 90 mcg/act inhaler 2 puffs, Inhalation, Every 6 hours PRN    amLODIPine (NORVASC) 5 mg, Oral, Daily    apixaban (ELIQUIS) 5 mg, Oral, 2 times daily    atorvastatin (LIPITOR) 20 mg, Oral, Daily    dexamethasone (DECADRON) 4 mg, Oral, 2 times daily with meals, Take the day before and day after chemotherapy treatment.    Fluticasone-Salmeterol (Wixela Inhub) 100-50 mcg/dose inhaler 1 puff, Inhalation, 2 times daily, Rinse mouth after use.    gabapentin (NEURONTIN) 300 mg, Oral, 3 times daily    ibuprofen (MOTRIN) 600 mg, Oral, Every 6 hours scheduled, Take this with food.    losartan (COZAAR) 100 mg, Oral, Daily    metoprolol succinate (TOPROL-XL) 50 mg, Oral, Daily    ondansetron (ZOFRAN) 4 mg, Oral, Every 8 hours PRN    oxyCODONE (ROXICODONE) 5 mg, Oral, Every 4 hours PRN    phenazopyridine (PYRIDIUM) 200 mg, Oral, 3 times daily with meals   Allergies[4]   Medications Ordered Prior to Encounter[5]   Social History[6]     Objective   /70 (BP Location: Left arm, Patient Position: Sitting, Cuff Size: Standard)   Pulse (!) 133   Temp 98.1 °F (36.7 °C) (Temporal)   Resp 15   Ht 5' 7.4\" (1.712 m)   Wt 79.4 kg (175 lb 0.7 oz)   SpO2 99%   BMI 27.09 kg/m²     Pain Screening:  Pain Score:   4  ECOG    Physical Exam  Vitals and nursing note reviewed.   Constitutional:       Appearance: He is well-developed. He is not diaphoretic.   HENT:      Head: Normocephalic and atraumatic.      Nose: Nose normal. No congestion.      Mouth/Throat:      Mouth: Mucous membranes are moist.      Pharynx: Oropharynx is clear.     Eyes:      Extraocular Movements: Extraocular movements intact.      Pupils: Pupils are equal, round, and reactive to light.     Neck:      " Trachea: No tracheal deviation.     Cardiovascular:      Rate and Rhythm: Normal rate and regular rhythm.      Heart sounds: Normal heart sounds. No murmur heard.  Pulmonary:      Effort: Pulmonary effort is normal. No respiratory distress.      Breath sounds: Normal breath sounds. No stridor. No wheezing or rales.   Chest:      Chest wall: No tenderness.   Abdominal:      General: Bowel sounds are normal. There is no distension.      Palpations: Abdomen is soft.      Tenderness: There is no abdominal tenderness.     Musculoskeletal:         General: Normal range of motion.      Cervical back: Normal range of motion.   Lymphadenopathy:      Cervical: No cervical adenopathy.     Skin:     General: Skin is warm and dry.      Coloration: Skin is not pale.      Findings: No erythema or rash.      Comments: Well-healing incisions.  Chest tube stitch DC'd     Neurological:      Mental Status: He is alert and oriented to person, place, and time.     Psychiatric:         Behavior: Behavior normal.         Thought Content: Thought content normal.         Judgment: Judgment normal.         Labs:   Results for orders placed or performed during the hospital encounter of 06/24/25   CBC   Result Value Ref Range    WBC 12.81 (H) 4.31 - 10.16 Thousand/uL    RBC 3.52 (L) 3.88 - 5.62 Million/uL    Hemoglobin 10.5 (L) 12.0 - 17.0 g/dL    Hematocrit 32.5 (L) 36.5 - 49.3 %    MCV 92 82 - 98 fL    MCH 29.8 26.8 - 34.3 pg    MCHC 32.3 31.4 - 37.4 g/dL    RDW 15.4 (H) 11.6 - 15.1 %    Platelets 266 149 - 390 Thousands/uL    MPV 8.6 (L) 8.9 - 12.7 fL   Basic metabolic panel   Result Value Ref Range    Sodium 137 135 - 147 mmol/L    Potassium 5.1 3.5 - 5.3 mmol/L    Chloride 107 96 - 108 mmol/L    CO2 24 21 - 32 mmol/L    ANION GAP 6 4 - 13 mmol/L    BUN 15 5 - 25 mg/dL    Creatinine 1.10 0.60 - 1.30 mg/dL    Glucose 178 (H) 65 - 140 mg/dL    Calcium 8.5 8.4 - 10.2 mg/dL    eGFR 70 ml/min/1.73sq m   Result Value Ref Range    Magnesium 1.7  (L) 1.9 - 2.7 mg/dL   CBC   Result Value Ref Range    WBC 9.31 4.31 - 10.16 Thousand/uL    RBC 2.82 (L) 3.88 - 5.62 Million/uL    Hemoglobin 8.5 (L) 12.0 - 17.0 g/dL    Hematocrit 26.1 (L) 36.5 - 49.3 %    MCV 93 82 - 98 fL    MCH 30.1 26.8 - 34.3 pg    MCHC 32.6 31.4 - 37.4 g/dL    RDW 15.4 (H) 11.6 - 15.1 %    Platelets 228 149 - 390 Thousands/uL    MPV 8.6 (L) 8.9 - 12.7 fL   Basic metabolic panel   Result Value Ref Range    Sodium 134 (L) 135 - 147 mmol/L    Potassium 4.7 3.5 - 5.3 mmol/L    Chloride 102 96 - 108 mmol/L    CO2 25 21 - 32 mmol/L    ANION GAP 7 4 - 13 mmol/L    BUN 16 5 - 25 mg/dL    Creatinine 0.95 0.60 - 1.30 mg/dL    Glucose 147 (H) 65 - 140 mg/dL    Calcium 8.6 8.4 - 10.2 mg/dL    eGFR 83 ml/min/1.73sq m   Result Value Ref Range    Magnesium 2.5 1.9 - 2.7 mg/dL   CBC and differential   Result Value Ref Range    WBC 8.26 4.31 - 10.16 Thousand/uL    RBC 3.12 (L) 3.88 - 5.62 Million/uL    Hemoglobin 9.3 (L) 12.0 - 17.0 g/dL    Hematocrit 30.3 (L) 36.5 - 49.3 %    MCV 97 82 - 98 fL    MCH 29.8 26.8 - 34.3 pg    MCHC 30.7 (L) 31.4 - 37.4 g/dL    RDW 15.4 (H) 11.6 - 15.1 %    MPV 8.9 8.9 - 12.7 fL    Platelets 257 149 - 390 Thousands/uL    nRBC 0 /100 WBCs    Segmented % 53 43 - 75 %    Immature Grans % 1 0 - 2 %    Lymphocytes % 33 14 - 44 %    Monocytes % 10 4 - 12 %    Eosinophils Relative 3 0 - 6 %    Basophils Relative 0 0 - 1 %    Absolute Neutrophils 4.44 1.85 - 7.62 Thousands/µL    Absolute Immature Grans 0.05 0.00 - 0.20 Thousand/uL    Absolute Lymphocytes 2.70 0.60 - 4.47 Thousands/µL    Absolute Monocytes 0.82 0.17 - 1.22 Thousand/µL    Eosinophils Absolute 0.22 0.00 - 0.61 Thousand/µL    Basophils Absolute 0.03 0.00 - 0.10 Thousands/µL   Basic metabolic panel   Result Value Ref Range    Sodium 137 135 - 147 mmol/L    Potassium 5.2 3.5 - 5.3 mmol/L    Chloride 105 96 - 108 mmol/L    CO2 29 21 - 32 mmol/L    ANION GAP 3 (L) 4 - 13 mmol/L    BUN 22 5 - 25 mg/dL    Creatinine 1.19 0.60 -  1.30 mg/dL    Glucose 121 65 - 140 mg/dL    Calcium 8.8 8.4 - 10.2 mg/dL    eGFR 63 ml/min/1.73sq m   Prepare Leukoreduced RBC: 2 Units   Result Value Ref Range    Unit Product Code C4269X42     Unit Number [id number]     Unit ABO O     Unit RH POS     Crossmatch Compatible     Unit Dispense Status Return to Inv     Unit Product Volume 300 ml    Unit Product Code Z7791M95     Unit Number [id number]     Unit ABO O     Unit RH POS     Crossmatch Compatible     Unit Dispense Status Return to Inv     Unit Product Volume 300 ml   Tissue Exam   Result Value Ref Range    Case Report       Surgical Pathology Report                         Case: [id number]                                  Authorizing Provider:  Yen Knott MD    Collected:           06/24/2025 1035              Ordering Location:     [facility]      Received:            06/24/2025 [address] [place] [address] Operating Room                                                      Pathologist:           Emmanuel Aguila MD                                                       Specimens:   A) - Lymph Node, LEVEL 6                                                                            B) - Lymph Node, LEVEL 11                                                                           C) - Lung, Left Upper Lobe                                                                          D) - Lymph Node, LEVEL 5                                                                            E) - Lymph Node, LVELE 5 #2                                                                Final Diagnosis       A. Lymph Node, LEVEL 6, ;excision:   -Single benign lymph node with anthracotic pigmentation,  sinus histiocytosis , negative for malignancy,  confirmed by Pankeratin MCK stain  (0/1).       B. Lymph Node, LEVEL 11, , ;excision:   -Single benign lymph node with anthracotic pigmentation,  sinus histiocytosis , negative for  malignancy (0/1).       C. Lung, Left Upper Lobe, lobectomy:   -Status post S/p neoadjuvant- chemotherapy for adenocarcinoma of the lung  -No evidence of residual tumor seen  -Changes of previous chemotherapy including stromal  elastosis, fibrosis, cholesterol granulomas, necrotizing granluomas, histiocytic aggregates, foci of hemosiderin laden macrophages , multinucleated histiocytes  -Foci of intraalveolar hemorrhage and fibrin deposition  -Scattered foci of epithelioid granulomas ( aggregates of non -caseating  epithelioid  histiocytes), possibly due to previous  neoadjuvant- chemotherapy   -Single focus of atypical adenomatous hyperplasia, 7 mm , away from the area of  previous tumor  ( slide C47)   -Cystic cavities lined by many histiocytes  -Foci of stromal  metaplastic osseous metaplasia    Emphysematous changes  -Intraparenchymal blood vessels with mural hyalinosis and stenosis  -Adhered benign pleural tissue and chest wall with stromal fibrosis  -Four intraparenchymal lymph node with anthracotic pigmentation,  sinus histiocytosis , negative for malignancy,  confirmed by Pankeratin MCK stain (0/4).  -Five fragments of peribronchial lymph nodes with anthracotic pigmentation,  sinus histiocytosis , negative for malignancy (0/5).  -All surgical margins are negative for malignancy.    D. Lymph Node, LEVEL 5, ;excision:   -Five fragments of lymph node with anthracotic pigmentation,  sinus histiocytosis , negative for malignancy,  confirmed by Pankeratin MCK stain (0/5).      E. Lymph Node, LVELE 5 #2,  ;excision:   -Single benign lymph node with anthracotic pigmentation,  sinus histiocytosis , negative for malignancy,  confirmed by Pankeratin MCK stain (0/1).         Note       Intradepartmental consultation is in agreement   Interpretation performed at [facility], [address] [place] [address]    Dr ALPA Knott , was notified by EPIC message on 7/3/25 at 2:20 PM.    Clinical  data  Op notes   Preop Diagnosis:  Adenocarcinoma of upper lobe of left lung (HCC) [C34.12]     Post-Op Diagnosis Codes:     * Adenocarcinoma of upper lobe of left lung (HCC) [C34.12]     Procedure(s):  Left - LOBECTOMY LUNG THORACOSCOPIC W/ ROBOTICS CONVERTED TO OPEN; MEDIASTINAL LYMPH NODE DISSECTION; PLEURAL LYSIS OF ADHESIONS      Additional Information       All reported additional testing was performed with appropriately reactive controls.  These tests were developed and their performance characteristics determined by [facility] or appropriate performing facility, though some tests may be performed on tissues which have not been validated for performance characteristics (such as staining performed on alcohol exposed cell blocks and decalcified tissues).  Results should be interpreted with caution and in the context of the patients’ clinical condition. These tests may not be cleared or approved by the U.S. Food and Drug Administration, though the FDA has determined that such clearance or approval is not necessary. These tests are used for clinical purposes and they should not be regarded as investigational or for research. This laboratory has been approved by [facility], designated as a high-complexity laboratory and is qualified to perform these tests.  .      Synoptic Checklist       LUNG   LUNG - All Specimens   AJCC 9 - Protocol posted: 12/11/2024      SPECIMEN      Procedure:    Lobectomy      Specimen Laterality:    Left      TUMOR      Tumor Focality:    Cannot be determined      Tumor Site:    Upper lobe of lung      Tumor Size:            Invasive Tumor Size:    Cannot be determined: No residual tumor      Histologic Type:    No viable tumor present: S/p neoadjuvant- chemotherapy for adenocarcinoma of the lung      Histologic Grade:    Not applicable      Spread Through Air Spaces (DAIN):    Not identified      Visceral Pleura Invasion:    Not identified      Direct Invasion of Other  Structures:    Not identified      Treatment Effect:    Present        Percentage of Residual Viable Tumor:    0 %        Percentage of Necrosis:    20 %        Percentage of Stroma (includes fibrosis and inflammation):    80 %        Inflammation:    Moderate      Lymphatic and / or Vascular Invasion:    Not identified      MARGINS      Margin Status for Invasive Tumor:    All margins negative for invasive tumor        Closest Margin(s) to Invasive Tumor:    Cannot be determined: -No evidence of residual tumor seen        Distance from Invasive Tumor to Closest Margin:    Not applicable      Margin Status for Non-Invasive Tumor:    Not applicable      REGIONAL LYMPH NODES      Lymph Node(s) from Prior Procedures:    Not included      Regional Lymph Node Status:            :    All regional lymph nodes negative for tumor        Number of Lymph Nodes Examined:    17          Reji Site(s) Examined:    5: Subaortic / aortopulmonary (AP) / AP window          Reji Site(s) Examined:    6: Para-aortic (ascending aorta or phrenic)          Reji Site(s) Examined:    11L: Interlobar          Reji Site(s) Examined:    Left node(s): peribronchial LN      pTNM CLASSIFICATION (AJCC Version 9)      Reporting of pT, pN, and (when applicable) pM categories is based on information available to the pathologist at the time the report is issued. As per the AJCC (Chapter 1, 8th Ed.) it is the managing physician's responsibility to establish the final pathologic stage based upon all pertinent information, including but potentially not limited to this pathology report.      Modified Classification:    y      pT Category:    pT0      pN Category:    pN0      ADDITIONAL FINDINGS      Additional Findings:    Atypical adenomatous hyperplasia      Additional Findings:    Granulomatous inflammation: non -caseating  epithelioid  histiocytes      Additional Findings:    Emphysema      Gross Description       A. The specimen is received in  "formalin, labeled with the patient's name and hospital number, and is designated \" level 6 lymph node\".  The specimen consists of 1 anthracotic dense nodule consistent with lymph node measuring 1.5 cm in greatest dimension.  Entirely submitted. One cassette, bisected.  B. The specimen is received in formalin, labeled with the patient's name and hospital number, and is designated \" level 11 lymph node\".  The specimen consists of 1 anthracotic dense nodule consistent with lymph node measuring 1.0 cm in greatest dimension.  Entirely submitted. One cassette.  C. The specimen is received in formalin, labeled with the patient's name and hospital number, and is designated \" left upper lobe lung\".  The specimen consists of a 453 g lung lobe measuring 20.9 x 10.9 x 5.4 cm.  Please note, measurement and weight taken after formalin infusion/fixation.  There is an unoriented staple line adjacent to bronchus measuring 13.9 cm in length.  The posterior pleural surface exhibits a rough area with adjacent puckering.  This rough area is possibly chest wall pleura and muscle which will be determined microscopically.  The staple line is removed, and the resultant exposed margin is inked blue the pleura surface rough area and area of puckering is inked black.  The specimen is sectioned, and corresponding to roughened area and area of puckering, is an ill-defined, partially spiculated, gray-brown black dense mass which measures 4.1 x 3.9 x 3.6 cm.  This mass exhibits cystic features and comes within less than 0.1 cm of rough pleural surface/possible chest wall pleura and muscle, 0.3 cm from unoriented staple line, 1.8 cm from bronchial margin of resection, and 2.6 cm from vasculature margin of resection.  The mass appears to abut and possibly focally invade the bronchus (not margin).  Photographs are taken before and after inking and sectioning.  The parenchyma adjacent to the mass is partially cystic in appearance.  The remaining " "parenchyma is reddish-brown and unremarkable.  Sectioning through the peribronchial tissue reveals 5 anthracotic density suggestive of lymph nodes which range from 0.2 cm to 1.0 cm in greatest dimension.  Representative sections. Seventy cassettes, including entire lesion as follows:    1: Bronchial margin of resection, shaved  2: Vasculature margin of resection, shaved  3-65: Lesion, with a complete section bisected in cassettes 9-10, complete section trisected in cassettes 6-8; 11-13, 18-20, a complete section quadrisected in cassettes 14-17; 21-24; 25-28; 29-32; 39-42; and a complete section (6 pieces), in cassettes 33-38; 43-48; 49-54; 55-60 as follows:      3-6: Closest approach to bronchus (not margin) and resultant exposed unoriented staple line      14-15; 16: Closest approach to pleural surface      21-22; 25-26; 29-30; 33-34; 43-44: Closest approach to rough area/possible chest wall pleura and muscle  61-62: Uninvolved parenchyma  63: 2 lymph nodes in an embedding bag  64: 1 lymph node  65-66 1 lymph node bisected in each, with embedding bag utilized in cassette 66  67-70: Random sections of peribronchial tissue for microscopic lymph node evaluation    Please note: There is additional green-orange and yellow ink for reapproximation purposes only in cassettes 14-17, 21-58.    D. The specimen is received in formalin, labeled with the patient's name and hospital number, and is designated \" level 5 lymph node\".  The specimen consists of 5 anthracotic dense nodules consistent with lymph nodes/lymph node fragments which range from 0.2 cm to 1.1 cm in greatest dimension.  Entirely submitted. Two cassettes.    1: 3 lymph nodes/lymph node fragments, in an embedding bag  2: 2 lymph nodes  E. The specimen is received in formalin, labeled with the patient's name and hospital number, and is designated \" level 5 #2 lymph node\".  The specimen consists of 1 gray-brown black dense nodule consistent with lymph node " measuring 1.5 x 1.4 x 0.6 cm.  The specimen is bisected revealing gray-brown black dense cut surfaces that fragment into 4 pieces.  Entirely submitted. Two cassettes, with the smaller fragments in an embedding bag in cassette A2.    Note: The estimated total formalin fixation time based upon information provided by the submitting clinician and the standard processing schedule is over 72 hours.  [place]     POCT Blood Gas (CG8+)   Result Value Ref Range    pH, Art i-STAT 7.234 (L) 7.350 - 7.450    pCO2, Art i-STAT 59.8 (H) 36.0 - 44.0 mm HG    pO2, ART i-STAT 128.0 75.0 - 129.0 mm HG    BE, i-STAT -3 (L) -2 - 3 mmol/L    HCO3, Art i-STAT 25.3 22.0 - 28.0 mmol/L    CO2, i-STAT 27 21 - 32 mmol/L    O2 Sat, i-STAT 98 (H) 60 - 85 %    SODIUM, I-STAT 136 136 - 145 mmol/l    Potassium, i-STAT 4.8 3.5 - 5.3 mmol/L    Calcium, Ionized i-STAT 1.25 1.12 - 1.32 mmol/L    Hct, i-STAT 31 (L) 36.5 - 49.3 %    Hgb, i-STAT 10.5 (L) 12.0 - 17.0 g/dl    Glucose, i-STAT 166 (H) 65 - 140 mg/dl    Specimen Type ARTERIAL    POCT Blood Gas (CG8+)   Result Value Ref Range    pH, Art i-STAT 7.277 (L) 7.350 - 7.450    pCO2, Art i-STAT 48.6 (H) 36.0 - 44.0 mm HG    pO2, ART i-STAT 320.0 (H) 75.0 - 129.0 mm HG    BE, i-STAT -4 (L) -2 - 3 mmol/L    HCO3, Art i-STAT 22.7 22.0 - 28.0 mmol/L    CO2, i-STAT 24 21 - 32 mmol/L    O2 Sat, i-STAT 100 (H) 60 - 85 %    SODIUM, I-STAT 140 136 - 145 mmol/l    Potassium, i-STAT 4.8 3.5 - 5.3 mmol/L    Calcium, Ionized i-STAT 1.18 1.12 - 1.32 mmol/L    Hct, i-STAT 32 (L) 36.5 - 49.3 %    Hgb, i-STAT 10.9 (L) 12.0 - 17.0 g/dl    Glucose, i-STAT 179 (H) 65 - 140 mg/dl    Specimen Type ARTERIAL         Radiology Results Review : I have reviewed images/report studies in PACS as described above (in the HPI).  Pathology: I have reviewed pathology reports described above.           [1]   Past Medical History:  Diagnosis Date    Anemia     Anxiety     Bladder cancer (HCC)     Cancer (HCC)     Colon polyp     COPD  (chronic obstructive pulmonary disease) (HCC)     Depression     History of transfusion 10/2024    Hyperlipidemia     Hypertension     Irregular heart beat     afib    Mass of left lung     No natural teeth     Pneumonia     Wears glasses    [2]   Past Surgical History:  Procedure Laterality Date    APPENDECTOMY      COLONOSCOPY      CYSTOSCOPY      ENDOBRONCHIAL ULTRASOUND (EBUS)  01/08/2025    IR BIOPSY LUNG  1/29/2025    IR LOWER EXTREMITY ANGIOGRAM  09/24/2024    MD BRNCHSC INCL FLUOR GDNCE DX W/CELL WASHG SPX N/A 6/24/2025    Procedure: BRONCHOSCOPY FLEXIBLE;  Surgeon: Yen Knott MD;  Location: [facility] [facility];  Service: Thoracic    MD BYP FEM-ANT TIBL PST TIBL PRONEAL ART/OTH DSTL Right 10/31/2024    Procedure: right Common femoral artery to anterior tibial bypass with autologous vein;  Surgeon: Carlos Bray DO;  Location: [facility];  Service: Vascular    MD CYSTO W/REMOVAL OF LESIONS SMALL N/A 1/21/2025    Procedure: TRANSURETHRAL RESECTION OF BLADDER TUMOR (TURBT);  Surgeon: Toni Real MD;  Location: [facility];  Service: Urology    MD THORACOSCOPY W/LOBECTOMY SINGLE LOBE Left 6/24/2025    Procedure: LOBECTOMY LUNG THORACOSCOPIC W/ ROBOTICS CONVERTED TO OPEN; MEDIASTINAL LYMPH NODE DISSECTION; PLEURAL LYSIS OF ADHESIONS;  Surgeon: Yen Knott MD;  Location: [facility];  Service: Thoracic    TRANSURETHRAL RESECTION OF BLADDER TUMOR N/A 11/05/2024    Procedure: (TURBT);  Surgeon: Gilmer Duke MD;  Location: [facility];  Service: Urology    US GUIDED LYMPH NODE BIOPSY LEFT  01/02/2025   [3]   Family History  Problem Relation Name Age of Onset    Alcohol abuse Father      Heart attack Father     [4] No Known Allergies  [5]   Current Outpatient Medications on File Prior to Visit   Medication Sig Dispense Refill    albuterol (Proventil HFA) 90 mcg/act inhaler Inhale 2 puffs every 6 (six) hours as needed for wheezing 6.7 g 5    amLODIPine (NORVASC) 5 mg tablet Take 1 tablet (5 mg total) by  mouth daily 30 tablet 11    apixaban (Eliquis) 5 mg Take 1 tablet (5 mg total) by mouth 2 (two) times a day 180 tablet 1    atorvastatin (LIPITOR) 20 mg tablet Take 1 tablet (20 mg total) by mouth daily 100 tablet 1    dexamethasone (DECADRON) 4 mg tablet Take 1 tablet (4 mg total) by mouth 2 (two) times a day with meals Take the day before and day after chemotherapy treatment. 16 tablet 0    Fluticasone-Salmeterol (Wixela Inhub) 100-50 mcg/dose inhaler Inhale 1 puff 2 (two) times a day Rinse mouth after use. 200 blister 1    gabapentin (Neurontin) 300 mg capsule Take 1 capsule (300 mg total) by mouth 3 (three) times a day for 21 days 63 capsule 0    losartan (COZAAR) 100 MG tablet Take 1 tablet (100 mg total) by mouth daily 100 tablet 1    metoprolol succinate (TOPROL-XL) 50 mg 24 hr tablet Take 1 tablet (50 mg total) by mouth daily 100 tablet 1    oxyCODONE (Roxicodone) 5 immediate release tablet Take 1 tablet (5 mg total) by mouth every 4 (four) hours as needed for moderate pain for up to 20 doses Max Daily Amount: 30 mg 20 tablet 0    ibuprofen (MOTRIN) 600 mg tablet Take 1 tablet (600 mg total) by mouth every 6 (six) hours for 7 days Take this with food. (Patient not taking: Reported on 7/10/2025) 28 tablet 0    ondansetron (ZOFRAN) 4 mg tablet Take 1 tablet (4 mg total) by mouth every 8 (eight) hours as needed for nausea or vomiting (Patient not taking: Reported on 7/10/2025) 20 tablet 1    phenazopyridine (PYRIDIUM) 200 mg tablet Take 1 tablet (200 mg total) by mouth 3 (three) times a day with meals (Patient not taking: Reported on 7/10/2025) 10 tablet 0     No current facility-administered medications on file prior to visit.   [6]   Social History  Tobacco Use    Smoking status: Former     Current packs/day: 0.00     Average packs/day: 1 pack/day for 49.8 years (49.8 ttl pk-yrs)     Types: Cigarettes     Start date:      Quit date: 10/30/2024     Years since quittin.6     Passive exposure: Current     Smokeless tobacco: Never   Vaping Use    Vaping status: Never Used   Substance and Sexual Activity    Alcohol use: Not Currently     Alcohol/week: 6.0 standard drinks of alcohol     Types: 6 Cans of beer per week     Comment: daily 3-5 beers daily and shots last drank 10/30    Drug use: No    Sexual activity: Not Currently

## 2025-07-10 NOTE — ASSESSMENT & PLAN NOTE
Today in the office, I am overall happy with how he is doing.  I did advise him to start to wean off the oxycodone.  I recommended Tylenol 1000 mg every 8 hours around-the-clock as well as ibuprofen 600 mg every 8 hours around-the-clock.    We discussed his pathology in detail.  He had a complete pathologic response.  This is excellent news!  I will refer him back to Dr. Macedo of medical oncology to discuss additional immunotherapy treatment.    At this time, he will come back to see me in another 4 weeks to ensure continued progress

## 2025-07-22 DIAGNOSIS — C34.12 ADENOCARCINOMA OF UPPER LOBE OF LEFT LUNG (HCC): ICD-10-CM

## 2025-07-22 RX ORDER — OXYCODONE HYDROCHLORIDE 5 MG/1
5 TABLET ORAL EVERY 4 HOURS PRN
Qty: 20 TABLET | Refills: 0 | Status: SHIPPED | OUTPATIENT
Start: 2025-07-22

## 2025-07-22 NOTE — TELEPHONE ENCOUNTER
Refill must be reviewed and completed by the office or provider. The refill is unable to be approved or denied by the medication management team.        Patient Id Prescription # Sold Filled Written Drug Label Qty Days Strength MME* Prescriber Pharmacy Payment REFILL #/Auth State Detail  1 [id number] ** 07/08/2025 07/08/2025 oxyCODONE HCL (Tablet) 20.0 4 5 MG 37.50 PRUDENCIO MURCIA [facility] Private Pay 0 / 0 PA   1 [id number] ** 06/25/2025 06/25/2025 oxyCODONE HCL (Tablet) 20.0 7 5 MG 21.43 AMYLYN, MORTIMER [facility] Private Pay 0 / 0 PA

## 2025-07-28 ENCOUNTER — TELEPHONE (OUTPATIENT)
Dept: CARDIAC SURGERY | Facility: CLINIC | Age: 65
End: 2025-07-28

## 2025-07-28 DIAGNOSIS — C34.12 ADENOCARCINOMA OF UPPER LOBE OF LEFT LUNG (HCC): ICD-10-CM

## 2025-07-28 RX ORDER — OXYCODONE HYDROCHLORIDE 5 MG/1
5 TABLET ORAL EVERY 4 HOURS PRN
Qty: 15 TABLET | Refills: 0 | Status: SHIPPED | OUTPATIENT
Start: 2025-07-28

## 2025-07-30 ENCOUNTER — TELEPHONE (OUTPATIENT)
Age: 65
End: 2025-07-30

## 2025-08-06 ENCOUNTER — OFFICE VISIT (OUTPATIENT)
Age: 65
End: 2025-08-06
Payer: MEDICARE

## 2025-08-06 VITALS
WEIGHT: 167 LBS | HEIGHT: 67 IN | BODY MASS INDEX: 26.21 KG/M2 | HEART RATE: 70 BPM | OXYGEN SATURATION: 99 % | DIASTOLIC BLOOD PRESSURE: 70 MMHG | SYSTOLIC BLOOD PRESSURE: 144 MMHG

## 2025-08-06 DIAGNOSIS — I10 PRIMARY HYPERTENSION: ICD-10-CM

## 2025-08-06 DIAGNOSIS — I73.9 PAD (PERIPHERAL ARTERY DISEASE) (HCC): ICD-10-CM

## 2025-08-06 DIAGNOSIS — I48.0 PAROXYSMAL A-FIB (HCC): Primary | ICD-10-CM

## 2025-08-06 DIAGNOSIS — I70.221 ATHEROSCLEROSIS OF NATIVE ARTERIES OF EXTREMITIES WITH REST PAIN, RIGHT LEG (HCC): ICD-10-CM

## 2025-08-06 DIAGNOSIS — E78.2 MIXED HYPERLIPIDEMIA: ICD-10-CM

## 2025-08-06 DIAGNOSIS — I25.10 CORONARY ARTERY CALCIFICATION: ICD-10-CM

## 2025-08-06 DIAGNOSIS — R06.09 DOE (DYSPNEA ON EXERTION): ICD-10-CM

## 2025-08-06 PROCEDURE — 93000 ELECTROCARDIOGRAM COMPLETE: CPT | Performed by: INTERNAL MEDICINE

## 2025-08-06 PROCEDURE — 99214 OFFICE O/P EST MOD 30 MIN: CPT | Performed by: INTERNAL MEDICINE

## 2025-08-06 PROCEDURE — G2211 COMPLEX E/M VISIT ADD ON: HCPCS | Performed by: INTERNAL MEDICINE

## 2025-08-07 ENCOUNTER — OFFICE VISIT (OUTPATIENT)
Dept: CARDIAC SURGERY | Facility: CLINIC | Age: 65
End: 2025-08-07

## 2025-08-07 VITALS
TEMPERATURE: 97.7 F | DIASTOLIC BLOOD PRESSURE: 62 MMHG | RESPIRATION RATE: 17 BRPM | HEIGHT: 67 IN | BODY MASS INDEX: 26.02 KG/M2 | SYSTOLIC BLOOD PRESSURE: 118 MMHG | WEIGHT: 165.79 LBS

## 2025-08-07 DIAGNOSIS — C34.90 MALIGNANT NEOPLASM OF LUNG, UNSPECIFIED LATERALITY, UNSPECIFIED PART OF LUNG (HCC): ICD-10-CM

## 2025-08-07 DIAGNOSIS — C34.92 ADENOCARCINOMA OF LEFT LUNG (HCC): ICD-10-CM

## 2025-08-07 DIAGNOSIS — C34.12 ADENOCARCINOMA OF UPPER LOBE OF LEFT LUNG (HCC): Primary | ICD-10-CM

## 2025-08-07 PROCEDURE — 99024 POSTOP FOLLOW-UP VISIT: CPT | Performed by: THORACIC SURGERY (CARDIOTHORACIC VASCULAR SURGERY)

## (undated) DEVICE — INVIEW CLEAR LEGGINGS: Brand: CONVERTORS

## (undated) DEVICE — PREMIUM DRY TRAY LF: Brand: MEDLINE INDUSTRIES, INC.

## (undated) DEVICE — CADIERE FORCEPS: Brand: ENDOWRIST

## (undated) DEVICE — Device

## (undated) DEVICE — SURGICEL 4 X 8IN

## (undated) DEVICE — HEMOCLIP CARTRIDGE SM

## (undated) DEVICE — BASIC SINGLE BASIN 2-LF: Brand: MEDLINE INDUSTRIES, INC.

## (undated) DEVICE — IV EXTENSION TUBING 33 IN

## (undated) DEVICE — PACK CUSTOM CARDIOVASCULAR

## (undated) DEVICE — SMALL DIAMETER CURVED TIP INTELLIGENT RELOAD VASCULAR/THIN; FOR USE WITH 8 MM CANNULA OR LARGER: Brand: SIGNIA

## (undated) DEVICE — UROLOGIC DRAIN BAG: Brand: UNBRANDED

## (undated) DEVICE — SUT SILK 2-0 30 IN A305H

## (undated) DEVICE — POWER SHELL: Brand: SIGNIA

## (undated) DEVICE — SUT PROLENE 7-0 BV 175-6 24 IN 8735H

## (undated) DEVICE — INTENDED FOR TISSUE SEPARATION, AND OTHER PROCEDURES THAT REQUIRE A SHARP SURGICAL BLADE TO PUNCTURE OR CUT.: Brand: BARD-PARKER SAFETY BLADES SIZE 15, STERILE

## (undated) DEVICE — HF-RESECTION ELECTRODE PLASMALOOP LOOP, MEDIUM, 24 FR., 12°-30°, ESG TURIS: Brand: OLYMPUS

## (undated) DEVICE — ELECTRODE BLADE MOD E-Z CLEAN  2.75IN 7CM -0012AM

## (undated) DEVICE — VESSEL CANNULA

## (undated) DEVICE — ASTOUND STANDARD SURGICAL GOWN, XL: Brand: CONVERTORS

## (undated) DEVICE — GAUZE SPONGES,16 PLY: Brand: CURITY

## (undated) DEVICE — SYRINGE CATH TIP 50ML

## (undated) DEVICE — VESSEL LOOPS X-RAY DETECTABLE: Brand: DEROYAL

## (undated) DEVICE — LUBRICANT JELLY SURGILUBE TUBE 2OZ FLIP TOP

## (undated) DEVICE — INDICATED FOR SURGICAL CLAMPING DURING CARDIOVASCULAR PERIPHERAL VASCULAR, AND GENERAL SURGERY.: Brand: SOFT/FIBRA® SPRING CLIP  1/2 FORCE

## (undated) DEVICE — DRESSING MEPILEX AG BORDER POST-OP 4 X 8 IN

## (undated) DEVICE — STOPCOCK 3-WAY

## (undated) DEVICE — SUT SILK 2-0 SH 30 IN K833H

## (undated) DEVICE — PROBE COVER: Brand: STERILE PROBE COVER

## (undated) DEVICE — CURVED TIP INTELLIGENT RELOAD AND INTRODUCER: Brand: TRI-STAPLE 2.0

## (undated) DEVICE — TIP-UP FENESTRATED GRASPER: Brand: ENDOWRIST

## (undated) DEVICE — BLACK INTELLIGENT RELOAD: Brand: TRI-STAPLE 2.0

## (undated) DEVICE — DECANTER: Brand: UNBRANDED

## (undated) DEVICE — GLOVE SRG BIOGEL 6.5

## (undated) DEVICE — HEMOSTATIC MATRIX SURGIFLO 8ML W/THROMBIN

## (undated) DEVICE — SCD SEQUENTIAL COMPRESSION COMFORT SLEEVE MEDIUM KNEE LENGTH: Brand: KENDALL SCD

## (undated) DEVICE — PENCIL ELECTROSURG E-Z CLEAN -0035H

## (undated) DEVICE — EXOFIN PRECISION PEN HIGH VISCOSITY TOPICAL SKIN ADHESIVE: Brand: EXOFIN PRECISION PEN, 1G

## (undated) DEVICE — PACK UNIVERSAL DRAPES SUB-Q ICD

## (undated) DEVICE — UTILITY MARKER,BLACK WITH LABELS: Brand: DEVON

## (undated) DEVICE — PREVENA PEEL & PLACE SYSTEM KIT- 13 CM: Brand: PREVENA™ PEEL & PLACE™

## (undated) DEVICE — STERILE POLYISOPRENE POWDER-FREE SURGICAL GLOVES: Brand: PROTEXIS

## (undated) DEVICE — LUBRICANT JELLY SURGILUBE TUBE 4OZ FLIP TOP

## (undated) DEVICE — COBAN 4 IN STERILE

## (undated) DEVICE — CATHETER PLUG WITH CAP: Brand: DOVER

## (undated) DEVICE — COLUMN DRAPE

## (undated) DEVICE — EXIDINE 4 PCT

## (undated) DEVICE — LARGE (BLUE) FOR GRAFTS UP TO 10 MM, 20 1/2" / 52 CM (1/PKG): Brand: SCANLAN® VASCULAR TUNNELER SHEATHS AND BULLET TIPS

## (undated) DEVICE — BIPOLAR GRASPER, LONG: Brand: ENDOWRIST

## (undated) DEVICE — GLOVE SRG BIOGEL 7.5

## (undated) DEVICE — SUT VICRYL PLUS 0 UR-6 27IN VCP603H

## (undated) DEVICE — 40529 DERMAPROX PAD 11'' X 15'' X 1'': Brand: 40529 DERMAPROX PAD 11'' X 15'' X 1''

## (undated) DEVICE — ANTIBACTERIAL UNDYED BRAIDED (POLYGLACTIN 910), SYNTHETIC ABSORBABLE SUTURE: Brand: COATED VICRYL

## (undated) DEVICE — POV-IOD SOLUTION 4OZ BT

## (undated) DEVICE — CATH FOLEY 20FR 5ML 2 WAY UNCOATED SILICONE

## (undated) DEVICE — TUBING SUCTION 5MM X 12 FT

## (undated) DEVICE — DRAPE SHEET THREE QUARTER

## (undated) DEVICE — ARM DRAPE

## (undated) DEVICE — PROXIMATE SKIN STAPLERS (35 WIDE) CONTAINS 35 STAINLESS STEEL STAPLES (FIXED HEAD): Brand: PROXIMATE

## (undated) DEVICE — SUT PROLENE 6-0 BV-1/BV-1 24 IN 8305H

## (undated) DEVICE — MICROPUNCTURE 501

## (undated) DEVICE — GLOVE SRG BIOGEL 7

## (undated) DEVICE — SYRINGE 30ML LL

## (undated) DEVICE — SUT SILK 4-0 30 IN A303H

## (undated) DEVICE — PACK TUR

## (undated) DEVICE — INSTRUMENT POUCH: Brand: CONVERTORS

## (undated) DEVICE — SUT PROLENE 0 CT-1 30 IN 8424H

## (undated) DEVICE — DISPOSABLE OR TOWEL: Brand: CARDINAL HEALTH

## (undated) DEVICE — HEMOCLIP CARTRIDGE MED

## (undated) DEVICE — FLUID MANAGEMENT KIT - IR

## (undated) DEVICE — APPLIER SURGICLIP PREMIUM SMALL 9IN

## (undated) DEVICE — VISUALIZATION SYSTEM: Brand: CLEARIFY

## (undated) DEVICE — INTENDED FOR TISSUE SEPARATION, AND OTHER PROCEDURES THAT REQUIRE A SHARP SURGICAL BLADE TO PUNCTURE OR CUT.: Brand: BARD-PARKER SAFETY BLADES SIZE 11, STERILE

## (undated) DEVICE — STERILE BETHLEHEM FEM POP PACK: Brand: CARDINAL HEALTH

## (undated) DEVICE — GLOVE PI ULTRA TOUCH SZ.6.5

## (undated) DEVICE — SEAL

## (undated) DEVICE — TELFA NON-ADHERENT ABSORBENT DRESSING: Brand: TELFA

## (undated) DEVICE — SUT MONOCRYL 4-0 PS-2 27 IN Y426H

## (undated) DEVICE — SUT SILK 3-0 30 IN A304H

## (undated) DEVICE — SURGIFOAM 8.5 X 12.5

## (undated) DEVICE — SOLUTION BOWL: Brand: KENDALL

## (undated) DEVICE — BASIC SINGLE BASIN-LF: Brand: MEDLINE INDUSTRIES, INC.

## (undated) DEVICE — PINNACLE R/O II INTRODUCER SHEATH WITH RADIOPAQUE MARKER: Brand: PINNACLE

## (undated) DEVICE — Device: Brand: OLYMPUS

## (undated) DEVICE — SPONGE SCRUB 4 PCT CHLORHEXIDINE

## (undated) DEVICE — FIRST STEP BEDSIDE KIT - STAND-UP POUCH, ENDOSCOPIC CLEANING PAD - 1 POUCH: Brand: FIRST STEP BEDSIDE KIT - STAND-UP POUCH, ENDOSCOPIC CLEANING PAD

## (undated) DEVICE — CHLORAPREP HI-LITE 26ML ORANGE

## (undated) DEVICE — UMBILICAL TAPE: Brand: DEROYAL

## (undated) DEVICE — TROCARS: Brand: KII® OPTICAL ACCESS SYSTEM

## (undated) DEVICE — PETRI DISH STERILE

## (undated) DEVICE — SUT VICRYL 2-0 CT-1 27 IN J259H

## (undated) DEVICE — SINGLE PORT MANIFOLD: Brand: NEPTUNE 2

## (undated) DEVICE — HEAVY DUTY TABLE COVER: Brand: CONVERTORS

## (undated) DEVICE — BAG URINE DRAINAGE 2000ML ANTI RFLX LF

## (undated) DEVICE — GLOVE INDICATOR PI UNDERGLOVE SZ 6.5 BLUE

## (undated) DEVICE — CYSTO TUBING TUR Y IRRIGATION

## (undated) DEVICE — CATH FOLEY HEMATURIA 22FR 30ML 3 WAY LUBRICATH

## (undated) DEVICE — KIT, BETHLEHEM THORACIC ROBOT: Brand: CARDINAL HEALTH

## (undated) DEVICE — REDUCER: Brand: ENDOWRIST

## (undated) DEVICE — SINGLE USE SUCTION VALVE MAJ-209: Brand: SINGLE USE SUCTION VALVE (STERILE)

## (undated) DEVICE — HF-RESECTION ELECTRODE PLASMABUTTON BUTTON, 24 FR., 12°-30°, ESG TURIS: Brand: OLYMPUS

## (undated) DEVICE — BLADELESS OBTURATOR: Brand: WECK VISTA

## (undated) DEVICE — REM POLYHESIVE ADULT PATIENT RETURN ELECTRODE: Brand: VALLEYLAB

## (undated) DEVICE — 2000CC GUARDIAN II: Brand: GUARDIAN

## (undated) DEVICE — GLOVE SRG BIOGEL ECLIPSE 7.5

## (undated) DEVICE — ELECTRO LUBE IS A SINGLE PATIENT USE DEVICE THAT IS INTENDED TO BE USED ON ELECTROSURGICAL ELECTRODES TO REDUCE STICKING.: Brand: KEY SURGICAL ELECTRO LUBE

## (undated) DEVICE — NEEDLE BLUNT 18 G X 1 1/2IN

## (undated) DEVICE — DRESSING MEPILEX AG BORDER POST-OP 4 X 6 IN

## (undated) DEVICE — TRAY FOLEY 16FR URIMETER SURESTEP

## (undated) DEVICE — SINGLE USE BIOPSY VALVE MAJ-210: Brand: SINGLE USE BIOPSY VALVE (STERILE)

## (undated) DEVICE — RED RUBBER ROBINSON URETHRAL CATHETER, RADIOPAQUE, SMOOTH ROUNDED TIP, 22 FR (7.3 MM): Brand: DOVER